# Patient Record
Sex: MALE | Race: ASIAN | NOT HISPANIC OR LATINO | Employment: OTHER | URBAN - METROPOLITAN AREA
[De-identification: names, ages, dates, MRNs, and addresses within clinical notes are randomized per-mention and may not be internally consistent; named-entity substitution may affect disease eponyms.]

---

## 2023-10-24 PROBLEM — I71.40 ABDOMINAL AORTIC ANEURYSM (AAA) WITHOUT RUPTURE (HCC): Status: ACTIVE | Noted: 2023-06-14

## 2023-10-24 PROBLEM — R73.9 HYPERGLYCEMIA: Status: ACTIVE | Noted: 2023-10-24

## 2023-10-24 PROBLEM — J96.21 ACUTE ON CHRONIC RESPIRATORY FAILURE WITH HYPOXIA (HCC): Status: ACTIVE | Noted: 2023-10-24

## 2023-10-24 PROBLEM — Z86.73 HISTORY OF STROKE: Status: ACTIVE | Noted: 2021-01-11

## 2023-10-24 PROBLEM — N13.8 BPH WITH URINARY OBSTRUCTION: Status: ACTIVE | Noted: 2023-08-31

## 2023-10-24 PROBLEM — I35.1 NON-RHEUMATIC AORTIC REGURGITATION: Status: ACTIVE | Noted: 2021-01-11

## 2023-10-24 PROBLEM — I25.10 ATHEROSCLEROTIC HEART DISEASE OF NATIVE CORONARY ARTERY WITHOUT ANGINA PECTORIS: Status: ACTIVE | Noted: 2021-01-11

## 2023-10-24 PROBLEM — E78.5 HYPERLIPIDEMIA: Status: ACTIVE | Noted: 2021-01-11

## 2023-10-24 PROBLEM — N40.1 BPH WITH URINARY OBSTRUCTION: Status: ACTIVE | Noted: 2023-08-31

## 2023-10-24 PROBLEM — I10 BENIGN ESSENTIAL HYPERTENSION: Status: ACTIVE | Noted: 2021-01-11

## 2023-10-24 PROBLEM — D64.9 NORMOCYTIC ANEMIA: Status: ACTIVE | Noted: 2023-10-24

## 2023-10-24 PROBLEM — J44.1 COPD EXACERBATION (HCC): Status: ACTIVE | Noted: 2023-10-24

## 2023-10-25 PROBLEM — E61.1 IRON DEFICIENCY: Status: ACTIVE | Noted: 2023-10-25

## 2024-05-29 ENCOUNTER — APPOINTMENT (OUTPATIENT)
Dept: RADIOLOGY | Facility: MEDICAL CENTER | Age: 79
End: 2024-05-29
Attending: STUDENT IN AN ORGANIZED HEALTH CARE EDUCATION/TRAINING PROGRAM
Payer: MEDICARE

## 2024-05-29 ENCOUNTER — HOSPITAL ENCOUNTER (INPATIENT)
Facility: MEDICAL CENTER | Age: 79
End: 2024-05-29
Attending: STUDENT IN AN ORGANIZED HEALTH CARE EDUCATION/TRAINING PROGRAM | Admitting: INTERNAL MEDICINE
Payer: MEDICARE

## 2024-05-29 DIAGNOSIS — I61.4 CEREBELLAR HEMORRHAGE (HCC): ICD-10-CM

## 2024-05-29 DIAGNOSIS — I61.9 HEMORRHAGIC CEREBROVASCULAR ACCIDENT (CVA) (HCC): ICD-10-CM

## 2024-05-29 LAB
ABO GROUP BLD: NORMAL
ALBUMIN SERPL BCP-MCNC: 4.2 G/DL (ref 3.2–4.9)
ALBUMIN/GLOB SERPL: 1.2 G/DL
ALP SERPL-CCNC: 59 U/L (ref 30–99)
ALT SERPL-CCNC: 8 U/L (ref 2–50)
ANION GAP SERPL CALC-SCNC: 14 MMOL/L (ref 7–16)
APTT PPP: 26.1 SEC (ref 24.7–36)
AST SERPL-CCNC: 14 U/L (ref 12–45)
BASOPHILS # BLD AUTO: 0.2 % (ref 0–1.8)
BASOPHILS # BLD: 0.03 K/UL (ref 0–0.12)
BILIRUB SERPL-MCNC: 0.6 MG/DL (ref 0.1–1.5)
BLD GP AB SCN SERPL QL: NORMAL
BUN SERPL-MCNC: 30 MG/DL (ref 8–22)
CALCIUM ALBUM COR SERPL-MCNC: 9.6 MG/DL (ref 8.5–10.5)
CALCIUM SERPL-MCNC: 9.8 MG/DL (ref 8.5–10.5)
CHLORIDE SERPL-SCNC: 103 MMOL/L (ref 96–112)
CO2 SERPL-SCNC: 23 MMOL/L (ref 20–33)
CREAT SERPL-MCNC: 0.89 MG/DL (ref 0.5–1.4)
EOSINOPHIL # BLD AUTO: 0.01 K/UL (ref 0–0.51)
EOSINOPHIL NFR BLD: 0.1 % (ref 0–6.9)
ERYTHROCYTE [DISTWIDTH] IN BLOOD BY AUTOMATED COUNT: 43.8 FL (ref 35.9–50)
GFR SERPLBLD CREATININE-BSD FMLA CKD-EPI: 87 ML/MIN/1.73 M 2
GLOBULIN SER CALC-MCNC: 3.4 G/DL (ref 1.9–3.5)
GLUCOSE SERPL-MCNC: 162 MG/DL (ref 65–99)
HCT VFR BLD AUTO: 41.2 % (ref 42–52)
HGB BLD-MCNC: 13.2 G/DL (ref 14–18)
IMM GRANULOCYTES # BLD AUTO: 0.06 K/UL (ref 0–0.11)
IMM GRANULOCYTES NFR BLD AUTO: 0.5 % (ref 0–0.9)
INR PPP: 1.03 (ref 0.87–1.13)
LYMPHOCYTES # BLD AUTO: 0.27 K/UL (ref 1–4.8)
LYMPHOCYTES NFR BLD: 2.2 % (ref 22–41)
MCH RBC QN AUTO: 28.1 PG (ref 27–33)
MCHC RBC AUTO-ENTMCNC: 32 G/DL (ref 32.3–36.5)
MCV RBC AUTO: 87.7 FL (ref 81.4–97.8)
MONOCYTES # BLD AUTO: 0.47 K/UL (ref 0–0.85)
MONOCYTES NFR BLD AUTO: 3.8 % (ref 0–13.4)
NEUTROPHILS # BLD AUTO: 11.4 K/UL (ref 1.82–7.42)
NEUTROPHILS NFR BLD: 93.2 % (ref 44–72)
NRBC # BLD AUTO: 0 K/UL
NRBC BLD-RTO: 0 /100 WBC (ref 0–0.2)
PLATELET # BLD AUTO: 226 K/UL (ref 164–446)
PMV BLD AUTO: 11.9 FL (ref 9–12.9)
POTASSIUM SERPL-SCNC: 4.2 MMOL/L (ref 3.6–5.5)
PROT SERPL-MCNC: 7.6 G/DL (ref 6–8.2)
PROTHROMBIN TIME: 13.6 SEC (ref 12–14.6)
RBC # BLD AUTO: 4.7 M/UL (ref 4.7–6.1)
RH BLD: NORMAL
SODIUM SERPL-SCNC: 140 MMOL/L (ref 135–145)
TROPONIN T SERPL-MCNC: 25 NG/L (ref 6–19)
WBC # BLD AUTO: 12.2 K/UL (ref 4.8–10.8)

## 2024-05-29 PROCEDURE — 85730 THROMBOPLASTIN TIME PARTIAL: CPT

## 2024-05-29 PROCEDURE — 85610 PROTHROMBIN TIME: CPT

## 2024-05-29 PROCEDURE — 96368 THER/DIAG CONCURRENT INF: CPT

## 2024-05-29 PROCEDURE — 86850 RBC ANTIBODY SCREEN: CPT

## 2024-05-29 PROCEDURE — 70498 CT ANGIOGRAPHY NECK: CPT

## 2024-05-29 PROCEDURE — 700117 HCHG RX CONTRAST REV CODE 255: Performed by: STUDENT IN AN ORGANIZED HEALTH CARE EDUCATION/TRAINING PROGRAM

## 2024-05-29 PROCEDURE — 94760 N-INVAS EAR/PLS OXIMETRY 1: CPT

## 2024-05-29 PROCEDURE — 96366 THER/PROPH/DIAG IV INF ADDON: CPT

## 2024-05-29 PROCEDURE — 99291 CRITICAL CARE FIRST HOUR: CPT

## 2024-05-29 PROCEDURE — 36415 COLL VENOUS BLD VENIPUNCTURE: CPT

## 2024-05-29 PROCEDURE — 770022 HCHG ROOM/CARE - ICU (200)

## 2024-05-29 PROCEDURE — 96365 THER/PROPH/DIAG IV INF INIT: CPT

## 2024-05-29 PROCEDURE — 700111 HCHG RX REV CODE 636 W/ 250 OVERRIDE (IP): Performed by: STUDENT IN AN ORGANIZED HEALTH CARE EDUCATION/TRAINING PROGRAM

## 2024-05-29 PROCEDURE — 86900 BLOOD TYPING SEROLOGIC ABO: CPT

## 2024-05-29 PROCEDURE — 85025 COMPLETE CBC W/AUTO DIFF WBC: CPT

## 2024-05-29 PROCEDURE — 80053 COMPREHEN METABOLIC PANEL: CPT

## 2024-05-29 PROCEDURE — 86901 BLOOD TYPING SEROLOGIC RH(D): CPT

## 2024-05-29 PROCEDURE — 70496 CT ANGIOGRAPHY HEAD: CPT

## 2024-05-29 PROCEDURE — 5A1945Z RESPIRATORY VENTILATION, 24-96 CONSECUTIVE HOURS: ICD-10-PCS | Performed by: INTERNAL MEDICINE

## 2024-05-29 PROCEDURE — 84484 ASSAY OF TROPONIN QUANT: CPT

## 2024-05-29 PROCEDURE — 700105 HCHG RX REV CODE 258: Performed by: STUDENT IN AN ORGANIZED HEALTH CARE EDUCATION/TRAINING PROGRAM

## 2024-05-29 PROCEDURE — 94002 VENT MGMT INPAT INIT DAY: CPT

## 2024-05-29 PROCEDURE — 71045 X-RAY EXAM CHEST 1 VIEW: CPT

## 2024-05-29 RX ORDER — 3% SODIUM CHLORIDE 3 G/100ML
0-60 INJECTION, SOLUTION INTRAVENOUS CONTINUOUS
Status: DISCONTINUED | OUTPATIENT
Start: 2024-05-29 | End: 2024-05-31

## 2024-05-29 RX ADMIN — IOHEXOL 80 ML: 350 INJECTION, SOLUTION INTRAVENOUS at 23:05

## 2024-05-29 RX ADMIN — PROPOFOL 40 MCG/KG/MIN: 10 INJECTION, EMULSION INTRAVENOUS at 23:34

## 2024-05-29 RX ADMIN — SODIUM CHLORIDE 30 ML/HR: 3 INJECTION, SOLUTION INTRAVENOUS at 23:39

## 2024-05-29 ASSESSMENT — FIBROSIS 4 INDEX: FIB4 SCORE: 1.29

## 2024-05-30 ENCOUNTER — APPOINTMENT (OUTPATIENT)
Dept: RADIOLOGY | Facility: MEDICAL CENTER | Age: 79
End: 2024-05-30
Attending: INTERNAL MEDICINE
Payer: MEDICARE

## 2024-05-30 ENCOUNTER — APPOINTMENT (OUTPATIENT)
Dept: RADIOLOGY | Facility: MEDICAL CENTER | Age: 79
End: 2024-05-30
Attending: NURSE PRACTITIONER
Payer: MEDICARE

## 2024-05-30 ENCOUNTER — HOSPITAL ENCOUNTER (OUTPATIENT)
Dept: RADIOLOGY | Facility: MEDICAL CENTER | Age: 79
End: 2024-05-30
Attending: NEUROLOGICAL SURGERY

## 2024-05-30 PROBLEM — J44.9 COPD (CHRONIC OBSTRUCTIVE PULMONARY DISEASE) (HCC): Status: ACTIVE | Noted: 2023-10-24

## 2024-05-30 LAB
ABO + RH BLD: NORMAL
ANION GAP SERPL CALC-SCNC: 11 MMOL/L (ref 7–16)
ANION GAP SERPL CALC-SCNC: 14 MMOL/L (ref 7–16)
APPEARANCE UR: CLEAR
ARTERIAL PATENCY WRIST A: ABNORMAL
ARTERIAL PATENCY WRIST A: ABNORMAL
BACTERIA #/AREA URNS HPF: NEGATIVE /HPF
BASE EXCESS BLDA CALC-SCNC: 0 MMOL/L (ref -4–3)
BASE EXCESS BLDA CALC-SCNC: 1 MMOL/L (ref -4–3)
BILIRUB UR QL STRIP.AUTO: NEGATIVE
BODY TEMPERATURE: ABNORMAL DEGREES
BODY TEMPERATURE: ABNORMAL DEGREES
BREATHS SETTING VENT: 18
BREATHS SETTING VENT: 18
BUN SERPL-MCNC: 22 MG/DL (ref 8–22)
BUN SERPL-MCNC: 28 MG/DL (ref 8–22)
CALCIUM SERPL-MCNC: 7.9 MG/DL (ref 8.5–10.5)
CALCIUM SERPL-MCNC: 9.1 MG/DL (ref 8.5–10.5)
CFT BLD TEG: 4.1 MIN (ref 4.6–9.1)
CFT P HPASE BLD TEG: 4.6 MIN (ref 4.3–8.3)
CHLORIDE SERPL-SCNC: 103 MMOL/L (ref 96–112)
CHLORIDE SERPL-SCNC: 123 MMOL/L (ref 96–112)
CHOLEST SERPL-MCNC: 162 MG/DL (ref 100–199)
CLOT ANGLE BLD TEG: 78 DEGREES (ref 63–78)
CLOT LYSIS 30M P MA LENFR BLD TEG: 0 % (ref 0–2.6)
CO2 BLDA-SCNC: 25 MMOL/L (ref 20–33)
CO2 BLDA-SCNC: 26 MMOL/L (ref 20–33)
CO2 SERPL-SCNC: 19 MMOL/L (ref 20–33)
CO2 SERPL-SCNC: 22 MMOL/L (ref 20–33)
COLOR UR: YELLOW
CREAT SERPL-MCNC: 0.58 MG/DL (ref 0.5–1.4)
CREAT SERPL-MCNC: 0.83 MG/DL (ref 0.5–1.4)
CT.EXTRINSIC BLD ROTEM: 0.8 MIN (ref 0.8–2.1)
DELSYS IDSYS: ABNORMAL
DELSYS IDSYS: ABNORMAL
EKG IMPRESSION: NORMAL
EPI CELLS #/AREA URNS HPF: NEGATIVE /HPF
EST. AVERAGE GLUCOSE BLD GHB EST-MCNC: 114 MG/DL
GFR SERPLBLD CREATININE-BSD FMLA CKD-EPI: 89 ML/MIN/1.73 M 2
GFR SERPLBLD CREATININE-BSD FMLA CKD-EPI: 99 ML/MIN/1.73 M 2
GLUCOSE BLD STRIP.AUTO-MCNC: 120 MG/DL (ref 65–99)
GLUCOSE BLD STRIP.AUTO-MCNC: 80 MG/DL (ref 65–99)
GLUCOSE BLD STRIP.AUTO-MCNC: 85 MG/DL (ref 65–99)
GLUCOSE BLD STRIP.AUTO-MCNC: 89 MG/DL (ref 65–99)
GLUCOSE SERPL-MCNC: 116 MG/DL (ref 65–99)
GLUCOSE SERPL-MCNC: 90 MG/DL (ref 65–99)
GLUCOSE UR STRIP.AUTO-MCNC: NEGATIVE MG/DL
HBA1C MFR BLD: 5.6 % (ref 4–5.6)
HCO3 BLDA-SCNC: 23.7 MMOL/L (ref 17–25)
HCO3 BLDA-SCNC: 25.1 MMOL/L (ref 17–25)
HDLC SERPL-MCNC: 44 MG/DL
HOROWITZ INDEX BLDA+IHG-RTO: 255 MM[HG]
HOROWITZ INDEX BLDA+IHG-RTO: 288 MM[HG]
HYALINE CASTS #/AREA URNS LPF: ABNORMAL /LPF
KETONES UR STRIP.AUTO-MCNC: NEGATIVE MG/DL
LACTATE BLD-SCNC: 0.4 MMOL/L (ref 0.5–2)
LACTATE BLD-SCNC: 0.8 MMOL/L (ref 0.5–2)
LDLC SERPL CALC-MCNC: ABNORMAL MG/DL
LEUKOCYTE ESTERASE UR QL STRIP.AUTO: NEGATIVE
MCF BLD TEG: 67.7 MM (ref 52–69)
MCF.PLATELET INHIB BLD ROTEM: 30.8 MM (ref 15–32)
MICRO URNS: ABNORMAL
MODE IMODE: ABNORMAL
MODE IMODE: ABNORMAL
NITRITE UR QL STRIP.AUTO: NEGATIVE
O2/TOTAL GAS SETTING VFR VENT: 40 %
O2/TOTAL GAS SETTING VFR VENT: 40 %
OSMOLALITY SERPL: 308 MOSM/KG H2O (ref 278–298)
OSMOLALITY UR: 308 MOSM/KG H2O (ref 300–900)
PA AA BLD-ACNC: 96.9 % (ref 0–11)
PA ADP BLD-ACNC: 39.7 % (ref 0–17)
PCO2 BLDA: 35.8 MMHG (ref 26–37)
PCO2 BLDA: 36.5 MMHG (ref 26–37)
PCO2 TEMP ADJ BLDA: 34.7 MMHG (ref 26–37)
PCO2 TEMP ADJ BLDA: 35.4 MMHG (ref 26–37)
PEEP END EXPIRATORY PRESSURE IPEEP: 8 CMH20
PEEP END EXPIRATORY PRESSURE IPEEP: 8 CMH20
PH BLDA: 7.43 [PH] (ref 7.4–7.5)
PH BLDA: 7.45 [PH] (ref 7.4–7.5)
PH TEMP ADJ BLDA: 7.44 [PH] (ref 7.4–7.5)
PH TEMP ADJ BLDA: 7.46 [PH] (ref 7.4–7.5)
PH UR STRIP.AUTO: 6.5 [PH] (ref 5–8)
PO2 BLDA: 102 MMHG (ref 64–87)
PO2 BLDA: 115 MMHG (ref 64–87)
PO2 TEMP ADJ BLDA: 111 MMHG (ref 64–87)
PO2 TEMP ADJ BLDA: 98 MMHG (ref 64–87)
POTASSIUM SERPL-SCNC: 3.7 MMOL/L (ref 3.6–5.5)
POTASSIUM SERPL-SCNC: 4.6 MMOL/L (ref 3.6–5.5)
PROT UR QL STRIP: 30 MG/DL
RBC # URNS HPF: ABNORMAL /HPF
RBC UR QL AUTO: ABNORMAL
SAO2 % BLDA: 98 % (ref 93–99)
SAO2 % BLDA: 99 % (ref 93–99)
SODIUM SERPL-SCNC: 139 MMOL/L (ref 135–145)
SODIUM SERPL-SCNC: 150 MMOL/L (ref 135–145)
SODIUM SERPL-SCNC: 153 MMOL/L (ref 135–145)
SODIUM UR-SCNC: 32 MMOL/L
SP GR UR STRIP.AUTO: <=1.005
SPECIMEN DRAWN FROM PATIENT: ABNORMAL
SPECIMEN DRAWN FROM PATIENT: ABNORMAL
TEG ALGORITHM TGALG: ABNORMAL
TIDAL VOLUME IVT: 340 ML
TIDAL VOLUME IVT: 390 ML
TRIGL SERPL-MCNC: 106 MG/DL (ref 0–149)
TRIGL SERPL-MCNC: 919 MG/DL (ref 0–149)
UROBILINOGEN UR STRIP.AUTO-MCNC: 0.2 MG/DL
WBC #/AREA URNS HPF: ABNORMAL /HPF

## 2024-05-30 PROCEDURE — 700102 HCHG RX REV CODE 250 W/ 637 OVERRIDE(OP): Performed by: INTERNAL MEDICINE

## 2024-05-30 PROCEDURE — 70450 CT HEAD/BRAIN W/O DYE: CPT

## 2024-05-30 PROCEDURE — 84478 ASSAY OF TRIGLYCERIDES: CPT

## 2024-05-30 PROCEDURE — 700111 HCHG RX REV CODE 636 W/ 250 OVERRIDE (IP): Mod: JZ,JG | Performed by: INTERNAL MEDICINE

## 2024-05-30 PROCEDURE — 82962 GLUCOSE BLOOD TEST: CPT | Mod: 91

## 2024-05-30 PROCEDURE — 85347 COAGULATION TIME ACTIVATED: CPT

## 2024-05-30 PROCEDURE — 83605 ASSAY OF LACTIC ACID: CPT

## 2024-05-30 PROCEDURE — 700102 HCHG RX REV CODE 250 W/ 637 OVERRIDE(OP): Performed by: NURSE PRACTITIONER

## 2024-05-30 PROCEDURE — 81001 URINALYSIS AUTO W/SCOPE: CPT

## 2024-05-30 PROCEDURE — 700105 HCHG RX REV CODE 258: Performed by: INTERNAL MEDICINE

## 2024-05-30 PROCEDURE — 99291 CRITICAL CARE FIRST HOUR: CPT | Performed by: NURSE PRACTITIONER

## 2024-05-30 PROCEDURE — 700111 HCHG RX REV CODE 636 W/ 250 OVERRIDE (IP): Mod: JZ | Performed by: NURSE PRACTITIONER

## 2024-05-30 PROCEDURE — A9270 NON-COVERED ITEM OR SERVICE: HCPCS | Performed by: INTERNAL MEDICINE

## 2024-05-30 PROCEDURE — 85576 BLOOD PLATELET AGGREGATION: CPT | Mod: 91

## 2024-05-30 PROCEDURE — 36600 WITHDRAWAL OF ARTERIAL BLOOD: CPT

## 2024-05-30 PROCEDURE — A9270 NON-COVERED ITEM OR SERVICE: HCPCS | Performed by: NURSE PRACTITIONER

## 2024-05-30 PROCEDURE — 80048 BASIC METABOLIC PNL TOTAL CA: CPT

## 2024-05-30 PROCEDURE — 83930 ASSAY OF BLOOD OSMOLALITY: CPT

## 2024-05-30 PROCEDURE — 99223 1ST HOSP IP/OBS HIGH 75: CPT | Performed by: STUDENT IN AN ORGANIZED HEALTH CARE EDUCATION/TRAINING PROGRAM

## 2024-05-30 PROCEDURE — 770022 HCHG ROOM/CARE - ICU (200)

## 2024-05-30 PROCEDURE — 82803 BLOOD GASES ANY COMBINATION: CPT

## 2024-05-30 PROCEDURE — 94003 VENT MGMT INPAT SUBQ DAY: CPT

## 2024-05-30 PROCEDURE — 36415 COLL VENOUS BLD VENIPUNCTURE: CPT

## 2024-05-30 PROCEDURE — 84300 ASSAY OF URINE SODIUM: CPT

## 2024-05-30 PROCEDURE — 84295 ASSAY OF SERUM SODIUM: CPT

## 2024-05-30 PROCEDURE — 93010 ELECTROCARDIOGRAM REPORT: CPT | Performed by: INTERNAL MEDICINE

## 2024-05-30 PROCEDURE — 83935 ASSAY OF URINE OSMOLALITY: CPT

## 2024-05-30 PROCEDURE — 700105 HCHG RX REV CODE 258: Performed by: NURSE PRACTITIONER

## 2024-05-30 PROCEDURE — 85384 FIBRINOGEN ACTIVITY: CPT | Mod: 91

## 2024-05-30 PROCEDURE — 93005 ELECTROCARDIOGRAM TRACING: CPT | Performed by: INTERNAL MEDICINE

## 2024-05-30 PROCEDURE — 80061 LIPID PANEL: CPT

## 2024-05-30 PROCEDURE — 02HV33Z INSERTION OF INFUSION DEVICE INTO SUPERIOR VENA CAVA, PERCUTANEOUS APPROACH: ICD-10-PCS | Performed by: INTERNAL MEDICINE

## 2024-05-30 PROCEDURE — 83036 HEMOGLOBIN GLYCOSYLATED A1C: CPT

## 2024-05-30 PROCEDURE — C1751 CATH, INF, PER/CENT/MIDLINE: HCPCS

## 2024-05-30 PROCEDURE — 700101 HCHG RX REV CODE 250: Performed by: INTERNAL MEDICINE

## 2024-05-30 RX ORDER — FLUTICASONE FUROATE AND VILANTEROL 200; 25 UG/1; UG/1
1 POWDER RESPIRATORY (INHALATION) DAILY
Status: ON HOLD | COMMUNITY
End: 2024-07-19

## 2024-05-30 RX ORDER — FAMOTIDINE 20 MG/1
20 TABLET, FILM COATED ORAL EVERY 12 HOURS
Status: DISCONTINUED | OUTPATIENT
Start: 2024-05-30 | End: 2024-05-30

## 2024-05-30 RX ORDER — ACETAMINOPHEN 325 MG/1
650 TABLET ORAL EVERY 4 HOURS PRN
Status: DISCONTINUED | OUTPATIENT
Start: 2024-05-30 | End: 2024-05-30

## 2024-05-30 RX ORDER — AMOXICILLIN 250 MG
2 CAPSULE ORAL 2 TIMES DAILY
Status: DISCONTINUED | OUTPATIENT
Start: 2024-05-30 | End: 2024-05-30

## 2024-05-30 RX ORDER — POLYETHYLENE GLYCOL 3350 17 G/17G
1 POWDER, FOR SOLUTION ORAL
Status: DISCONTINUED | OUTPATIENT
Start: 2024-05-30 | End: 2024-06-26 | Stop reason: HOSPADM

## 2024-05-30 RX ORDER — ACETAMINOPHEN 650 MG/1
650 SUPPOSITORY RECTAL EVERY 4 HOURS PRN
Status: DISCONTINUED | OUTPATIENT
Start: 2024-05-30 | End: 2024-06-11

## 2024-05-30 RX ORDER — LABETALOL HYDROCHLORIDE 5 MG/ML
10 INJECTION, SOLUTION INTRAVENOUS
Status: DISCONTINUED | OUTPATIENT
Start: 2024-05-30 | End: 2024-06-01

## 2024-05-30 RX ORDER — SODIUM CHLORIDE, SODIUM LACTATE, POTASSIUM CHLORIDE, CALCIUM CHLORIDE 600; 310; 30; 20 MG/100ML; MG/100ML; MG/100ML; MG/100ML
INJECTION, SOLUTION INTRAVENOUS CONTINUOUS
Status: DISCONTINUED | OUTPATIENT
Start: 2024-05-30 | End: 2024-06-02

## 2024-05-30 RX ORDER — TAMSULOSIN HYDROCHLORIDE 0.4 MG/1
0.4 CAPSULE ORAL
Status: DISCONTINUED | OUTPATIENT
Start: 2024-05-30 | End: 2024-05-30

## 2024-05-30 RX ORDER — ACETAMINOPHEN 650 MG/1
650 SUPPOSITORY RECTAL EVERY 4 HOURS PRN
Status: DISCONTINUED | OUTPATIENT
Start: 2024-05-30 | End: 2024-05-30

## 2024-05-30 RX ORDER — NOREPINEPHRINE BITARTRATE 0.03 MG/ML
0-1 INJECTION, SOLUTION INTRAVENOUS CONTINUOUS
Status: DISCONTINUED | OUTPATIENT
Start: 2024-05-30 | End: 2024-05-31

## 2024-05-30 RX ORDER — LANSOPRAZOLE 30 MG/1
30 TABLET, ORALLY DISINTEGRATING, DELAYED RELEASE ORAL DAILY
Status: DISCONTINUED | OUTPATIENT
Start: 2024-05-31 | End: 2024-06-18

## 2024-05-30 RX ORDER — CALCIUM GLUCONATE 20 MG/ML
1 INJECTION, SOLUTION INTRAVENOUS ONCE
Status: COMPLETED | OUTPATIENT
Start: 2024-05-30 | End: 2024-05-31

## 2024-05-30 RX ORDER — ONDANSETRON 2 MG/ML
4 INJECTION INTRAMUSCULAR; INTRAVENOUS EVERY 4 HOURS PRN
Status: DISCONTINUED | OUTPATIENT
Start: 2024-05-30 | End: 2024-06-26 | Stop reason: HOSPADM

## 2024-05-30 RX ORDER — ACETAMINOPHEN 325 MG/1
650 TABLET ORAL EVERY 4 HOURS PRN
Status: DISCONTINUED | OUTPATIENT
Start: 2024-05-30 | End: 2024-06-11

## 2024-05-30 RX ORDER — BISACODYL 10 MG
10 SUPPOSITORY, RECTAL RECTAL
Status: DISCONTINUED | OUTPATIENT
Start: 2024-05-30 | End: 2024-05-30

## 2024-05-30 RX ORDER — PETROLATUM 42 G/100G
OINTMENT TOPICAL 2 TIMES DAILY
Status: DISCONTINUED | OUTPATIENT
Start: 2024-05-30 | End: 2024-06-26 | Stop reason: HOSPADM

## 2024-05-30 RX ORDER — PREDNISONE 20 MG/1
40 TABLET ORAL DAILY
Status: ON HOLD | COMMUNITY
Start: 2024-04-24 | End: 2024-06-25

## 2024-05-30 RX ORDER — DEXTROSE MONOHYDRATE 25 G/50ML
25 INJECTION, SOLUTION INTRAVENOUS
Status: DISCONTINUED | OUTPATIENT
Start: 2024-05-30 | End: 2024-06-05

## 2024-05-30 RX ORDER — POLYETHYLENE GLYCOL 3350 17 G/17G
1 POWDER, FOR SOLUTION ORAL
Status: DISCONTINUED | OUTPATIENT
Start: 2024-05-30 | End: 2024-05-30

## 2024-05-30 RX ORDER — AZITHROMYCIN 250 MG/1
250-500 TABLET, FILM COATED ORAL DAILY
Status: ON HOLD | COMMUNITY
Start: 2024-04-24 | End: 2024-06-25

## 2024-05-30 RX ORDER — HYDRALAZINE HYDROCHLORIDE 20 MG/ML
10 INJECTION INTRAMUSCULAR; INTRAVENOUS
Status: DISCONTINUED | OUTPATIENT
Start: 2024-05-30 | End: 2024-06-01

## 2024-05-30 RX ORDER — ONDANSETRON 4 MG/1
4 TABLET, ORALLY DISINTEGRATING ORAL EVERY 4 HOURS PRN
Status: DISCONTINUED | OUTPATIENT
Start: 2024-05-30 | End: 2024-05-30

## 2024-05-30 RX ORDER — ONDANSETRON 4 MG/1
4 TABLET, ORALLY DISINTEGRATING ORAL EVERY 4 HOURS PRN
Status: DISCONTINUED | OUTPATIENT
Start: 2024-05-30 | End: 2024-06-26 | Stop reason: HOSPADM

## 2024-05-30 RX ORDER — AMOXICILLIN 250 MG
2 CAPSULE ORAL EVERY EVENING
Status: DISCONTINUED | OUTPATIENT
Start: 2024-05-30 | End: 2024-06-13

## 2024-05-30 RX ORDER — ONDANSETRON 2 MG/ML
4 INJECTION INTRAMUSCULAR; INTRAVENOUS EVERY 4 HOURS PRN
Status: DISCONTINUED | OUTPATIENT
Start: 2024-05-30 | End: 2024-05-30

## 2024-05-30 RX ORDER — DIAZEPAM 5 MG/ML
5 INJECTION, SOLUTION INTRAMUSCULAR; INTRAVENOUS EVERY 6 HOURS PRN
Status: DISCONTINUED | OUTPATIENT
Start: 2024-05-30 | End: 2024-06-26 | Stop reason: HOSPADM

## 2024-05-30 RX ADMIN — Medication: at 18:54

## 2024-05-30 RX ADMIN — PROPOFOL 40 MCG/KG/MIN: 10 INJECTION, EMULSION INTRAVENOUS at 07:02

## 2024-05-30 RX ADMIN — SENNOSIDES AND DOCUSATE SODIUM 2 TABLET: 50; 8.6 TABLET ORAL at 17:19

## 2024-05-30 RX ADMIN — FENTANYL CITRATE 50 MCG: 50 INJECTION, SOLUTION INTRAMUSCULAR; INTRAVENOUS at 15:55

## 2024-05-30 RX ADMIN — NOREPINEPHRINE BITARTRATE 0.05 MCG/KG/MIN: 1 INJECTION, SOLUTION, CONCENTRATE INTRAVENOUS at 17:17

## 2024-05-30 RX ADMIN — CALCIUM GLUCONATE 1 G: 20 INJECTION, SOLUTION INTRAVENOUS at 23:15

## 2024-05-30 RX ADMIN — DESMOPRESSIN ACETATE 18.3 MCG: 4 INJECTION INTRAVENOUS; SUBCUTANEOUS at 07:52

## 2024-05-30 RX ADMIN — SODIUM CHLORIDE 20 ML/HR: 3 INJECTION, SOLUTION INTRAVENOUS at 23:18

## 2024-05-30 RX ADMIN — POTASSIUM BICARBONATE 25 MEQ: 978 TABLET, EFFERVESCENT ORAL at 23:18

## 2024-05-30 RX ADMIN — FAMOTIDINE 20 MG: 10 INJECTION, SOLUTION INTRAVENOUS at 07:44

## 2024-05-30 RX ADMIN — PROPOFOL 40 MCG/KG/MIN: 10 INJECTION, EMULSION INTRAVENOUS at 19:36

## 2024-05-30 RX ADMIN — SODIUM CHLORIDE, POTASSIUM CHLORIDE, SODIUM LACTATE AND CALCIUM CHLORIDE: 600; 310; 30; 20 INJECTION, SOLUTION INTRAVENOUS at 13:23

## 2024-05-30 ASSESSMENT — PAIN DESCRIPTION - PAIN TYPE
TYPE: ACUTE PAIN

## 2024-05-30 ASSESSMENT — ENCOUNTER SYMPTOMS
CARDIOVASCULAR NEGATIVE: 1
WEAKNESS: 1
NAUSEA: 1
PSYCHIATRIC NEGATIVE: 1
DIARRHEA: 0
EYES NEGATIVE: 1
HEADACHES: 1
VOMITING: 1
RESPIRATORY NEGATIVE: 1
CONSTITUTIONAL NEGATIVE: 1
MUSCULOSKELETAL NEGATIVE: 1

## 2024-05-30 ASSESSMENT — FIBROSIS 4 INDEX: FIB4 SCORE: 1.71

## 2024-05-30 NOTE — CARE PLAN
Problem: Ventilation  Goal: Ability to achieve and maintain unassisted ventilation or tolerate decreased levels of ventilator support  Description: Target End Date:  4 days     Document on Vent flowsheet    1.  Support and monitor invasive and noninvasive mechanical ventilation  2.  Monitor ventilator weaning response  3.  Perform ventilator associated pneumonia prevention interventions  4.  Manage ventilation therapy by monitoring diagnostic test results  Outcome: Not Met     Ventilator Daily Summary    Vent Day #2  Airway: 7.5/22    Ventilator settings: 18/390/+8/40%  Weaning trials: n/a  Respiratory Procedures: arrived intubated, awaiting ABG    Plan: Continue current ventilator settings and wean mechanical ventilation as tolerated per physician orders.

## 2024-05-30 NOTE — DISCHARGE PLANNING
[facility] Transitional Care Coordination    Referral from: [facility]    Insurance Provider on Facesheet: TEETEE/SHELDONP    Potential Rehab Diagnosis: CVA    Chart review indicates patient may have on going medical management and may have therapy needs to possibly meet inpatient rehab facility criteria with the goal of returning to community.    D/C support will need to be verified: Spouse    Physiatry consultation pended per protocol.  Vented day 1.     Thank you for the referral.

## 2024-05-30 NOTE — PROCEDURES
Vascular Access Team     Date of Insertion: 05/30/24  Arm Circumference: 26.5  Internal length: 40  External Length: 0  Vein Occupancy %: 42   Reason for PICC: Hypertonic   Labs: WBC 12.2, , BUN 28, Cr 0.83, GFR 89, INR 1.03     Consents confirmed, vessel patency confirmed with ultrasound. Risks and benefits of procedure explained to patient and education regarding central line associated bloodstream infections provided. Questions answered.      PICC placed in LUE per licensed provider order with ultrasound guidance.  5 Fr, 3 lumen PICC placed in Basilic vein after 1 attempt(s). 2 mL of 1% lidocaine injected intradermally at the insertion site. A 21 gauge microintroducer needle was visualized entering the vein and modified Seldinger technique was used to obtain access to the vein. 40 cm catheter inserted and brisk blood return was observed from each lumen upon aspiration. Line secured at the 0 cm marker. TCS stylet removed and observed to be fully intact. Each lumen flushed using pulsatile method without resistance with 10 mL 0.9% normal saline. PICC line secured with Biopatch and Tegaderm.     PICC tip placement location is confirmed by nurse to be in the Superior Vena Cava (SVC) utilizing 3CG technology. PICC line is appropriate for use at this time. Patient tolerated procedure well, without complications.  Patient condition relayed to primary RN or ordering physician via this post procedure note in the EMR.      Ultrasound images uploaded to PACS and viewable in the EMR - no  Ultrasound imaged printed and placed in paper chart - no     BARD Power PICC ref # [id number], Lot # [id number], Expiration Date 05/31/2025

## 2024-05-30 NOTE — ED PROVIDER NOTES
ED Provider Note    CHIEF COMPLAINT  Chief Complaint   Patient presents with    Possible Stroke     Pt transferred from [place] via Care flight for hemorrhagic CVA. LKW 2030. Pt was eating dinner, and had sudden headache and started vomiting. Pt originally arrived to [place] via EMS with N/V, GCS 11. Pt was intubated at [place]       EXTERNAL RECORDS REVIEWED  External ED Note acute hemorrhagic stroke seen on CT.  Patient with mental status change and intubated for airway protection prior to transport.  He has prior office visit documentation of CAD, AAA status postrepair, BPH    HPI/ROS  LIMITATION TO HISTORY   Select: intubated and sedated  OUTSIDE HISTORIAN(S):  EMS on propofol drip.  Blood pressures are normal range.  Patient was witnessed moving all extremities.    Jl Mueller is a 78 y.o. male who presents as a transfer from outside hospital.  Patient had been seen for acute onset of headache and vomiting around 6 PM.  Symptoms got worse at 630 and he was brought to an outside hospital where he gradually became less responsive.  He was found to have a hemorrhagic stroke and transferred here was arranged with neurology aware during the transfer conversation.  He otherwise has a history of CAD, hypertension, prior strokes and heart attack, reported to me he has a history of a AAA and on chart review in January he had a CTA showing aortic Endo stent grafting of abdominal aortic aneurysm and unchanged in size aneurysm.    PAST MEDICAL HISTORY   has a past medical history of Chronic obstructive pulmonary disease (HCC), Hypertension, MI, old, Peptic ulcer, Prostate disorder, and Stroke (HCC).    SURGICAL HISTORY   has a past surgical history that includes aaa with stent graft.    FAMILY HISTORY  No family history on file.    SOCIAL HISTORY  Social History     Tobacco Use    Smoking status: Unknown    Smokeless tobacco: Not on file   Vaping Use    Vaping status: Never Used   Substance and  "Sexual Activity    Alcohol use: Not Currently    Drug use: Not Currently    Sexual activity: Not on file       CURRENT MEDICATIONS  Home Medications       Reviewed by Nancy Zamudio, Pharmacy Intern (Pharmacy Intern) on 05/30/24 at 0042  Med List Status: Complete     Medication Last Dose Status   albuterol (PROVENTIL) 2.5mg/0.5ml Nebu Soln PRN Active   albuterol 108 (90 Base) MCG/ACT Aero Soln inhalation aerosol PRN Active   aspirin EC (ECOTRIN) 81 MG Tablet Delayed Response 5/29/2024 Active   azithromycin (ZITHROMAX) 250 MG Tab 4/29/2024 Active   B Complex Vitamins (B COMPLEX 1 PO) 5/29/2024 Active   fluticasone furoate-vilanterol (BREO ELLIPTA) 200-25 MCG/ACT AEROSOL POWDER, BREATH ACTIVATED  Active   guaiFENesin ER (MUCINEX) 600 MG TABLET SR 12 HR Not Taking Active   magnesium oxide (MAG-OX) 400 MG Tab tablet 5/29/2024 Active   Multiple Vitamins-Minerals (PRESERVISION AREDS 2) Cap 5/29/2024 Active   OXYGEN-HELIUM INH SUPPLY Active   pantoprazole (PROTONIX) 40 MG Tablet Delayed Response 5/29/2024 Active   Pitavastatin Calcium (LIVALO) 2 MG Tab 5/28/2024 Active   predniSONE (DELTASONE) 20 MG Tab 4/29/2024 Active   tamsulosin (FLOMAX) 0.4 MG capsule 5/29/2024 Active                  Audit from Redirected Encounters    **Home medications have not yet been reviewed for this encounter**         ALLERGIES  Allergies   Allergen Reactions    Lisinopril      cough    Namenda [Memantine]      \"weakness\"    Rosuvastatin        PHYSICAL EXAM  VITAL SIGNS: /64   Pulse 67   Temp 36.3 °C (97.4 °F) (Temporal)   Resp 18   Ht 1.676 m (5' 5.98\")   Wt 61 kg (134 lb 7.7 oz)   SpO2 100%   BMI 21.72 kg/m²    Constitutional: Awake and alert. No acute distress.  Head: NCAT.  HEENT: Normal Conjunctiva. PERRLA.  Intubated and sedated.  Neck: Grossly normal range of motion. Airway midline.  Cardiovascular: Normal heart rate, Normal rhythm.  Thorax & Lungs: No respiratory distress. Clear to Auscultation bilaterally.  Abdomen: " Normal inspection. Nontender. Nondistended  Skin: No obvious rash.  Back: No tenderness, No CVA tenderness.   Musculoskeletal: No obvious deformity. Moves all extremities Well.  Neurologic: GCS 3 T. Moving all extremities noted.  Psychiatric: Mood and affect are appropriate for situation.    EKG/LABS  Results for orders placed or performed during the hospital encounter of 05/29/24   CBC WITH DIFFERENTIAL   Result Value Ref Range    WBC 12.2 (H) 4.8 - 10.8 K/uL    RBC 4.70 4.70 - 6.10 M/uL    Hemoglobin 13.2 (L) 14.0 - 18.0 g/dL    Hematocrit 41.2 (L) 42.0 - 52.0 %    MCV 87.7 81.4 - 97.8 fL    MCH 28.1 27.0 - 33.0 pg    MCHC 32.0 (L) 32.3 - 36.5 g/dL    RDW 43.8 35.9 - 50.0 fL    Platelet Count 226 164 - 446 K/uL    MPV 11.9 9.0 - 12.9 fL    Neutrophils-Polys 93.20 (H) 44.00 - 72.00 %    Lymphocytes 2.20 (L) 22.00 - 41.00 %    Monocytes 3.80 0.00 - 13.40 %    Eosinophils 0.10 0.00 - 6.90 %    Basophils 0.20 0.00 - 1.80 %    Immature Granulocytes 0.50 0.00 - 0.90 %    Nucleated RBC 0.00 0.00 - 0.20 /100 WBC    Neutrophils (Absolute) 11.40 (H) 1.82 - 7.42 K/uL    Lymphs (Absolute) 0.27 (L) 1.00 - 4.80 K/uL    Monos (Absolute) 0.47 0.00 - 0.85 K/uL    Eos (Absolute) 0.01 0.00 - 0.51 K/uL    Baso (Absolute) 0.03 0.00 - 0.12 K/uL    Immature Granulocytes (abs) 0.06 0.00 - 0.11 K/uL    NRBC (Absolute) 0.00 K/uL   COMP METABOLIC PANEL   Result Value Ref Range    Sodium 140 135 - 145 mmol/L    Potassium 4.2 3.6 - 5.5 mmol/L    Chloride 103 96 - 112 mmol/L    Co2 23 20 - 33 mmol/L    Anion Gap 14.0 7.0 - 16.0    Glucose 162 (H) 65 - 99 mg/dL    Bun 30 (H) 8 - 22 mg/dL    Creatinine 0.89 0.50 - 1.40 mg/dL    Calcium 9.8 8.5 - 10.5 mg/dL    Correct Calcium 9.6 8.5 - 10.5 mg/dL    AST(SGOT) 14 12 - 45 U/L    ALT(SGPT) 8 2 - 50 U/L    Alkaline Phosphatase 59 30 - 99 U/L    Total Bilirubin 0.6 0.1 - 1.5 mg/dL    Albumin 4.2 3.2 - 4.9 g/dL    Total Protein 7.6 6.0 - 8.2 g/dL    Globulin 3.4 1.9 - 3.5 g/dL    A-G Ratio 1.2 g/dL    PROTHROMBIN TIME   Result Value Ref Range    PT 13.6 12.0 - 14.6 sec    INR 1.03 0.87 - 1.13   APTT   Result Value Ref Range    APTT 26.1 24.7 - 36.0 sec   COD (ADULT)   Result Value Ref Range    ABO Grouping Only A     Rh Grouping Only POS     Antibody Screen-Cod NEG    TROPONIN   Result Value Ref Range    Troponin T 25 (H) 6 - 19 ng/L   ABO Rh Confirm   Result Value Ref Range    ABO Rh Confirm A POS    ESTIMATED GFR   Result Value Ref Range    GFR (CKD-EPI) 87 >60 mL/min/1.73 m 2       I have independently interpreted this EKG    RADIOLOGY/PROCEDURES   I have independently interpreted the diagnostic imaging associated with this visit and am waiting the final reading from the radiologist.   My preliminary interpretation is as follows: L cerebellar hemorrhagic CVA. No Intraventricular bleeding    Radiologist interpretation:  DX-CHEST-PORTABLE (1 VIEW)   Final Result         1.  Left basilar atelectasis, no focal infiltrate   2.  Trace left pleural effusion   3.  Atherosclerosis      CT-CTA NECK WITH & W/O-POST PROCESSING   Final Result         1.  Scattered atherosclerosis without significant stenosis or occlusion.   2.  4.2 cm proximal descending thoracic aortic aneurysm, radiographic follow-up and surveillance recommended as clinically appropriate.         CT-CTA HEAD WITH & W/O-POST PROCESS   Final Result         1.  No large vessel occlusion or aneurysm identified   2.  Large cerebellar hemorrhage predominantly in the left cerebellum      These findings were discussed with the patient's clinician, Nikki Alexander, on 5/29/2024 11:35 PM.      EC-ECHOCARDIOGRAM COMPLETE W/O CONT    (Results Pending)   CT-HEAD W/O    (Results Pending)   MR-MRA HEAD-W/O    (Results Pending)       COURSE & MEDICAL DECISION MAKING    ASSESSMENT, COURSE AND PLAN  Care Narrative:   78-year-old male arrives as a hemorrhagic stroke transfer from outside hospital where he had acute onset of headache and vomiting at 6 PM and was found  to have a left cerebellar intraparenchymal hemorrhage without significant shift and no intraventricular bleeding  Afebrile and normotensive on arrival  He is GCS 3 T, intubated and sedated.  He is observed moving all 4 extremities when being transported to CT scanner.  Neurology arrived to CT.  CTAs are obtained in addition to CT Noncon.  Evidently outside hospital unable to push images this evening.  Imaging is as described and seemingly not worse based on description from outside hospital.  Neurology recommends hypertonic saline.  Neurosurgery consult advised by neurology, and no acute interventions recommended by neurosurgery.  Patient will be admitted to the ICU for close blood pressure control, administration of hypertonic saline, stroke care and management  Stroke: Time seen 22:50 (Time)   No, this patient is not a candidate for thrombolytic therapy because hemorrhagic CVA    CRITICAL CARE  The very real possibilty of a deterioration of this patient's condition required the highest level of my preparedness for sudden, emergent intervention.  I provided critical care services, which included medication orders, frequent reevaluations of the patient's condition and response to treatment, ordering and reviewing test results, and discussing the case with various consultants.  The critical care time associated with the care of the patient was 33 minutes. Review chart for interventions. This time is exclusive of any other billable procedures.     ADDITIONAL PROBLEMS MANAGED  none    DISPOSITION AND DISCUSSIONS  I have discussed management of the patient with the following physicians and LIZZ's:    Neurology  Neurosurgery  Radiology  ICU    Discussion of management with other QHP or appropriate source(s): Pharmacy hypertonic saline and RT for intubation care      Barriers to care at this time, including but not limited to:  none .     Decision tools and prescription drugs considered including, but not limited to:  none  .    FINAL DIAGNOSIS  1. Hemorrhagic cerebrovascular accident (CVA) (HCC)    2. Cerebellar hemorrhage (HCC)           Electronically signed by: Nikki Alexander D.O., 5/29/2024 10:56 PM

## 2024-05-30 NOTE — THERAPY
Speech Language Therapy Contact Note    Patient Name: Jl Mueller  Age:  78 y.o., Sex:  male  Medical Record #: [id number]  Today's Date: 5/30/2024 05/30/24 0810   Treatment Variance   Reason For Missed Therapy Medical - Patient on Hold from Therapy;Medical - Patient has Bowel Issues;Medical - Other (Please Comment)   Initial Contact Note    Initial Contact Note  Order Received and Verified, Speech Therapy Evaluation in Progress with Full Report to Follow.   Interdisciplinary Plan of Care Collaboration   IDT Collaboration with  Other (See Comments)  (EMR)   Collaboration Comments Order for CSE received/chart reviewed. Per EMR, pt remains intubated at this junction. Will hold and monitor for extubation to complete CSE if/when appropriate.

## 2024-05-30 NOTE — ED NOTES
Bedside report received from off going RN Isai, assumed care of patient.  POC discussed with patient. Call light within reach, all needs addressed at this time.       Fall risk interventions in place: Move the patient closer to the nurse's station, Patient's personal possessions are with in their safe reach, Place fall risk sign on patient's door, and Other (comment required) (all applicable per [place] Fall risk assessment)   Continuous monitoring: Cardiac Leads, Pulse Ox, or Blood Pressure  IVF/IV medications: Infusion per MAR (List Med(s)) Isotonic 3 % and propofol drip  Oxygen: vented 40 %   Bedside sitter: Not Applicable   Isolation: Not Applicable

## 2024-05-30 NOTE — ED NOTES
PIV placed at charge desk, labs drawn pt taken to CT via 2 trauma Rns, and RT on ventilator and zoll montior.

## 2024-05-30 NOTE — ED TRIAGE NOTES
"Chief Complaint   Patient presents with    Possible Stroke     Pt transferred from [place] via Care flight for hemorrhagic CVA. LKW 2030. Pt was eating dinner, and had sudden headache and started vomiting. Pt originally arrived to [place] via EMS with N/V, GCS 11. Pt was intubated at [place]           /75   Pulse 66   Temp 36.3 °C (97.4 °F) (Temporal)   Resp 18   Ht 1.676 m (5' 5.98\")   Wt 61 kg (134 lb 7.7 oz)   SpO2 97%   BMI 21.72 kg/m²     "

## 2024-05-30 NOTE — ASSESSMENT & PLAN NOTE
S/p EVAR w/ bypass graft stent  - Maintain normotension  - Holding ASA at this time; per wife he takes ASA 3x/week

## 2024-05-30 NOTE — ASSESSMENT & PLAN NOTE
Per wife, patient's medications were discontinued as an outpatient because his BP has been controlled  - SBP in the 170s per wife's at home BP cuff measurement; <160 since arrival to this facility  - Cardene & PRN antihypertensives to maintain -160)  - Monitor I&O  - Cardiac diet when appropriate  - Control pain

## 2024-05-30 NOTE — CONSULTS
Chief complaint cerebellar hemorrhage  Brief HPI this is a 78-year-old gentleman who had onset of headache subsequently was brought to outside ER where he had a CT scan demonstrating left superior hemispheric cerebellar hemorrhage there is relatively diffuse dispersed without any significant mass effect or impingement on the fourth ventricle he did not have any hydrocephalus he was intubated at [place] is unclear of the reasons other than the fact that he had a hemorrhage and then was sent to [facility] where he was moving everything and following commands this morning he had a CT head repeat which showed slight interval evolution of the hemorrhage but nothing that looked enlarged and nothing that was increased in terms of mass effect or impingement of the fourth ventricle he is on sedation this morning and is moving everything with it what seems to be slight favoring of the right side over the left side but does not have any focal deficits that we can discern at this time he is not following commands but is likely due to sedation for the intubation status.    CT head this morning again looks relatively stable does not appear to be any significant expansion of the hemorrhage and there also does not appear to be any significant changes in the overall ventricular size.    12 point review of systems is negative for any loss of consciousness bowel or bladder dysfunction or focal deficits.    Past medical history significant for 3 intraparenchymal hematomas in the past  Past surgical history noncontributory  Medications he is on aspirin every other day for unclear reasons.    Physical exam  The patient is intubated on propofol for sedation  He will move all extremities x 4 with right side greater than left but does not seem to have any sensory deficit or motor deficit  Cranial nerves grossly intact.    CT head this morning again demonstrates stability of the exam of the scan with slight increased hyper density within  the clot cavity but no signs of expansion of the clot or bigger more mass effect.    Assessment and plan  At this time we will defer anticoagulation reversal to the ICU  Okay for every hour neurochecks until the patient is extubated if he has stable exam he can be downgraded to every 2 neurochecks  Medical management per ICU  If the patient obstructs or becomes concerning for decline in exam we would consider placement of an EVD based on the size of the clot and location of clot we think this is unlikely additionally due to the lack of mass effect we do not think the patient will require surgical intervention unless the clot expand significantly given the amount of atrophy in the patient's posterior fossa.  No role for Keppra  If patient has decline in exam please repeat the CT head as of now we feel the CT is significantly stable enough for we do not require a follow-up scan unless ICU feels it is necessary    We will continue to follow until the patient is extubated and stable  Total 50 minutes in direct patient care coordination consultation evaluation  Alfredito Hicks MD

## 2024-05-30 NOTE — ASSESSMENT & PLAN NOTE
Not currently in exacerbation  - CXR now and Qam, no acute process  - Continue home nebulizers as ordered  - Continue PRN nebulizers Q4h  - Wean FIo2 as tolerated

## 2024-05-30 NOTE — ED NOTES
Med rec completed per pt's spouse at bedside with home medication bottles.  Home antibiotics in past 30 days:   - Azithromycin x5 days, completed 4/29   Anticoagulants/antiplatelets in past 14 days:   - aspirin 81mg daily, last dose today in AM    Nancy Zamudio, Pharmacy Intern

## 2024-05-30 NOTE — PROCEDURES
"Per Radiology \"PICC line is in place with the tip projecting over the SVC in satisfactory position\" Picc line is ok for use.  "

## 2024-05-30 NOTE — ASSESSMENT & PLAN NOTE
Intubated for airway protection  Lung protective ventilation strategies  Titrate ventilator prescription to optimize oxygenation, ventilation, and acid base balance.  Daily ABC trials

## 2024-05-30 NOTE — H&P
Critical Care History & Physical Note    Date of Service  5/30/2024    Primary Care Physician  Ximena Callahan N.P.    Consultants  neurology and neurosurgery    Specialist Names: Dr. Oliver, Dr. Hicks    Code Status  Full Code    Chief Complaint  Chief Complaint   Patient presents with    Possible Stroke     Pt transferred from [place] via Care flight for hemorrhagic CVA. LKW 2030. Pt was eating dinner, and had sudden headache and started vomiting. Pt originally arrived to [place] via EMS with N/V, GCS 11. Pt was intubated at [place]       History of Presenting Illness  Jl Mueller is a 78 y.o. male smoker w/ PMHx s/f HTN, CAD, HLD, prior MI, AAA s/p bypass graft stenting, COPD on 2L home O2, two prior hemorrhagic strokes w/o reported deficits, BPH s/p prostate artery embolization who initially presented to an outside facility via EMS on 5/29/2024 for evaluation. Per the patient's wife, the patient was eating dinner when he stated he had a headache and felt weak. He then had two episodes of emesis and became altered to GCS11; he was intubated at some point prior to transfer to this facility due to his neuro status. On imaging at the outside facility, he was found to have a left cerebellar hemorrhage, mild perihematomal edema with subtle parenchymal displacement. 4th ventricle with moderate compression but open. for which he was transferred to this facility for higher level of care. On arrival, the patient was moving all extremities and withdrew to pain in all extremities. His pupils are pinpoint, and he over breaths vent. Repeat imaging redemonstrates area of Lt-cerebellar hemorrhage.  A CTA was completed which, does not clearly show an aneurysmal/vascular malformation. He is being admitted to the ICU for hypertonic saline therapy, Q1h neurological checks, and strict blood pressure control as well as ventilator management.     .  I discussed the plan of care with family, bedside RN,  "neurology and neurosurgery, and Dr. Stephenson .    Review of Systems  Review of Systems   Unable to perform ROS: Mental acuity (history obtained by wife)   Constitutional: Negative.    HENT: Negative.     Eyes: Negative.    Respiratory: Negative.     Cardiovascular: Negative.    Gastrointestinal:  Positive for nausea and vomiting. Negative for diarrhea.   Genitourinary:         Incontinence of urine at baseline   Musculoskeletal: Negative.         Uses walker/wheelchair at baseline   Skin:  Positive for itching (bandaids to shoulder/back from him itching).   Neurological:  Positive for weakness (patient did not specify to wife any focal weakness) and headaches.        AMS (GCS11) required intubation at [facility]    Endo/Heme/Allergies: Negative.    Psychiatric/Behavioral: Negative.         Past Medical History   has a past medical history of Chronic obstructive pulmonary disease (HCC), Hypertension, MI, old, Peptic ulcer, Prostate disorder, and Stroke (HCC).    Surgical History  AAA bypass graft stent, prostate artery embolization, Bilateral cataract surgery    Family History  Family history reviewed with patient. There is family history that is pertinent to the chief complaint.   Father - Stroke  Sister - Hemorrhagic stroke     Social History  He has been a smoker since his late teens; his wife states he smokes 1-3 packs of cigarettes per day, no other sources of tobacco or nicotine reported. He drinks alcohol on rare occasion socially. He reports that he does not currently use drugs.  He lives with his wife who is his surrogate decision maker  He ambulates with a walker at baseline; wheelchair for longer distances    Allergies  Allergies   Allergen Reactions    Lisinopril      cough    Namenda [Memantine]      \"weakness\"    Rosuvastatin        Medications  Prior to Admission Medications   Prescriptions Last Dose Informant Patient Reported? Taking?   B Complex Vitamins (B COMPLEX 1 PO) 5/29/2024 at AM  Yes Yes   Sig: Take 1 " Tablet by mouth every day.   Multiple Vitamins-Minerals (PRESERVISION AREDS 2) Cap 5/29/2024 at AM  Yes Yes   Sig: Take  by mouth.   OXYGEN-HELIUM INH SUPPLY at SUPPLY  Yes No   Sig: Inhale. 2 lt NC   Pitavastatin Calcium (LIVALO) 2 MG Tab 5/28/2024 at PM  Yes Yes   Sig: Take  by mouth every day.   albuterol (PROVENTIL) 2.5mg/0.5ml Nebu Soln PRN at PRN  Yes Yes   Sig: Take 2.5 mg by nebulization every four hours as needed for Shortness of Breath.   albuterol 108 (90 Base) MCG/ACT Aero Soln inhalation aerosol PRN at PRN  No No   Sig: Inhale 2 Puffs every 6 hours as needed for Shortness of Breath.   aspirin EC (ECOTRIN) 81 MG Tablet Delayed Response 5/29/2024 at AM  Yes Yes   Sig: Take 81 mg by mouth every day.   azithromycin (ZITHROMAX) 250 MG Tab 4/29/2024 at COMPLETED  Yes Yes   Sig: Take 250-500 mg by mouth every day. 500MG DAY ONE THEN 250MG DAYS 2-5   fluticasone furoate-vilanterol (BREO ELLIPTA) 200-25 MCG/ACT AEROSOL POWDER, BREATH ACTIVATED   Yes No   Sig: Inhale 1 Puff every day.   guaiFENesin ER (MUCINEX) 600 MG TABLET SR 12 HR Not Taking  No No   Sig: Take 1 Tablet by mouth every day.   Patient not taking: Reported on 5/30/2024   magnesium oxide (MAG-OX) 400 MG Tab tablet 5/29/2024 at AM  Yes Yes   Sig: Take 400 mg by mouth every day.   pantoprazole (PROTONIX) 40 MG Tablet Delayed Response 5/29/2024 at AM  Yes Yes   Sig: Take 40 mg by mouth every day.   predniSONE (DELTASONE) 20 MG Tab 4/29/2024 at COMPLETED  Yes Yes   Sig: Take 40 mg by mouth every day. 5 DAYS   tamsulosin (FLOMAX) 0.4 MG capsule 5/29/2024 at AM  Yes Yes   Sig: Take 0.4 mg by mouth 1/2 hour after breakfast.      Facility-Administered Medications: None       Physical Exam  Temp:  [36.3 °C (97.4 °F)] 36.3 °C (97.4 °F)  Pulse:  [59-68] 67  Resp:  [18-22] 18  BP: (112-159)/(63-75) 121/64  SpO2:  [97 %-100 %] 100 %  Blood Pressure : 121/64   Temperature: 36.3 °C (97.4 °F)   Pulse: 66   Respiration: 20   Pulse Oximetry: 98 %       Physical  "Exam  Constitutional:       Comments: Intubated/sedated on ventilator   HENT:      Head: Normocephalic and atraumatic.      Nose: Nose normal.      Mouth/Throat:      Mouth: Mucous membranes are dry.   Eyes:      General: No scleral icterus.     Pupils: Pupils are equal, round, and reactive to light.      Comments: Pinpoint pupils   Cardiovascular:      Rate and Rhythm: Normal rate and regular rhythm.      Pulses: Normal pulses.      Heart sounds: Normal heart sounds. No murmur heard.  Pulmonary:      Effort: Pulmonary effort is normal.      Breath sounds: Normal breath sounds. No wheezing.   Abdominal:      General: Abdomen is flat. Bowel sounds are normal. There is no distension.      Palpations: Abdomen is soft. There is no mass.      Tenderness: There is no abdominal tenderness.   Genitourinary:     Penis: Normal.    Musculoskeletal:      Cervical back: Normal range of motion and neck supple. No rigidity.      Right lower leg: No edema.      Left lower leg: No edema.   Skin:     General: Skin is warm and dry.      Capillary Refill: Capillary refill takes 2 to 3 seconds.      Findings: Lesion (back/shoulder/arm lesions from pt scratching) present.   Neurological:      Comments: L side stronger than Right side  Withdrawals to all 4 extremities  Pupils pinpoint bilaterally  Bilateral feet w/ upgoing toes/leg flexion, appropriately w/d to pain         Laboratory:  Recent Labs     05/29/24 2249   WBC 12.2*   RBC 4.70   HEMOGLOBIN 13.2*   HEMATOCRIT 41.2*   MCV 87.7   MCH 28.1   MCHC 32.0*   RDW 43.8   PLATELETCT 226   MPV 11.9     Recent Labs     05/29/24 2249   SODIUM 140   POTASSIUM 4.2   CHLORIDE 103   CO2 23   GLUCOSE 162*   BUN 30*   CREATININE 0.89   CALCIUM 9.8     Recent Labs     05/29/24 2249   ALTSGPT 8   ASTSGOT 14   ALKPHOSPHAT 59   TBILIRUBIN 0.6   GLUCOSE 162*     Recent Labs     05/29/24 2249   APTT 26.1   INR 1.03     No results for input(s): \"NTPROBNP\" in the last 72 hours.      Recent Labs     " 05/29/24  2249   TROPONINT 25*       Imaging:  DX-CHEST-PORTABLE (1 VIEW)   Final Result         1.  Left basilar atelectasis, no focal infiltrate   2.  Trace left pleural effusion   3.  Atherosclerosis      CT-CTA NECK WITH & W/O-POST PROCESSING   Final Result         1.  Scattered atherosclerosis without significant stenosis or occlusion.   2.  4.2 cm proximal descending thoracic aortic aneurysm, radiographic follow-up and surveillance recommended as clinically appropriate.         CT-CTA HEAD WITH & W/O-POST PROCESS   Final Result         1.  No large vessel occlusion or aneurysm identified   2.  Large cerebellar hemorrhage predominantly in the left cerebellum      These findings were discussed with the patient's clinician, Nikki Alexander, on 5/29/2024 11:35 PM.      EC-ECHOCARDIOGRAM COMPLETE W/O CONT    (Results Pending)   CT-HEAD W/O    (Results Pending)   MR-MRA HEAD-W/O    (Results Pending)       X-Ray:  I have personally reviewed the images and compared with prior images.  EKG:  I have personally reviewed the images and compared with prior images.    Assessment/Plan:  * Cerebellar hemorrhage (HCC)- (present on admission)  Assessment & Plan  Patient reportedly stated he had a headache and felt weak while eating dinner followed by AMS (GCS 11) with emesis x2 episodes at home; LKW 2030  -Admit to ICU  -q1h neurochecks  -Repeat CTH in AM to assess for hematoma expansion; ICH score=1  -MRI brain ICH protocol- this is MRI brain w/wo contrast, MRA head w/o contrast  -Keep -160, target 140 or MAP  strict,  cardene PRN  -3% Saline: check serum Na and Osm q6 hour, goal Na 150-160  -Neurosurgery evaluation; non operative at this time  -Neurovascular evaluation   - Per Dr. Oliver, no obvious Aneurysm/AVM on imaging - will d/w Neuro IR in am  -No coagulation noted; TEG pending (pt takes 81mg ASA 3x/wk)  -Hold all antithrombotic therapy  -SCDs only for DVT ppx  -keep euvolemic, euthermic, and  normoglycemic (140-180)  -Maintain bowel regimen to avoid Valsalva and increased intracranial pressure.  -HOB at 30 degrees  -maintain pCO2 of 35-40; closer to 35  -Start atorvastatin 40 mg qHS for possible neuroprotective effect  -Send for A1c, Lipid panel as well as baseline urine studies  -not a candidate for PT/OT/SLP    Benign essential hypertension- (present on admission)  Assessment & Plan  Per wife, patient's medications were discontinued as an outpatient because his BP has been controlled  - SBP in the 170s per wife's at home BP cuff measurement; <160 since arrival to this facility  - Cardene & PRN antihypertensives to maintain SBP <160mmHg  - Monitor I&O  - Cardiac diet when appropriate  - Control pain    Hyperglycemia- (present on admission)  Assessment & Plan  - Obtain HgA1c  - SSI as ordered  - Carb controlled diet when tolerating PO  - Dietary/lifestyle modification counseling    Acute on chronic respiratory failure with hypoxia (HCC)- (present on admission)  Assessment & Plan  2/2 neurological status  - Obtain CXR and ABG now and as needed  - Daily SAT/SBT as tolerated  - Wean ventilator settings for extubation when appropriate  - VAP bundle  - Maintain HOB >30*  - Sedation for RASS 0 to -1    COPD (chronic obstructive pulmonary disease) (HCC)- (present on admission)  Assessment & Plan  Not currently in exacerbation  - CXR now and Qam, no acute process  - Continue home nebulizers as ordered  - Continue PRN nebulizers Q4h  - Wean FIo2 as tolerated    Hyperlipidemia- (present on admission)  Assessment & Plan  Per wife, patient's home medication was discontinued due to muscle cramps  - Start atorvastatin 40mg QHS for neuroprotection; monitor for tolerance  - Obtain Lipid panel  - Statin therapy; hold for elevated CK/LFTs  - Counseling on lifestyle/dietary modifications    History of stroke- (present on admission)  Assessment & Plan  Patient has had 2 prior hemorrhagic strokes; last in 2015  - No noted  deficits per wife    BPH with urinary obstruction- (present on admission)  Assessment & Plan  S/p prostate artery embolization  - Per wife, patient has suffered from urinary retention and incontinence since his surgery  - Continue flomax  - [place] for strict I&O    Abdominal aortic aneurysm (AAA) without rupture (HCC)- (present on admission)  Assessment & Plan  S/p EVAR w/ bypass graft stent  - Maintain normotension  - Holding ASA at this time; per wife he takes ASA 3x/week        VTE prophylaxis: SCDs/TEDs and pharmacologic prophylaxis contraindicated due to L ICH.     Patient was critically ill during the time I treated the patient.    75 minutes of critical care time were spent, including examination and interview of the patient, explanation of prognosis/diagnosis/plan of care, direction of nursing staff and discussion of the patient with other physicians involved.  This time was independent of procedures performed.    Greater than 50% of which was spent at the bedside.

## 2024-05-30 NOTE — THERAPY
Physical Therapy Contact Note    Patient Name: Jl Mueller  Age:  78 y.o., Sex:  male  Medical Record #: [id number]  Today's Date: 5/30/2024    PT consult received, pt new admit with active bedrest order will monitor for appropriate timing of PT eval;     Monica HEARN, PT,  [phone]   Yes

## 2024-05-30 NOTE — ASSESSMENT & PLAN NOTE
Per wife, patient's home medication was discontinued due to muscle cramps  - Start atorvastatin 40mg QHS for neuroprotection; monitor for tolerance  - Obtain Lipid panel  - Statin therapy; hold for elevated CK/LFTs  - Counseling on lifestyle/dietary modifications

## 2024-05-30 NOTE — CONSULTS
Neurology Initial Consult H&P  Neurohospitalist Service, [facility]    Referring Physician: Chilo Stephenson D.O.    Chief Complaint   Patient presents with    Possible Stroke     Pt transferred from [place] via Care flight for hemorrhagic CVA. LKW 2030. Pt was eating dinner, and had sudden headache and started vomiting. Pt originally arrived to [place] via EMS with N/V, GCS 11. Pt was intubated at [place]       HPI: Jl Mueller is a 78 y.o. M w/ prior hx of HTN, HLD, AAA repair (6/2023), UGIB, prior stroke 2008 and prior ICH 2015, CAD s/p stent 2008, COPD who presents as trasnsfer from [facility] after acute onset of vomiting and found on head imaging to have Lt-cerebellar spontaneous IPH. Patient did not have signs of midline shift, hydrocephalus, brainstem compression or intraventricular extension. Patient was intubated prior to transfer for diminished mental status and failure to protect airway. Patient was moving all extremities upon transfer and repeat head imaging without worsening of hemorrhage. Patient's wife reports history at bedside this morning. Patient was provided dinner by wife but when he wasn't eating she asked him what was wrong. Patient tried to get up from his chair but could not do so and wife did think that they should call 911 because she was unable to help him ambulate to the couch to lay down. Patient abruptly began vomiting and vomited roughly 3 times. Patient had no prodromal complaints and was in his [facility] prior to event. Denied acute headache prior to event. Wife reports good medication compliance because she provides him with his medications.     Review of systems: In addition to what is detailed in the HPI above, all other systems reviewed and are negative.    Past Medical History:    has a past medical history of Chronic obstructive pulmonary disease (HCC), Hypertension, MI, old, Peptic ulcer, Prostate disorder, and Stroke  "([facility]).    FHx:  family history is not on file.    SHx:   reports that he does not currently use alcohol. He reports that he does not currently use drugs.    Allergies:  Allergies   Allergen Reactions    Lisinopril      cough    Namenda [Memantine]      \"weakness\"    Rosuvastatin        Medications:    Current Facility-Administered Medications:     diazePAM (Valium) injection 5 mg, 5 mg, Intravenous, Q6HRS PRN, Sarina Euceda    insulin regular (HumuLIN R,NovoLIN R) injection, 1-6 Units, Subcutaneous, Q6HRS **AND** POC blood glucose manual result, , , Q6H **AND** NOTIFY MD and PharmD, , , Once **AND** Administer 20 grams of glucose (approximately 8 ounces of fruit juice) every 15 minutes PRN FSBG less than 70 mg/dL, , , PRN **AND** dextrose 50% (D50W) injection 25 g, 25 g, Intravenous, Q15 MIN PRN, Sarina Euceda    niCARdipine (Cardene) 25 mg in  mL Standard Infusion, 0-15 mg/hr, Intravenous, Continuous, Chilo Stephenson D.O.    labetalol (Normodyne/Trandate) injection 10 mg, 10 mg, Intravenous, Q3HRS PRN, Sarina Euceda    hydrALAZINE (Apresoline) injection 10 mg, 10 mg, Intravenous, Q3HRS PRN, Sarina Euceda    norepinephrine (Levophed) 8 mg in 250 mL NS infusion (premix), 0-1 mcg/kg/min, Intravenous, Continuous, LOBO LunaOAye    senna-docusate (Pericolace Or Senokot S) 8.6-50 MG per tablet 2 Tablet, 2 Tablet, Enteral Tube, Q EVENING **AND** polyethylene glycol/lytes (Miralax) Packet 1 Packet, 1 Packet, Enteral Tube, QDAY PRN, Sarina Euceda    Respiratory Therapy Consult, , Nebulization, Continuous RT, Sarina Euceda MD Alert...ICU Electrolyte Replacement per Pharmacy, , Other, PHARMACY TO DOSE, Sarina Euceda    lidocaine (Xylocaine) 1 % injection 2 mL, 2 mL, Tracheal Tube, Q30 MIN PRN, Sarina Euceda    acetaminophen (Tylenol) tablet 650 mg, 650 mg, Enteral Tube, Q4HRS PRN **OR** acetaminophen (Tylenol) suppository 650 mg, 650 mg, Rectal, Q4HRS PRN, Sarina Euceda    ondansetron (Zofran ODT) " dispertab 4 mg, 4 mg, Enteral Tube, Q4HRS PRN **OR** ondansetron (Zofran) syringe/vial injection 4 mg, 4 mg, Intravenous, Q4HRS PRN, Sarina Euceda    albuterol (Proventil) 2.5mg/0.5ml nebulizer solution 2.5 mg, 2.5 mg, Nebulization, Q4HRS PRN, Sarina LAye Lattanja    propofol (DIPRIVAN) injection, 0-80 mcg/kg/min, Intravenous, Continuous, Last Rate: 18.3 mL/hr at 05/30/24 1000, 50 mcg/kg/min at 05/30/24 1000 **AND** Triglycerides Starting now and then Every 3 Days, , , Every 3 Days (0300), Sarina Euceda    fentaNYL (Sublimaze) injection 25-50 mcg, 25-50 mcg, Intravenous, Q HOUR PRN, Sarina LAye Lattanja    Pharmacy Consult: Enteral tube insertion - review meds/change route/product selection, , Other, PHARMACY TO DOSE, Reyes Gray M.D.    [START ON 5/31/2024] lansoprazole (Prevacid) solutab 30 mg, 30 mg, Enteral Tube, DAILY, Reyes Gray M.D.    3% sodium chloride (Hypertonic Saline) 500mL infusion, 0-60 mL/hr, Intravenous, Continuous, Sarina RODRIGUEZ Lattanja, Last Rate: 20 mL/hr at 05/30/24 0738, 20 mL/hr at 05/30/24 0738    sodium chloride 200 mEq in empty bag 50 mL infusion, 200 mEq, Intravenous, Q6HRS PRN, Sarina L. Lattanja      NEUROLOGICAL EXAM:     Sedated (Propofol). Intubated and Ventilated.    Level of consciousness: Responsive to pinch in all extremities with limb and truncal withdrawal. Patient did not follow commands    Pupils: Pinpoint pupils, difficult to establish reactivity.   Oculomotor: Conjugate. No gaze deviation. No overt nystagmus. Oculomotor reflex impaired  Corneal reflexes: Not tested  Blink to threat: Not elicited  Cough reflex: Intact  Gag reflex: Intact  Overbreathes ventilator    Face appears symmetric    Motor: Moves all extremities to pinch, withdrawal seen.   Deep tendon reflexes: No clonus.   Plantar responses: Mute      NIHSS: National Institutes of Health Stroke Scale    [2] 1a:Level of Consciousness    0-alert 1-drowsy   2-stupor   3-coma  [2] 1b:LOC Questions                  0-both   1-one      2-neither  [2] 1c:LOC Commands                   0-both  1-one      2-neither  [0] 2: Best Gaze                     0-nl    1-partial  2-forced  [0] 3: Visual Fields                   0-nl    1-partial  2-complete 3-bilat  [0] 4: Facial Paresis                0-nl    1-minor    2-partial  3-full  MOTOR                       0-nl  [3] 5: Right Arm           1-drift  [3] 6: Left Arm             2-some effort vs gravity  [3] 7: Right Leg           3-no effort vs gravity  [3] 8: Left Leg             4-no movement                             x-untestable  [0] 9: Limb Ataxia                    0-abs   1-1_limb   2-2+_limbs       x-untestable  [0] 10:Sensory                        0-nl    1-partial  2-dense  [3] 11:Best Language/Aphasia         0-nl    1-mild/mod 2-severe   3-mute  [2] 12:Dysarthria                     0-nl    1-mild/mod 2-severe       x-untestable  [0] 13:Neglect/Inattention            0-none  1-partial  2-complete  [23] TOTAL      Objective Data:    Labs:  Lab Results   Component Value Date/Time    PROTHROMBTM 13.6 05/29/2024 10:49 PM    INR 1.03 05/29/2024 10:49 PM      Lab Results   Component Value Date/Time    WBC 12.2 (H) 05/29/2024 10:49 PM    RBC 4.70 05/29/2024 10:49 PM    HEMOGLOBIN 13.2 (L) 05/29/2024 10:49 PM    HEMATOCRIT 41.2 (L) 05/29/2024 10:49 PM    MCV 87.7 05/29/2024 10:49 PM    MCH 28.1 05/29/2024 10:49 PM    MCHC 32.0 (L) 05/29/2024 10:49 PM    MPV 11.9 05/29/2024 10:49 PM    NEUTSPOLYS 93.20 (H) 05/29/2024 10:49 PM    LYMPHOCYTES 2.20 (L) 05/29/2024 10:49 PM    MONOCYTES 3.80 05/29/2024 10:49 PM    EOSINOPHILS 0.10 05/29/2024 10:49 PM    BASOPHILS 0.20 05/29/2024 10:49 PM      Lab Results   Component Value Date/Time    SODIUM 150 (H) 05/30/2024 05:00 AM    POTASSIUM 4.6 05/30/2024 02:00 AM    CHLORIDE 103 05/30/2024 02:00 AM    CO2 22 05/30/2024 02:00 AM    GLUCOSE 116 (H) 05/30/2024 02:00 AM    BUN 28 (H) 05/30/2024 02:00 AM    CREATININE 0.83 05/30/2024 02:00 AM     GLOMRATE 78 10/19/2023 09:26 AM      Lab Results   Component Value Date/Time    CHOLSTRLTOT 162 05/30/2024 02:00 AM    LDL see below 05/30/2024 02:00 AM    HDL 44 05/30/2024 02:00 AM    TRIGLYCERIDE 106 05/30/2024 05:00 AM       Lab Results   Component Value Date/Time    ALKPHOSPHAT 59 05/29/2024 10:49 PM    ASTSGOT 14 05/29/2024 10:49 PM    ALTSGPT 8 05/29/2024 10:49 PM    TBILIRUBIN 0.6 05/29/2024 10:49 PM        Imaging/Testing:    I interpreted and/or reviewed the patient's neuroimaging    CT-HEAD W/O   Final Result         1.  Left cerebellar hemorrhage, similar compared to prior study.   2.  Minimal bilateral intraventricular hemorrhages, new since prior study.   3.  Nonspecific white matter changes, commonly associated with small vessel ischemic disease.  Associated mild cerebral atrophy is noted.   4.  Atherosclerosis.         DX-CHEST-PORTABLE (1 VIEW)   Final Result         1.  Left basilar atelectasis, no focal infiltrate   2.  Trace left pleural effusion   3.  Atherosclerosis      CT-CTA NECK WITH & W/O-POST PROCESSING   Final Result         1.  Scattered atherosclerosis without significant stenosis or occlusion.   2.  4.2 cm proximal descending thoracic aortic aneurysm, radiographic follow-up and surveillance recommended as clinically appropriate.         CT-CTA HEAD WITH & W/O-POST PROCESS   Final Result         1.  No large vessel occlusion or aneurysm identified   2.  Large cerebellar hemorrhage predominantly in the left cerebellum      These findings were discussed with the patient's clinician, Nikki Alexander, on 5/29/2024 11:35 PM.      DX-CHEST-PORTABLE (1 VIEW)   Final Result      Tubes as above      Left pleural effusion      CHF/pulmonary edema pattern      See Brown MD   5/30/2024 8:41 AM         CT-HEAD W/O   Final Result      Acute intraparenchymal hemorrhage is noted involving the left cerebellar hemisphere, measuring 3.1 x 4.2 x 3.4 cm, with associated mass effect.      This finding  was telephoned to the mentioned attending by on-call radiologist at the time of imaging         AAST Intracranial Hemorrhage Brain Injury Guidelines         Hemorrhage type  mBIG 1           mBIG 2                mBIG 3      Subdural             <4mm               5-7.9 mm             >8mm      Epidural                No                    No                  Yes      Intraparenc'mal   <4mm               5-7.9 mm         >8mm or multi   1 location       or 2 locations      >3 locations      Subarachnoid     <3 sulci       single hemisphere   bi-hemisphere   and <1 mm        or 1-3 mm            or > 3 mm      Intraventricular       No                  No                    Yes      Skull Fracture       None            Non-displaced       Displaced               DLP Reporting Thresholds for Incorrect/Repeated Exams - DLP in mGy*cm   Head/Neck:  0-year-old 3840, 1-year-old 5880, 5-year-old 8770, 10-year-old 44827 and adult 85260   Head:  0-year-old 4540, 1-year-old 7460, 5-year-old 93220, 10-year-old 93465 and adult 67009   Neck:  0-year-old 2940, 1-year-old 4160, 5-year-old 4550, 10-year-old 6320 and adult 8470   Chest:  0-year-old 550, 1-year-old 830, 5-year-old 1200, 10-year-old 3840 and adult 3570   Abd/pelvis:  0-year-old 440, 1-year-old 720, 5-year-old 1080, 10-year-old 3330 and adult 3330   Trunk(C/A/P):  0-year-old 490, 1-year-old 770, 5-year-old 1140, 10-year-old 3570 and adult 3330      EC-ECHOCARDIOGRAM COMPLETE W/O CONT    (Results Pending)   MR-MRA HEAD-W/O    (Results Pending)   OUTSIDE IMAGES-CT CERVICAL SPINE    (Results Pending)       Assessment and Plan:  78 y.o. M w/ prior hx of HTN, HLD, AAA repair (6/2023), UGIB, prior stroke 2008 and prior ICH 2015, CAD s/p stent 2008, COPD who presents as trasnsfer from [facility] after acute onset of vomiting and found on head imaging to have Lt-cerebellar spontaneous IPH. Repeat head imaging has shown stable IPH. No signs of developing brainstem compression  or hydrocephalus. Started on HTS in the setting of PHE surrounding bleed with subtle 4th ventricular compression however no signs of overt obstruction. NSGY consulted NTD at this time. In the setting of long standing cardiovascular disease and evidence of significant underlying atherosclerotic disease burden most strongly suspect primary HTN related hemorrhage although follow up MRI should provide clarity for evidence of potential CAA. Patient will remain in ICU for close neuro-monitoring. CTA head and neck without underlying aneurysms or vascular abnormalities, would not recommend further vascular imaging at this time.     Problem list:  -- Lt-cerebellar spontaneous IPH    Recommendations:  -- ICU to wean ventilator/sedation as tolerated  -- Hemorrhage protocol  -- q1h neurochecks  -- Continue 3% Saline: check serum Na and Osm q6 hour, goal Na 150-160; current Na 150  -- Neurosurgery evaluation: NTD at this time, are prepared to intervene if patient develops brainstem compression, this is not anticipated  -- no underlying coagulopathy to correct noted  -- MRI brain w/o contrast  -- keep -160, target 140 or MAP  strict,  cardene PRN  -- Hold all antithrombotic therapy  -- SCDs only for DVT ppx  -- keep euvolemic, euthermic, and normoglycemic (140-180)  -- Maintain bowel regimen to avoid Valsalva and increased intracranial pressure.  -- HOB at 30 degrees  -- maintain pCO2 of 35-40  -- Start atorvastatin 40 mg qHS for possible neuroprotective effect  -- A1c: 5.6, LDL: Will need to be repeated TAGs great than 400  -- not a candidate for PT/OT/SLP    Les Nunn III, MD  ABPN Board Certified in Neurology  Vascular Neurology, [facility]

## 2024-05-30 NOTE — ASSESSMENT & PLAN NOTE
S/p prostate artery embolization  - Per wife, patient has suffered from urinary retention and incontinence since his surgery  - Continue flomax  - Park for strict I&O

## 2024-05-30 NOTE — PROGRESS NOTES
Vascular Neurology Brief Consult note    Patient transferred from [facility] after the acute onest of nausea, vomiting and AMS. On CTH imaging, found to have Lt-cerebellar hemorrhage. OSH system down and could not transfer imaging for our review. Patient reportedly required intubation for airway protection and was transferred for higher level of care. On arrival, patient seen moving all extremities and withdrew to pain in all extremities. Pupils pinpoint, over breaths vent. Repeat imaging redemonstrates area of Lt-cerebellar hemorrhage, mild perihematomal edema with subtle parenchymal displacement. 4th ventricle with moderate compression but open. Patient has baseline cerebral volume loss and likely hydrocephalus ex-vacuo, imaging was not suggestive of acute obstructive hydrocephalus. In the setting of well preserved motor function and developing PHE, elected to have patient treated with hypertonic saline. CTA completed, does not clearly show an aneurysmal/vascular malformation but will discuss with IR in the morning for consideration of DSA. Patient will require admission to NICU for higher level of care, close vital sign and neurologic monitoring. Full consultation note to follow in the morning.     Recommendations.  -- Admit to RICU  -- Hemorrhage protocol  -- q1h neurochecks  -- Repeat CTH tomorrow AM to assess for hematoma expansion  -- 3% Saline: check serum Na and Osm q6 hour, goal Na 150-160  -- Neurosurgery evaluation  -- no underlying coagulopathy to correct noted  -- MRI brain ICH protocol- this is MRI brain w/wo contrast, MRA head w/o contrast  -- keep -160, target 140 or MAP  strict,  cardene PRN  -- Hold all antithrombotic therapy  -- SCDs only for DVT ppx  -- keep euvolemic, euthermic, and normoglycemic (140-180)  -- Maintain bowel regimen to avoid Valsalva and increased intracranial pressure.  -- HOB at 30 degrees  -- maintain pCO2 of 35-40  -- Start atorvastatin 40 mg qHS for possible  neuroprotective effect  -- Send for A1c & Lipid panel  -- not a candidate for PT/OT/SLP

## 2024-05-30 NOTE — PROGRESS NOTES
4 Eyes Skin Assessment Completed by Terence RN and SALBADOR Guerra.    Head Redness, discoloration  Ears Redness, Blanching, and Discoloration  Nose WDL  Mouth Ulcer(s) white patch on tongue  Neck WDL  Breast/Chest Scab, possible rash  Shoulder Blades abrasions, discoloration, possible  Spine WDL  (R) Arm/Elbow/Hand Abrasion, Scab, and Discoloration (grey color)  (L) Arm/Elbow/Hand Discoloration  Abdomen WDL  Groin WDL  Scrotum/Coccyx/Buttocks Discoloration  (R) Leg Abrasion  (L) Leg WDL  (R) Heel/Foot/Toe Redness and Non-Blanching, boggy  (L) Heel/Foot/Toe Redness, Non-Blanching, and Boggy          Devices In Places ECG, Blood Pressure Cuff, Pulse Ox, Park, SCD's, ET Tube, and OG/NG      Interventions In Place NC Cheek Stickers, Heel Mepilex, Sacral Mepilex, TAP System, Pillows, Elbow Mepilex, Q2 Turns, Low Air Loss Mattress, and Heels Loaded W/Pillows    Possible Skin Injury Yes    Pictures Uploaded Into Epic Yes  Wound Consult Placed Yes  RN Wound Prevention Protocol Ordered Yes

## 2024-05-30 NOTE — ASSESSMENT & PLAN NOTE
ICH score=1  -q1h neurochecks  -MRI brain ICH protocol pending  -Keep -160,   -3% sodium acetate: check serum Na and Osm q6 hour, goal Na 150-160  -Neurosurgery Zosyn; non operative at this time  -No coagulation   -Hold all antithrombotic therapy  -SCDs only for DVT ppx  -keep euvolemic, euthermic, and normoglycemic (140-180)  -Maintain bowel regimen to avoid Valsalva and increased intracranial pressure.  -HOB at 30 degrees  -maintain pCO2 of 35-40; closer to 35  -Atorvastatin 40 mg qHS for possible neuroprotective effect

## 2024-05-31 ENCOUNTER — APPOINTMENT (OUTPATIENT)
Dept: RADIOLOGY | Facility: MEDICAL CENTER | Age: 79
End: 2024-05-31
Attending: NURSE PRACTITIONER
Payer: MEDICARE

## 2024-05-31 VITALS
WEIGHT: 144.84 LBS | HEIGHT: 63 IN | RESPIRATION RATE: 20 BRPM | DIASTOLIC BLOOD PRESSURE: 56 MMHG | TEMPERATURE: 96.8 F | OXYGEN SATURATION: 97 % | HEART RATE: 45 BPM | SYSTOLIC BLOOD PRESSURE: 113 MMHG | BODY MASS INDEX: 25.66 KG/M2

## 2024-05-31 PROBLEM — E87.29 HYPERCHLOREMIC METABOLIC ACIDOSIS: Status: ACTIVE | Noted: 2024-05-31

## 2024-05-31 LAB
ANION GAP SERPL CALC-SCNC: 11 MMOL/L (ref 7–16)
ANION GAP SERPL CALC-SCNC: 12 MMOL/L (ref 7–16)
ANION GAP SERPL CALC-SCNC: 13 MMOL/L (ref 7–16)
ANION GAP SERPL CALC-SCNC: 13 MMOL/L (ref 7–16)
ARTERIAL PATENCY WRIST A: ABNORMAL
BASE EXCESS BLDA CALC-SCNC: -1 MMOL/L (ref -4–3)
BASOPHILS # BLD AUTO: 0.2 % (ref 0–1.8)
BASOPHILS # BLD: 0.02 K/UL (ref 0–0.12)
BODY TEMPERATURE: ABNORMAL DEGREES
BREATHS SETTING VENT: 18
BUN SERPL-MCNC: 17 MG/DL (ref 8–22)
BUN SERPL-MCNC: 18 MG/DL (ref 8–22)
BUN SERPL-MCNC: 20 MG/DL (ref 8–22)
BUN SERPL-MCNC: 23 MG/DL (ref 8–22)
CALCIUM SERPL-MCNC: 7.9 MG/DL (ref 8.5–10.5)
CALCIUM SERPL-MCNC: 8.3 MG/DL (ref 8.5–10.5)
CALCIUM SERPL-MCNC: 8.9 MG/DL (ref 8.5–10.5)
CALCIUM SERPL-MCNC: 9 MG/DL (ref 8.5–10.5)
CHLORIDE SERPL-SCNC: 114 MMOL/L (ref 96–112)
CHLORIDE SERPL-SCNC: 116 MMOL/L (ref 96–112)
CHLORIDE SERPL-SCNC: 116 MMOL/L (ref 96–112)
CHLORIDE SERPL-SCNC: 133 MMOL/L (ref 96–112)
CO2 BLDA-SCNC: 26 MMOL/L (ref 20–33)
CO2 SERPL-SCNC: 18 MMOL/L (ref 20–33)
CO2 SERPL-SCNC: 20 MMOL/L (ref 20–33)
CO2 SERPL-SCNC: 21 MMOL/L (ref 20–33)
CO2 SERPL-SCNC: 23 MMOL/L (ref 20–33)
CREAT SERPL-MCNC: 0.57 MG/DL (ref 0.5–1.4)
CREAT SERPL-MCNC: 0.68 MG/DL (ref 0.5–1.4)
CREAT SERPL-MCNC: 0.68 MG/DL (ref 0.5–1.4)
CREAT SERPL-MCNC: 0.76 MG/DL (ref 0.5–1.4)
DELSYS IDSYS: ABNORMAL
EOSINOPHIL # BLD AUTO: 0.13 K/UL (ref 0–0.51)
EOSINOPHIL NFR BLD: 1.5 % (ref 0–6.9)
ERYTHROCYTE [DISTWIDTH] IN BLOOD BY AUTOMATED COUNT: 46.9 FL (ref 35.9–50)
GFR SERPLBLD CREATININE-BSD FMLA CKD-EPI: 100 ML/MIN/1.73 M 2
GFR SERPLBLD CREATININE-BSD FMLA CKD-EPI: 92 ML/MIN/1.73 M 2
GFR SERPLBLD CREATININE-BSD FMLA CKD-EPI: 95 ML/MIN/1.73 M 2
GFR SERPLBLD CREATININE-BSD FMLA CKD-EPI: 95 ML/MIN/1.73 M 2
GLUCOSE BLD STRIP.AUTO-MCNC: 150 MG/DL (ref 65–99)
GLUCOSE BLD STRIP.AUTO-MCNC: 181 MG/DL (ref 65–99)
GLUCOSE BLD STRIP.AUTO-MCNC: 64 MG/DL (ref 65–99)
GLUCOSE BLD STRIP.AUTO-MCNC: 72 MG/DL (ref 65–99)
GLUCOSE BLD STRIP.AUTO-MCNC: 78 MG/DL (ref 65–99)
GLUCOSE BLD STRIP.AUTO-MCNC: 96 MG/DL (ref 65–99)
GLUCOSE SERPL-MCNC: 103 MG/DL (ref 65–99)
GLUCOSE SERPL-MCNC: 67 MG/DL (ref 65–99)
GLUCOSE SERPL-MCNC: 74 MG/DL (ref 65–99)
GLUCOSE SERPL-MCNC: 90 MG/DL (ref 65–99)
HCO3 BLDA-SCNC: 24.4 MMOL/L (ref 17–25)
HCT VFR BLD AUTO: 33.5 % (ref 42–52)
HGB BLD-MCNC: 10.9 G/DL (ref 14–18)
HOROWITZ INDEX BLDA+IHG-RTO: 193 MM[HG]
IMM GRANULOCYTES # BLD AUTO: 0.02 K/UL (ref 0–0.11)
IMM GRANULOCYTES NFR BLD AUTO: 0.2 % (ref 0–0.9)
LACTATE BLD-SCNC: 0.9 MMOL/L (ref 0.5–2)
LYMPHOCYTES # BLD AUTO: 0.71 K/UL (ref 1–4.8)
LYMPHOCYTES NFR BLD: 8.4 % (ref 22–41)
MAGNESIUM SERPL-MCNC: 2 MG/DL (ref 1.5–2.5)
MCH RBC QN AUTO: 29 PG (ref 27–33)
MCHC RBC AUTO-ENTMCNC: 32.5 G/DL (ref 32.3–36.5)
MCV RBC AUTO: 89.1 FL (ref 81.4–97.8)
MODE IMODE: ABNORMAL
MONOCYTES # BLD AUTO: 0.67 K/UL (ref 0–0.85)
MONOCYTES NFR BLD AUTO: 7.9 % (ref 0–13.4)
NEUTROPHILS # BLD AUTO: 6.91 K/UL (ref 1.82–7.42)
NEUTROPHILS NFR BLD: 81.8 % (ref 44–72)
NRBC # BLD AUTO: 0 K/UL
NRBC BLD-RTO: 0 /100 WBC (ref 0–0.2)
O2/TOTAL GAS SETTING VFR VENT: 30 %
PCO2 BLDA: 40.5 MMHG (ref 26–37)
PCO2 TEMP ADJ BLDA: 39.4 MMHG (ref 26–37)
PEEP END EXPIRATORY PRESSURE IPEEP: 8 CMH20
PH BLDA: 7.39 [PH] (ref 7.4–7.5)
PH TEMP ADJ BLDA: 7.4 [PH] (ref 7.4–7.5)
PLATELET # BLD AUTO: 175 K/UL (ref 164–446)
PMV BLD AUTO: 13 FL (ref 9–12.9)
PO2 BLDA: 58 MMHG (ref 64–87)
PO2 TEMP ADJ BLDA: 56 MMHG (ref 64–87)
POTASSIUM SERPL-SCNC: 3.6 MMOL/L (ref 3.6–5.5)
POTASSIUM SERPL-SCNC: 3.6 MMOL/L (ref 3.6–5.5)
POTASSIUM SERPL-SCNC: 4 MMOL/L (ref 3.6–5.5)
POTASSIUM SERPL-SCNC: 4.3 MMOL/L (ref 3.6–5.5)
RBC # BLD AUTO: 3.76 M/UL (ref 4.7–6.1)
SAO2 % BLDA: 89 % (ref 93–99)
SODIUM SERPL-SCNC: 148 MMOL/L (ref 135–145)
SODIUM SERPL-SCNC: 148 MMOL/L (ref 135–145)
SODIUM SERPL-SCNC: 150 MMOL/L (ref 135–145)
SODIUM SERPL-SCNC: 164 MMOL/L (ref 135–145)
SPECIMEN DRAWN FROM PATIENT: ABNORMAL
TIDAL VOLUME IVT: 340 ML
TROPONIN T SERPL-MCNC: 34 NG/L (ref 6–19)
WBC # BLD AUTO: 8.5 K/UL (ref 4.8–10.8)

## 2024-05-31 PROCEDURE — 99232 SBSQ HOSP IP/OBS MODERATE 35: CPT | Performed by: STUDENT IN AN ORGANIZED HEALTH CARE EDUCATION/TRAINING PROGRAM

## 2024-05-31 PROCEDURE — 700101 HCHG RX REV CODE 250: Performed by: NURSE PRACTITIONER

## 2024-05-31 PROCEDURE — 85025 COMPLETE CBC W/AUTO DIFF WBC: CPT

## 2024-05-31 PROCEDURE — 82962 GLUCOSE BLOOD TEST: CPT | Mod: 91

## 2024-05-31 PROCEDURE — 700111 HCHG RX REV CODE 636 W/ 250 OVERRIDE (IP): Performed by: INTERNAL MEDICINE

## 2024-05-31 PROCEDURE — 36600 WITHDRAWAL OF ARTERIAL BLOOD: CPT

## 2024-05-31 PROCEDURE — 70544 MR ANGIOGRAPHY HEAD W/O DYE: CPT

## 2024-05-31 PROCEDURE — 99291 CRITICAL CARE FIRST HOUR: CPT | Performed by: INTERNAL MEDICINE

## 2024-05-31 PROCEDURE — 70553 MRI BRAIN STEM W/O & W/DYE: CPT

## 2024-05-31 PROCEDURE — 700111 HCHG RX REV CODE 636 W/ 250 OVERRIDE (IP): Mod: JZ | Performed by: NURSE PRACTITIONER

## 2024-05-31 PROCEDURE — A9579 GAD-BASE MR CONTRAST NOS,1ML: HCPCS | Performed by: NURSE PRACTITIONER

## 2024-05-31 PROCEDURE — 94003 VENT MGMT INPAT SUBQ DAY: CPT

## 2024-05-31 PROCEDURE — 700102 HCHG RX REV CODE 250 W/ 637 OVERRIDE(OP): Performed by: INTERNAL MEDICINE

## 2024-05-31 PROCEDURE — A9270 NON-COVERED ITEM OR SERVICE: HCPCS | Performed by: INTERNAL MEDICINE

## 2024-05-31 PROCEDURE — 700102 HCHG RX REV CODE 250 W/ 637 OVERRIDE(OP): Performed by: NURSE PRACTITIONER

## 2024-05-31 PROCEDURE — 700101 HCHG RX REV CODE 250: Performed by: INTERNAL MEDICINE

## 2024-05-31 PROCEDURE — 71045 X-RAY EXAM CHEST 1 VIEW: CPT

## 2024-05-31 PROCEDURE — 700117 HCHG RX CONTRAST REV CODE 255: Performed by: NURSE PRACTITIONER

## 2024-05-31 PROCEDURE — 82803 BLOOD GASES ANY COMBINATION: CPT

## 2024-05-31 PROCEDURE — 84484 ASSAY OF TROPONIN QUANT: CPT

## 2024-05-31 PROCEDURE — A9270 NON-COVERED ITEM OR SERVICE: HCPCS | Performed by: NURSE PRACTITIONER

## 2024-05-31 PROCEDURE — 770022 HCHG ROOM/CARE - ICU (200)

## 2024-05-31 PROCEDURE — 83605 ASSAY OF LACTIC ACID: CPT

## 2024-05-31 PROCEDURE — 80048 BASIC METABOLIC PNL TOTAL CA: CPT

## 2024-05-31 PROCEDURE — 700105 HCHG RX REV CODE 258: Performed by: INTERNAL MEDICINE

## 2024-05-31 PROCEDURE — 83735 ASSAY OF MAGNESIUM: CPT

## 2024-05-31 RX ORDER — NOREPINEPHRINE BITARTRATE 0.03 MG/ML
0-1 INJECTION, SOLUTION INTRAVENOUS CONTINUOUS
Status: DISCONTINUED | OUTPATIENT
Start: 2024-05-31 | End: 2024-06-02

## 2024-05-31 RX ADMIN — SODIUM CHLORIDE, POTASSIUM CHLORIDE, SODIUM LACTATE AND CALCIUM CHLORIDE: 600; 310; 30; 20 INJECTION, SOLUTION INTRAVENOUS at 18:26

## 2024-05-31 RX ADMIN — LANSOPRAZOLE 30 MG: 30 TABLET, ORALLY DISINTEGRATING ORAL at 06:22

## 2024-05-31 RX ADMIN — FENTANYL CITRATE 50 MCG: 50 INJECTION, SOLUTION INTRAMUSCULAR; INTRAVENOUS at 17:50

## 2024-05-31 RX ADMIN — PROPOFOL 40 MCG/KG/MIN: 10 INJECTION, EMULSION INTRAVENOUS at 18:53

## 2024-05-31 RX ADMIN — FENTANYL CITRATE 50 MCG: 50 INJECTION, SOLUTION INTRAMUSCULAR; INTRAVENOUS at 09:33

## 2024-05-31 RX ADMIN — SENNOSIDES AND DOCUSATE SODIUM 2 TABLET: 50; 8.6 TABLET ORAL at 18:25

## 2024-05-31 RX ADMIN — FENTANYL CITRATE 50 MCG: 50 INJECTION, SOLUTION INTRAMUSCULAR; INTRAVENOUS at 06:26

## 2024-05-31 RX ADMIN — Medication: at 18:28

## 2024-05-31 RX ADMIN — DEXTROSE MONOHYDRATE 25 G: 25 INJECTION, SOLUTION INTRAVENOUS at 19:02

## 2024-05-31 RX ADMIN — FENTANYL CITRATE 50 MCG: 50 INJECTION, SOLUTION INTRAMUSCULAR; INTRAVENOUS at 12:00

## 2024-05-31 RX ADMIN — PROPOFOL 35 MCG/KG/MIN: 10 INJECTION, EMULSION INTRAVENOUS at 02:51

## 2024-05-31 RX ADMIN — WATER 1026 MEQ: 1 INJECTION INTRAMUSCULAR; INTRAVENOUS; SUBCUTANEOUS at 20:59

## 2024-05-31 RX ADMIN — PROPOFOL 35 MCG/KG/MIN: 10 INJECTION, EMULSION INTRAVENOUS at 10:54

## 2024-05-31 RX ADMIN — NOREPINEPHRINE BITARTRATE 0.02 MCG/KG/MIN: 1 INJECTION, SOLUTION, CONCENTRATE INTRAVENOUS at 10:57

## 2024-05-31 RX ADMIN — FENTANYL CITRATE 50 MCG: 50 INJECTION, SOLUTION INTRAMUSCULAR; INTRAVENOUS at 03:25

## 2024-05-31 RX ADMIN — GADOTERIDOL 10 ML: 279.3 INJECTION, SOLUTION INTRAVENOUS at 18:08

## 2024-05-31 RX ADMIN — Medication: at 06:22

## 2024-05-31 RX ADMIN — DEXTROSE MONOHYDRATE 25 G: 25 INJECTION, SOLUTION INTRAVENOUS at 12:29

## 2024-05-31 RX ADMIN — PROPOFOL 20 MCG/KG/MIN: 10 INJECTION, EMULSION INTRAVENOUS at 09:20

## 2024-05-31 RX ADMIN — FENTANYL CITRATE 50 MCG: 50 INJECTION, SOLUTION INTRAMUSCULAR; INTRAVENOUS at 16:36

## 2024-05-31 ASSESSMENT — PAIN DESCRIPTION - PAIN TYPE
TYPE: ACUTE PAIN

## 2024-05-31 NOTE — PROGRESS NOTES
Critical Care Progress Note    Date of admission  5/29/2024    Chief Complaint  Jl Mueller is a 78 y.o. male smoker w/ PMHx s/f HTN, CAD, HLD, prior MI, AAA s/p bypass graft stenting, COPD on 2L home O2, two prior hemorrhagic strokes w/o reported deficits, BPH s/p prostate artery embolization who initially presented to an outside facility via EMS on 5/29/2024 for evaluation. Per the patient's wife, the patient was eating dinner when he stated he had a headache and felt weak. He then had two episodes of emesis and became altered to GCS11; he was intubated at some point prior to transfer to this facility due to his neuro status. On imaging at the outside facility, he was found to have a left cerebellar hemorrhage, mild perihematomal edema with subtle parenchymal displacement. 4th ventricle with moderate compression but open. for which he was transferred to this facility for higher level of care. On arrival, the patient was moving all extremities and withdrew to pain in all extremities. His pupils are pinpoint, and he over breaths vent. Repeat imaging redemonstrates area of Lt-cerebellar hemorrhage.  A CTA was completed which, does not clearly show an aneurysmal/vascular malformation. He is being admitted to the ICU for hypertonic saline therapy, Q1h neurological checks, and strict blood pressure control as well as ventilator management.     Hospital Course  5/30- DDAVP for platelet inhibition.     Interval Problem Update  Reviewed last 24 hour events:              - No acute events overnight              - Tm: Afebrile              - HR: 60-70s (RBBB)              - SBP: 120-140s (goal 110-140)              - Neuro: RASS -2 to +2              - GI: NPO, start EN              - UOP: 450 mL              - Park: Yes              - Lines: PICC line              - PPx: PPI, No dvt.    - VD#3, Failed SAT agitation              - Mobility level1    Infusions:  Norepinephrine  Propofol    Review of Systems  Review of  Systems   Unable to perform ROS: Intubated        Vital Signs for last 24 hours   Temp:  [35.8 °C (96.4 °F)-36.2 °C (97.2 °F)] 36 °C (96.8 °F)  Pulse:  [] 54  Resp:  [16-56] 20  BP: ()/(51-83) 115/56  SpO2:  [88 %-100 %] 96 %    Hemodynamic parameters for last 24 hours       Respiratory Information for the last 24 hours  Vent Mode: APVCMV  Rate (breaths/min): 20  Vt Target (mL): 340  PEEP/CPAP: 8  MAP: 11  Control VTE (exp VT): 341    Physical Exam   Physical Exam  Constitutional:       General: He is not in acute distress.  HENT:      Head: Normocephalic.      Mouth/Throat:      Mouth: Mucous membranes are moist.   Eyes:      Pupils: Pupils are equal, round, and reactive to light.      Comments: Constricted bilaterally consistent with propofol infusion   Cardiovascular:      Rate and Rhythm: Regular rhythm. Bradycardia present.      Heart sounds: No murmur heard.     No friction rub. No gallop.   Pulmonary:      Effort: No respiratory distress.      Breath sounds: No wheezing or rales.   Abdominal:      General: There is no distension.      Palpations: Abdomen is soft. There is no mass.      Tenderness: There is no abdominal tenderness.   Musculoskeletal:      Cervical back: Neck supple.      Right lower leg: No edema.      Left lower leg: No edema.   Skin:     General: Skin is warm and dry.      Capillary Refill: Capillary refill takes less than 2 seconds.   Neurological:      Comments: Sedated, RASS -2.   Psychiatric:      Comments: GUZMAN         Medications  Current Facility-Administered Medications   Medication Dose Route Frequency Provider Last Rate Last Admin    norepinephrine (Levophed) 8 mg in 250 mL NS infusion (premix)  0-1 mcg/kg/min (Ideal) Intravenous Continuous Reyes Gray M.D.   Stopped at 05/31/24 1130    propofol (DIPRIVAN) injection  0-80 mcg/kg/min (Ideal) Intravenous Continuous Reyes Gray M.D. 13.7 mL/hr at 05/31/24 1400 40 mcg/kg/min at 05/31/24 1400    Pharmacy Consult:  Enteral tube insertion - review meds/change route/product selection  1 Each Other PHARMACY TO DOSE Reyes Gray M.D.        Hypertonic Sodium Acetate (513 mEq Na Acetate per liter) infusion  513 mEq/L Intravenous Continuous Reyes Gray M.D.        diazePAM (Valium) injection 5 mg  5 mg Intravenous Q6HRS PRN Sarina Euceda        insulin regular (HumuLIN R,NovoLIN R) injection  1-6 Units Subcutaneous Q6HRS Sarina Euceda        And    dextrose 50% (D50W) injection 25 g  25 g Intravenous Q15 MIN PRN Sarina RODRIGUEZ Latona   25 g at 05/31/24 1229    niCARdipine (Cardene) 25 mg in  mL Standard Infusion  0-15 mg/hr Intravenous Continuous Chilo Stephenson D.O.        labetalol (Normodyne/Trandate) injection 10 mg  10 mg Intravenous Q3HRS PRN Sarina Euceda        hydrALAZINE (Apresoline) injection 10 mg  10 mg Intravenous Q3HRS PRN Sarina Euceda        senna-docusate (Pericolace Or Senokot S) 8.6-50 MG per tablet 2 Tablet  2 Tablet Enteral Tube Q EVENING Sarina Euceda   2 Tablet at 05/30/24 1719    And    polyethylene glycol/lytes (Miralax) Packet 1 Packet  1 Packet Enteral Tube QDAY PRN Sarina Euceda        Respiratory Therapy Consult   Nebulization Continuous RT Sarina Euceda MD Alert...ICU Electrolyte Replacement per Pharmacy   Other PHARMACY TO DOSE Sarina Euceda        lidocaine (Xylocaine) 1 % injection 2 mL  2 mL Tracheal Tube Q30 MIN PRN Sarina Euceda        acetaminophen (Tylenol) tablet 650 mg  650 mg Enteral Tube Q4HRS PRN Sarina Euceda        Or    acetaminophen (Tylenol) suppository 650 mg  650 mg Rectal Q4HRS PRN Sarina Euceda        ondansetron (Zofran ODT) dispertab 4 mg  4 mg Enteral Tube Q4HRS PRN Sarina Euceda        Or    ondansetron (Zofran) syringe/vial injection 4 mg  4 mg Intravenous Q4HRS PRN Sarina Euceda        albuterol (Proventil) 2.5mg/0.5ml nebulizer solution 2.5 mg  2.5 mg Nebulization Q4HRS PRN Sarina Euceda        fentaNYL (Sublimaze) injection 25-50 mcg   25-50 mcg Intravenous Q HOUR PRN Sarina RODRIGUEZ Latona   50 mcg at 05/31/24 1200    lansoprazole (Prevacid) solutab 30 mg  30 mg Enteral Tube DAILY Reyes Gray M.D.   30 mg at 05/31/24 0622    lactated ringers infusion   Intravenous Continuous Reyes Gray M.D. 40 mL/hr at 05/31/24 1400 Rate Verify at 05/31/24 1400    mineral oil-pet hydrophilic (Aquaphor) ointment   Topical BID Reyes Gray M.D.   Given at 05/31/24 0622       Fluids    Intake/Output Summary (Last 24 hours) at 5/31/2024 1438  Last data filed at 5/31/2024 1400  Gross per 24 hour   Intake 2062.62 ml   Output 580 ml   Net 1482.62 ml       Laboratory  Recent Labs     05/30/24  0200 05/30/24  2306 05/31/24  0341   ISTATAPH 7.445 7.430 7.387*   ISTATAPCO2 36.5 35.8 40.5*   ISTATAPO2 102* 115* 58*   ISTATATCO2 26 25 26   JMCVMVQ1WEM 98 99 89*   ISTATARTHCO3 25.1* 23.7 24.4   ISTATARTBE 1 0 -1   ISTATTEMP 97.3 F 97.4 F 97.5 F   ISTATFIO2 40 40 30   ISTATSPEC Arterial Arterial Arterial   ISTATAPHTC 7.456 7.440 7.396*   RYINWCJS0WE 98* 111* 56*         Recent Labs     05/31/24  0024 05/31/24  0601 05/31/24  1210   SODIUM 148* 150* 164*   POTASSIUM 3.6 4.3 4.0   CHLORIDE 114* 116* 133*   CO2 23 21 18*   BUN 23* 20 17   CREATININE 0.76 0.68 0.57   MAGNESIUM  --  2.0  --    CALCIUM 9.0 8.9 7.9*     Recent Labs     05/29/24  2249 05/30/24  0200 05/31/24  0024 05/31/24  0601 05/31/24  1210   ALTSGPT 8  --   --   --   --    ASTSGOT 14  --   --   --   --    ALKPHOSPHAT 59  --   --   --   --    TBILIRUBIN 0.6  --   --   --   --    GLUCOSE 162*   < > 103* 90 67    < > = values in this interval not displayed.     Recent Labs     05/29/24 2249 05/31/24 0601   WBC 12.2* 8.5   NEUTSPOLYS 93.20* 81.80*   LYMPHOCYTES 2.20* 8.40*   MONOCYTES 3.80 7.90   EOSINOPHILS 0.10 1.50   BASOPHILS 0.20 0.20   ASTSGOT 14  --    ALTSGPT 8  --    ALKPHOSPHAT 59  --    TBILIRUBIN 0.6  --      Recent Labs     05/29/24 2249 05/31/24 0601   RBC 4.70 3.76*   HEMOGLOBIN 13.2* 10.9*    HEMATOCRIT 41.2* 33.5*   PLATELETCT 226 175   PROTHROMBTM 13.6  --    APTT 26.1  --    INR 1.03  --        Imaging  MRI:   Pending    Assessment/Plan  * Cerebellar hemorrhage (HCC)- (present on admission)  Assessment & Plan  ICH score=1  -q1h neurochecks  -MRI brain ICH protocol pending  -Keep -160,   -3% sodium acetate: check serum Na and Osm q6 hour, goal Na 150-160  -Neurosurgery Zosyn; non operative at this time  -No coagulation   -Hold all antithrombotic therapy  -SCDs only for DVT ppx  -keep euvolemic, euthermic, and normoglycemic (140-180)  -Maintain bowel regimen to avoid Valsalva and increased intracranial pressure.  -HOB at 30 degrees  -maintain pCO2 of 35-40; closer to 35  -Atorvastatin 40 mg qHS for possible neuroprotective effect      Acute on chronic respiratory failure with hypoxia (HCC)- (present on admission)  Assessment & Plan  Intubated for airway protection  Lung protective ventilation strategies  Titrate ventilator prescription to optimize oxygenation, ventilation, and acid base balance.  Daily ABC trials    COPD (chronic obstructive pulmonary disease) (HCC)- (present on admission)  Assessment & Plan  Not currently in exacerbation  - CXR now and Qam, no acute process  - Continue home nebulizers as ordered  - Continue PRN nebulizers Q4h  - Wean FIo2 as tolerated    Abdominal aortic aneurysm (AAA) without rupture (HCC)- (present on admission)  Assessment & Plan  S/p EVAR w/ bypass graft stent  - Maintain normotension  - Holding ASA at this time; per wife he takes ASA 3x/week    Hyperchloremic metabolic acidosis  Assessment & Plan  Due to 3% saline for osmotic therapy  Changed 3% saline to 3% sodium acetate    Hyperglycemia- (present on admission)  Assessment & Plan  Moderate glycemic control with insulin therapy    Hyperlipidemia- (present on admission)  Assessment & Plan  Per wife, patient's home medication was discontinued due to muscle cramps  - Start atorvastatin 40mg QHS for  neuroprotection; monitor for tolerance  - Obtain Lipid panel  - Statin therapy; hold for elevated CK/LFTs  - Counseling on lifestyle/dietary modifications    History of stroke- (present on admission)  Assessment & Plan  Patient has had 2 prior hemorrhagic strokes; last in 2015  - No noted deficits per wife    BPH with urinary obstruction- (present on admission)  Assessment & Plan  S/p prostate artery embolization  - Per wife, patient has suffered from urinary retention and incontinence since his surgery  - Continue flomax  - Park for strict I&O    Benign essential hypertension- (present on admission)  Assessment & Plan  Per wife, patient's medications were discontinued as an outpatient because his BP has been controlled  - SBP in the 170s per wife's at home BP cuff measurement; <160 since arrival to this facility  - Cardene & PRN antihypertensives to maintain -160)  - Monitor I&O  - Cardiac diet when appropriate  - Control pain         VTE:  Contraindicated  Ulcer: PPI  Lines: None    I have performed a physical exam and reviewed and updated ROS and Plan today (5/31/2024). In review of yesterday's note (5/30/2024), there are no changes except as documented above.     Discussed patient condition and risk of morbidity and/or mortality with Family, RN, RT, Pharmacy, and neurology and neurosurgery      The patient remains critically ill.  I have assessed and reassessed the respiratory status and made ventilator adjustments based upon arterial blood gas analysis, ventilator waveforms and airway mechanics.  I have assessed and reassessed the blood pressure, hemodynamics, cardiovascular and neurologic status. This patient remains at high risk for worsening cardiopulmonary dysfunction and death without the above critical care interventions.    Critical care time = 45 minutes in directly providing and coordinating critical care and extensive data review.  No time overlap and excludes procedures.

## 2024-05-31 NOTE — THERAPY
Physical Therapy Contact Note    Patient Name: Jl Mueller  Age:  78 y.o., Sex:  male  Medical Record #: [id number]  Today's Date: 5/31/2024    PT consult received, remains with active bedrest order; neurology note citing 'not a candidate for therapy'; if remains inappropriate to participate x 3rd attempt will sign off per protocol and await reconsult;     Monica HEARN, PT,  [phone]

## 2024-05-31 NOTE — PROGRESS NOTES
Critical Na+ value of 164 resulted from 1210. 3% Normal Saline stopped per MAR. Will redraw in 6 hours. Dr Gray notified at 1300.

## 2024-05-31 NOTE — DISCHARGE PLANNING
CM reviewed chart and patient was discussed in IDT rounds.    CM met with wife  Avril at bedside.She tells me that they live in a multi level home.  There are 7 steps up to a landing and then 7 more.  Yuniel has been able to manage those by using the wall for stability.  Yuniel uses a 4 wheeled walker with a seat and hand brakes.  When he goes to an appt he does use the w/c.    Yuniel is on O2 continuous at 2L/nc baseline.  Provider is [facility].    Previously Yuniel has been [facility] HH [facility] and was with [facility] in [place].    Since previous strokes Yuniel has required increasing assistance with ADLs.  On discharge if he needs a SNF she would like the facility in [place] as first choice and then would prefer something in [place].      Care Transition Team Assessment    Information Source  Orientation Level: Unable to assess  Information Given By: Spouse  Informant's Name: Avril  Who is responsible for making decisions for patient? : Spouse  Name(s) of Primary Decision Maker: Avril    Readmission Evaluation  Is this a readmission?: No    Elopement Risk  Legal Hold: No  Ambulatory or Self Mobile in Wheelchair: No-Not an Elopement Risk  Elopement Risk: Not at Risk for Elopement         Discharge Preparedness  What is your plan after discharge?: Home with help (per Avril)  What are your discharge supports?: Spouse  Prior Functional Level: Ambulatory, Needs Assist with Activities of Daily Living, Uses Walker, Uses Wheelchair, Needs Assist with Medication Management  Difficulity with ADLs: Walking, Dressing, Bathing  Difficulty with ADLs Comment: Since the previous stroke Avril has had to assist him  Difficulity with IADLs: Cooking, Driving, Keeping track of finances, Laundry, Managing medication, Shopping  Difficulity with IADL Comments: Avril provides this for him    Functional Assesment  Prior Functional Level: Ambulatory, Needs Assist with Activities of Daily Living, Uses Walker, Uses Wheelchair,  Needs Assist with Medication Management    Finances  Prescription Coverage: Yes                             Discharge Risks or Barriers  Discharge risks or barriers?: Post-acute placement / services, Complex medical needs  Patient risk factors: Cognitive / sensory / physical deficit    Anticipated Discharge Information  Discharge Disposition: D/T to another type of health care inst. (96)

## 2024-05-31 NOTE — DISCHARGE PLANNING
Physiatry consult remains pending.  Would appreciate therapy evaluations once medically appropriate.    Vented day #2    TCC remains following

## 2024-05-31 NOTE — PROGRESS NOTES
Hypoglycemia Intervention    Hypoglycemia protocol intervention:  Blood glucose 59 at 1220.  Intervention: 25 g IV dextrose per MAR   Repeat blood glucose 181 at 1253.  Intervention: Patient NPO, recheck blood glucose in 1 hour   Additional interventions needed: N/A - blood glucose 59 on PICC blood draw; 72 fingerstick --> 25 g IV dextrose given  Dr. Gray notified of the above at 1300.

## 2024-05-31 NOTE — DIETARY
"Nutrition Support Assessment   Day 2 of admit.  Jl Mueller is a 78 y.o. male with admitting DX of Cerebellar hemorrhage      Current problem list:  COPD/ acute on chronic   Hx of stroke 2021  Cerebellar hemorrhage   Abdominal aortic aneurysm without rupture      Assessment:  Estimated Nutritional Needs: based on:   Height: 160 cm (5' 2.99\")  Weight: 65.7 kg (144 lb 13.5 oz)  Ideal Body Weight: 56.3 kg (124 lb 1.9 oz) +/- 10%  % Ideal Body weight: 116%  Body mass index is 25.66 kg/m²., BMI classification: Overweight   Weight to be used in calculations: 65.7 kg    Calculation/Equation: MSJ x 1.2 = 1527 kcal; PSU: 1323 kcal (Tmax: 36.2, Vent: 6.7)  Total calories/ day: 1642 - 1770  kcal (25 - 27kcal/kg)  Total grams of protein/day: 85 - 105 (1.3-1.6g/kg) elevated related to hemorrhage  Maintenance fluids: 1mL/kcal or at least 25mL/kg, priority is maintaining Na 150-160 and adjusting fluids accordingly      Evaluation:   Indication for nutrition support: Intubated   NG left nare in place, pending verification prior to feeding.   Wt hx per chart review: noted unknown weight measurement sources:  Wt Readings from Last 4 Encounters:   05/30/24 65.7 kg (144 lb 13.5 oz)   05/30/24 65.7 kg (144 lb 13.5 oz)   10/24/23 61 kg (134 lb 7.7 oz)   01/26/21 69.2 kg (152 lb 8.9 oz)   Potential downtrend over 2 years ago, more recently appears trended up and is stable.   Per nutrition focused physical exam limited, though no muscle wasting or subcutaneous fat loss observed at this time.   Skin: partial thickness to back and shoulder; Wt assessed on 5/30, no advanced wound needs required or follow-up at this time.   Pertinent Labs: Na 150, triglycerides now WNL  Pertinent Meds: SSI, prevacid, senokot; continuous 3% sodium chloride, 40mL/hr lactated ringers Propofol @ 35mcg (11.9mL/hr = 314 kcal); Levo @ 0.02mcg  Last BM: PTA  RD recommends fiber-free formula with immuno enhancing components at this time.      Malnutrition Risk: No " criteria noted at this time.     Problem: Nutritional:  Goal: Nutrition support tolerated and meeting greater than 85% of estimated needs      Recommendations/Plan:  Recommend Impact peptide @ 25mL/hr to advance per protocol to goal rate of 40mL/hr to provide 1440 kcal (+ kcal form propofol), 90 grams of protein (~1.4g/kg), and 739 ml free water.  Nutrition support to reach goal rate within 72 hours desirable   Fluids per MD, NA goal remains within 150-160  Monitor TF tolerance and propofol  Monitor weight     RD following

## 2024-05-31 NOTE — WOUND TEAM
[facility]  Inpatient Services  Initial Wound and Skin Care Evaluation    Admission Date: 5/29/2024     Last order of IP CONSULT TO WOUND CARE was found on 5/30/2024 from Hospital Encounter on 5/29/2024     HPI, PMH, SH: Reviewed    Past Surgical History:   Procedure Laterality Date    AAA WITH STENT GRAFT       Social History     Tobacco Use    Smoking status: Unknown    Smokeless tobacco: Not on file   Substance Use Topics    Alcohol use: Not Currently     Chief Complaint   Patient presents with    Possible Stroke     Pt transferred from [place] via Care flight for hemorrhagic CVA. LKW 2030. Pt was eating dinner, and had sudden headache and started vomiting. Pt originally arrived to [facility] via EMS with N/V, GCS 11. Pt was intubated at [facility]     Diagnosis: Cerebellar hemorrhage (HCC) [I61.4]    Unit where seen by Wound Team: R104/00     WOUND CONSULT RELATED TO:  Shoulders     WOUND TEAM PLAN OF CARE - Frequency of Follow-up:   Nursing to follow dressing orders written for wound care. Contact wound team if area fails to progress, deteriorates or with any questions/concerns if something comes up before next scheduled follow up (See below as to whether wound is following and frequency of wound follow up)   Not following, consult as needed  - Shoulders    WOUND HISTORY:   Patient has history of itching and scratching shoulders. Wife using TAC and bacitracin at home.        WOUND ASSESSMENT/LDA  Wound 10/24/23 Partial Thickness Wound Back;Shoulder Midline;Upper Bilateral Scar tissue, dryness (Active)   Date First Assessed/Time First Assessed: 10/24/23 1938   Present on Original Admission: Yes  Primary Wound Type: Partial Thickness Wound  Location: Back;Shoulder  Wound Orientation: Midline;Upper  Laterality: Bilateral  Wound Description (Comments): S...      Assessments 5/30/2024  5:00 PM   Wound Image     Periwound Assessment Pink;Dry;Flaky   Margins Attached edges   Closure Open to  air   Drainage Amount None   Treatments Other (Comment)   Periwound Protectant Skin Moisturizer   Dressing Status Open to Air        Vascular:    MIRTHA:   No results found.    Lab Values:    Lab Results   Component Value Date/Time    WBC 12.2 (H) 05/29/2024 10:49 PM    RBC 4.70 05/29/2024 10:49 PM    HEMOGLOBIN 13.2 (L) 05/29/2024 10:49 PM    HEMATOCRIT 41.2 (L) 05/29/2024 10:49 PM    HBA1C 5.6 05/30/2024 02:00 AM         Culture Results show:  No results found for this or any previous visit (from the past 720 hour(s)).    Pain Level/Medicated:  None, Tolerated without pain medication       INTERVENTIONS BY WOUND TEAM:  Chart and images reviewed. Discussed with bedside RN. All areas of concern (based on picture review, LDA review and discussion with bedside RN) have been thoroughly assessed. Documentation of areas based on significant findings. This RN in to assess patient. Performed standard wound care which includes appropriate positioning, dressing removal and non-selective debridement. Pictures and measurements obtained weekly if/when required.    Wound:  B shoulders  Primary Dressing:  Ordered aquaphor ointment, left open to air    [facility] Discharge Planning  Number of Clinicians necessary to complete wound care: 1  Is patient requiring IV pain medications for dressing changes:  No   Length of time for dressing change 10 min. (This does not include chart review, pre-medication time, set up, clean up or time spent charting.)    Interdisciplinary consultation: Patient, Bedside RN ([place]), N/A.  Pressure injury and staging reviewed with N/A.    EVALUATION / RATIONALE FOR TREATMENT:     Date:  05/30/24  Wound Status:  Initial evaluation    The patient has significant scar tissue extending from shoulders on to upper back area. Per wife, the patient has been having itching, and often scratches here. At home he has been using kenalog for the dryness, and bacitracin for open sores that they gets  over-the-counter. The wounds do not appear infected, and are quite dry with a few partial thickness areas open. A hydrating ointment ordered to decrease dryness. The patient is currently restrained, unable to identify if skin is currently itchy. May consider a steroid cream if itching continues.     The patient's B heels are intact and blanching. The sacrum also intact and blanching with small skin-colored growth noted to L upper posterior thigh.       NURSING PLAN OF CARE ORDERS:  Skin care: See Skin Care orders    NUTRITION RECOMMENDATIONS   Wound Team Recommendations:  N/A    DIET ORDERS (From admission to next 24h)       Start     Ordered    05/30/24 0028  Diet NPO Restrict to: Strict (okay to receive meds through the NG/OG tube)  ALL MEALS        Question Answer Comment   Type: Now    Diet NPO Restrict to: Strict okay to receive meds through the NG/OG tube       05/30/24 0031                    PREVENTATIVE INTERVENTIONS:    Q shift Barrera - performed per nursing policy  Q shift pressure point assessments - performed per nursing policy    Surface/Positioning  Low Airloss - Currently in Place  Reposition q 2 hours - Currently in Place  TAPs Turning system - Currently in Place    Offloading/Redistribution  Sacral offloading dressing (Silicone dressing) - Currently in Place  Float Heels off Bed with Pillows - Currently in Place           Containment/Moisture Prevention    Dri-zuri pad - Currently in Place  Park Catheter - Currently in Place    Mobilization      Not Mobilizing      Anticipated discharge plans:  TBD and N/A        Vac Discharge Needs:  Vac Discharge plan is purely a recommendation from wound team and not a requirement for discharge unless otherwise stated by physician.  Not Applicable Pt not on a wound vac

## 2024-05-31 NOTE — PROGRESS NOTES
Neurosurgery Progress Note    Subjective:  No acute events over night    Exam:  Vented and sedated  PERRLA  Per RN, difficulty with weaning sedation due to agitation  MACIAS, R>L    BP  Min: 89/51  Max: 167/73  Pulse  Av.4  Min: 45  Max: 101  Resp  Av.2  Min: 16  Max: 56  Temp  Av.1 °C (96.9 °F)  Min: 35.7 °C (96.2 °F)  Max: 36.2 °C (97.2 °F)  Monitored Temp 2  Av.2 °C (99 °F)  Min: 36.1 °C (97 °F)  Max: 37.6 °C (99.7 °F)  SpO2  Av.4 %  Min: 88 %  Max: 100 %    No data recorded    Recent Labs     24  06   WBC 12.2* 8.5   RBC 4.70 3.76*   HEMOGLOBIN 13.2* 10.9*   HEMATOCRIT 41.2* 33.5*   MCV 87.7 89.1   MCH 28.1 29.0   MCHC 32.0* 32.5   RDW 43.8 46.9   PLATELETCT 226 175   MPV 11.9 13.0*     Recent Labs     24  1800 24  0024 24  0601   SODIUM 153* 148* 150*   POTASSIUM 3.7 3.6 4.3   CHLORIDE 123* 114* 116*   CO2 19* 23 21   GLUCOSE 90 103* 90   BUN 22 23* 20   CREATININE 0.58 0.76 0.68   CALCIUM 7.9* 9.0 8.9     Recent Labs     24   APTT 26.1   INR 1.03     Recent Labs     24  0035   REACTMIN 4.1*   CLOTKINET 0.8   CLOTANGL 78.0   MAXCLOTS 67.7   [id number] 0.0   PRCINADP 39.7*   PRCINAA 96.9*       Intake/Output                         24 - 24 0659 24 - 2459      Total [id number] Total                 Intake    I.V.  602.3  913.7 1516  --  -- --    Propofol Volume 169.3 161.4 330.7 -- -- --    Norepinephrine Volume 3.9 38.7 42.6 -- -- --    Volume (mL) (3% sodium chloride (Hypertonic Saline) 500mL infusion) 252.5 283.2 535.7 -- -- --    Volume (mL) (lactated ringers infusion) 176.5 430.6 607.1 -- -- --    Other  --  180 180  --  -- --    Medications (PO/Enteral Liquids) -- 180 180 -- -- --    NG/GT  --  0 0  --  -- --    Intake (mL) (Enteral Tube 24 Left nare) -- 0 0 -- -- --    IV Piggyback  48.9  43 91.8  --  -- --    Volume (mL) (desmopressin PF (Ddavp) 18.3 mcg in  NS 50 mL ivpb) 48.9 -- 48.9 -- -- --    Volume (mL) (calcium GLUConate 1 g in NaCl IVPB premix) -- 43 43 -- -- --    Total Intake 651.2 1136.7 1787.9 -- -- --       Output    Urine  225  220 445  --  -- --    Output (mL) ([REMOVED] Urethral Catheter 16 Fr. 05/31/24 0540) 225 160 385 -- -- --    Output (mL) (Urethral Catheter Temperature probe 16 Fr.) -- 60 60 -- -- --    Stool  --  -- --  --  -- --    Number of Times Stooled 0 x 0 x 0 x -- -- --    Emesis/NG output  --  0 0  --  -- --    Output (mL) (Enteral Tube 05/29/24 Left nare) -- 0 0 -- -- --    Total Output 225 220 445 -- -- --       Net I/O     426.2 916.7 1342.9 -- -- --              Intake/Output Summary (Last 24 hours) at 5/31/2024 0735  Last data filed at 5/31/2024 0600  Gross per 24 hour   Intake 1787.85 ml   Output 445 ml   Net 1342.85 ml             diazePAM  5 mg Q6HRS PRN    insulin regular  1-6 Units Q6HRS    And    dextrose bolus  25 g Q15 MIN PRN    niCARdipine (Cardene) 25 mg in  mL Standard Infusion  0-15 mg/hr Continuous    labetalol  10 mg Q3HRS PRN    hydrALAZINE  10 mg Q3HRS PRN    NORepinephrine  0-1 mcg/kg/min Continuous    senna-docusate  2 Tablet Q EVENING    And    polyethylene glycol/lytes  1 Packet QDAY PRN    Respiratory Therapy Consult   Continuous RT    MD Alert...Adult ICU Electrolyte Replacement per Pharmacy   PHARMACY TO DOSE    lidocaine  2 mL Q30 MIN PRN    acetaminophen  650 mg Q4HRS PRN    Or    acetaminophen  650 mg Q4HRS PRN    ondansetron  4 mg Q4HRS PRN    Or    ondansetron  4 mg Q4HRS PRN    albuterol  2.5 mg Q4HRS PRN    propofol  0-80 mcg/kg/min Continuous    fentaNYL  25-50 mcg Q HOUR PRN    Pharmacy   PHARMACY TO DOSE    lansoprazole  30 mg DAILY    LR   Continuous    mineral oil-pet hydrophilic   BID    3% sodium chloride  0-60 mL/hr Continuous    sodium chloride 200 mEq in empty bag 50 mL infusion  200 mEq Q6HRS PRN       Assessment and Plan:  Hospital day #2 cerebellar hemorrhage  POD #NA  Prophylactic  anticoagulation: no         Start date/time: TBD     Follow up CT stable  Continue with Q1hr neuro checks while intubated  On EVD watch  Will intermittently follow, please let us know with any questions/concerns

## 2024-05-31 NOTE — CARE PLAN
The patient is Watcher - Medium risk of patient condition declining or worsening    Shift Goals  Clinical Goals: wean pressor support; wean vent support; q1h neuro checks  Patient Goals: GUZMAN  Family Goals: updates,    Progress made toward(s) clinical / shift goals:    Problem: Safety - Medical Restraint  Goal: Remains free of injury from restraints (Restraint for Interference with Medical Device)  Outcome: Progressing     Problem: Pain - Standard  Goal: Alleviation of pain or a reduction in pain to the patient’s comfort goal  Outcome: Progressing     Problem: Skin Integrity  Goal: Skin integrity is maintained or improved  Outcome: Progressing       Patient is not progressing towards the following goals:

## 2024-05-31 NOTE — PROGRESS NOTES
Neurology Progress Note  Neurohospitalist Service, [facility]    Referring Physician: Reyes Gray M.D.      Interval History: Patient seen and examined this AM. Patient clinically unchanged. No reported acute overnight events.     Review of systems: In addition to what is detailed in the HPI and/or updated in the interval history, all other systems reviewed and are negative.    Past Medical History, Past Surgical History and Social History reviewed and unchanged from prior    Medications:    Current Facility-Administered Medications:     norepinephrine (Levophed) 8 mg in 250 mL NS infusion (premix), 0-1 mcg/kg/min (Ideal), Intravenous, Continuous, Reyes Gray M.D., Last Rate: 2.1 mL/hr at 05/31/24 1057, 0.02 mcg/kg/min at 05/31/24 1057    propofol (DIPRIVAN) injection, 0-80 mcg/kg/min (Ideal), Intravenous, Continuous, Last Rate: 11.9 mL/hr at 05/31/24 1054, 35 mcg/kg/min at 05/31/24 1054 **AND** [START ON 6/1/2024] Triglycerides Starting now and then Every 3 Days, , , Every 3 Days (0300), Reyes Gray M.D.    diazePAM (Valium) injection 5 mg, 5 mg, Intravenous, Q6HRS PRN, Sarina Euceda    insulin regular (HumuLIN R,NovoLIN R) injection, 1-6 Units, Subcutaneous, Q6HRS **AND** POC blood glucose manual result, , , Q6H **AND** NOTIFY MD and PharmD, , , Once **AND** Administer 20 grams of glucose (approximately 8 ounces of fruit juice) every 15 minutes PRN FSBG less than 70 mg/dL, , , PRN **AND** dextrose 50% (D50W) injection 25 g, 25 g, Intravenous, Q15 MIN PRN, Sarina Euceda    niCARdipine (Cardene) 25 mg in  mL Standard Infusion, 0-15 mg/hr, Intravenous, Continuous, Chilo Stephenson D.O.    labetalol (Normodyne/Trandate) injection 10 mg, 10 mg, Intravenous, Q3HRS PRN, Sarina L. Latona    hydrALAZINE (Apresoline) injection 10 mg, 10 mg, Intravenous, Q3HRS PRN, Sarina RODRIGUEZ Latona    senna-docusate (Pericolace Or Senokot S) 8.6-50 MG per tablet 2 Tablet, 2 Tablet, Enteral Tube, Q  "EVENING, 2 Tablet at 05/30/24 1719 **AND** polyethylene glycol/lytes (Miralax) Packet 1 Packet, 1 Packet, Enteral Tube, QDAY PRN, Sarina Euceda    Respiratory Therapy Consult, , Nebulization, Continuous RT, Sarina Euceda MD Alert...ICU Electrolyte Replacement per Pharmacy, , Other, PHARMACY TO DOSE, Sarina Euceda    lidocaine (Xylocaine) 1 % injection 2 mL, 2 mL, Tracheal Tube, Q30 MIN PRN, Sarina Euceda    acetaminophen (Tylenol) tablet 650 mg, 650 mg, Enteral Tube, Q4HRS PRN **OR** acetaminophen (Tylenol) suppository 650 mg, 650 mg, Rectal, Q4HRS PRN, Sarina Euceda    ondansetron (Zofran ODT) dispertab 4 mg, 4 mg, Enteral Tube, Q4HRS PRN **OR** ondansetron (Zofran) syringe/vial injection 4 mg, 4 mg, Intravenous, Q4HRS PRN, Sarina Euceda    albuterol (Proventil) 2.5mg/0.5ml nebulizer solution 2.5 mg, 2.5 mg, Nebulization, Q4HRS PRN, Sarina Euceda    fentaNYL (Sublimaze) injection 25-50 mcg, 25-50 mcg, Intravenous, Q HOUR PRN, Sarina Euceda, 50 mcg at 05/31/24 0933    Pharmacy Consult: Enteral tube insertion - review meds/change route/product selection, , Other, PHARMACY TO DOSE, Reyes Gray M.D.    lansoprazole (Prevacid) solutab 30 mg, 30 mg, Enteral Tube, DAILY, Reyes Gray M.D., 30 mg at 05/31/24 0622    lactated ringers infusion, , Intravenous, Continuous, Reyes Gray M.D., Last Rate: 40 mL/hr at 05/31/24 1000, Rate Verify at 05/31/24 1000    mineral oil-pet hydrophilic (Aquaphor) ointment, , Topical, BID, Reyes Gray M.D., Given at 05/31/24 0622    3% sodium chloride (Hypertonic Saline) 500mL infusion, 0-60 mL/hr, Intravenous, Continuous, Sarina Euceda, Last Rate: 30 mL/hr at 05/31/24 0713, 30 mL/hr at 05/31/24 0713    Physical Examination:   BP 98/55   Pulse (!) 51   Temp 36 °C (96.8 °F) (Temporal)   Resp 20   Ht 1.6 m (5' 2.99\")   Wt 65.7 kg (144 lb 13.5 oz)   SpO2 95%   BMI 25.66 kg/m²     NEUROLOGICAL EXAM:     Neurologic exam stable without significant changes " since yesterday's assessment (5/31/2024).    Sedated (Propofol). Intubated and Ventilated.     Level of consciousness: Responsive to pinch in all extremities with limb and truncal withdrawal. Patient did not follow commands     Pupils: Pinpoint pupils, difficult to establish reactivity.   Oculomotor: Conjugate. No gaze deviation. No overt nystagmus. Oculomotor reflex impaired  Corneal reflexes: Not tested  Blink to threat: Not elicited  Cough reflex: Intact  Gag reflex: Intact  Overbreathes ventilator     Face appears symmetric     Motor: Moves all extremities to pinch, withdrawal seen.   Deep tendon reflexes: No clonus.   Plantar responses: Mute    Objective Data:    Labs:  Lab Results   Component Value Date/Time    PROTHROMBTM 13.6 05/29/2024 10:49 PM    INR 1.03 05/29/2024 10:49 PM      Lab Results   Component Value Date/Time    WBC 8.5 05/31/2024 06:01 AM    RBC 3.76 (L) 05/31/2024 06:01 AM    HEMOGLOBIN 10.9 (L) 05/31/2024 06:01 AM    HEMATOCRIT 33.5 (L) 05/31/2024 06:01 AM    MCV 89.1 05/31/2024 06:01 AM    MCH 29.0 05/31/2024 06:01 AM    MCHC 32.5 05/31/2024 06:01 AM    MPV 13.0 (H) 05/31/2024 06:01 AM    NEUTSPOLYS 81.80 (H) 05/31/2024 06:01 AM    LYMPHOCYTES 8.40 (L) 05/31/2024 06:01 AM    MONOCYTES 7.90 05/31/2024 06:01 AM    EOSINOPHILS 1.50 05/31/2024 06:01 AM    BASOPHILS 0.20 05/31/2024 06:01 AM      Lab Results   Component Value Date/Time    SODIUM 150 (H) 05/31/2024 06:01 AM    POTASSIUM 4.3 05/31/2024 06:01 AM    CHLORIDE 116 (H) 05/31/2024 06:01 AM    CO2 21 05/31/2024 06:01 AM    GLUCOSE 90 05/31/2024 06:01 AM    BUN 20 05/31/2024 06:01 AM    CREATININE 0.68 05/31/2024 06:01 AM    GLOMRATE 78 10/19/2023 09:26 AM      Lab Results   Component Value Date/Time    CHOLSTRLTOT 162 05/30/2024 02:00 AM    LDL see below 05/30/2024 02:00 AM    HDL 44 05/30/2024 02:00 AM    TRIGLYCERIDE 106 05/30/2024 05:00 AM       Lab Results   Component Value Date/Time    ALKPHOSPHAT 59 05/29/2024 10:49 PM    ASTSGOT 14  05/29/2024 10:49 PM    ALTSGPT 8 05/29/2024 10:49 PM    TBILIRUBIN 0.6 05/29/2024 10:49 PM        Imaging/Testing:    I interpreted and/or reviewed the patient's neuroimaging    DX-CHEST-PORTABLE (1 VIEW)   Final Result         1.  Hazy left lower lobe infiltrate   2.  Small left pleural effusion   3.  Enlargement of the aortic knob, appearance suggesting aortic aneurysm.      DX-CHEST-FOR LINE PLACEMENT Perform procedure in: Patient's Room   Final Result      1.  PICC line is in place with the tip projecting over the SVC in satisfactory position.   2.  Mildly enlarged cardiac silhouette with vascular congestion.   3.  Retrocardiac opacity is likely due to atelectasis.         IR-PICC LINE PLACEMENT W/ GUIDANCE > AGE 5   Final Result                  Ultrasound-guided PICC placement performed by qualified nursing staff as    above.          CT-HEAD W/O   Final Result         1.  Left cerebellar hemorrhage, similar compared to prior study.   2.  Minimal bilateral intraventricular hemorrhages, new since prior study.   3.  Nonspecific white matter changes, commonly associated with small vessel ischemic disease.  Associated mild cerebral atrophy is noted.   4.  Atherosclerosis.         DX-CHEST-PORTABLE (1 VIEW)   Final Result         1.  Left basilar atelectasis, no focal infiltrate   2.  Trace left pleural effusion   3.  Atherosclerosis      CT-CTA NECK WITH & W/O-POST PROCESSING   Final Result         1.  Scattered atherosclerosis without significant stenosis or occlusion.   2.  4.2 cm proximal descending thoracic aortic aneurysm, radiographic follow-up and surveillance recommended as clinically appropriate.         CT-CTA HEAD WITH & W/O-POST PROCESS   Final Result         1.  No large vessel occlusion or aneurysm identified   2.  Large cerebellar hemorrhage predominantly in the left cerebellum      These findings were discussed with the patient's clinician, Nikki Alexander, on 5/29/2024 11:35 PM.       DX-CHEST-PORTABLE (1 VIEW)   Final Result      Tubes as above      Left pleural effusion      CHF/pulmonary edema pattern      See Brown MD   5/30/2024 8:41 AM         CT-HEAD W/O   Final Result      Acute intraparenchymal hemorrhage is noted involving the left cerebellar hemisphere, measuring 3.1 x 4.2 x 3.4 cm, with associated mass effect.      This finding was telephoned to the mentioned attending by on-call radiologist at the time of imaging         AAST Intracranial Hemorrhage Brain Injury Guidelines         Hemorrhage type  mBIG 1           mBIG 2                mBIG 3      Subdural             <4mm               5-7.9 mm             >8mm      Epidural                No                    No                  Yes      Intraparenc'mal   <4mm               5-7.9 mm         >8mm or multi   1 location       or 2 locations      >3 locations      Subarachnoid     <3 sulci       single hemisphere   bi-hemisphere   and <1 mm        or 1-3 mm            or > 3 mm      Intraventricular       No                  No                    Yes      Skull Fracture       None            Non-displaced       Displaced               DLP Reporting Thresholds for Incorrect/Repeated Exams - DLP in mGy*cm   Head/Neck:  0-year-old 3840, 1-year-old 5880, 5-year-old 8770, 10-year-old 71370 and adult 14441   Head:  0-year-old 4540, 1-year-old 7460, 5-year-old 48249, 10-year-old 52760 and adult 52313   Neck:  0-year-old 2940, 1-year-old 4160, 5-year-old 4550, 10-year-old 6320 and adult 8470   Chest:  0-year-old 550, 1-year-old 830, 5-year-old 1200, 10-year-old 3840 and adult 3570   Abd/pelvis:  0-year-old 440, 1-year-old 720, 5-year-old 1080, 10-year-old 3330 and adult 3330   Trunk(C/A/P):  0-year-old 490, 1-year-old 770, 5-year-old 1140, 10-year-old 3570 and adult 3330      EC-ECHOCARDIOGRAM COMPLETE W/O CONT    (Results Pending)   MR-MRA HEAD-W/O    (Results Pending)   OUTSIDE IMAGES-CT CERVICAL SPINE    (Results Pending)    MR-BRAIN-WITH & W/O    (Results Pending)       Assessment and Plan:  78 y.o. M w/ prior hx of HTN, HLD, AAA repair (6/2023), UGIB, prior stroke 2008 and prior ICH 2015, CAD s/p stent 2008, COPD who presents as trasnsfer from [facility] after acute onset of vomiting and found on head imaging to have Lt-cerebellar spontaneous IPH. Repeat head imaging has shown stable IPH. No signs of developing brainstem compression or hydrocephalus. Started on HTS in the setting of PHE surrounding bleed with subtle 4th ventricular compression however no signs of overt obstruction. NSGY consulted NTD at this time. In the setting of long standing cardiovascular disease and evidence of significant underlying atherosclerotic disease burden most strongly suspect primary HTN related hemorrhage although follow up MRI should provide clarity for evidence of potential CAA. Patient will remain in ICU for close neuro-monitoring. CTA head and neck without underlying aneurysms or vascular abnormalities, would not recommend further vascular imaging at this time.      Problem list:  -- Lt-cerebellar spontaneous IPH     Recommendations:  -- Can switch to q2hr neurochecks  -- Continue 3% Saline: check serum Na and Osm q6 hour, goal Na 150-160; current Na 150  -- Anticipate total HTS duration of about 3-5 days; then slow wean  -- Neurosurgery evaluation: NTD at this time, none anticipated  -- no underlying coagulopathy to correct noted  -- MRI brain w/o contrast  -- keep -160, target 140 or MAP  strict,  cardene PRN  -- Hold all antithrombotic therapy  -- SCDs only for DVT ppx  -- keep euvolemic, euthermic, and normoglycemic (140-180)  -- Maintain bowel regimen to avoid Valsalva and increased intracranial pressure.  -- HOB at 30 degrees  -- maintain pCO2 of 35-40  -- Start atorvastatin 40 mg qHS for possible neuroprotective effect  -- A1c: 5.6, LDL: Will need to be repeated TAGs great than 400  -- not a candidate for PT/OT/SLP    I  have performed a physical exam and reviewed and updated ROS and Plan today (5/31/2024).     Les Nunn III, MD  ABPN Board Certified in Neurology  Vascular Neurology, [facility]

## 2024-06-01 ENCOUNTER — APPOINTMENT (OUTPATIENT)
Dept: RADIOLOGY | Facility: MEDICAL CENTER | Age: 79
End: 2024-06-01
Attending: NURSE PRACTITIONER
Payer: MEDICARE

## 2024-06-01 ENCOUNTER — APPOINTMENT (OUTPATIENT)
Dept: CARDIOLOGY | Facility: MEDICAL CENTER | Age: 79
End: 2024-06-01
Attending: NURSE PRACTITIONER
Payer: MEDICARE

## 2024-06-01 LAB
ANION GAP SERPL CALC-SCNC: 10 MMOL/L (ref 7–16)
ANION GAP SERPL CALC-SCNC: 14 MMOL/L (ref 7–16)
ANION GAP SERPL CALC-SCNC: 8 MMOL/L (ref 7–16)
BASE EXCESS BLDA CALC-SCNC: 1 MMOL/L (ref -4–3)
BASOPHILS # BLD AUTO: 0.3 % (ref 0–1.8)
BASOPHILS # BLD: 0.03 K/UL (ref 0–0.12)
BODY TEMPERATURE: ABNORMAL DEGREES
BREATHS SETTING VENT: 20
BUN SERPL-MCNC: 12 MG/DL (ref 8–22)
BUN SERPL-MCNC: 18 MG/DL (ref 8–22)
BUN SERPL-MCNC: 18 MG/DL (ref 8–22)
CALCIUM SERPL-MCNC: 8.4 MG/DL (ref 8.5–10.5)
CALCIUM SERPL-MCNC: 8.4 MG/DL (ref 8.5–10.5)
CALCIUM SERPL-MCNC: 8.7 MG/DL (ref 8.5–10.5)
CHLORIDE SERPL-SCNC: 115 MMOL/L (ref 96–112)
CHLORIDE SERPL-SCNC: 115 MMOL/L (ref 96–112)
CHLORIDE SERPL-SCNC: 116 MMOL/L (ref 96–112)
CHOLEST SERPL-MCNC: 132 MG/DL (ref 100–199)
CO2 BLDA-SCNC: 27 MMOL/L (ref 20–33)
CO2 SERPL-SCNC: 25 MMOL/L (ref 20–33)
CO2 SERPL-SCNC: 26 MMOL/L (ref 20–33)
CO2 SERPL-SCNC: 30 MMOL/L (ref 20–33)
CREAT SERPL-MCNC: 0.61 MG/DL (ref 0.5–1.4)
CREAT SERPL-MCNC: 0.74 MG/DL (ref 0.5–1.4)
CREAT SERPL-MCNC: 0.76 MG/DL (ref 0.5–1.4)
CRP SERPL HS-MCNC: 5.73 MG/DL (ref 0–0.75)
DELSYS IDSYS: ABNORMAL
EOSINOPHIL # BLD AUTO: 0.3 K/UL (ref 0–0.51)
EOSINOPHIL NFR BLD: 3.4 % (ref 0–6.9)
ERYTHROCYTE [DISTWIDTH] IN BLOOD BY AUTOMATED COUNT: 48.3 FL (ref 35.9–50)
GFR SERPLBLD CREATININE-BSD FMLA CKD-EPI: 92 ML/MIN/1.73 M 2
GFR SERPLBLD CREATININE-BSD FMLA CKD-EPI: 92 ML/MIN/1.73 M 2
GFR SERPLBLD CREATININE-BSD FMLA CKD-EPI: 98 ML/MIN/1.73 M 2
GLUCOSE BLD STRIP.AUTO-MCNC: 119 MG/DL (ref 65–99)
GLUCOSE BLD STRIP.AUTO-MCNC: 126 MG/DL (ref 65–99)
GLUCOSE BLD STRIP.AUTO-MCNC: 132 MG/DL (ref 65–99)
GLUCOSE BLD STRIP.AUTO-MCNC: 71 MG/DL (ref 65–99)
GLUCOSE SERPL-MCNC: 109 MG/DL (ref 65–99)
GLUCOSE SERPL-MCNC: 125 MG/DL (ref 65–99)
GLUCOSE SERPL-MCNC: 128 MG/DL (ref 65–99)
HCO3 BLDA-SCNC: 25.8 MMOL/L (ref 17–25)
HCT VFR BLD AUTO: 34.9 % (ref 42–52)
HDLC SERPL-MCNC: 41 MG/DL
HGB BLD-MCNC: 10.8 G/DL (ref 14–18)
HOROWITZ INDEX BLDA+IHG-RTO: 263 MM[HG]
IMM GRANULOCYTES # BLD AUTO: 0.03 K/UL (ref 0–0.11)
IMM GRANULOCYTES NFR BLD AUTO: 0.3 % (ref 0–0.9)
LACTATE BLD-SCNC: 1.9 MMOL/L (ref 0.5–2)
LDLC SERPL CALC-MCNC: ABNORMAL MG/DL
LV EJECT FRACT  99904: 45
LV EJECT FRACT MOD 2C 99903: 50.44
LV EJECT FRACT MOD 4C 99902: 38.45
LV EJECT FRACT MOD BP 99901: 45.96
LYMPHOCYTES # BLD AUTO: 0.99 K/UL (ref 1–4.8)
LYMPHOCYTES NFR BLD: 11.3 % (ref 22–41)
MAGNESIUM SERPL-MCNC: 2 MG/DL (ref 1.5–2.5)
MCH RBC QN AUTO: 28.3 PG (ref 27–33)
MCHC RBC AUTO-ENTMCNC: 30.9 G/DL (ref 32.3–36.5)
MCV RBC AUTO: 91.4 FL (ref 81.4–97.8)
MODE IMODE: ABNORMAL
MONOCYTES # BLD AUTO: 0.74 K/UL (ref 0–0.85)
MONOCYTES NFR BLD AUTO: 8.4 % (ref 0–13.4)
NEUTROPHILS # BLD AUTO: 6.7 K/UL (ref 1.82–7.42)
NEUTROPHILS NFR BLD: 76.3 % (ref 44–72)
NRBC # BLD AUTO: 0 K/UL
NRBC BLD-RTO: 0 /100 WBC (ref 0–0.2)
O2/TOTAL GAS SETTING VFR VENT: 30 %
PCO2 BLDA: 40.7 MMHG (ref 26–37)
PCO2 TEMP ADJ BLDA: 41.1 MMHG (ref 26–37)
PEEP END EXPIRATORY PRESSURE IPEEP: 8 CMH20
PH BLDA: 7.41 [PH] (ref 7.4–7.5)
PH TEMP ADJ BLDA: 7.41 [PH] (ref 7.4–7.5)
PLATELET # BLD AUTO: 175 K/UL (ref 164–446)
PMV BLD AUTO: 12 FL (ref 9–12.9)
PO2 BLDA: 79 MMHG (ref 64–87)
PO2 TEMP ADJ BLDA: 80 MMHG (ref 64–87)
POTASSIUM SERPL-SCNC: 3.3 MMOL/L (ref 3.6–5.5)
POTASSIUM SERPL-SCNC: 3.5 MMOL/L (ref 3.6–5.5)
POTASSIUM SERPL-SCNC: 3.7 MMOL/L (ref 3.6–5.5)
RBC # BLD AUTO: 3.82 M/UL (ref 4.7–6.1)
SAO2 % BLDA: 96 % (ref 93–99)
SODIUM SERPL-SCNC: 152 MMOL/L (ref 135–145)
SODIUM SERPL-SCNC: 153 MMOL/L (ref 135–145)
SODIUM SERPL-SCNC: 154 MMOL/L (ref 135–145)
SPECIMEN DRAWN FROM PATIENT: ABNORMAL
TIDAL VOLUME IVT: 340 ML
TRIGL SERPL-MCNC: 432 MG/DL (ref 0–149)
WBC # BLD AUTO: 8.8 K/UL (ref 4.8–10.8)

## 2024-06-01 PROCEDURE — 85025 COMPLETE CBC W/AUTO DIFF WBC: CPT

## 2024-06-01 PROCEDURE — 700102 HCHG RX REV CODE 250 W/ 637 OVERRIDE(OP): Performed by: INTERNAL MEDICINE

## 2024-06-01 PROCEDURE — 80061 LIPID PANEL: CPT

## 2024-06-01 PROCEDURE — 94640 AIRWAY INHALATION TREATMENT: CPT

## 2024-06-01 PROCEDURE — 93306 TTE W/DOPPLER COMPLETE: CPT

## 2024-06-01 PROCEDURE — 700105 HCHG RX REV CODE 258: Performed by: INTERNAL MEDICINE

## 2024-06-01 PROCEDURE — 770022 HCHG ROOM/CARE - ICU (200)

## 2024-06-01 PROCEDURE — 71045 X-RAY EXAM CHEST 1 VIEW: CPT

## 2024-06-01 PROCEDURE — 80048 BASIC METABOLIC PNL TOTAL CA: CPT | Mod: 91

## 2024-06-01 PROCEDURE — 83605 ASSAY OF LACTIC ACID: CPT

## 2024-06-01 PROCEDURE — 83735 ASSAY OF MAGNESIUM: CPT

## 2024-06-01 PROCEDURE — 99232 SBSQ HOSP IP/OBS MODERATE 35: CPT | Performed by: PSYCHIATRY & NEUROLOGY

## 2024-06-01 PROCEDURE — 86140 C-REACTIVE PROTEIN: CPT

## 2024-06-01 PROCEDURE — 700101 HCHG RX REV CODE 250: Performed by: INTERNAL MEDICINE

## 2024-06-01 PROCEDURE — 99291 CRITICAL CARE FIRST HOUR: CPT | Performed by: INTERNAL MEDICINE

## 2024-06-01 PROCEDURE — A9270 NON-COVERED ITEM OR SERVICE: HCPCS | Performed by: INTERNAL MEDICINE

## 2024-06-01 PROCEDURE — 700111 HCHG RX REV CODE 636 W/ 250 OVERRIDE (IP): Performed by: NURSE PRACTITIONER

## 2024-06-01 PROCEDURE — 93306 TTE W/DOPPLER COMPLETE: CPT | Mod: 26 | Performed by: INTERNAL MEDICINE

## 2024-06-01 PROCEDURE — 700101 HCHG RX REV CODE 250: Performed by: NURSE PRACTITIONER

## 2024-06-01 PROCEDURE — 94003 VENT MGMT INPAT SUBQ DAY: CPT

## 2024-06-01 PROCEDURE — 36600 WITHDRAWAL OF ARTERIAL BLOOD: CPT

## 2024-06-01 PROCEDURE — 700117 HCHG RX CONTRAST REV CODE 255: Performed by: NURSE PRACTITIONER

## 2024-06-01 PROCEDURE — 82962 GLUCOSE BLOOD TEST: CPT | Mod: 91

## 2024-06-01 PROCEDURE — 82803 BLOOD GASES ANY COMBINATION: CPT

## 2024-06-01 PROCEDURE — 700111 HCHG RX REV CODE 636 W/ 250 OVERRIDE (IP): Performed by: INTERNAL MEDICINE

## 2024-06-01 RX ORDER — IPRATROPIUM BROMIDE AND ALBUTEROL SULFATE 2.5; .5 MG/3ML; MG/3ML
3 SOLUTION RESPIRATORY (INHALATION)
Status: DISCONTINUED | OUTPATIENT
Start: 2024-06-01 | End: 2024-06-03

## 2024-06-01 RX ORDER — DEXMEDETOMIDINE HYDROCHLORIDE 4 UG/ML
.1-.5 INJECTION, SOLUTION INTRAVENOUS CONTINUOUS
Status: DISCONTINUED | OUTPATIENT
Start: 2024-06-01 | End: 2024-06-04

## 2024-06-01 RX ORDER — LABETALOL HYDROCHLORIDE 5 MG/ML
10 INJECTION, SOLUTION INTRAVENOUS
Status: DISCONTINUED | OUTPATIENT
Start: 2024-06-01 | End: 2024-06-26 | Stop reason: HOSPADM

## 2024-06-01 RX ORDER — HYDRALAZINE HYDROCHLORIDE 20 MG/ML
10 INJECTION INTRAMUSCULAR; INTRAVENOUS
Status: DISCONTINUED | OUTPATIENT
Start: 2024-06-01 | End: 2024-06-26 | Stop reason: HOSPADM

## 2024-06-01 RX ADMIN — HYDRALAZINE HYDROCHLORIDE 10 MG: 20 INJECTION, SOLUTION INTRAMUSCULAR; INTRAVENOUS at 13:04

## 2024-06-01 RX ADMIN — FENTANYL CITRATE 50 MCG: 50 INJECTION, SOLUTION INTRAMUSCULAR; INTRAVENOUS at 01:06

## 2024-06-01 RX ADMIN — Medication: at 05:42

## 2024-06-01 RX ADMIN — ALBUTEROL SULFATE 2.5 MG: 2.5 SOLUTION RESPIRATORY (INHALATION) at 13:44

## 2024-06-01 RX ADMIN — Medication: at 17:11

## 2024-06-01 RX ADMIN — POTASSIUM BICARBONATE 50 MEQ: 978 TABLET, EFFERVESCENT ORAL at 07:55

## 2024-06-01 RX ADMIN — FENTANYL CITRATE 50 MCG: 50 INJECTION, SOLUTION INTRAMUSCULAR; INTRAVENOUS at 13:02

## 2024-06-01 RX ADMIN — PROPOFOL 40 MCG/KG/MIN: 10 INJECTION, EMULSION INTRAVENOUS at 02:16

## 2024-06-01 RX ADMIN — HYDRALAZINE HYDROCHLORIDE 10 MG: 20 INJECTION, SOLUTION INTRAMUSCULAR; INTRAVENOUS at 12:11

## 2024-06-01 RX ADMIN — ALBUTEROL SULFATE 2.5 MG: 2.5 SOLUTION RESPIRATORY (INHALATION) at 09:49

## 2024-06-01 RX ADMIN — LABETALOL HYDROCHLORIDE 10 MG: 5 INJECTION, SOLUTION INTRAVENOUS at 10:09

## 2024-06-01 RX ADMIN — DEXMEDETOMIDINE 0.2 MCG/KG/HR: 100 INJECTION, SOLUTION INTRAVENOUS at 12:12

## 2024-06-01 RX ADMIN — HUMAN ALBUMIN MICROSPHERES AND PERFLUTREN 3 ML: 10; .22 INJECTION, SOLUTION INTRAVENOUS at 11:31

## 2024-06-01 RX ADMIN — DEXMEDETOMIDINE 1.2 MCG/KG/HR: 100 INJECTION, SOLUTION INTRAVENOUS at 23:19

## 2024-06-01 RX ADMIN — LABETALOL HYDROCHLORIDE 10 MG: 5 INJECTION, SOLUTION INTRAVENOUS at 21:01

## 2024-06-01 RX ADMIN — SODIUM CHLORIDE, POTASSIUM CHLORIDE, SODIUM LACTATE AND CALCIUM CHLORIDE: 600; 310; 30; 20 INJECTION, SOLUTION INTRAVENOUS at 17:10

## 2024-06-01 RX ADMIN — FENTANYL CITRATE 50 MCG: 50 INJECTION, SOLUTION INTRAMUSCULAR; INTRAVENOUS at 04:24

## 2024-06-01 RX ADMIN — DEXMEDETOMIDINE 1.2 MCG/KG/HR: 100 INJECTION, SOLUTION INTRAVENOUS at 17:09

## 2024-06-01 RX ADMIN — LANSOPRAZOLE 30 MG: 30 TABLET, ORALLY DISINTEGRATING ORAL at 05:42

## 2024-06-01 RX ADMIN — ALBUTEROL SULFATE 2.5 MG: 2.5 SOLUTION RESPIRATORY (INHALATION) at 08:00

## 2024-06-01 RX ADMIN — IPRATROPIUM BROMIDE AND ALBUTEROL SULFATE 3 ML: 2.5; .5 SOLUTION RESPIRATORY (INHALATION) at 19:06

## 2024-06-01 ASSESSMENT — PAIN DESCRIPTION - PAIN TYPE
TYPE: ACUTE PAIN

## 2024-06-01 ASSESSMENT — FIBROSIS 4 INDEX: FIB4 SCORE: 2.21

## 2024-06-01 NOTE — CARE PLAN
The patient is Watcher - Medium risk of patient condition declining or worsening    Shift Goals  Clinical Goals: wean levo, q1H neuros, MRI, pulmonary hygiene  Patient Goals: GUZMAN  Family Goals: MRI, updates    Progress made toward(s) clinical / shift goals:    Problem: Urinary - Renal Perfusion  Goal: Ability to achieve and maintain adequate renal perfusion and functioning will improve  Outcome: Progressing     Problem: Hemodynamics  Goal: Patient's hemodynamics, fluid balance and neurologic status will be stable or improve  Outcome: Progressing     Problem: Respiratory  Goal: Patient will achieve/maintain optimum respiratory ventilation and gas exchange  Outcome: Progressing       Patient is not progressing towards the following goals:      Problem: Safety - Medical Restraint  Goal: Free from restraint(s) (Restraint for Interference with Medical Device)  5/31/2024 1717 by Savana Samuel, R.N.  Outcome: Not Progressing  5/31/2024 1716 by Savana Samuel, R.N.  Outcome: Not Progressing     Problem: Pain - Standard  Goal: Alleviation of pain or a reduction in pain to the patient’s comfort goal  Outcome: Not Progressing     Problem: Psychosocial  Goal: Patient's level of anxiety will decrease  Outcome: Not Progressing     Problem: Neuro Status  Goal: Neuro status will remain stable or improve  Outcome: Not Progressing     Problem: Risk for Aspiration  Goal: Patient's risk for aspiration will be absent or decrease  Outcome: Not Progressing    Pt systolic BP goals 100-140 - managed per MAR. Critical Na+ level of 164 communicated with MD - managed per MAR. MRI done. Neuro status unchanged - failed SAT. Family at bedside.

## 2024-06-01 NOTE — CARE PLAN
The patient is Watcher - Medium risk of patient condition declining or worsening    Shift Goals  Clinical Goals: Q1h neuro; wean sedation  Patient Goals: GUZMAN  Family Goals: Updates    Progress made toward(s) clinical / shift goals:    Problem: Safety - Medical Restraint  Goal: Remains free of injury from restraints (Restraint for Interference with Medical Device)  Outcome: Progressing     Problem: Pain - Standard  Goal: Alleviation of pain or a reduction in pain to the patient’s comfort goal  Outcome: Progressing     Problem: Hemodynamics  Goal: Patient's hemodynamics, fluid balance and neurologic status will be stable or improve  Outcome: Progressing       Patient is not progressing towards the following goals:      Problem: Safety - Medical Restraint  Goal: Free from restraint(s) (Restraint for Interference with Medical Device)  Outcome: Not Progressing

## 2024-06-01 NOTE — PROGRESS NOTES
Critical Care Progress Note    Date of admission  5/29/2024    Chief Complaint  Jl Mueller is a 78 y.o. male smoker w/ PMHx s/f HTN, CAD, HLD, prior MI, AAA s/p bypass graft stenting, COPD on 2L home O2, two prior hemorrhagic strokes w/o reported deficits, BPH s/p prostate artery embolization who initially presented to an outside facility via EMS on 5/29/2024 for evaluation. Per the patient's wife, the patient was eating dinner when he stated he had a headache and felt weak. He then had two episodes of emesis and became altered to GCS11; he was intubated at some point prior to transfer to this facility due to his neuro status. On imaging at the outside facility, he was found to have a left cerebellar hemorrhage, mild perihematomal edema with subtle parenchymal displacement. 4th ventricle with moderate compression but open. for which he was transferred to this facility for higher level of care. On arrival, the patient was moving all extremities and withdrew to pain in all extremities. His pupils are pinpoint, and he over breaths vent. Repeat imaging redemonstrates area of Lt-cerebellar hemorrhage.  A CTA was completed which, does not clearly show an aneurysmal/vascular malformation. He is being admitted to the ICU for hypertonic saline therapy, Q1h neurological checks, and strict blood pressure control as well as ventilator management.     Hospital Course  5/30- DDAVP for platelet inhibition.   5/31- No clinical change    Interval Problem Update  Reviewed last 24 hour events:              - Trial of extubation just before IDT rounds.              - Tm: Afebrile              - HR: 40-50 (RBBB)              - SBP: 120-140s (goal 110-140)              - Neuro: RASS -1 to 0              - GI: NPO, start EN              - UOP: 560 mL              - Park: Yes              - Lines: PICC line, EVD              - PPx:  Home PPI, No dvt.    - Mobility level1    Infusions:  Norepinephrine  Propofol  3% Sodium  Acetate    Review of Systems  Review of Systems   Unable to perform ROS: Intubated        Vital Signs for last 24 hours   Pulse:  [] 111  Resp:  [15-50] 28  BP: ()/(51-78) 110/53  SpO2:  [92 %-99 %] 92 %    Hemodynamic parameters for last 24 hours       Respiratory Information for the last 24 hours  Vent Mode: Spont  Rate (breaths/min): 20  Vt Target (mL): 340  PEEP/CPAP: 8  P Support: 7  MAP: 12  Control VTE (exp VT): 341    Physical Exam  Constitutional:       General: He is not in acute distress.  HENT:      Head: Normocephalic.      Mouth/Throat:      Mouth: Mucous membranes are moist.   Eyes:      Pupils: Pupils are equal, round, and reactive to light.      Comments: Constricted bilaterally consistent with propofol infusion   Cardiovascular:      Rate and Rhythm: Regular rhythm. Bradycardia present.      Heart sounds: No murmur heard.     No friction rub. No gallop.   Pulmonary:      Effort: No respiratory distress.      Breath sounds: No wheezing or rales.   Abdominal:      General: There is no distension.      Palpations: Abdomen is soft. There is no mass.      Tenderness: There is no abdominal tenderness.   Musculoskeletal:      Cervical back: Neck supple.      Right lower leg: No edema.      Left lower leg: No edema.   Skin:     General: Skin is warm and dry.      Capillary Refill: Capillary refill takes less than 2 seconds.   Neurological:      Comments: Sedated, RASS +1, Lifting his head off the pillow.   Psychiatric:      Comments: GUZMAN         Medications  Current Facility-Administered Medications   Medication Dose Route Frequency Provider Last Rate Last Admin    dexmedetomidine (PRECEDEX) 400 mcg/100mL NS premix infusion  0.1-1.5 mcg/kg/hr (Ideal) Intravenous Continuous Reyes Gray M.D. 2.8 mL/hr at 06/01/24 1212 0.2 mcg/kg/hr at 06/01/24 1212    norepinephrine (Levophed) 8 mg in 250 mL NS infusion (premix)  0-1 mcg/kg/min (Ideal) Intravenous Continuous Reyes Gray M.D.   Stopped at  05/31/24 1130    Pharmacy Consult: Enteral tube insertion - review meds/change route/product selection  1 Each Other PHARMACY TO DOSE Reyes Gray M.D.        Hypertonic Sodium Acetate (513 mEq Na Acetate per liter) infusion  513 mEq/L Intravenous Continuous Reyes Gray M.D. 20 mL/hr at 06/01/24 0726 Rate Verify at 06/01/24 0726    diazePAM (Valium) injection 5 mg  5 mg Intravenous Q6HRS PRN Sarina Euceda        insulin regular (HumuLIN R,NovoLIN R) injection  1-6 Units Subcutaneous Q6HRS Sarina Euceda        And    dextrose 50% (D50W) injection 25 g  25 g Intravenous Q15 MIN PRN Sarina LAye Latona   25 g at 05/31/24 1902    niCARdipine (Cardene) 25 mg in  mL Standard Infusion  0-15 mg/hr Intravenous Continuous Chilo Stephenson D.O.        labetalol (Normodyne/Trandate) injection 10 mg  10 mg Intravenous Q3HRS PRN Sarina RODRIGUEZ Latona   10 mg at 06/01/24 1009    hydrALAZINE (Apresoline) injection 10 mg  10 mg Intravenous Q3HRS PRN Sarina LAye Latona   10 mg at 06/01/24 1211    senna-docusate (Pericolace Or Senokot S) 8.6-50 MG per tablet 2 Tablet  2 Tablet Enteral Tube Q EVENING Sarina RODRIGUEZ Lattanja   2 Tablet at 05/31/24 1825    And    polyethylene glycol/lytes (Miralax) Packet 1 Packet  1 Packet Enteral Tube QDAY PRN Sarina Euceda        Respiratory Therapy Consult   Nebulization Continuous RT Sarina Euceda MD Alert...ICU Electrolyte Replacement per Pharmacy   Other PHARMACY TO DOSE Sarina Euceda        acetaminophen (Tylenol) tablet 650 mg  650 mg Enteral Tube Q4HRS PRN Sarina Euceda        Or    acetaminophen (Tylenol) suppository 650 mg  650 mg Rectal Q4HRS PRN Sarina Euceda        ondansetron (Zofran ODT) dispertab 4 mg  4 mg Enteral Tube Q4HRS PRN Sarina Euceda        Or    ondansetron (Zofran) syringe/vial injection 4 mg  4 mg Intravenous Q4HRS PRN Sarina RODRIGUEZ Lattanja        albuterol (Proventil) 2.5mg/0.5ml nebulizer solution 2.5 mg  2.5 mg Nebulization Q4HRS PRN Sarina RODRIGUEZ Latona   2.5 mg at  06/01/24 0949    fentaNYL (Sublimaze) injection 25-50 mcg  25-50 mcg Intravenous Q HOUR PRN Sarina RODRIGUEZ Latona   50 mcg at 06/01/24 0424    lansoprazole (Prevacid) solutab 30 mg  30 mg Enteral Tube DAILY Reyes Grya M.D.   30 mg at 06/01/24 0542    lactated ringers infusion   Intravenous Continuous Reyes Gray M.D. 40 mL/hr at 05/31/24 1826 New Bag at 05/31/24 1826    mineral oil-pet hydrophilic (Aquaphor) ointment   Topical BID Reyes Gray M.D.   Given at 06/01/24 0542       Fluids    Intake/Output Summary (Last 24 hours) at 6/1/2024 1247  Last data filed at 6/1/2024 0600  Gross per 24 hour   Intake 1529.43 ml   Output 390 ml   Net 1139.43 ml       Laboratory  Recent Labs     05/30/24  2306 05/31/24  0341 06/01/24  0438   ISTATAPH 7.430 7.387* 7.410   ISTATAPCO2 35.8 40.5* 40.7*   ISTATAPO2 115* 58* 79   ISTATATCO2 25 26 27   CBOIEWU9TTG 99 89* 96   ISTATARTHCO3 23.7 24.4 25.8*   ISTATARTBE 0 -1 1   ISTATTEMP 97.4 F 97.5 F 37.2 C   ISTATFIO2 40 30 30   ISTATSPEC Arterial Arterial Arterial   ISTATAPHTC 7.440 7.396* 7.407   QGPLCXHE2CU 111* 56* 80         Recent Labs     05/31/24  0601 05/31/24  1210 05/31/24  1845 06/01/24  0435 06/01/24  1109   SODIUM 150*   < > 148* 152* 153*   POTASSIUM 4.3   < > 3.6 3.5* 3.7   CHLORIDE 116*   < > 116* 116* 115*   CO2 21   < > 20 26 30   BUN 20   < > 18 18 18   CREATININE 0.68   < > 0.68 0.74 0.76   MAGNESIUM 2.0  --   --  2.0  --    CALCIUM 8.9   < > 8.3* 8.4* 8.7    < > = values in this interval not displayed.     Recent Labs     05/29/24 2249 05/30/24  0200 05/31/24  1845 06/01/24  0435 06/01/24  1109   ALTSGPT 8  --   --   --   --    ASTSGOT 14  --   --   --   --    ALKPHOSPHAT 59  --   --   --   --    TBILIRUBIN 0.6  --   --   --   --    GLUCOSE 162*   < > 74 109* 125*    < > = values in this interval not displayed.     Recent Labs     05/29/24 2249 05/31/24  0601 06/01/24  0435   WBC 12.2* 8.5 8.8   NEUTSPOLYS 93.20* 81.80* 76.30*   LYMPHOCYTES 2.20* 8.40* 11.30*    MONOCYTES 3.80 7.90 8.40   EOSINOPHILS 0.10 1.50 3.40   BASOPHILS 0.20 0.20 0.30   ASTSGOT 14  --   --    ALTSGPT 8  --   --    ALKPHOSPHAT 59  --   --    TBILIRUBIN 0.6  --   --      Recent Labs     05/29/24  2249 05/31/24  0601 06/01/24  0435   RBC 4.70 3.76* 3.82*   HEMOGLOBIN 13.2* 10.9* 10.8*   HEMATOCRIT 41.2* 33.5* 34.9*   PLATELETCT 226 175 175   PROTHROMBTM 13.6  --   --    APTT 26.1  --   --    INR 1.03  --   --        Imaging  MRI:   Reviewed    Assessment/Plan  * Cerebellar hemorrhage (HCC)- (present on admission)  Assessment & Plan  ICH score=1  -q1h neurochecks  -MRI brain ICH protocol pending  -Keep -160,   -3% sodium acetate: check serum Na and Osm q6 hour, goal Na 150-160  -Neurosurgery non operative at this time  -No coagulation   -Hold all antithrombotic therapy  -SCDs only for DVT ppx  -keep euvolemic, euthermic, and normoglycemic (140-180)  -Maintain bowel regimen to avoid Valsalva and increased intracranial pressure.  -HOB at 30 degrees  -maintain pCO2 of 35-40; closer to 35  -Atorvastatin 40 mg qHS for possible neuroprotective effect      Acute on chronic respiratory failure with hypoxia ([facility])- (present on admission)  Assessment & Plan  Intubated for airway protection  Lung protective ventilation strategies  Titrate ventilator prescription to optimize oxygenation, ventilation, and acid base balance.  Trial of extubation    COPD (chronic obstructive pulmonary disease) ([facility])- (present on admission)  Assessment & Plan  Not currently in exacerbation  - CXR now and Qam, no acute process  - Continue home nebulizers as ordered  - Continue PRN nebulizers Q4h  - Wean FIo2 as tolerated    Abdominal aortic aneurysm (AAA) without rupture ([facility])- (present on admission)  Assessment & Plan  S/p EVAR w/ bypass graft stent  - Maintain normotension  - Holding ASA at this time; per wife he takes ASA 3x/week    Hyperchloremic metabolic acidosis  Assessment & Plan  Due to 3% saline for osmotic  therapy  Changed 3% saline to 3% sodium acetate    Hyperglycemia- (present on admission)  Assessment & Plan  Moderate glycemic control with insulin therapy    Hyperlipidemia- (present on admission)  Assessment & Plan  Per wife, patient's home medication was discontinued due to muscle cramps  - Start atorvastatin 40mg QHS for neuroprotection; monitor for tolerance  - Obtain Lipid panel  - Statin therapy; hold for elevated CK/LFTs  - Counseling on lifestyle/dietary modifications    History of stroke- (present on admission)  Assessment & Plan  Patient has had 2 prior hemorrhagic strokes; last in 2015  - No noted deficits per wife    BPH with urinary obstruction- (present on admission)  Assessment & Plan  S/p prostate artery embolization  - Per wife, patient has suffered from urinary retention and incontinence since his surgery  - Continue flomax  - Park for strict I&O    Benign essential hypertension- (present on admission)  Assessment & Plan  Per wife, patient's medications were discontinued as an outpatient because his BP has been controlled  - SBP in the 170s per wife's at home BP cuff measurement; <160 since arrival to this facility  - Cardene & PRN antihypertensives to maintain -160)  - Monitor I&O  - Cardiac diet when appropriate  - Control pain         VTE:  Contraindicated  Ulcer: PPI  Lines: None    I have performed a physical exam and reviewed and updated ROS and Plan today (6/1/2024). In review of yesterday's note (5/31/2024), there are no changes except as documented above.     Discussed patient condition and risk of morbidity and/or mortality with Family, RN, RT, Pharmacy, and neurology and neurosurgery      The patient remains critically ill.  I have assessed and reassessed the respiratory status and made ventilator adjustments based upon arterial blood gas analysis, ventilator waveforms and airway mechanics.  I have assessed and reassessed the blood pressure, hemodynamics, cardiovascular and  neurologic status. This patient remains at high risk for worsening cardiopulmonary dysfunction and death without the above critical care interventions.    Critical care time = 40 minutes in directly providing and coordinating critical care and extensive data review.  No time overlap and excludes procedures.

## 2024-06-01 NOTE — RESPIRATORY CARE
Extubation    Cuff leak noted yes  Stridor present no     FiO2%: 30 % (06/01/24 9085)        Patient toleration well  RCP Complete? yes  Events/Summary/Plan: MD bedside ok extubation, pt  can lift head off pillow and has a strong cuff leak (06/01/24 9045)

## 2024-06-01 NOTE — CARE PLAN
Problem: Bronchoconstriction  Goal: Improve in air movement and diminished wheezing  Description: Target End Date:  2 to 3 days    1.  Implement inhaled treatments  2.  Evaluate and manage medication effects  Outcome: Progressing   Duoneb QID

## 2024-06-01 NOTE — CARE PLAN
Problem: Ventilation  Goal: Ability to achieve and maintain unassisted ventilation or tolerate decreased levels of ventilator support  Description: Target End Date:  4 days     Document on Vent flowsheet    1.  Support and monitor invasive and noninvasive mechanical ventilation  2.  Monitor ventilator weaning response  3.  Perform ventilator associated pneumonia prevention interventions  4.  Manage ventilation therapy by monitoring diagnostic test results  Outcome: Progressing   VD 4  20/340/8/30

## 2024-06-01 NOTE — CARE PLAN
Problem: Ventilation  Goal: Ability to achieve and maintain unassisted ventilation or tolerate decreased levels of ventilator support  Description: Target End Date:  4 days     Document on Vent flowsheet    1.  Support and monitor invasive and noninvasive mechanical ventilation  2.  Monitor ventilator weaning response  3.  Perform ventilator associated pneumonia prevention interventions  4.  Manage ventilation therapy by monitoring diagnostic test results  Outcome: Not Met   VD: 3 22, 7.5    APVCMV  RR: 20, VT: 340, PEEP: 8, FiO2: 30%

## 2024-06-02 ENCOUNTER — APPOINTMENT (OUTPATIENT)
Dept: RADIOLOGY | Facility: MEDICAL CENTER | Age: 79
End: 2024-06-02
Attending: NURSE PRACTITIONER
Payer: MEDICARE

## 2024-06-02 LAB
ANION GAP SERPL CALC-SCNC: 10 MMOL/L (ref 7–16)
ANION GAP SERPL CALC-SCNC: 10 MMOL/L (ref 7–16)
ANION GAP SERPL CALC-SCNC: 8 MMOL/L (ref 7–16)
ANION GAP SERPL CALC-SCNC: 9 MMOL/L (ref 7–16)
BASOPHILS # BLD AUTO: 0.2 % (ref 0–1.8)
BASOPHILS # BLD: 0.02 K/UL (ref 0–0.12)
BUN SERPL-MCNC: 12 MG/DL (ref 8–22)
BUN SERPL-MCNC: 14 MG/DL (ref 8–22)
BUN SERPL-MCNC: 16 MG/DL (ref 8–22)
BUN SERPL-MCNC: 18 MG/DL (ref 8–22)
CALCIUM SERPL-MCNC: 8.5 MG/DL (ref 8.5–10.5)
CALCIUM SERPL-MCNC: 8.8 MG/DL (ref 8.5–10.5)
CALCIUM SERPL-MCNC: 8.8 MG/DL (ref 8.5–10.5)
CALCIUM SERPL-MCNC: 8.9 MG/DL (ref 8.5–10.5)
CHLORIDE SERPL-SCNC: 112 MMOL/L (ref 96–112)
CHLORIDE SERPL-SCNC: 113 MMOL/L (ref 96–112)
CHLORIDE SERPL-SCNC: 113 MMOL/L (ref 96–112)
CHLORIDE SERPL-SCNC: 115 MMOL/L (ref 96–112)
CO2 SERPL-SCNC: 27 MMOL/L (ref 20–33)
CO2 SERPL-SCNC: 27 MMOL/L (ref 20–33)
CO2 SERPL-SCNC: 30 MMOL/L (ref 20–33)
CO2 SERPL-SCNC: 31 MMOL/L (ref 20–33)
CREAT SERPL-MCNC: 0.58 MG/DL (ref 0.5–1.4)
CREAT SERPL-MCNC: 0.63 MG/DL (ref 0.5–1.4)
CREAT SERPL-MCNC: 0.63 MG/DL (ref 0.5–1.4)
CREAT SERPL-MCNC: 0.64 MG/DL (ref 0.5–1.4)
EOSINOPHIL # BLD AUTO: 0.32 K/UL (ref 0–0.51)
EOSINOPHIL NFR BLD: 2.9 % (ref 0–6.9)
ERYTHROCYTE [DISTWIDTH] IN BLOOD BY AUTOMATED COUNT: 46.5 FL (ref 35.9–50)
GFR SERPLBLD CREATININE-BSD FMLA CKD-EPI: 97 ML/MIN/1.73 M 2
GFR SERPLBLD CREATININE-BSD FMLA CKD-EPI: 98 ML/MIN/1.73 M 2
GFR SERPLBLD CREATININE-BSD FMLA CKD-EPI: 99 ML/MIN/1.73 M 2
GLUCOSE BLD STRIP.AUTO-MCNC: 145 MG/DL (ref 65–99)
GLUCOSE BLD STRIP.AUTO-MCNC: 146 MG/DL (ref 65–99)
GLUCOSE BLD STRIP.AUTO-MCNC: 153 MG/DL (ref 65–99)
GLUCOSE BLD STRIP.AUTO-MCNC: 163 MG/DL (ref 65–99)
GLUCOSE SERPL-MCNC: 152 MG/DL (ref 65–99)
GLUCOSE SERPL-MCNC: 162 MG/DL (ref 65–99)
GLUCOSE SERPL-MCNC: 164 MG/DL (ref 65–99)
GLUCOSE SERPL-MCNC: 178 MG/DL (ref 65–99)
HCT VFR BLD AUTO: 36 % (ref 42–52)
HGB BLD-MCNC: 11.6 G/DL (ref 14–18)
IMM GRANULOCYTES # BLD AUTO: 0.03 K/UL (ref 0–0.11)
IMM GRANULOCYTES NFR BLD AUTO: 0.3 % (ref 0–0.9)
LYMPHOCYTES # BLD AUTO: 0.57 K/UL (ref 1–4.8)
LYMPHOCYTES NFR BLD: 5.2 % (ref 22–41)
MAGNESIUM SERPL-MCNC: 2 MG/DL (ref 1.5–2.5)
MCH RBC QN AUTO: 28.7 PG (ref 27–33)
MCHC RBC AUTO-ENTMCNC: 32.2 G/DL (ref 32.3–36.5)
MCV RBC AUTO: 89.1 FL (ref 81.4–97.8)
MONOCYTES # BLD AUTO: 0.65 K/UL (ref 0–0.85)
MONOCYTES NFR BLD AUTO: 6 % (ref 0–13.4)
NEUTROPHILS # BLD AUTO: 9.3 K/UL (ref 1.82–7.42)
NEUTROPHILS NFR BLD: 85.4 % (ref 44–72)
NRBC # BLD AUTO: 0 K/UL
NRBC BLD-RTO: 0 /100 WBC (ref 0–0.2)
PLATELET # BLD AUTO: 182 K/UL (ref 164–446)
PMV BLD AUTO: 12.2 FL (ref 9–12.9)
POTASSIUM SERPL-SCNC: 3.4 MMOL/L (ref 3.6–5.5)
POTASSIUM SERPL-SCNC: 3.7 MMOL/L (ref 3.6–5.5)
POTASSIUM SERPL-SCNC: 3.7 MMOL/L (ref 3.6–5.5)
POTASSIUM SERPL-SCNC: 4.1 MMOL/L (ref 3.6–5.5)
RBC # BLD AUTO: 4.04 M/UL (ref 4.7–6.1)
SODIUM SERPL-SCNC: 150 MMOL/L (ref 135–145)
SODIUM SERPL-SCNC: 150 MMOL/L (ref 135–145)
SODIUM SERPL-SCNC: 151 MMOL/L (ref 135–145)
SODIUM SERPL-SCNC: 154 MMOL/L (ref 135–145)
WBC # BLD AUTO: 10.9 K/UL (ref 4.8–10.8)

## 2024-06-02 PROCEDURE — 80048 BASIC METABOLIC PNL TOTAL CA: CPT | Mod: 91

## 2024-06-02 PROCEDURE — 99291 CRITICAL CARE FIRST HOUR: CPT | Performed by: INTERNAL MEDICINE

## 2024-06-02 PROCEDURE — 99232 SBSQ HOSP IP/OBS MODERATE 35: CPT | Performed by: PSYCHIATRY & NEUROLOGY

## 2024-06-02 PROCEDURE — 83735 ASSAY OF MAGNESIUM: CPT

## 2024-06-02 PROCEDURE — A9270 NON-COVERED ITEM OR SERVICE: HCPCS | Performed by: NURSE PRACTITIONER

## 2024-06-02 PROCEDURE — 94640 AIRWAY INHALATION TREATMENT: CPT

## 2024-06-02 PROCEDURE — 700111 HCHG RX REV CODE 636 W/ 250 OVERRIDE (IP): Performed by: INTERNAL MEDICINE

## 2024-06-02 PROCEDURE — 700102 HCHG RX REV CODE 250 W/ 637 OVERRIDE(OP): Performed by: INTERNAL MEDICINE

## 2024-06-02 PROCEDURE — 700101 HCHG RX REV CODE 250: Performed by: INTERNAL MEDICINE

## 2024-06-02 PROCEDURE — 700102 HCHG RX REV CODE 250 W/ 637 OVERRIDE(OP): Performed by: NURSE PRACTITIONER

## 2024-06-02 PROCEDURE — 700111 HCHG RX REV CODE 636 W/ 250 OVERRIDE (IP): Performed by: NURSE PRACTITIONER

## 2024-06-02 PROCEDURE — 770022 HCHG ROOM/CARE - ICU (200)

## 2024-06-02 PROCEDURE — 85025 COMPLETE CBC W/AUTO DIFF WBC: CPT

## 2024-06-02 PROCEDURE — 700105 HCHG RX REV CODE 258: Performed by: INTERNAL MEDICINE

## 2024-06-02 PROCEDURE — 82962 GLUCOSE BLOOD TEST: CPT | Mod: 91

## 2024-06-02 PROCEDURE — A9270 NON-COVERED ITEM OR SERVICE: HCPCS | Performed by: INTERNAL MEDICINE

## 2024-06-02 RX ORDER — AMLODIPINE BESYLATE 5 MG/1
5 TABLET ORAL
Status: DISCONTINUED | OUTPATIENT
Start: 2024-06-02 | End: 2024-06-05

## 2024-06-02 RX ORDER — QUETIAPINE FUMARATE 25 MG/1
25 TABLET, FILM COATED ORAL ONCE
Status: COMPLETED | OUTPATIENT
Start: 2024-06-02 | End: 2024-06-02

## 2024-06-02 RX ADMIN — ACETAMINOPHEN 650 MG: 325 TABLET, FILM COATED ORAL at 08:31

## 2024-06-02 RX ADMIN — WATER 1026 MEQ: 1 INJECTION INTRAMUSCULAR; INTRAVENOUS; SUBCUTANEOUS at 16:09

## 2024-06-02 RX ADMIN — QUETIAPINE FUMARATE 25 MG: 25 TABLET ORAL at 23:08

## 2024-06-02 RX ADMIN — INSULIN HUMAN 1 UNITS: 100 INJECTION, SOLUTION PARENTERAL at 11:55

## 2024-06-02 RX ADMIN — IPRATROPIUM BROMIDE AND ALBUTEROL SULFATE 3 ML: 2.5; .5 SOLUTION RESPIRATORY (INHALATION) at 19:19

## 2024-06-02 RX ADMIN — FENTANYL CITRATE 25 MCG: 50 INJECTION, SOLUTION INTRAMUSCULAR; INTRAVENOUS at 21:40

## 2024-06-02 RX ADMIN — LABETALOL HYDROCHLORIDE 10 MG: 5 INJECTION, SOLUTION INTRAVENOUS at 06:14

## 2024-06-02 RX ADMIN — Medication: at 06:00

## 2024-06-02 RX ADMIN — DEXMEDETOMIDINE 0.5 MCG/KG/HR: 100 INJECTION, SOLUTION INTRAVENOUS at 14:15

## 2024-06-02 RX ADMIN — DEXMEDETOMIDINE 1.1 MCG/KG/HR: 100 INJECTION, SOLUTION INTRAVENOUS at 06:24

## 2024-06-02 RX ADMIN — FENTANYL CITRATE 50 MCG: 50 INJECTION, SOLUTION INTRAMUSCULAR; INTRAVENOUS at 13:57

## 2024-06-02 RX ADMIN — DEXMEDETOMIDINE 0.8 MCG/KG/HR: 100 INJECTION, SOLUTION INTRAVENOUS at 23:34

## 2024-06-02 RX ADMIN — Medication: at 17:52

## 2024-06-02 RX ADMIN — IPRATROPIUM BROMIDE AND ALBUTEROL SULFATE 3 ML: 2.5; .5 SOLUTION RESPIRATORY (INHALATION) at 06:51

## 2024-06-02 RX ADMIN — LANSOPRAZOLE 30 MG: 30 TABLET, ORALLY DISINTEGRATING ORAL at 06:00

## 2024-06-02 RX ADMIN — FENTANYL CITRATE 50 MCG: 50 INJECTION, SOLUTION INTRAMUSCULAR; INTRAVENOUS at 01:17

## 2024-06-02 RX ADMIN — POTASSIUM BICARBONATE 50 MEQ: 978 TABLET, EFFERVESCENT ORAL at 02:55

## 2024-06-02 RX ADMIN — INSULIN HUMAN 1 UNITS: 100 INJECTION, SOLUTION PARENTERAL at 23:16

## 2024-06-02 RX ADMIN — INSULIN HUMAN 1 UNITS: 100 INJECTION, SOLUTION PARENTERAL at 00:11

## 2024-06-02 RX ADMIN — IPRATROPIUM BROMIDE AND ALBUTEROL SULFATE 3 ML: 2.5; .5 SOLUTION RESPIRATORY (INHALATION) at 14:51

## 2024-06-02 RX ADMIN — HYDRALAZINE HYDROCHLORIDE 10 MG: 20 INJECTION, SOLUTION INTRAMUSCULAR; INTRAVENOUS at 07:37

## 2024-06-02 RX ADMIN — IPRATROPIUM BROMIDE AND ALBUTEROL SULFATE 3 ML: 2.5; .5 SOLUTION RESPIRATORY (INHALATION) at 10:39

## 2024-06-02 RX ADMIN — AMLODIPINE BESYLATE 5 MG: 5 TABLET ORAL at 10:14

## 2024-06-02 RX ADMIN — HYDRALAZINE HYDROCHLORIDE 10 MG: 20 INJECTION, SOLUTION INTRAMUSCULAR; INTRAVENOUS at 00:09

## 2024-06-02 ASSESSMENT — PAIN DESCRIPTION - PAIN TYPE
TYPE: ACUTE PAIN

## 2024-06-02 NOTE — PROGRESS NOTES
Critical Care Progress Note    Date of admission  5/29/2024    Chief Complaint  Jl Mueller is a 78 y.o. male smoker w/ PMHx s/f HTN, CAD, HLD, prior MI, AAA s/p bypass graft stenting, COPD on 2L home O2, two prior hemorrhagic strokes w/o reported deficits, BPH s/p prostate artery embolization who initially presented to an outside facility via EMS on 5/29/2024 for evaluation. Per the patient's wife, the patient was eating dinner when he stated he had a headache and felt weak. He then had two episodes of emesis and became altered to GCS11; he was intubated at some point prior to transfer to this facility due to his neuro status. On imaging at the outside facility, he was found to have a left cerebellar hemorrhage, mild perihematomal edema with subtle parenchymal displacement. 4th ventricle with moderate compression but open. for which he was transferred to this facility for higher level of care. On arrival, the patient was moving all extremities and withdrew to pain in all extremities. His pupils are pinpoint, and he over breaths vent. Repeat imaging redemonstrates area of Lt-cerebellar hemorrhage.  A CTA was completed which, does not clearly show an aneurysmal/vascular malformation. He is being admitted to the ICU for hypertonic saline therapy, Q1h neurological checks, and strict blood pressure control as well as ventilator management.     Hospital Course  5/30- DDAVP for platelet inhibition.   5/31- No clinical change  6/1- Extubated    Interval Problem Update  Reviewed last 24 hour events:              - Tm: Afebrile              - HR: 60-80s (RBBB)              - SBP: 120-150s (goal 110-140) add Norvasc              - Neuro: Q2 hour exams. RASS -2 to +3, intermittently following commands              - GI: EN at goal              - UOP: 735 mL              - Park: Yes              - Lines: PICC line              - PPx:  Home PPI, No dvt.    - Mobility level1    Infusions:  3% Sodium Acetate @ Na  -150    Review of Systems  Review of Systems   Unable to perform ROS: Medical condition        Vital Signs for last 24 hours   Pulse:  [] 72  Resp:  [20-50] 28  BP: (103-161)/(54-79) 104/54  SpO2:  [91 %-100 %] 96 %    Hemodynamic parameters for last 24 hours       Respiratory Information for the last 24 hours       Physical Exam  Constitutional:       General: He is not in acute distress.  HENT:      Head: Normocephalic.      Mouth/Throat:      Mouth: Mucous membranes are moist.   Eyes:      Pupils: Pupils are equal, round, and reactive to light.      Comments: Constricted bilaterally consistent with propofol infusion   Cardiovascular:      Rate and Rhythm: Regular rhythm. Bradycardia present.      Heart sounds: No murmur heard.     No friction rub. No gallop.   Pulmonary:      Effort: No respiratory distress.      Breath sounds: No wheezing or rales.   Abdominal:      General: There is no distension.      Palpations: Abdomen is soft. There is no mass.      Tenderness: There is no abdominal tenderness.   Musculoskeletal:      Cervical back: Neck supple.      Right lower leg: No edema.      Left lower leg: No edema.   Skin:     General: Skin is warm and dry.      Capillary Refill: Capillary refill takes less than 2 seconds.   Neurological:      Comments: Awake, following commands, incomprehensible speech.  Globally weak, no focal neurologic deficits.   Psychiatric:         Mood and Affect: Mood normal.         Behavior: Behavior normal.      Comments: GUZMAN         Medications  Current Facility-Administered Medications   Medication Dose Route Frequency Provider Last Rate Last Admin    amLODIPine (Norvasc) tablet 5 mg  5 mg Enteral Tube Q DAY Reyes Gray M.D.   5 mg at 06/02/24 1014    dexmedetomidine (PRECEDEX) 400 mcg/100mL NS premix infusion  0.1-1.5 mcg/kg/hr (Ideal) Intravenous Continuous Reyes Gray M.D. 7.1 mL/hr at 06/02/24 1159 0.5 mcg/kg/hr at 06/02/24 1159    ipratropium-albuterol (DUONEB)  nebulizer solution  3 mL Nebulization 4X/DAY (RT) Reyes Gray M.D.   3 mL at 06/02/24 1039    ipratropium-albuterol (DUONEB) nebulizer solution  3 mL Nebulization Q2HRS PRN (RT) Reyes Gray M.D.        labetalol (Normodyne/Trandate) injection 10 mg  10 mg Intravenous Q3HRS PRN Sarina L. Latona   10 mg at 06/02/24 0614    hydrALAZINE (Apresoline) injection 10 mg  10 mg Intravenous Q3HRS PRN Sarina L. Latona   10 mg at 06/02/24 0737    Pharmacy Consult: Enteral tube insertion - review meds/change route/product selection  1 Each Other PHARMACY TO DOSE Reyes Gray M.D.        Hypertonic Sodium Acetate (513 mEq Na Acetate per liter) infusion  513 mEq/L Intravenous Continuous Reyes Gray M.D. 30 mL/hr at 06/02/24 0720 Rate Change not Required at 06/02/24 0720    diazePAM (Valium) injection 5 mg  5 mg Intravenous Q6HRS PRN Sarina Euceda        insulin regular (HumuLIN R,NovoLIN R) injection  1-6 Units Subcutaneous Q6HRS Sarina LAye Latona   1 Units at 06/02/24 1155    And    dextrose 50% (D50W) injection 25 g  25 g Intravenous Q15 MIN PRN Sarina L. Latona   25 g at 05/31/24 1902    senna-docusate (Pericolace Or Senokot S) 8.6-50 MG per tablet 2 Tablet  2 Tablet Enteral Tube Q EVENING Sarina L. Latona   2 Tablet at 05/31/24 1825    And    polyethylene glycol/lytes (Miralax) Packet 1 Packet  1 Packet Enteral Tube QDAY PRN Sarina LAye Lattanja        Respiratory Therapy Consult   Nebulization Continuous RT Sarina Euceda MD Alert...ICU Electrolyte Replacement per Pharmacy   Other PHARMACY TO DOSE Sarina LAye Euceda        acetaminophen (Tylenol) tablet 650 mg  650 mg Enteral Tube Q4HRS PRN Sarina L. Latona   650 mg at 06/02/24 0831    Or    acetaminophen (Tylenol) suppository 650 mg  650 mg Rectal Q4HRS PRN Sarina LAye Lattanja        ondansetron (Zofran ODT) dispertab 4 mg  4 mg Enteral Tube Q4HRS PRN Sarina Euceda        Or    ondansetron (Zofran) syringe/vial injection 4 mg  4 mg Intravenous Q4HRS PRN Sarina RODRIGUEZ  Latona        albuterol (Proventil) 2.5mg/0.5ml nebulizer solution 2.5 mg  2.5 mg Nebulization Q4HRS PRN Sarina RODRIGUEZ Latona   2.5 mg at 06/01/24 1344    fentaNYL (Sublimaze) injection 25-50 mcg  25-50 mcg Intravenous Q HOUR PRN Sarina RODRIGUEZ Latona   50 mcg at 06/02/24 0117    lansoprazole (Prevacid) solutab 30 mg  30 mg Enteral Tube DAILY Reyes Gray M.D.   30 mg at 06/02/24 0600    mineral oil-pet hydrophilic (Aquaphor) ointment   Topical BID Reyes Gray M.D.   Given at 06/02/24 0600       Fluids    Intake/Output Summary (Last 24 hours) at 6/2/2024 1228  Last data filed at 6/2/2024 1200  Gross per 24 hour   Intake 2438.85 ml   Output 1660 ml   Net 778.85 ml       Laboratory  Recent Labs     05/30/24  2306 05/31/24  0341 06/01/24  0438   ISTATAPH 7.430 7.387* 7.410   ISTATAPCO2 35.8 40.5* 40.7*   ISTATAPO2 115* 58* 79   ISTATATCO2 25 26 27   [id number] 99 89* 96   ISTATARTHCO3 23.7 24.4 25.8*   ISTATARTBE 0 -1 1   ISTATTEMP 97.4 F 97.5 F 37.2 C   ISTATFIO2 40 30 30   ISTATSPEC Arterial Arterial Arterial   ISTATAPHTC 7.440 7.396* 7.407   [id number] 111* 56* 80         Recent Labs     05/31/24  0601 05/31/24  1210 06/01/24  0435 06/01/24  1109 06/01/24  2355 06/02/24  0605 06/02/24  1145   SODIUM 150*   < > 152*   < > 150* 150* 151*   POTASSIUM 4.3   < > 3.5*   < > 3.4* 4.1 3.7   CHLORIDE 116*   < > 116*   < > 113* 113* 112   CO2 21   < > 26   < > 27 27 31   BUN 20   < > 18   < > 12 14 16   CREATININE 0.68   < > 0.74   < > 0.63 0.58 0.64   MAGNESIUM 2.0  --  2.0  --   --  2.0  --    CALCIUM 8.9   < > 8.4*   < > 8.5 8.8 8.8    < > = values in this interval not displayed.     Recent Labs     06/01/24  2355 06/02/24  0605 06/02/24  1145   GLUCOSE 162* 152* 178*     Recent Labs     05/31/24  0601 06/01/24  0435 06/02/24  0605   WBC 8.5 8.8 10.9*   NEUTSPOLYS 81.80* 76.30* 85.40*   LYMPHOCYTES 8.40* 11.30* 5.20*   MONOCYTES 7.90 8.40 6.00   EOSINOPHILS 1.50 3.40 2.90   BASOPHILS 0.20 0.30 0.20     Recent Labs      05/31/24  0601 06/01/24  0435 06/02/24  0605   RBC 3.76* 3.82* 4.04*   HEMOGLOBIN 10.9* 10.8* 11.6*   HEMATOCRIT 33.5* 34.9* 36.0*   PLATELETCT 175 175 182       Imaging  MRI:   Reviewed    Assessment/Plan  * Cerebellar hemorrhage (HCC)- (present on admission)  Assessment & Plan  ICH score=1  -q2h neurochecks  -MRI brain ICH protocol pending  -Keep -160,   -3% sodium acetate: check serum Na and Osm q6 hour, goal Na 150-160  -Neurosurgery non operative at this time  -No coagulation   -Hold all antithrombotic therapy  -SCDs only for DVT ppx  -keep euvolemic, euthermic, and normoglycemic (140-180)  -Maintain bowel regimen to avoid Valsalva and increased intracranial pressure.  -HOB at 30 degrees  -maintain pCO2 of 35-40; closer to 35  -Atorvastatin 40 mg qHS for possible neuroprotective effect      Acute on chronic respiratory failure with hypoxia ([facility])- (present on admission)  Assessment & Plan  Intubated for airway protection  Extubated 6/1  Mobilization  Pulmonary hygiene    COPD (chronic obstructive pulmonary disease) ([facility])- (present on admission)  Assessment & Plan  Not currently in exacerbation  - CXR now and Qam, no acute process  - Continue home nebulizers as ordered  - Continue PRN nebulizers Q4h  - Wean FIo2 as tolerated    Abdominal aortic aneurysm (AAA) without rupture ([facility])- (present on admission)  Assessment & Plan  S/p EVAR w/ bypass graft stent  - Maintain normotension  - Holding ASA at this time; per wife he takes ASA 3x/week    Hyperchloremic metabolic acidosis  Assessment & Plan  Due to 3% saline for osmotic therapy  Changed 3% saline to 3% sodium acetate    Hyperglycemia- (present on admission)  Assessment & Plan  Moderate glycemic control with insulin therapy    Hyperlipidemia- (present on admission)  Assessment & Plan  Per wife, patient's home medication was discontinued due to muscle cramps  - Start atorvastatin 40mg QHS for neuroprotection; monitor for tolerance  - Obtain Lipid panel  -  Statin therapy; hold for elevated CK/LFTs  - Counseling on lifestyle/dietary modifications    History of stroke- (present on admission)  Assessment & Plan  Patient has had 2 prior hemorrhagic strokes; last in 2015  - No noted deficits per wife    BPH with urinary obstruction- (present on admission)  Assessment & Plan  S/p prostate artery embolization  - Per wife, patient has suffered from urinary retention and incontinence since his surgery  - Continue flomax  - Park for strict I&O    Benign essential hypertension- (present on admission)  Assessment & Plan  Per wife, patient's medications were discontinued as an outpatient because his BP has been controlled  - SBP in the 170s per wife's at home BP cuff measurement; <160 since arrival to this facility  - As needed antihypertensives to maintain -160)  - Norvasc 5 mg         VTE:  Contraindicated  Ulcer: PPI  Lines: None    I have performed a physical exam and reviewed and updated ROS and Plan today (6/2/2024). In review of yesterday's note (6/1/2024), there are no changes except as documented above.     Discussed patient condition and risk of morbidity and/or mortality with Family, RN, RT, Pharmacy, and neurology and neurosurgery      The patient remains critically ill.  I have assessed and reassessed the respiratory status and made ventilator adjustments based upon arterial blood gas analysis, ventilator waveforms and airway mechanics.  I have assessed and reassessed the blood pressure, hemodynamics, cardiovascular and neurologic status. This patient remains at high risk for worsening cardiopulmonary dysfunction and death without the above critical care interventions.    Critical care time = 40 minutes in directly providing and coordinating critical care and extensive data review.  No time overlap and excludes procedures.

## 2024-06-02 NOTE — CARE PLAN
The patient is Watcher - Medium risk of patient condition declining or worsening    Shift Goals  Clinical Goals: stable neuro, RASS -1 to +1, -140  Patient Goals: unable to assess  Family Goals: updates    Progress made toward(s) clinical / shift goals:    Problem: Safety - Medical Restraint  Goal: Remains free of injury from restraints (Restraint for Interference with Medical Device)  Outcome: Progressing  Q2h restraint monitoring performed. Restraints in place due to patient pulling at tubes and lines.      Problem: Knowledge Deficit - Standard  Goal: Patient and family/care givers will demonstrate understanding of plan of care, disease process/condition, diagnostic tests and medications  Outcome: Progressing     Problem: Pain - Standard  Goal: Alleviation of pain or a reduction in pain to the patient’s comfort goal  Outcome: Progressing     Problem: Skin Integrity  Goal: Skin integrity is maintained or improved  Outcome: Progressing  Q2h turns performed, pillows in place for support and positioning, and mepilex applied to maintain skin integrity.      Problem: Hemodynamics  Goal: Patient's hemodynamics, fluid balance and neurologic status will be stable or improve  Outcome: Progressing  Patient treated with PRNs to maintain -140.     Problem: Neuro Status  Goal: Neuro status will remain stable or improve  Outcome: Progressing   Q1h neuro exams performed and have remained stable.     Patient is not progressing towards the following goals: N/A

## 2024-06-02 NOTE — PROGRESS NOTES
Iris Placement    Tube Team verified patient name and medical record number prior to tube placement. Iris feeding tube (43 inches, 10 Tajik) placed at 86 cm in left nare. Per Iris picture, tube appears to be in the stomach.    Nursing Instructions: Await KUB to confirm placement before use for medications or feeding. Stylet, may be removed, please place in labeled bag with insufflation bulb and save in patient medication drawer.     Bridled in placed

## 2024-06-02 NOTE — CARE PLAN
The patient is Watcher - Medium risk of patient condition declining or worsening    Shift Goals  Clinical Goals: neuro checks, mobility, wean Precedex gtt as able  Patient Goals: GUZMAN  Family Goals: mobility, improved neuro exam    Progress made toward(s) clinical / shift goals:  Patient intermittently following commands. Mobilized to edge of bed, stood at edge of bed. Precedex gtt titrated down when appropriate.      Problem: Safety - Medical Restraint  Goal: Remains free of injury from restraints (Restraint for Interference with Medical Device)  Outcome: Progressing  Flowsheets (Taken 6/2/2024 1624)  Addressed this shift: Remains free of injury from restraints (restraint for interference with medical device):   Inform patient/family regarding the reason for restraint   Every 2 hours: Monitor safety, psychosocial status, comfort, nutrition and hydration     Problem: Pain - Standard  Goal: Alleviation of pain or a reduction in pain to the patient’s comfort goal  Outcome: Progressing     Problem: Skin Integrity  Goal: Skin integrity is maintained or improved  Outcome: Progressing     Problem: Fall Risk  Goal: Patient will remain free from falls  Outcome: Progressing     Problem: Neuro Status  Goal: Neuro status will remain stable or improve  Outcome: Progressing         Patient is not progressing towards the following goals:      Problem: Safety - Medical Restraint  Goal: Free from restraint(s) (Restraint for Interference with Medical Device)  Outcome: Not Progressing  Flowsheets (Taken 6/2/2024 1624)  Addressed this shift: Free from restraint(s) (restraint for interference with medical device):   ONCE/SHIFT or MINIMUM Every 12 hours: Assess and document the continuing need for restraints   Every 24 hours: Continued use of restraint requires Licensed Independent Practitioner to perform face to face examination and written order

## 2024-06-02 NOTE — CARE PLAN
Problem: Bronchoconstriction  Goal: Improve in air movement and diminished wheezing  Description: Target End Date:  2 to 3 days    1.  Implement inhaled treatments  2.  Evaluate and manage medication effects  Outcome: Progressing   Duoneb QID home regime

## 2024-06-02 NOTE — THERAPY
Speech Language Therapy Contact Note    Patient Name: Jl Mueller  Age:  78 y.o., Sex:  male  Medical Record #: [id number]  Today's Date: 6/2/2024    Discussed missed therapy with Gabriela Powell RN.       06/02/24 0840   Treatment Variance   Reason For Missed Therapy Medical - Patient not Able To Participate   Pertinent Information   Current Method of Nutrition NGT   Interdisciplinary Plan of Care Collaboration   IDT Collaboration with  Nursing   Collaboration Comments Per RN, Pt is not following commands at this time and is unable to participate in CSE. Will hold and monitor to complete CSE if/when appropriate.

## 2024-06-02 NOTE — PROGRESS NOTES
Neurology Progress Note  Neurohospitalist Service, [facility]    Referring Physician: Reyes Gray M.D.    Chief Complaint   Patient presents with    Possible Stroke     Pt transferred from [place] via Care flight for hemorrhagic CVA. LKW 2030. Pt was eating dinner, and had sudden headache and started vomiting. Pt originally arrived to [place] via EMS with N/V, GCS 11. Pt was intubated at [place]       HPI: Refer to initial documented Neurology H&P, as detailed in the patient's chart.    Interval History:   No acute events overnight, he appears more agitated and was trying to pull his NG tube out and is currently on upper extremity restraints.  Does not follow commands today.  Wife and daughter are at bedside.    Review of systems: In addition to what is detailed in the HPI and/or updated in the interval history, all other systems reviewed and are negative.    Past Medical History:    has a past medical history of Chronic obstructive pulmonary disease (HCC), Hypertension, MI, old, Peptic ulcer, Prostate disorder, and Stroke (HCC).    FHx:  family history is not on file.    SHx:   reports that he does not currently use alcohol. He reports that he does not currently use drugs.    Medications:    Current Facility-Administered Medications:     amLODIPine (Norvasc) tablet 5 mg, 5 mg, Enteral Tube, Q DAY, Reyes Gray M.D., 5 mg at 06/02/24 1014    dexmedetomidine (PRECEDEX) 400 mcg/100mL NS premix infusion, 0.1-1.5 mcg/kg/hr (Ideal), Intravenous, Continuous, Reyes Gray M.D., Last Rate: 15.6 mL/hr at 06/02/24 0624, 1.1 mcg/kg/hr at 06/02/24 0624    ipratropium-albuterol (DUONEB) nebulizer solution, 3 mL, Nebulization, 4X/DAY (RT), Reyes Gray M.D., 3 mL at 06/02/24 1039    ipratropium-albuterol (DUONEB) nebulizer solution, 3 mL, Nebulization, Q2HRS PRN (RT), Reyes Gray M.D.    labetalol (Normodyne/Trandate) injection 10 mg, 10 mg, Intravenous, Q3HRS PRN, Sarina  JENNIFER Euceda, 10 mg at 06/02/24 0614    hydrALAZINE (Apresoline) injection 10 mg, 10 mg, Intravenous, Q3HRS PRN, Sarina Euceda, 10 mg at 06/02/24 0737    Pharmacy Consult: Enteral tube insertion - review meds/change route/product selection, 1 Each, Other, PHARMACY TO DOSE, Reyes Gray M.D.    Hypertonic Sodium Acetate (513 mEq Na Acetate per liter) infusion, 513 mEq/L, Intravenous, Continuous, Reyes Gray M.D., Last Rate: 30 mL/hr at 06/02/24 0720, Rate Change not Required at 06/02/24 0720    diazePAM (Valium) injection 5 mg, 5 mg, Intravenous, Q6HRS PRN, Sarina Euceda    insulin regular (HumuLIN R,NovoLIN R) injection, 1-6 Units, Subcutaneous, Q6HRS, 1 Units at 06/02/24 0011 **AND** POC blood glucose manual result, , , Q6H **AND** NOTIFY MD and PharmD, , , Once **AND** Administer 20 grams of glucose (approximately 8 ounces of fruit juice) every 15 minutes PRN FSBG less than 70 mg/dL, , , PRN **AND** dextrose 50% (D50W) injection 25 g, 25 g, Intravenous, Q15 MIN PRN, Sarina Euceda, 25 g at 05/31/24 1902    senna-docusate (Pericolace Or Senokot S) 8.6-50 MG per tablet 2 Tablet, 2 Tablet, Enteral Tube, Q EVENING, 2 Tablet at 05/31/24 1825 **AND** polyethylene glycol/lytes (Miralax) Packet 1 Packet, 1 Packet, Enteral Tube, QDAY PRN, Sarina Euceda    Respiratory Therapy Consult, , Nebulization, Continuous RT, Sarina Euceda MD Alert...ICU Electrolyte Replacement per Pharmacy, , Other, PHARMACY TO DOSE, Sarina Euceda    acetaminophen (Tylenol) tablet 650 mg, 650 mg, Enteral Tube, Q4HRS PRN, 650 mg at 06/02/24 0831 **OR** acetaminophen (Tylenol) suppository 650 mg, 650 mg, Rectal, Q4HRS PRN, Sarina RODRIGUEZ Lattanja    ondansetron (Zofran ODT) dispertab 4 mg, 4 mg, Enteral Tube, Q4HRS PRN **OR** ondansetron (Zofran) syringe/vial injection 4 mg, 4 mg, Intravenous, Q4HRS PRN, Sarina Euceda    albuterol (Proventil) 2.5mg/0.5ml nebulizer solution 2.5 mg, 2.5 mg, Nebulization, Q4HRS PRN, Sarina RODRIGUEZ Lattanja, 2.5 mg at  06/01/24 1344    fentaNYL (Sublimaze) injection 25-50 mcg, 25-50 mcg, Intravenous, Q HOUR PRN, Sarina RODRIGUEZ Latona, 50 mcg at 06/02/24 0117    lansoprazole (Prevacid) solutab 30 mg, 30 mg, Enteral Tube, DAILY, Reyes Gray M.D., 30 mg at 06/02/24 0600    mineral oil-pet hydrophilic (Aquaphor) ointment, , Topical, BID, Reyes rGay M.D., Given at 06/02/24 0600    Physical Examination:    Vitals:    06/02/24 0700 06/02/24 0800 06/02/24 0900 06/02/24 1000   BP: (!) 141/67 125/59 103/55 122/63   Pulse: 72 80 76 76   Resp: (!) 25 (!) 25 (!) 23 (!) 22   Temp:       TempSrc:  Bladder     SpO2: 98% 96% 96% 97%   Weight:       Height:           General:   Patient is awake and in no acute distress  Neck: Full range of motion  Eyes: Midline, Pupils reactive to light.  CV: RRR  Lungs: No respiratory distress  Extremities: No cyanosis, warm, no significant edema.    NEUROLOGICAL EXAM:   Mental status: Awake, alert but disoriented, not able to follow commands today.  Appears restless.  Speech and language: speech is dysarthric.  He has minimal verbal output and just mumbling noncoherently.  Cranial nerve exam: Pupils are equal, round and reactive to light bilaterally. Visual fields are full. Extraocular muscles are intact.   Face is symmetric, facial sensation is intact.   Motor exam: Moves all 4 extremity antigravity.  Tone is normal. No abnormal movements were seen on exam.  Sensory exam: No sensory deficits identified   Coordination: Difficult to assess as he does not follow commands.   Plantar reflexes: Equivocal  Gait: deferred     Objective Data:    Labs:  Lab Results   Component Value Date/Time    PROTHROMBTM 13.6 05/29/2024 10:49 PM    INR 1.03 05/29/2024 10:49 PM      Lab Results   Component Value Date/Time    WBC 10.9 (H) 06/02/2024 06:05 AM    RBC 4.04 (L) 06/02/2024 06:05 AM    HEMOGLOBIN 11.6 (L) 06/02/2024 06:05 AM    HEMATOCRIT 36.0 (L) 06/02/2024 06:05 AM    MCV 89.1 06/02/2024 06:05 AM    MCH 28.7 06/02/2024  06:05 AM    MCHC 32.2 (L) 06/02/2024 06:05 AM    MPV 12.2 06/02/2024 06:05 AM    NEUTSPOLYS 85.40 (H) 06/02/2024 06:05 AM    LYMPHOCYTES 5.20 (L) 06/02/2024 06:05 AM    MONOCYTES 6.00 06/02/2024 06:05 AM    EOSINOPHILS 2.90 06/02/2024 06:05 AM    BASOPHILS 0.20 06/02/2024 06:05 AM      Lab Results   Component Value Date/Time    SODIUM 150 (H) 06/02/2024 06:05 AM    POTASSIUM 4.1 06/02/2024 06:05 AM    CHLORIDE 113 (H) 06/02/2024 06:05 AM    CO2 27 06/02/2024 06:05 AM    GLUCOSE 152 (H) 06/02/2024 06:05 AM    BUN 14 06/02/2024 06:05 AM    CREATININE 0.58 06/02/2024 06:05 AM    GLOMRATE 78 10/19/2023 09:26 AM      Lab Results   Component Value Date/Time    CHOLSTRLTOT 132 06/01/2024 04:35 AM    LDL see below 06/01/2024 04:35 AM    HDL 41 06/01/2024 04:35 AM    TRIGLYCERIDE 432 (H) 06/01/2024 04:35 AM       Lab Results   Component Value Date/Time    ALKPHOSPHAT 59 05/29/2024 10:49 PM    ASTSGOT 14 05/29/2024 10:49 PM    ALTSGPT 8 05/29/2024 10:49 PM    TBILIRUBIN 0.6 05/29/2024 10:49 PM        Imaging/Testing:    I interpreted and/or reviewed the patient's neuroimaging    DX-ABDOMEN FOR TUBE PLACEMENT   Final Result      Enteric tube tip projects over the stomach.      EC-ECHOCARDIOGRAM COMPLETE W/ CONT   Final Result      DX-CHEST-PORTABLE (1 VIEW)   Final Result         1.  Left basilar atelectasis or subtle infiltrate, decreased since prior study   2.  Trace left pleural effusion   3.  Enlargement of the aortic knob suggesting thoracic aortic aneurysm   4.  Atherosclerosis      MR-BRAIN-WITH & W/O   Final Result      1.  Stable fossa of parenchymal hemorrhage centered in the LEFT cerebellum and extending into the RIGHT cerebellum exerting mass effect on the fourth ventricle which is not completely effaced   2.  Intraventricular blood redemonstrated   3.  Numerous areas of remote microhemorrhage in the supra and infratentorial brain. These could be related to amyloid angiopathy, numerous hypertensive microhemorrhages  or less likely multiple cavernomas   4.  Moderate diffuse atrophy without compelling evidence of hydrocephalus   5.  Advanced white matter changes   6.  4 mm LEFT temporal meningioma   7.  Enhancing nodule in the RIGHT internal auditory canal suspicious for a vestibular schwannoma, less likely other extra-axial mass such as a meningioma, facial nerve schwannoma or metastasis. Consider posterior fossa protocol brain MRI without and with    contrast to further evaluate in when clinically appropriate.      MR-MRA HEAD-W/O   Final Result         MRA OF THE [place] OF [place] WITHIN NORMAL LIMITS.      DX-CHEST-PORTABLE (1 VIEW)   Final Result         1.  Hazy left lower lobe infiltrate   2.  Small left pleural effusion   3.  Enlargement of the aortic knob, appearance suggesting aortic aneurysm.      DX-CHEST-FOR LINE PLACEMENT Perform procedure in: Patient's Room   Final Result      1.  PICC line is in place with the tip projecting over the SVC in satisfactory position.   2.  Mildly enlarged cardiac silhouette with vascular congestion.   3.  Retrocardiac opacity is likely due to atelectasis.         IR-PICC LINE PLACEMENT W/ GUIDANCE > AGE 5   Final Result                  Ultrasound-guided PICC placement performed by qualified nursing staff as    above.          CT-HEAD W/O   Final Result         1.  Left cerebellar hemorrhage, similar compared to prior study.   2.  Minimal bilateral intraventricular hemorrhages, new since prior study.   3.  Nonspecific white matter changes, commonly associated with small vessel ischemic disease.  Associated mild cerebral atrophy is noted.   4.  Atherosclerosis.         DX-CHEST-PORTABLE (1 VIEW)   Final Result         1.  Left basilar atelectasis, no focal infiltrate   2.  Trace left pleural effusion   3.  Atherosclerosis      CT-CTA NECK WITH & W/O-POST PROCESSING   Final Result         1.  Scattered atherosclerosis without significant stenosis or occlusion.   2.  4.2 cm proximal  descending thoracic aortic aneurysm, radiographic follow-up and surveillance recommended as clinically appropriate.         CT-CTA HEAD WITH & W/O-POST PROCESS   Final Result         1.  No large vessel occlusion or aneurysm identified   2.  Large cerebellar hemorrhage predominantly in the left cerebellum      These findings were discussed with the patient's clinician, Nikki Alexander, on 5/29/2024 11:35 PM.      DX-CHEST-PORTABLE (1 VIEW)   Final Result      Tubes as above      Left pleural effusion      CHF/pulmonary edema pattern      See Brown MD   5/30/2024 8:41 AM         CT-HEAD W/O   Final Result      Acute intraparenchymal hemorrhage is noted involving the left cerebellar hemisphere, measuring 3.1 x 4.2 x 3.4 cm, with associated mass effect.      This finding was telephoned to the mentioned attending by on-call radiologist at the time of imaging         AAST Intracranial Hemorrhage Brain Injury Guidelines         Hemorrhage type  mBIG 1           mBIG 2                mBIG 3      Subdural             <4mm               5-7.9 mm             >8mm      Epidural                No                    No                  Yes      Intraparenc'mal   <4mm               5-7.9 mm         >8mm or multi   1 location       or 2 locations      >3 locations      Subarachnoid     <3 sulci       single hemisphere   bi-hemisphere   and <1 mm        or 1-3 mm            or > 3 mm      Intraventricular       No                  No                    Yes      Skull Fracture       None            Non-displaced       Displaced               DLP Reporting Thresholds for Incorrect/Repeated Exams - DLP in mGy*cm   Head/Neck:  0-year-old 3840, 1-year-old 5880, 5-year-old 8770, 10-year-old 17768 and adult 42100   Head:  0-year-old 4540, 1-year-old 7460, 5-year-old 09897, 10-year-old 44391 and adult 95656   Neck:  0-year-old 2940, 1-year-old 4160, 5-year-old 4550, 10-year-old 6320 and adult 8470   Chest:  0-year-old 550, 1-year-old  830, 5-year-old 1200, 10-year-old 3840 and adult 3570   Abd/pelvis:  0-year-old 440, 1-year-old 720, 5-year-old 1080, 10-year-old 3330 and adult 3330   Trunk(C/A/P):  0-year-old 490, 1-year-old 770, 5-year-old 1140, 10-year-old 3570 and adult 3330      OUTSIDE IMAGES-CT CERVICAL SPINE    (Results Pending)       Assessment and Plan:  Jl Mueller is a 78 y.o. with history hypertension, hyperlipidemia, AAA repair, GI bleed, previous stroke with prior ICH in 2015, coronary artery disease who was transferred from [facility] after he had acute onset of nausea vomiting and was found to have left cerebellar ICH.  Patient was evaluated by neurosurgery and no neurosurgical intervention deemed necessary at this point.  Brain MRI obtained which revealed stable left cerebellar intraparenchymal hemorrhage extending into right cerebellum with some mass effect on fourth ventricle.  There is a small intraventricular hemorrhage noted.  There are numerous areas of remote microhemorrhages noted in the supra and infratentorial brain with differential of amyloid angiopathy versus hypertensive microhemorrhages.    Plan:  - Continue every 2 hours neurocheck.  - Continue hypertonic saline with a goal of sodium 150-160, current sodium 150.  - Neurosurgery on board in case of deterioration and need for ventriculostomy.  -Presenting systolic blood pressure < 160, suggest to maintain systolic blood pressure 100-140, target 120, antihypertensives per primary team.  - Hold all antithrombotic medications.  - Maintain euvolemia, normothermia, normoglycemia.  - Maintain bowel regimen to avoid Valsalva and increased intracranial pressure.  - Keep head of bed elevated at 30 degrees.  - Continue atorvastatin 40 mg nightly for possible neuroprotective effect.  - Continue PT, OT and SLP if blood pressure is stable and if he can participate.  - DVT prevention with SCDs.    The evaluation of the patient, and recommended management, was discussed  with Reyes Gray M.D.    Please note that this dictation was created using voice recognition software. I have made every reasonable attempt to correct obvious errors, but I expect that there are errors of grammar and possibly content that I did not discover before finalizing the note.      Aubrey Edwards MD  [facility]

## 2024-06-03 ENCOUNTER — APPOINTMENT (OUTPATIENT)
Dept: RADIOLOGY | Facility: MEDICAL CENTER | Age: 79
End: 2024-06-03
Attending: NURSE PRACTITIONER
Payer: MEDICARE

## 2024-06-03 PROBLEM — G93.40 ENCEPHALOPATHY ACUTE: Status: ACTIVE | Noted: 2024-06-03

## 2024-06-03 LAB
ALBUMIN SERPL BCP-MCNC: 3.2 G/DL (ref 3.2–4.9)
ALBUMIN/GLOB SERPL: 1.1 G/DL
ALP SERPL-CCNC: 44 U/L (ref 30–99)
ALT SERPL-CCNC: 11 U/L (ref 2–50)
ANION GAP SERPL CALC-SCNC: 10 MMOL/L (ref 7–16)
ANION GAP SERPL CALC-SCNC: 11 MMOL/L (ref 7–16)
ANION GAP SERPL CALC-SCNC: 8 MMOL/L (ref 7–16)
ANION GAP SERPL CALC-SCNC: 8 MMOL/L (ref 7–16)
AST SERPL-CCNC: 16 U/L (ref 12–45)
BASOPHILS # BLD AUTO: 0.2 % (ref 0–1.8)
BASOPHILS # BLD: 0.02 K/UL (ref 0–0.12)
BILIRUB SERPL-MCNC: 0.3 MG/DL (ref 0.1–1.5)
BUN SERPL-MCNC: 18 MG/DL (ref 8–22)
BUN SERPL-MCNC: 20 MG/DL (ref 8–22)
BUN SERPL-MCNC: 21 MG/DL (ref 8–22)
BUN SERPL-MCNC: 21 MG/DL (ref 8–22)
CALCIUM ALBUM COR SERPL-MCNC: 9.7 MG/DL (ref 8.5–10.5)
CALCIUM SERPL-MCNC: 9 MG/DL (ref 8.5–10.5)
CALCIUM SERPL-MCNC: 9.1 MG/DL (ref 8.5–10.5)
CALCIUM SERPL-MCNC: 9.1 MG/DL (ref 8.5–10.5)
CALCIUM SERPL-MCNC: 9.2 MG/DL (ref 8.5–10.5)
CHLORIDE SERPL-SCNC: 114 MMOL/L (ref 96–112)
CHLORIDE SERPL-SCNC: 114 MMOL/L (ref 96–112)
CHLORIDE SERPL-SCNC: 115 MMOL/L (ref 96–112)
CHLORIDE SERPL-SCNC: 120 MMOL/L (ref 96–112)
CO2 SERPL-SCNC: 26 MMOL/L (ref 20–33)
CO2 SERPL-SCNC: 27 MMOL/L (ref 20–33)
CO2 SERPL-SCNC: 30 MMOL/L (ref 20–33)
CO2 SERPL-SCNC: 30 MMOL/L (ref 20–33)
CREAT SERPL-MCNC: 0.58 MG/DL (ref 0.5–1.4)
CREAT SERPL-MCNC: 0.6 MG/DL (ref 0.5–1.4)
CREAT SERPL-MCNC: 0.63 MG/DL (ref 0.5–1.4)
CREAT SERPL-MCNC: 0.65 MG/DL (ref 0.5–1.4)
CRP SERPL HS-MCNC: 9.46 MG/DL (ref 0–0.75)
EOSINOPHIL # BLD AUTO: 0.41 K/UL (ref 0–0.51)
EOSINOPHIL NFR BLD: 4.2 % (ref 0–6.9)
ERYTHROCYTE [DISTWIDTH] IN BLOOD BY AUTOMATED COUNT: 47.1 FL (ref 35.9–50)
GFR SERPLBLD CREATININE-BSD FMLA CKD-EPI: 96 ML/MIN/1.73 M 2
GFR SERPLBLD CREATININE-BSD FMLA CKD-EPI: 97 ML/MIN/1.73 M 2
GFR SERPLBLD CREATININE-BSD FMLA CKD-EPI: 99 ML/MIN/1.73 M 2
GLOBULIN SER CALC-MCNC: 2.9 G/DL (ref 1.9–3.5)
GLUCOSE BLD STRIP.AUTO-MCNC: 129 MG/DL (ref 65–99)
GLUCOSE BLD STRIP.AUTO-MCNC: 149 MG/DL (ref 65–99)
GLUCOSE BLD STRIP.AUTO-MCNC: 151 MG/DL (ref 65–99)
GLUCOSE BLD STRIP.AUTO-MCNC: 153 MG/DL (ref 65–99)
GLUCOSE SERPL-MCNC: 146 MG/DL (ref 65–99)
GLUCOSE SERPL-MCNC: 155 MG/DL (ref 65–99)
GLUCOSE SERPL-MCNC: 161 MG/DL (ref 65–99)
GLUCOSE SERPL-MCNC: 179 MG/DL (ref 65–99)
HCT VFR BLD AUTO: 34.5 % (ref 42–52)
HGB BLD-MCNC: 11 G/DL (ref 14–18)
IMM GRANULOCYTES # BLD AUTO: 0.05 K/UL (ref 0–0.11)
IMM GRANULOCYTES NFR BLD AUTO: 0.5 % (ref 0–0.9)
LYMPHOCYTES # BLD AUTO: 0.69 K/UL (ref 1–4.8)
LYMPHOCYTES NFR BLD: 7 % (ref 22–41)
MAGNESIUM SERPL-MCNC: 2 MG/DL (ref 1.5–2.5)
MCH RBC QN AUTO: 28.6 PG (ref 27–33)
MCHC RBC AUTO-ENTMCNC: 31.9 G/DL (ref 32.3–36.5)
MCV RBC AUTO: 89.8 FL (ref 81.4–97.8)
MONOCYTES # BLD AUTO: 0.68 K/UL (ref 0–0.85)
MONOCYTES NFR BLD AUTO: 6.9 % (ref 0–13.4)
NEUTROPHILS # BLD AUTO: 7.95 K/UL (ref 1.82–7.42)
NEUTROPHILS NFR BLD: 81.2 % (ref 44–72)
NRBC # BLD AUTO: 0 K/UL
NRBC BLD-RTO: 0 /100 WBC (ref 0–0.2)
PLATELET # BLD AUTO: 176 K/UL (ref 164–446)
PMV BLD AUTO: 11.9 FL (ref 9–12.9)
POTASSIUM SERPL-SCNC: 3.7 MMOL/L (ref 3.6–5.5)
POTASSIUM SERPL-SCNC: 3.7 MMOL/L (ref 3.6–5.5)
POTASSIUM SERPL-SCNC: 3.8 MMOL/L (ref 3.6–5.5)
POTASSIUM SERPL-SCNC: 3.9 MMOL/L (ref 3.6–5.5)
PREALB SERPL-MCNC: 10.9 MG/DL (ref 18–38)
PROT SERPL-MCNC: 6.1 G/DL (ref 6–8.2)
RBC # BLD AUTO: 3.84 M/UL (ref 4.7–6.1)
SODIUM SERPL-SCNC: 152 MMOL/L (ref 135–145)
SODIUM SERPL-SCNC: 152 MMOL/L (ref 135–145)
SODIUM SERPL-SCNC: 153 MMOL/L (ref 135–145)
SODIUM SERPL-SCNC: 156 MMOL/L (ref 135–145)
WBC # BLD AUTO: 9.8 K/UL (ref 4.8–10.8)

## 2024-06-03 PROCEDURE — 770022 HCHG ROOM/CARE - ICU (200)

## 2024-06-03 PROCEDURE — 70450 CT HEAD/BRAIN W/O DYE: CPT

## 2024-06-03 PROCEDURE — A9270 NON-COVERED ITEM OR SERVICE: HCPCS | Performed by: INTERNAL MEDICINE

## 2024-06-03 PROCEDURE — 97535 SELF CARE MNGMENT TRAINING: CPT

## 2024-06-03 PROCEDURE — 700102 HCHG RX REV CODE 250 W/ 637 OVERRIDE(OP): Performed by: NURSE PRACTITIONER

## 2024-06-03 PROCEDURE — 83735 ASSAY OF MAGNESIUM: CPT

## 2024-06-03 PROCEDURE — 700102 HCHG RX REV CODE 250 W/ 637 OVERRIDE(OP): Performed by: INTERNAL MEDICINE

## 2024-06-03 PROCEDURE — 86140 C-REACTIVE PROTEIN: CPT

## 2024-06-03 PROCEDURE — 94640 AIRWAY INHALATION TREATMENT: CPT

## 2024-06-03 PROCEDURE — 87040 BLOOD CULTURE FOR BACTERIA: CPT | Mod: 91

## 2024-06-03 PROCEDURE — A9270 NON-COVERED ITEM OR SERVICE: HCPCS | Performed by: NURSE PRACTITIONER

## 2024-06-03 PROCEDURE — 99291 CRITICAL CARE FIRST HOUR: CPT | Mod: GC | Performed by: INTERNAL MEDICINE

## 2024-06-03 PROCEDURE — 700105 HCHG RX REV CODE 258: Performed by: INTERNAL MEDICINE

## 2024-06-03 PROCEDURE — 82962 GLUCOSE BLOOD TEST: CPT | Mod: 91

## 2024-06-03 PROCEDURE — 700111 HCHG RX REV CODE 636 W/ 250 OVERRIDE (IP): Mod: JZ | Performed by: NURSE PRACTITIONER

## 2024-06-03 PROCEDURE — 97163 PT EVAL HIGH COMPLEX 45 MIN: CPT

## 2024-06-03 PROCEDURE — 97167 OT EVAL HIGH COMPLEX 60 MIN: CPT

## 2024-06-03 PROCEDURE — 99232 SBSQ HOSP IP/OBS MODERATE 35: CPT | Performed by: PSYCHIATRY & NEUROLOGY

## 2024-06-03 PROCEDURE — 80053 COMPREHEN METABOLIC PANEL: CPT

## 2024-06-03 PROCEDURE — 80048 BASIC METABOLIC PNL TOTAL CA: CPT | Mod: 91

## 2024-06-03 PROCEDURE — 700101 HCHG RX REV CODE 250: Performed by: INTERNAL MEDICINE

## 2024-06-03 PROCEDURE — 84134 ASSAY OF PREALBUMIN: CPT

## 2024-06-03 PROCEDURE — 85025 COMPLETE CBC W/AUTO DIFF WBC: CPT

## 2024-06-03 RX ORDER — SODIUM CHLORIDE 1 G/1
2 TABLET ORAL
Status: DISCONTINUED | OUTPATIENT
Start: 2024-06-03 | End: 2024-06-05

## 2024-06-03 RX ORDER — LEVALBUTEROL INHALATION SOLUTION 1.25 MG/3ML
1.25 SOLUTION RESPIRATORY (INHALATION)
Status: DISCONTINUED | OUTPATIENT
Start: 2024-06-03 | End: 2024-06-07

## 2024-06-03 RX ORDER — 3% SODIUM CHLORIDE 3 G/100ML
0-60 INJECTION, SOLUTION INTRAVENOUS CONTINUOUS
Status: DISCONTINUED | OUTPATIENT
Start: 2024-06-03 | End: 2024-06-05

## 2024-06-03 RX ORDER — QUETIAPINE FUMARATE 25 MG/1
25 TABLET, FILM COATED ORAL NIGHTLY
Status: DISCONTINUED | OUTPATIENT
Start: 2024-06-04 | End: 2024-06-04

## 2024-06-03 RX ORDER — LEVALBUTEROL INHALATION SOLUTION 1.25 MG/3ML
1.25 SOLUTION RESPIRATORY (INHALATION)
Status: DISCONTINUED | OUTPATIENT
Start: 2024-06-03 | End: 2024-06-03

## 2024-06-03 RX ADMIN — ACETAMINOPHEN 650 MG: 325 TABLET, FILM COATED ORAL at 01:15

## 2024-06-03 RX ADMIN — Medication: at 17:52

## 2024-06-03 RX ADMIN — SODIUM CHLORIDE 2 G: 1 TABLET ORAL at 10:16

## 2024-06-03 RX ADMIN — SODIUM CHLORIDE 30 ML/HR: 3 INJECTION, SOLUTION INTRAVENOUS at 11:16

## 2024-06-03 RX ADMIN — INSULIN HUMAN 1 UNITS: 100 INJECTION, SOLUTION PARENTERAL at 17:51

## 2024-06-03 RX ADMIN — LANSOPRAZOLE 30 MG: 30 TABLET, ORALLY DISINTEGRATING ORAL at 05:41

## 2024-06-03 RX ADMIN — LABETALOL HYDROCHLORIDE 10 MG: 5 INJECTION, SOLUTION INTRAVENOUS at 15:05

## 2024-06-03 RX ADMIN — LEVALBUTEROL 1.25 MG: 1.25 SOLUTION RESPIRATORY (INHALATION) at 07:17

## 2024-06-03 RX ADMIN — AMLODIPINE BESYLATE 5 MG: 5 TABLET ORAL at 05:42

## 2024-06-03 RX ADMIN — DEXMEDETOMIDINE 0.6 MCG/KG/HR: 100 INJECTION, SOLUTION INTRAVENOUS at 11:28

## 2024-06-03 RX ADMIN — SODIUM CHLORIDE 2 G: 1 TABLET ORAL at 17:27

## 2024-06-03 RX ADMIN — POTASSIUM BICARBONATE 25 MEQ: 978 TABLET, EFFERVESCENT ORAL at 08:51

## 2024-06-03 RX ADMIN — SENNOSIDES AND DOCUSATE SODIUM 2 TABLET: 50; 8.6 TABLET ORAL at 17:27

## 2024-06-03 RX ADMIN — Medication: at 06:00

## 2024-06-03 RX ADMIN — ACETAMINOPHEN 650 MG: 325 TABLET, FILM COATED ORAL at 08:51

## 2024-06-03 RX ADMIN — LEVALBUTEROL 1.25 MG: 1.25 SOLUTION RESPIRATORY (INHALATION) at 11:20

## 2024-06-03 RX ADMIN — FENTANYL CITRATE 50 MCG: 50 INJECTION, SOLUTION INTRAMUSCULAR; INTRAVENOUS at 23:24

## 2024-06-03 RX ADMIN — LABETALOL HYDROCHLORIDE 10 MG: 5 INJECTION, SOLUTION INTRAVENOUS at 08:51

## 2024-06-03 RX ADMIN — ACETAMINOPHEN 650 MG: 325 TABLET, FILM COATED ORAL at 17:27

## 2024-06-03 RX ADMIN — DEXMEDETOMIDINE 0.9 MCG/KG/HR: 100 INJECTION, SOLUTION INTRAVENOUS at 21:27

## 2024-06-03 RX ADMIN — LEVALBUTEROL 1.25 MG: 1.25 SOLUTION RESPIRATORY (INHALATION) at 00:21

## 2024-06-03 RX ADMIN — HYDRALAZINE HYDROCHLORIDE 10 MG: 20 INJECTION, SOLUTION INTRAMUSCULAR; INTRAVENOUS at 16:37

## 2024-06-03 ASSESSMENT — COGNITIVE AND FUNCTIONAL STATUS - GENERAL
SUGGESTED CMS G CODE MODIFIER MOBILITY: CL
SUGGESTED CMS G CODE MODIFIER DAILY ACTIVITY: CM
MOVING TO AND FROM BED TO CHAIR: A LOT
CLIMB 3 TO 5 STEPS WITH RAILING: TOTAL
PERSONAL GROOMING: A LOT
EATING MEALS: A LOT
STANDING UP FROM CHAIR USING ARMS: A LOT
HELP NEEDED FOR BATHING: TOTAL
MOVING FROM LYING ON BACK TO SITTING ON SIDE OF FLAT BED: A LOT
WALKING IN HOSPITAL ROOM: A LOT
MOBILITY SCORE: 11
DRESSING REGULAR UPPER BODY CLOTHING: TOTAL
TURNING FROM BACK TO SIDE WHILE IN FLAT BAD: A LOT
DAILY ACTIVITIY SCORE: 8
TOILETING: TOTAL
DRESSING REGULAR LOWER BODY CLOTHING: TOTAL

## 2024-06-03 ASSESSMENT — PAIN DESCRIPTION - PAIN TYPE
TYPE: ACUTE PAIN

## 2024-06-03 ASSESSMENT — GAIT ASSESSMENTS: GAIT LEVEL OF ASSIST: UNABLE TO PARTICIPATE

## 2024-06-03 ASSESSMENT — ACTIVITIES OF DAILY LIVING (ADL): TOILETING: INDEPENDENT

## 2024-06-03 NOTE — ASSESSMENT & PLAN NOTE
Likely delirium in conjunction with his intracranial pathology  Keep patient awake during the day and avoid daytime naps. Remove all unnecessary lines (central lines, peripheral IVs, feeding tubes, leung catheters). Avoid polypharmacy, frequent re-orientation, maximize family time at bedside, use glasses and hearing aids if needed, treat pain, encourage ambulation, minimize benzos/anticholinergic agents.   Ambulate today  Trazadone qHS to assist with sleep  Avoid sedatives

## 2024-06-03 NOTE — PROGRESS NOTES
"Family Medicine ICU Resident Progress Note    Date of admission  5/29/2024    Chief Complaint  Per Dr. Gray note \"Jl Mueller is a 78 y.o. male smoker w/ PMHx s/f HTN, CAD, HLD, prior MI, AAA s/p bypass graft stenting, COPD on 2L home O2, two prior hemorrhagic strokes w/o reported deficits, BPH s/p prostate artery embolization who initially presented to an outside facility via EMS on 5/29/2024 for evaluation. Per the patient's wife, the patient was eating dinner when he stated he had a headache and felt weak. He then had two episodes of emesis and became altered to GCS11; he was intubated at some point prior to transfer to this facility due to his neuro status. On imaging at the outside facility, he was found to have a left cerebellar hemorrhage, mild perihematomal edema with subtle parenchymal displacement. 4th ventricle with moderate compression but open. for which he was transferred to this facility for higher level of care. On arrival, the patient was moving all extremities and withdrew to pain in all extremities. His pupils are pinpoint, and he over breaths vent. Repeat imaging redemonstrates area of Lt-cerebellar hemorrhage. A CTA was completed which, does not clearly show an aneurysmal/vascular malformation. He is being admitted to the ICU for hypertonic saline therapy, Q1h neurological checks, and strict blood pressure control as well as ventilator management.\"    Interval Problem Update  Reviewed last 24 hour events:  -No acute events overnight  -Increased agitation, pulling at NG tube and upper extremity restraints, not able to follow commands  -Speech pathology consult, unable to participate  -Neurology consult with Dr. Devine  -Evaluation by OT/PT, recommend post acute placement    Review of Systems  Review of Systems   Unable to perform ROS: Medical condition        Vital Signs for last 24 hours   Pulse:  [] 91  Resp:  [16-36] 31  BP: (113-151)/(55-93) 136/73  SpO2:  [91 %-98 %] 95 " %      Physical Exam   Physical Exam  Constitutional:       General: He is not in acute distress.     Appearance: He is not toxic-appearing.   HENT:      Head: Normocephalic and atraumatic.      Right Ear: External ear normal.      Left Ear: External ear normal.      Nose: Nose normal. No congestion.      Mouth/Throat:      Mouth: Mucous membranes are moist.      Pharynx: Oropharynx is clear. No oropharyngeal exudate.   Eyes:      Pupils: Pupils are equal, round, and reactive to light.      Comments: Pupils 2mm bilaterally   Cardiovascular:      Rate and Rhythm: Normal rate and regular rhythm.   Pulmonary:      Effort: Pulmonary effort is normal.      Breath sounds: No wheezing or rales.   Abdominal:      General: Abdomen is flat. There is no distension.      Palpations: Abdomen is soft.      Tenderness: There is no abdominal tenderness. There is no guarding or rebound.   Musculoskeletal:         General: No swelling or deformity.      Cervical back: Normal range of motion and neck supple.   Skin:     General: Skin is warm and dry.      Capillary Refill: Capillary refill takes less than 2 seconds.   Neurological:      Mental Status: He is alert.      Comments: Unable to follow commands or participate in exam. Speech unintelligible         Medications  Current Facility-Administered Medications   Medication Dose Route Frequency Provider Last Rate Last Admin    levalbuterol (Xopenex) 1.25 MG/3ML nebulizer solution 1.25 mg  1.25 mg Nebulization Q2HRS PRN (RT) Reyes Gray M.D.        sodium chloride (Salt) tablet 2 g  2 g Enteral Tube TID WITH MEALS Reyes Moseley Jr., D.O.   2 g at 06/03/24 1016    3% sodium chloride (Hypertonic Saline) 500mL infusion  0-60 mL/hr Intravenous Continuous Reyes Moseley Jr., D.O. 30 mL/hr at 06/03/24 1245 30 mL/hr at 06/03/24 1245    amLODIPine (Norvasc) tablet 5 mg  5 mg Enteral Tube Q DAY Reyes Gray M.D.   5 mg at 06/03/24 0542    dexmedetomidine (PRECEDEX) 400 mcg/100mL NS  premix infusion  0.1-1.5 mcg/kg/hr (Ideal) Intravenous Continuous Reyes Gray M.D. 8.5 mL/hr at 06/03/24 1128 0.6 mcg/kg/hr at 06/03/24 1128    labetalol (Normodyne/Trandate) injection 10 mg  10 mg Intravenous Q3HRS PRN Sarina L. Latona   10 mg at 06/03/24 1505    hydrALAZINE (Apresoline) injection 10 mg  10 mg Intravenous Q3HRS PRN Sarina L. Latona   10 mg at 06/02/24 0737    Pharmacy Consult: Enteral tube insertion - review meds/change route/product selection  1 Each Other PHARMACY TO DOSE Reyes Gray M.D.        diazePAM (Valium) injection 5 mg  5 mg Intravenous Q6HRS PRN Sarina Euceda        insulin regular (HumuLIN R,NovoLIN R) injection  1-6 Units Subcutaneous Q6HRS Sarina L. Latona   1 Units at 06/02/24 2316    And    dextrose 50% (D50W) injection 25 g  25 g Intravenous Q15 MIN PRN Sarina L. Latona   25 g at 05/31/24 1902    senna-docusate (Pericolace Or Senokot S) 8.6-50 MG per tablet 2 Tablet  2 Tablet Enteral Tube Q EVENING Sarina L. Latona   2 Tablet at 05/31/24 1825    And    polyethylene glycol/lytes (Miralax) Packet 1 Packet  1 Packet Enteral Tube QDAY PRN Sarina LAye Latona        Respiratory Therapy Consult   Nebulization Continuous RT Sarina Euceda MD Alert...ICU Electrolyte Replacement per Pharmacy   Other PHARMACY TO DOSE Sarina L. Latona        acetaminophen (Tylenol) tablet 650 mg  650 mg Enteral Tube Q4HRS PRN Sarina L. Latona   650 mg at 06/03/24 0851    Or    acetaminophen (Tylenol) suppository 650 mg  650 mg Rectal Q4HRS PRN Sarina L. Latona        ondansetron (Zofran ODT) dispertab 4 mg  4 mg Enteral Tube Q4HRS PRN Sarina RODRIGUEZ Latona        Or    ondansetron (Zofran) syringe/vial injection 4 mg  4 mg Intravenous Q4HRS PRN Sarina JENNIFER Latona        albuterol (Proventil) 2.5mg/0.5ml nebulizer solution 2.5 mg  2.5 mg Nebulization Q4HRS PRN Sarina L. Latona   2.5 mg at 06/01/24 1344    fentaNYL (Sublimaze) injection 25-50 mcg  25-50 mcg Intravenous Q HOUR PRN Sarina ABEL. Latona   25 mcg at  06/02/24 2140    lansoprazole (Prevacid) solutab 30 mg  30 mg Enteral Tube DAILY Reyes Gray M.D.   30 mg at 06/03/24 0541    mineral oil-pet hydrophilic (Aquaphor) ointment   Topical BID Reyes Gray M.D.   Given at 06/03/24 0600       Fluids    Intake/Output Summary (Last 24 hours) at 6/3/2024 1546  Last data filed at 6/3/2024 1400  Gross per 24 hour   Intake 2258.82 ml   Output 2005 ml   Net 253.82 ml       Laboratory  Recent Labs     06/01/24  0438   ISTATAPH 7.410   ISTATAPCO2 40.7*   ISTATAPO2 79   ISTATATCO2 27   TZKFUUF0DKS 96   ISTATARTHCO3 25.8*   ISTATARTBE 1   ISTATTEMP 37.2 C   ISTATFIO2 30   ISTATSPEC Arterial   ISTATAPHTC 7.407   NNGACTWM9NQ 80         Recent Labs     06/01/24  0435 06/01/24  1109 06/02/24  0605 06/02/24  1145 06/02/24  2315 06/03/24  0548 06/03/24  1137   SODIUM 152*   < > 150*   < > 153* 152* 152*   POTASSIUM 3.5*   < > 4.1   < > 3.8 3.7 3.9   CHLORIDE 116*   < > 113*   < > 115* 114* 114*   CO2 26   < > 27   < > 30 27 30   BUN 18   < > 14   < > 18 20 21   CREATININE 0.74   < > 0.58   < > 0.60 0.63 0.65   MAGNESIUM 2.0  --  2.0  --   --  2.0  --    CALCIUM 8.4*   < > 8.8   < > 9.1 9.1 9.0    < > = values in this interval not displayed.     Recent Labs     06/02/24 2315 06/03/24  0548 06/03/24  1137   ALTSGPT  --  11  --    ASTSGOT  --  16  --    ALKPHOSPHAT  --  44  --    TBILIRUBIN  --  0.3  --    PREALBUMIN  --   --  10.9*   GLUCOSE 179* 146* 155*     Recent Labs     06/01/24  0435 06/02/24  0605 06/03/24  0548   WBC 8.8 10.9* 9.8   NEUTSPOLYS 76.30* 85.40* 81.20*   LYMPHOCYTES 11.30* 5.20* 7.00*   MONOCYTES 8.40 6.00 6.90   EOSINOPHILS 3.40 2.90 4.20   BASOPHILS 0.30 0.20 0.20   ASTSGOT  --   --  16   ALTSGPT  --   --  11   ALKPHOSPHAT  --   --  44   TBILIRUBIN  --   --  0.3     Recent Labs     06/01/24  0435 06/02/24  0605 06/03/24  0548   RBC 3.82* 4.04* 3.84*   HEMOGLOBIN 10.8* 11.6* 11.0*   HEMATOCRIT 34.9* 36.0* 34.5*   PLATELETCT 175 182 176       Imaging  CT-HEAD  W/O   Final Result      1.  Advanced cerebral atrophy.   2.  Stable ventriculomegaly with intraventricular hemorrhage.   3.  Advanced supratentorial white matter disease most consistent with microvascular ischemic change.   4.  Multiple old lacunar infarcts as described.   5.  Acute/recent subacute parenchymal hemorrhage in the posterior fossa involving the superior cerebellar vermis and left paramedian cerebellar hemisphere and left lateral cerebellar peduncle. No evidence of recurrent hemorrhage.   6.  Minimal focus of subarachnoid hemorrhage within a single sulcus in the left posterior temporal region. Probably unchanged from prior recent exam.         DX-ABDOMEN FOR TUBE PLACEMENT   Final Result      Enteric tube tip projects over the stomach.      EC-ECHOCARDIOGRAM COMPLETE W/ CONT   Final Result      DX-CHEST-PORTABLE (1 VIEW)   Final Result         1.  Left basilar atelectasis or subtle infiltrate, decreased since prior study   2.  Trace left pleural effusion   3.  Enlargement of the aortic knob suggesting thoracic aortic aneurysm   4.  Atherosclerosis      MR-BRAIN-WITH & W/O   Final Result      1.  Stable fossa of parenchymal hemorrhage centered in the LEFT cerebellum and extending into the RIGHT cerebellum exerting mass effect on the fourth ventricle which is not completely effaced   2.  Intraventricular blood redemonstrated   3.  Numerous areas of remote microhemorrhage in the supra and infratentorial brain. These could be related to amyloid angiopathy, numerous hypertensive microhemorrhages or less likely multiple cavernomas   4.  Moderate diffuse atrophy without compelling evidence of hydrocephalus   5.  Advanced white matter changes   6.  4 mm LEFT temporal meningioma   7.  Enhancing nodule in the RIGHT internal auditory canal suspicious for a vestibular schwannoma, less likely other extra-axial mass such as a meningioma, facial nerve schwannoma or metastasis. Consider posterior fossa protocol brain  MRI without and with    contrast to further evaluate in when clinically appropriate.      MR-MRA HEAD-W/O   Final Result         MRA OF THE [place] WITHIN NORMAL LIMITS.      DX-CHEST-PORTABLE (1 VIEW)   Final Result         1.  Hazy left lower lobe infiltrate   2.  Small left pleural effusion   3.  Enlargement of the aortic knob, appearance suggesting aortic aneurysm.      DX-CHEST-FOR LINE PLACEMENT Perform procedure in: Patient's Room   Final Result      1.  PICC line is in place with the tip projecting over the SVC in satisfactory position.   2.  Mildly enlarged cardiac silhouette with vascular congestion.   3.  Retrocardiac opacity is likely due to atelectasis.         IR-PICC LINE PLACEMENT W/ GUIDANCE > AGE 5   Final Result                  Ultrasound-guided PICC placement performed by qualified nursing staff as    above.          CT-HEAD W/O   Final Result         1.  Left cerebellar hemorrhage, similar compared to prior study.   2.  Minimal bilateral intraventricular hemorrhages, new since prior study.   3.  Nonspecific white matter changes, commonly associated with small vessel ischemic disease.  Associated mild cerebral atrophy is noted.   4.  Atherosclerosis.         DX-CHEST-PORTABLE (1 VIEW)   Final Result         1.  Left basilar atelectasis, no focal infiltrate   2.  Trace left pleural effusion   3.  Atherosclerosis      CT-CTA NECK WITH & W/O-POST PROCESSING   Final Result         1.  Scattered atherosclerosis without significant stenosis or occlusion.   2.  4.2 cm proximal descending thoracic aortic aneurysm, radiographic follow-up and surveillance recommended as clinically appropriate.         CT-CTA HEAD WITH & W/O-POST PROCESS   Final Result         1.  No large vessel occlusion or aneurysm identified   2.  Large cerebellar hemorrhage predominantly in the left cerebellum      These findings were discussed with the patient's clinician, Nikki Alexander, on 5/29/2024 11:35 PM.       DX-CHEST-PORTABLE (1 VIEW)   Final Result      Tubes as above      Left pleural effusion      CHF/pulmonary edema pattern      See Brown MD   5/30/2024 8:41 AM         CT-HEAD W/O   Final Result      Acute intraparenchymal hemorrhage is noted involving the left cerebellar hemisphere, measuring 3.1 x 4.2 x 3.4 cm, with associated mass effect.      This finding was telephoned to the mentioned attending by on-call radiologist at the time of imaging         AAST Intracranial Hemorrhage Brain Injury Guidelines         Hemorrhage type  mBIG 1           mBIG 2                mBIG 3      Subdural             <4mm               5-7.9 mm             >8mm      Epidural                No                    No                  Yes      Intraparenc'mal   <4mm               5-7.9 mm         >8mm or multi   1 location       or 2 locations      >3 locations      Subarachnoid     <3 sulci       single hemisphere   bi-hemisphere   and <1 mm        or 1-3 mm            or > 3 mm      Intraventricular       No                  No                    Yes      Skull Fracture       None            Non-displaced       Displaced               DLP Reporting Thresholds for Incorrect/Repeated Exams - DLP in mGy*cm   Head/Neck:  0-year-old 3840, 1-year-old 5880, 5-year-old 8770, 10-year-old 11455 and adult 39173   Head:  0-year-old 4540, 1-year-old 7460, 5-year-old 35188, 10-year-old 18302 and adult 19877   Neck:  0-year-old 2940, 1-year-old 4160, 5-year-old 4550, 10-year-old 6320 and adult 8470   Chest:  0-year-old 550, 1-year-old 830, 5-year-old 1200, 10-year-old 3840 and adult 3570   Abd/pelvis:  0-year-old 440, 1-year-old 720, 5-year-old 1080, 10-year-old 3330 and adult 3330   Trunk(C/A/P):  0-year-old 490, 1-year-old 770, 5-year-old 1140, 10-year-old 3570 and adult 3330      OUTSIDE IMAGES-CT CERVICAL SPINE    (Results Pending)         Assessment/Plan  Cerebellar hemorrhage (present on admission)  -ICH score equals 1  -Every 2 hours  neurochecks  -CT head shows advanced tubular atrophy, stable ventriculomegaly with intraventricular hemorrhage, advanced supratentorial white matter disease most consistent with microvascular ischemic change, multiple old lacunar infarcts, acute/recent subacute parenchymal hemorrhage in the posterior fossa involving the superior cerebellar vermis and left paramedian cerebellar hemisphere and left lateral cerebellar pedicle.  No acute or recurrent hemorrhage, minimal focus of subarachnoid hemorrhage within a single sulcus of the left posterior temporal region, probably unchanged from prior recent exam.   -MRI brain ICH protocol pending  -Maintain SBP between 110-160.   -3% sodium acetate or sodium chloride if hyperchloremia improving: Monitor serum Na and Osm q6h, goal Na of 150-160  -Neurosurgery consulted, appreciate recommendations, non operative recommendation at this time  -Anticoagulation contraindicated, SCDs only for DVT ppx  -Maintain euvolemia, euthermia, normoglycemia (140-180)  -Maintain bowel regimen and avoid valsalva or other causes of increased ICP   -Head of bed at 30 degrees  -Maintain pCo2 of 35-40; prefer closer to 35  -Atorvastatin 40mg qHS for possible neuro-protective effect    Acute on chronic respiratory failure with hypoxemia (present on admission)  -Intubated for airway protection, extubated on 6/1/24  -Encourage mobilization  -Pulmonary toilet     COPD  -No current exacerbation  -CXR, NAD  -Continue home nebulizers  -RT protocol PRN  -Wean supplemental O2 as tolerated    Hx of AAA without rupture  Status post EVAR with bypass graft stent  -Maintain normotension  -Hold ASA, per family medicated with ASA 3 times weekly    Hyperchloremic metabolic acidosis   -Likely due to 3% saline for osmotic therapy  -Changed to 3% saline to 3% sodium acetate, as chloride normalizes will restart 3% saline    Hyperglycemia (present on admission)  Moderate SSI     Hyperlipidemia  Per wife, patient yumi been  on medications in the past but developed muscle cramping or elevated CK/LFTs  -Start atorvastatin 40mg QHS for neuroprotection, monitor for adverse reaction  -Lipid panel with total cholesterol of 132, triglycerides of 432, HDL of 41, LDL invalid due to hypertriglyceridemia  -Counseling on lifestyle/dietary modifications    History of CVA  History of 2 prior hemorrhagic strokes, last was in 2015  -No chronic deficits per wife    BPH with urinary obstruction (present on admission)  Status post prostate artery embolization, per wife patient has had complications with urinary retention and incontinence since his surgery  -Continue home flomax  -Park for strict I/O    Benign essential hypertension (present on admission)  Pr wife, patient's medications were discontinued as an outpatient because his BP has been controlled  -SBP in 170s per wife on home cuff, <160s since admission  -PRN antihypertensives to maintain -160  -Norvasc 5mg daily     VTE:  Contraindicated  Ulcer: PPI  Lines: PIV  Code status: Full code     I have performed a physical exam and reviewed and updated ROS and Plan today (6/3/2024). In review of yesterday's note (6/2/2024), there are no changes except as documented above.     Discussed patient condition and risk of morbidity and/or mortality with RN, RT, and Dr. Moseley  The patient remains critically ill.  No time overlap and excludes procedures.

## 2024-06-03 NOTE — THERAPY
Occupational Therapy   Initial Evaluation     Patient Name: Jl Mueller  Age:  78 y.o., Sex:  male  Medical Record #: [id number]  Today's Date: 6/3/2024          Assessment  Patient is 78 y.o. male with a diagnosis of L cerebellar hemorrhage.   Pt currently limited by decreased functional mobility, activity tolerance, cognition, strength, AROM,  and pain which are affecting pt's ability to complete ADLs/IADLs at baseline. Pt would benefit from OT services in the acute care setting to maximize functional recovery.       Plan    Occupational Therapy Initial Treatment Plan   Treatment Interventions: (P) Self Care / Activities of Daily Living, Therapeutic Activity  Treatment Frequency: (P) 3 Times per Week  Duration: (P) Until Therapy Goals Met       Discharge Recommendations: (P) Recommend post-acute placement for additional occupational therapy services prior to discharge home        06/03/24 0835   Prior Living Situation   Housing / Facility 2 Story House   Steps In Home 15   Equipment Owned Front-Wheel Walker;Single Point Cane;Wheelchair;Tub / Shower Seat   Prior Level of ADL Function   Self Feeding Independent   Grooming / Hygiene Independent   Bathing Requires Assist   Dressing Independent   Toileting Independent   Active ROM Upper Body   Active ROM Upper Body  X   Comments limited movement from shrldr,  grabbing lines with bilateral hands   Balance Assessment   Sitting Balance (Static) Fair   Sitting Balance (Dynamic) Poor   Standing Balance (Static) Trace +   Standing Balance (Dynamic) Dependent   Weight Shift Sitting Fair   Weight Shift Standing Absent   ADL Assessment   Grooming Maximal Assist   Upper Body Dressing Total Assist   Lower Body Dressing Total Assist   Toileting Total Assist   Functional Mobility   Sit to Stand Moderate Assist   Bed, Chair, Wheelchair Transfer Unable to Participate   Short Term Goals   Short Term Goal # 2 min A with UB dressing   Short Term Goal # 3 mod A with LB dressing    Occupational Therapy Initial Treatment Plan    Treatment Interventions Self Care / Activities of Daily Living;Therapeutic Activity   Treatment Frequency 3 Times per Week   Duration Until Therapy Goals Met   Anticipated Discharge Equipment and Recommendations   Discharge Recommendations Recommend post-acute placement for additional occupational therapy services prior to discharge home

## 2024-06-03 NOTE — CARE PLAN
The patient is Watcher - Medium risk of patient condition declining or worsening    Shift Goals  Clinical Goals: stable neuro, RASS -1 to +1, mobility  Patient Goals: unable to assess  Family Goals: rest, updates    Progress made toward(s) clinical / shift goals:    Problem: Safety - Medical Restraint  Goal: Remains free of injury from restraints (Restraint for Interference with Medical Device)  Outcome: Progressing  Patient required restraints due to him pulling at lines and tubes. Q2h restraint monitoring performed to ensure skin intact during this shift.      Problem: Knowledge Deficit - Standard  Goal: Patient and family/care givers will demonstrate understanding of plan of care, disease process/condition, diagnostic tests and medications  Outcome: Progressing     Problem: Pain - Standard  Goal: Alleviation of pain or a reduction in pain to the patient’s comfort goal  Outcome: Progressing  Patient treated with pharmacologic and nonpharmacologic pain management methods to achieve CPOT less than 2.      Problem: Skin Integrity  Goal: Skin integrity is maintained or improved  Outcome: Progressing  Q2h turns performed, wedges and pillows in use for support and positioning, and mepilex in place to maintain skin integrity during this shift.      Problem: Hemodynamics  Goal: Patient's hemodynamics, fluid balance and neurologic status will be stable or improve  Outcome: Progressing     Problem: Neuro Status  Goal: Neuro status will remain stable or improve  Outcome: Progressing   Q2h neuro exams performed and have remained stable during this shift.     Patient is not progressing towards the following goals: N/A

## 2024-06-03 NOTE — THERAPY
Physical Therapy   Initial Evaluation     Patient Name: Jl Mueller  Age:  78 y.o., Sex:  male  Medical Record #: [id number]  Today's Date: 6/3/2024     Precautions  Precautions: Fall Risk  Comments: baseline 2L of O2. Hx of prior CVA with L-sided deficits    Assessment    Jl Mueller is a 78 year old man presenting with nausea, vomiting, found to have L cerebellar ICH.  MRI brain suggestive of amyloid angiopathy.  In ICU for hypertonic therapy, and close neuromonitoring for delayed obstructive hydrocephalus.  Given relatively poor exam- unclear if related to ICU delirium versus evolving hydrocephalus, recommend repeat head CT today.   Anticipate additional 1-2 days of hypertonic therapy as he is now post-bleed day 5 and in peak edema window and  Secondary prevention is namely strict BP control and avoidance of all antithrombotic therapy. Patient presents to PT eval with impaired insight, balance, strength and mobility.  Session was limited by restlessness and pulling at lines.  He tolerated EOB for 10 minutes and was able to stand with 2 person assist for 30 seconds. At this time he will benefit from placement for further therapy. Will continue to follow while in house.     Plan    Physical Therapy Initial Treatment Plan   Treatment Plan : Bed Mobility, Equipment, Neuro Re-Education / Balance, Self Care / Home Evaluation, Stair Training, Therapeutic Activities, Therapeutic Exercise, Gait Training, Family / Caregiver Training  Treatment Frequency: 4 Times per Week  Duration: Until Therapy Goals Met    DC Equipment Recommendations: Unable to determine at this time  Discharge Recommendations: Recommend post-acute placement for additional physical therapy services prior to discharge home       Subjective  Patient's wife presents and providing all the information about PLOF     Objective       06/03/24 0935   Precautions   Precautions Fall Risk   Comments baseline 2L of O2. Hx of prior CVA with L-sided deficits    Pain   Pain Scales Non Verbal Scale   Non Verbal Descriptors   Non Verbal Scale  Restlessness;Grimacing   Prior Living Situation   Prior Services Continuous (24 Hour) Care Giving Family   Housing / Facility 2 Story House   Steps Into Home 1   Steps In Home 14   Bathroom Set up Walk In Shower   Equipment Owned 4-Wheel Walker;Front-Wheel Walker;Wheelchair;Tub / Shower Seat   Lives with - Patient's Self Care Capacity Spouse   Comments wife provides assist as needed for ADLs and uses a 4WW in the house and a WC in the community   Prior Level of Functional Mobility   Bed Mobility Required Assist   Transfer Status Required Assist   Ambulation Required Assist   Ambulation Distance household distances   Assistive Devices Used 4-Wheel Walker   Wheelchair Required Assist   Stairs Required Assist   Comments wife assists and gaurds for safety   History of Falls   History of Falls No   Cognition    Cognition / Consciousness X   Speech/ Communication Expressive Aphasia;Dysarthric   Orientation Level Not Oriented to Reason;Not Oriented to Time   Level of Consciousness Responds to voice   Ability To Follow Commands Unable to Follow 1 Step Commands   Safety Awareness Impaired;Impulsive   New Learning Impaired   Attention Impaired   Sequencing Impaired   Initiation Impaired   Comments patient with restlessness and not following commands, needs Max multimodal cueing   Passive ROM Upper Body   Passive ROM Upper Body X   Comments resists full ROM   Active ROM Upper Body   Active ROM Upper Body  WDL   Comments as above   Strength Upper Body   Upper Body Strength  X   Comments not following for formal testing, but reaching and grabbing at lines, unclear shoulder strength   Sensation Upper Body   Upper Extremity Sensation  Not Tested   Passive ROM Lower Body   Passive ROM Lower Body X   Comments B knee extension neg 20 deg   Active ROM Lower Body    Active ROM Lower Body  X   Comments as above   Strength Lower Body   Lower Body Strength   X   Gross Strength Generalized Weakness, Equal Bilaterally   Comments able to bear weight but does not follow for formal MMT   Sensation Lower Body   Lower Extremity Sensation   Not Tested   Neurological Concerns   Neurological Concerns Yes   Comments given ICH   Coordination Upper Body   Coordination X   Coordination Lower Body    Coordination Lower Body  X   Vision   Vision Comments unable to test, open eyes to his name   Other Treatments   Other Treatments Provided discussed DC recs with spouse   Balance Assessment   Sitting Balance (Static) Fair -   Sitting Balance (Dynamic) Poor +   Standing Balance (Static) Poor   Standing Balance (Dynamic) Dependent   Weight Shift Sitting Fair   Weight Shift Standing Absent   Comments w/B HHA   Bed Mobility    Supine to Sit Total Assist   Sit to Supine Total Assist   Scooting Total Assist   Rolling Total Assist to Rt.;Total Assist to Lt.   Comments with HOB elevated   Gait Analysis   Gait Level Of Assist Unable to Participate   Functional Mobility   Sit to Stand Moderate Assist   Bed, Chair, Wheelchair Transfer Unable to Participate   ICU Target Mobility Level   ICU Mobility - Targeted Level Level 3A   6 Clicks Assessment - How much HELP from from another person do you currently need... (If the patient hasn't done an activity recently, how much help from another person do you think he/she would need if he/she tried?)   Turning from your back to your side while in a flat bed without using bedrails? 2   Moving from lying on your back to sitting on the side of a flat bed without using bedrails? 2   Moving to and from a bed to a chair (including a wheelchair)? 2   Standing up from a chair using your arms (e.g., wheelchair, or bedside chair)? 2   Walking in hospital room? 2   Climbing 3-5 steps with a railing? 1   6 clicks Mobility Score 11   Activity Tolerance   Sitting Edge of Bed 12 min   Standing 1 min   Comments lathergic and restless at times   Edema / Skin Assessment    Edema / Skin  X   Comments B UE edema   Short Term Goals    Short Term Goal # 1 in 6 visits patient will demo all functional transfers with Jake and LRAD for safe DC   Short Term Goal # 2 in 6 visits patient will ambulate 200' w/FWW and CGA for safe DC   Short Term Goal # 3 in 6 visits patient will navigate 14 stairs with Jake  LRAD for racquel house navigation   Education Group   Education Provided Role of Physical Therapist   Role of Physical Therapist Patient Response Patient;Acceptance;Explanation;Demonstration;Verbal Demonstration;Action Demonstration   Physical Therapy Initial Treatment Plan    Treatment Plan  Bed Mobility;Equipment;Neuro Re-Education / Balance;Self Care / Home Evaluation;Stair Training;Therapeutic Activities;Therapeutic Exercise;Gait Training;Family / Caregiver Training   Treatment Frequency 4 Times per Week   Duration Until Therapy Goals Met   Problem List    Problems Pain;Impaired Bed Mobility;Impaired Transfers;Impaired Ambulation;Impaired Balance;Decreased Activity Tolerance;Safety Awareness Deficits / Cognition;Motor Planning / Sequencing   Anticipated Discharge Equipment and Recommendations   DC Equipment Recommendations Unable to determine at this time   Discharge Recommendations Recommend post-acute placement for additional physical therapy services prior to discharge home     Olivia Monterroso, PT, DPT, GCS

## 2024-06-03 NOTE — THERAPY
Speech Language Therapy Contact Note    Patient Name: Jl Mueller  Age:  78 y.o., Sex:  male  Medical Record #: [id number]  Today's Date: 6/3/2024    Discussed missed therapy with RNGabriela.        06/03/24 0943   Treatment Variance   Reason For Missed Therapy Medical - Patient not Able To Participate   Initial Contact Note    Initial Contact Note  Order Received and Verified, Speech Therapy Evaluation in Progress with Full Report to Follow.   Interdisciplinary Plan of Care Collaboration   IDT Collaboration with  Nursing;Other (See Comments)  (EMR)   Collaboration Comments Per RN, Pt is not following commands at this time and is unable to participate in CSE. Will hold and monitor to complete CSE if/when appropriate.

## 2024-06-03 NOTE — PROGRESS NOTES
Neurology Progress Note  Neurohospitalist Service, [facility]    Referring Physician: Reyes Moseley Jr., D.O.      Interval History: No acute events overnight.  Remains on dex for behavioral control    Review of systems: In addition to what is detailed in the HPI and/or updated in the interval history, all other systems reviewed and are negative.    Past Medical History, Past Surgical History and Social History reviewed and unchanged from prior    Medications:    Current Facility-Administered Medications:     levalbuterol (Xopenex) 1.25 MG/3ML nebulizer solution 1.25 mg, 1.25 mg, Nebulization, 4X/DAY (RT), Reyes Gray M.D., 1.25 mg at 06/03/24 0717    levalbuterol (Xopenex) 1.25 MG/3ML nebulizer solution 1.25 mg, 1.25 mg, Nebulization, Q2HRS PRN (RT), Reyes Gray M.D.    potassium bicarbonate (Klyte) effervescent tablet 25 mEq, 25 mEq, Enteral Tube, Once, Reyes Moseley Jr., D.O.    amLODIPine (Norvasc) tablet 5 mg, 5 mg, Enteral Tube, Q DAY, Reyes Gray M.D., 5 mg at 06/03/24 0542    dexmedetomidine (PRECEDEX) 400 mcg/100mL NS premix infusion, 0.1-1.5 mcg/kg/hr (Ideal), Intravenous, Continuous, Reyes Gray M.D., Last Rate: 8.5 mL/hr at 06/03/24 0231, 0.6 mcg/kg/hr at 06/03/24 0231    labetalol (Normodyne/Trandate) injection 10 mg, 10 mg, Intravenous, Q3HRS PRN, Sarina JENNIFER Latona, 10 mg at 06/02/24 0614    hydrALAZINE (Apresoline) injection 10 mg, 10 mg, Intravenous, Q3HRS PRN, Sarina L. Latona, 10 mg at 06/02/24 0737    Pharmacy Consult: Enteral tube insertion - review meds/change route/product selection, 1 Each, Other, PHARMACY TO DOSE, Reyes Gray M.D.    Hypertonic Sodium Acetate (513 mEq Na Acetate per liter) infusion, 513 mEq/L, Intravenous, Continuous, Reyes Gray M.D., Last Rate: 30 mL/hr at 06/03/24 0654, Rate Verify at 06/03/24 0654    diazePAM (Valium) injection 5 mg, 5 mg, Intravenous, Q6HRS PRN, Sarina Euceda    insulin regular (HumuLIN R,NovoLIN R)  "injection, 1-6 Units, Subcutaneous, Q6HRS, 1 Units at 06/02/24 2316 **AND** POC blood glucose manual result, , , Q6H **AND** NOTIFY MD and PharmD, , , Once **AND** Administer 20 grams of glucose (approximately 8 ounces of fruit juice) every 15 minutes PRN FSBG less than 70 mg/dL, , , PRN **AND** dextrose 50% (D50W) injection 25 g, 25 g, Intravenous, Q15 MIN PRN, Sarina Euceda, 25 g at 05/31/24 1902    senna-docusate (Pericolace Or Senokot S) 8.6-50 MG per tablet 2 Tablet, 2 Tablet, Enteral Tube, Q EVENING, 2 Tablet at 05/31/24 1825 **AND** polyethylene glycol/lytes (Miralax) Packet 1 Packet, 1 Packet, Enteral Tube, QDAY PRN, Sarina Euceda    Respiratory Therapy Consult, , Nebulization, Continuous RT, Sarina Euceda MD Alert...ICU Electrolyte Replacement per Pharmacy, , Other, PHARMACY TO DOSE, Sarina Euceda    acetaminophen (Tylenol) tablet 650 mg, 650 mg, Enteral Tube, Q4HRS PRN, 650 mg at 06/03/24 0115 **OR** acetaminophen (Tylenol) suppository 650 mg, 650 mg, Rectal, Q4HRS PRN, Sarina Euceda    ondansetron (Zofran ODT) dispertab 4 mg, 4 mg, Enteral Tube, Q4HRS PRN **OR** ondansetron (Zofran) syringe/vial injection 4 mg, 4 mg, Intravenous, Q4HRS PRN, Sarina Euceda    albuterol (Proventil) 2.5mg/0.5ml nebulizer solution 2.5 mg, 2.5 mg, Nebulization, Q4HRS PRN, Sarina Euceda, 2.5 mg at 06/01/24 1344    fentaNYL (Sublimaze) injection 25-50 mcg, 25-50 mcg, Intravenous, Q HOUR PRN, Sarina Euceda, 25 mcg at 06/02/24 2140    lansoprazole (Prevacid) solutab 30 mg, 30 mg, Enteral Tube, DAILY, Reyes Gray M.D., 30 mg at 06/03/24 0541    mineral oil-pet hydrophilic (Aquaphor) ointment, , Topical, BID, Reyes Gray M.D., Given at 06/03/24 0600    Physical Examination:   /62   Pulse 90   Temp 36 °C (96.8 °F) (Temporal)   Resp 20   Ht 1.6 m (5' 2.99\")   Wt 68.9 kg (151 lb 14.4 oz)   SpO2 95%   BMI 26.91 kg/m²       General: Eyes closed, sedated  Neck: There is normal range of motion  CV: " Regular rate   Extremities:  Warm, dry, and intact, without peripheral lower extremity edema    NEUROLOGICAL EXAM:     Mental status: Lethargic, not really interactive with me, not oriented  Speech and language:  Dysarthric speech, no reliable command following  Cranial nerve exam: Pupils are equal, round and reactive to light bilaterally. Visual fields are full. There is no nystagmus. Eyes midline.  Not clearly tracking.  Motor exam: There is spontaneous antigravity movements in all 4 extremities  Sensory exam:  Reacts to tactile in all 4 extremities, no obvious neglect  Coordination:  Limited movements to test      Objective Data:    Labs:  Lab Results   Component Value Date/Time    PROTHROMBTM 13.6 05/29/2024 10:49 PM    INR 1.03 05/29/2024 10:49 PM      Lab Results   Component Value Date/Time    WBC 9.8 06/03/2024 05:48 AM    RBC 3.84 (L) 06/03/2024 05:48 AM    HEMOGLOBIN 11.0 (L) 06/03/2024 05:48 AM    HEMATOCRIT 34.5 (L) 06/03/2024 05:48 AM    MCV 89.8 06/03/2024 05:48 AM    MCH 28.6 06/03/2024 05:48 AM    MCHC 31.9 (L) 06/03/2024 05:48 AM    MPV 11.9 06/03/2024 05:48 AM    NEUTSPOLYS 81.20 (H) 06/03/2024 05:48 AM    LYMPHOCYTES 7.00 (L) 06/03/2024 05:48 AM    MONOCYTES 6.90 06/03/2024 05:48 AM    EOSINOPHILS 4.20 06/03/2024 05:48 AM    BASOPHILS 0.20 06/03/2024 05:48 AM      Lab Results   Component Value Date/Time    SODIUM 152 (H) 06/03/2024 05:48 AM    POTASSIUM 3.7 06/03/2024 05:48 AM    CHLORIDE 114 (H) 06/03/2024 05:48 AM    CO2 27 06/03/2024 05:48 AM    GLUCOSE 146 (H) 06/03/2024 05:48 AM    BUN 20 06/03/2024 05:48 AM    CREATININE 0.63 06/03/2024 05:48 AM    GLOMRATE 78 10/19/2023 09:26 AM      Lab Results   Component Value Date/Time    CHOLSTRLTOT 132 06/01/2024 04:35 AM    LDL see below 06/01/2024 04:35 AM    HDL 41 06/01/2024 04:35 AM    TRIGLYCERIDE 432 (H) 06/01/2024 04:35 AM       Lab Results   Component Value Date/Time    ALKPHOSPHAT 44 06/03/2024 05:48 AM    ASTSGOT 16 06/03/2024 05:48 AM     ALTSGPT 11 06/03/2024 05:48 AM    TBILIRUBIN 0.3 06/03/2024 05:48 AM        Imaging/Testing:    I interpreted and/or reviewed the patient's neuroimaging    DX-ABDOMEN FOR TUBE PLACEMENT   Final Result      Enteric tube tip projects over the stomach.      EC-ECHOCARDIOGRAM COMPLETE W/ CONT   Final Result      DX-CHEST-PORTABLE (1 VIEW)   Final Result         1.  Left basilar atelectasis or subtle infiltrate, decreased since prior study   2.  Trace left pleural effusion   3.  Enlargement of the aortic knob suggesting thoracic aortic aneurysm   4.  Atherosclerosis      MR-BRAIN-WITH & W/O   Final Result      1.  Stable fossa of parenchymal hemorrhage centered in the LEFT cerebellum and extending into the RIGHT cerebellum exerting mass effect on the fourth ventricle which is not completely effaced   2.  Intraventricular blood redemonstrated   3.  Numerous areas of remote microhemorrhage in the supra and infratentorial brain. These could be related to amyloid angiopathy, numerous hypertensive microhemorrhages or less likely multiple cavernomas   4.  Moderate diffuse atrophy without compelling evidence of hydrocephalus   5.  Advanced white matter changes   6.  4 mm LEFT temporal meningioma   7.  Enhancing nodule in the RIGHT internal auditory canal suspicious for a vestibular schwannoma, less likely other extra-axial mass such as a meningioma, facial nerve schwannoma or metastasis. Consider posterior fossa protocol brain MRI without and with    contrast to further evaluate in when clinically appropriate.      MR-MRA HEAD-W/O   Final Result         MRA OF THE [place] OF [place] WITHIN NORMAL LIMITS.      DX-CHEST-PORTABLE (1 VIEW)   Final Result         1.  Hazy left lower lobe infiltrate   2.  Small left pleural effusion   3.  Enlargement of the aortic knob, appearance suggesting aortic aneurysm.      DX-CHEST-FOR LINE PLACEMENT Perform procedure in: Patient's Room   Final Result      1.  PICC line is in place with the tip  projecting over the SVC in satisfactory position.   2.  Mildly enlarged cardiac silhouette with vascular congestion.   3.  Retrocardiac opacity is likely due to atelectasis.         IR-PICC LINE PLACEMENT W/ GUIDANCE > AGE 5   Final Result                  Ultrasound-guided PICC placement performed by qualified nursing staff as    above.          CT-HEAD W/O   Final Result         1.  Left cerebellar hemorrhage, similar compared to prior study.   2.  Minimal bilateral intraventricular hemorrhages, new since prior study.   3.  Nonspecific white matter changes, commonly associated with small vessel ischemic disease.  Associated mild cerebral atrophy is noted.   4.  Atherosclerosis.         DX-CHEST-PORTABLE (1 VIEW)   Final Result         1.  Left basilar atelectasis, no focal infiltrate   2.  Trace left pleural effusion   3.  Atherosclerosis      CT-CTA NECK WITH & W/O-POST PROCESSING   Final Result         1.  Scattered atherosclerosis without significant stenosis or occlusion.   2.  4.2 cm proximal descending thoracic aortic aneurysm, radiographic follow-up and surveillance recommended as clinically appropriate.         CT-CTA HEAD WITH & W/O-POST PROCESS   Final Result         1.  No large vessel occlusion or aneurysm identified   2.  Large cerebellar hemorrhage predominantly in the left cerebellum      These findings were discussed with the patient's clinician, Nikki Alexander, on 5/29/2024 11:35 PM.      DX-CHEST-PORTABLE (1 VIEW)   Final Result      Tubes as above      Left pleural effusion      CHF/pulmonary edema pattern      See Brown MD   5/30/2024 8:41 AM         CT-HEAD W/O   Final Result      Acute intraparenchymal hemorrhage is noted involving the left cerebellar hemisphere, measuring 3.1 x 4.2 x 3.4 cm, with associated mass effect.      This finding was telephoned to the mentioned attending by on-call radiologist at the time of imaging         AAST Intracranial Hemorrhage Brain Injury Guidelines          Hemorrhage type  mBIG 1           mBIG 2                mBIG 3      Subdural             <4mm               5-7.9 mm             >8mm      Epidural                No                    No                  Yes      Intraparenc'mal   <4mm               5-7.9 mm         >8mm or multi   1 location       or 2 locations      >3 locations      Subarachnoid     <3 sulci       single hemisphere   bi-hemisphere   and <1 mm        or 1-3 mm            or > 3 mm      Intraventricular       No                  No                    Yes      Skull Fracture       None            Non-displaced       Displaced               DLP Reporting Thresholds for Incorrect/Repeated Exams - DLP in mGy*cm   Head/Neck:  0-year-old 3840, 1-year-old 5880, 5-year-old 8770, 10-year-old 92909 and adult 43493   Head:  0-year-old 4540, 1-year-old 7460, 5-year-old 95560, 10-year-old 78080 and adult 13904   Neck:  0-year-old 2940, 1-year-old 4160, 5-year-old 4550, 10-year-old 6320 and adult 8470   Chest:  0-year-old 550, 1-year-old 830, 5-year-old 1200, 10-year-old 3840 and adult 3570   Abd/pelvis:  0-year-old 440, 1-year-old 720, 5-year-old 1080, 10-year-old 3330 and adult 3330   Trunk(C/A/P):  0-year-old 490, 1-year-old 770, 5-year-old 1140, 10-year-old 3570 and adult 3330      OUTSIDE IMAGES-CT CERVICAL SPINE    (Results Pending)       Assessment and Plan:  Jl Mueller is a 78 year old man presenting with nausea, vomiting, found to have L cerebellar ICH.  MRI brain suggestive of amyloid angiopathy.  In ICU for hypertonic therapy, and close neuromonitoring for delayed obstructive hydrocephalus.  Given relatively poor exam- unclear if related to ICU delirium versus evolving hydrocephalus, recommend repeat head CT today.   Anticipate additional 1-2 days of hypertonic therapy as he is now post-bleed day 5 and in peak edema window and  Secondary prevention is namely strict BP control and avoidance of all antithrombotic therapy.    Problem  list:   Left cerebellar ICH   Amyloid angiopathy    Plan:   - continue q2h neurochecks   - repeat non-contrast head CT today   - STRICT SBP goal 100-140, currently at goal at current regimen   - continue 3% NaCl for goal a 150-160, anticipate 1-2 additional days of therapy   - continue atorvastatin 40mg daily for statin neuroprotective effects in ICH   - no antithrombotic therapy, SCDs only indefinitely   - maintain normothermia, net even I/O, FSBS 140-180    The evaluation of the patient, and recommended management, was discussed with Dr. Vincenzo Moseley, ICU attending. I have performed a physical exam and reviewed and updated ROS and Plan today (6/3/2024).     Alfredito Devine MD  Vascular Neurology

## 2024-06-03 NOTE — HOSPITAL COURSE
"\"Jl Mueller is a 78 y.o. male smoker w/ PMHx s/f HTN, CAD, HLD, prior MI, AAA s/p bypass graft stenting, COPD on 2L home O2, two prior hemorrhagic strokes w/o reported deficits, BPH s/p prostate artery embolization who initially presented to an outside facility via EMS on 5/29/2024 for evaluation. Per the patient's wife, the patient was eating dinner when he stated he had a headache and felt weak. He then had two episodes of emesis and became altered to GCS11; he was intubated at some point prior to transfer to this facility due to his neuro status. On imaging at the outside facility, he was found to have a left cerebellar hemorrhage, mild perihematomal edema with subtle parenchymal displacement. 4th ventricle with moderate compression but open. for which he was transferred to this facility for higher level of care. On arrival, the patient was moving all extremities and withdrew to pain in all extremities. His pupils are pinpoint, and he over breaths vent. Repeat imaging redemonstrates area of Lt-cerebellar hemorrhage. A CTA was completed which, does not clearly show an aneurysmal/vascular malformation. He is being admitted to the ICU for hypertonic saline therapy, Q1h neurological checks, and strict blood pressure control as well as ventilator management.\"    5/30- DDAVP for platelet inhibition.   5/31- No clinical change  6/1- Extubated  6/2 - RASS fluctuating, norvasc added   6/3 - confused, on dex. 3% on going  6/4 - continuing 3%, CXR unremarkable, decrease sedation  6/5 - D/C 3%, decrease sedation, ambulate  6/6 - improved delirium  6/7 - continue to ambulate and re-orient  "

## 2024-06-03 NOTE — DIETARY
Nutrition Services Brief Update:    Problem: Nutritional:  Goal: Nutrition support tolerated and meeting greater than 85% of estimated needs  Outcome: MET    Pt is receiving TF Impact Peptide 1.5 at goal rate 40 ml/hr. Pt is extubated on 3 L oxymask. Gluc 146, on hypertonic Na acetate.     RD following.

## 2024-06-04 ENCOUNTER — APPOINTMENT (OUTPATIENT)
Dept: RADIOLOGY | Facility: MEDICAL CENTER | Age: 79
End: 2024-06-04
Attending: INTERNAL MEDICINE
Payer: MEDICARE

## 2024-06-04 DIAGNOSIS — I61.4 NONTRAUMATIC INTRACEREBRAL HEMORRHAGE OF CEREBELLUM, UNSPECIFIED LATERALITY (HCC): ICD-10-CM

## 2024-06-04 LAB
ANION GAP SERPL CALC-SCNC: 10 MMOL/L (ref 7–16)
ANION GAP SERPL CALC-SCNC: 7 MMOL/L (ref 7–16)
ANION GAP SERPL CALC-SCNC: 9 MMOL/L (ref 7–16)
APPEARANCE UR: ABNORMAL
BACTERIA #/AREA URNS HPF: ABNORMAL /HPF
BASOPHILS # BLD AUTO: 0.2 % (ref 0–1.8)
BASOPHILS # BLD: 0.02 K/UL (ref 0–0.12)
BILIRUB UR QL STRIP.AUTO: NEGATIVE
BUN SERPL-MCNC: 24 MG/DL (ref 8–22)
BUN SERPL-MCNC: 26 MG/DL (ref 8–22)
BUN SERPL-MCNC: 30 MG/DL (ref 8–22)
BUN SERPL-MCNC: 31 MG/DL (ref 8–22)
BUN SERPL-MCNC: 31 MG/DL (ref 8–22)
CALCIUM SERPL-MCNC: 8.6 MG/DL (ref 8.5–10.5)
CALCIUM SERPL-MCNC: 9 MG/DL (ref 8.5–10.5)
CALCIUM SERPL-MCNC: 9.1 MG/DL (ref 8.5–10.5)
CALCIUM SERPL-MCNC: 9.4 MG/DL (ref 8.5–10.5)
CALCIUM SERPL-MCNC: 9.4 MG/DL (ref 8.5–10.5)
CHLORIDE SERPL-SCNC: 118 MMOL/L (ref 96–112)
CHLORIDE SERPL-SCNC: 118 MMOL/L (ref 96–112)
CHLORIDE SERPL-SCNC: 119 MMOL/L (ref 96–112)
CHLORIDE SERPL-SCNC: 120 MMOL/L (ref 96–112)
CHLORIDE SERPL-SCNC: 120 MMOL/L (ref 96–112)
CO2 SERPL-SCNC: 25 MMOL/L (ref 20–33)
CO2 SERPL-SCNC: 25 MMOL/L (ref 20–33)
CO2 SERPL-SCNC: 26 MMOL/L (ref 20–33)
COLOR UR: YELLOW
CREAT SERPL-MCNC: 0.64 MG/DL (ref 0.5–1.4)
CREAT SERPL-MCNC: 0.65 MG/DL (ref 0.5–1.4)
CREAT SERPL-MCNC: 0.65 MG/DL (ref 0.5–1.4)
CREAT SERPL-MCNC: 0.68 MG/DL (ref 0.5–1.4)
CREAT SERPL-MCNC: 0.68 MG/DL (ref 0.5–1.4)
EOSINOPHIL # BLD AUTO: 0.4 K/UL (ref 0–0.51)
EOSINOPHIL NFR BLD: 3.8 % (ref 0–6.9)
EPI CELLS #/AREA URNS HPF: NEGATIVE /HPF
ERYTHROCYTE [DISTWIDTH] IN BLOOD BY AUTOMATED COUNT: 50.1 FL (ref 35.9–50)
GFR SERPLBLD CREATININE-BSD FMLA CKD-EPI: 95 ML/MIN/1.73 M 2
GFR SERPLBLD CREATININE-BSD FMLA CKD-EPI: 95 ML/MIN/1.73 M 2
GFR SERPLBLD CREATININE-BSD FMLA CKD-EPI: 96 ML/MIN/1.73 M 2
GFR SERPLBLD CREATININE-BSD FMLA CKD-EPI: 96 ML/MIN/1.73 M 2
GFR SERPLBLD CREATININE-BSD FMLA CKD-EPI: 97 ML/MIN/1.73 M 2
GLUCOSE BLD STRIP.AUTO-MCNC: 134 MG/DL (ref 65–99)
GLUCOSE BLD STRIP.AUTO-MCNC: 136 MG/DL (ref 65–99)
GLUCOSE BLD STRIP.AUTO-MCNC: 141 MG/DL (ref 65–99)
GLUCOSE BLD STRIP.AUTO-MCNC: 150 MG/DL (ref 65–99)
GLUCOSE BLD STRIP.AUTO-MCNC: 160 MG/DL (ref 65–99)
GLUCOSE SERPL-MCNC: 139 MG/DL (ref 65–99)
GLUCOSE SERPL-MCNC: 143 MG/DL (ref 65–99)
GLUCOSE SERPL-MCNC: 146 MG/DL (ref 65–99)
GLUCOSE SERPL-MCNC: 171 MG/DL (ref 65–99)
GLUCOSE SERPL-MCNC: 173 MG/DL (ref 65–99)
GLUCOSE UR STRIP.AUTO-MCNC: NEGATIVE MG/DL
HCT VFR BLD AUTO: 34.2 % (ref 42–52)
HGB BLD-MCNC: 10.3 G/DL (ref 14–18)
HYALINE CASTS #/AREA URNS LPF: ABNORMAL /LPF
IMM GRANULOCYTES # BLD AUTO: 0.08 K/UL (ref 0–0.11)
IMM GRANULOCYTES NFR BLD AUTO: 0.8 % (ref 0–0.9)
KETONES UR STRIP.AUTO-MCNC: ABNORMAL MG/DL
LEUKOCYTE ESTERASE UR QL STRIP.AUTO: ABNORMAL
LYMPHOCYTES # BLD AUTO: 0.67 K/UL (ref 1–4.8)
LYMPHOCYTES NFR BLD: 6.4 % (ref 22–41)
MAGNESIUM SERPL-MCNC: 1.9 MG/DL (ref 1.5–2.5)
MCH RBC QN AUTO: 27.9 PG (ref 27–33)
MCHC RBC AUTO-ENTMCNC: 30.1 G/DL (ref 32.3–36.5)
MCV RBC AUTO: 92.7 FL (ref 81.4–97.8)
MICRO URNS: ABNORMAL
MONOCYTES # BLD AUTO: 0.75 K/UL (ref 0–0.85)
MONOCYTES NFR BLD AUTO: 7.2 % (ref 0–13.4)
NEUTROPHILS # BLD AUTO: 8.56 K/UL (ref 1.82–7.42)
NEUTROPHILS NFR BLD: 81.6 % (ref 44–72)
NITRITE UR QL STRIP.AUTO: NEGATIVE
NRBC # BLD AUTO: 0 K/UL
NRBC BLD-RTO: 0 /100 WBC (ref 0–0.2)
PH UR STRIP.AUTO: 7 [PH] (ref 5–8)
PLATELET # BLD AUTO: 178 K/UL (ref 164–446)
PMV BLD AUTO: 12.1 FL (ref 9–12.9)
POTASSIUM SERPL-SCNC: 3.8 MMOL/L (ref 3.6–5.5)
POTASSIUM SERPL-SCNC: 3.9 MMOL/L (ref 3.6–5.5)
POTASSIUM SERPL-SCNC: 4 MMOL/L (ref 3.6–5.5)
PROT UR QL STRIP: NEGATIVE MG/DL
RBC # BLD AUTO: 3.69 M/UL (ref 4.7–6.1)
RBC # URNS HPF: ABNORMAL /HPF
RBC UR QL AUTO: ABNORMAL
SODIUM SERPL-SCNC: 153 MMOL/L (ref 135–145)
SODIUM SERPL-SCNC: 155 MMOL/L (ref 135–145)
SODIUM SERPL-SCNC: 155 MMOL/L (ref 135–145)
SP GR UR STRIP.AUTO: 1.02
UROBILINOGEN UR STRIP.AUTO-MCNC: 0.2 MG/DL
WBC # BLD AUTO: 10.5 K/UL (ref 4.8–10.8)
WBC #/AREA URNS HPF: ABNORMAL /HPF

## 2024-06-04 PROCEDURE — 99291 CRITICAL CARE FIRST HOUR: CPT | Performed by: INTERNAL MEDICINE

## 2024-06-04 PROCEDURE — 99232 SBSQ HOSP IP/OBS MODERATE 35: CPT | Performed by: PSYCHIATRY & NEUROLOGY

## 2024-06-04 PROCEDURE — 700101 HCHG RX REV CODE 250: Performed by: INTERNAL MEDICINE

## 2024-06-04 PROCEDURE — A9270 NON-COVERED ITEM OR SERVICE: HCPCS | Performed by: INTERNAL MEDICINE

## 2024-06-04 PROCEDURE — A9270 NON-COVERED ITEM OR SERVICE: HCPCS | Performed by: PHYSICAL MEDICINE & REHABILITATION

## 2024-06-04 PROCEDURE — 87186 SC STD MICRODIL/AGAR DIL: CPT

## 2024-06-04 PROCEDURE — 99223 1ST HOSP IP/OBS HIGH 75: CPT | Performed by: PHYSICAL MEDICINE & REHABILITATION

## 2024-06-04 PROCEDURE — 83735 ASSAY OF MAGNESIUM: CPT

## 2024-06-04 PROCEDURE — 82962 GLUCOSE BLOOD TEST: CPT | Mod: 91

## 2024-06-04 PROCEDURE — 700102 HCHG RX REV CODE 250 W/ 637 OVERRIDE(OP): Performed by: NURSE PRACTITIONER

## 2024-06-04 PROCEDURE — 74018 RADEX ABDOMEN 1 VIEW: CPT

## 2024-06-04 PROCEDURE — 700105 HCHG RX REV CODE 258: Performed by: INTERNAL MEDICINE

## 2024-06-04 PROCEDURE — 770022 HCHG ROOM/CARE - ICU (200)

## 2024-06-04 PROCEDURE — A9270 NON-COVERED ITEM OR SERVICE: HCPCS | Performed by: NURSE PRACTITIONER

## 2024-06-04 PROCEDURE — 92610 EVALUATE SWALLOWING FUNCTION: CPT

## 2024-06-04 PROCEDURE — 80048 BASIC METABOLIC PNL TOTAL CA: CPT | Mod: 91

## 2024-06-04 PROCEDURE — 87086 URINE CULTURE/COLONY COUNT: CPT

## 2024-06-04 PROCEDURE — 700101 HCHG RX REV CODE 250: Performed by: NURSE PRACTITIONER

## 2024-06-04 PROCEDURE — 31720 CLEARANCE OF AIRWAYS: CPT

## 2024-06-04 PROCEDURE — 71045 X-RAY EXAM CHEST 1 VIEW: CPT

## 2024-06-04 PROCEDURE — 81001 URINALYSIS AUTO W/SCOPE: CPT

## 2024-06-04 PROCEDURE — 94640 AIRWAY INHALATION TREATMENT: CPT

## 2024-06-04 PROCEDURE — 700102 HCHG RX REV CODE 250 W/ 637 OVERRIDE(OP): Performed by: INTERNAL MEDICINE

## 2024-06-04 PROCEDURE — 85025 COMPLETE CBC W/AUTO DIFF WBC: CPT

## 2024-06-04 PROCEDURE — 700102 HCHG RX REV CODE 250 W/ 637 OVERRIDE(OP): Performed by: PHYSICAL MEDICINE & REHABILITATION

## 2024-06-04 PROCEDURE — 700111 HCHG RX REV CODE 636 W/ 250 OVERRIDE (IP): Mod: JZ | Performed by: INTERNAL MEDICINE

## 2024-06-04 PROCEDURE — 700111 HCHG RX REV CODE 636 W/ 250 OVERRIDE (IP): Performed by: NURSE PRACTITIONER

## 2024-06-04 PROCEDURE — 87077 CULTURE AEROBIC IDENTIFY: CPT | Mod: 91

## 2024-06-04 RX ORDER — OXYCODONE HYDROCHLORIDE 5 MG/1
5 TABLET ORAL EVERY 4 HOURS PRN
Status: DISCONTINUED | OUTPATIENT
Start: 2024-06-04 | End: 2024-06-11

## 2024-06-04 RX ORDER — ENOXAPARIN SODIUM 100 MG/ML
40 INJECTION SUBCUTANEOUS DAILY
Status: DISCONTINUED | OUTPATIENT
Start: 2024-06-04 | End: 2024-06-04

## 2024-06-04 RX ORDER — QUETIAPINE FUMARATE 25 MG/1
25 TABLET, FILM COATED ORAL 2 TIMES DAILY
Status: DISCONTINUED | OUTPATIENT
Start: 2024-06-04 | End: 2024-06-08

## 2024-06-04 RX ORDER — MAGNESIUM SULFATE HEPTAHYDRATE 40 MG/ML
2 INJECTION, SOLUTION INTRAVENOUS ONCE
Status: COMPLETED | OUTPATIENT
Start: 2024-06-04 | End: 2024-06-04

## 2024-06-04 RX ORDER — LEVALBUTEROL INHALATION SOLUTION 1.25 MG/3ML
1.25 SOLUTION RESPIRATORY (INHALATION)
Status: DISCONTINUED | OUTPATIENT
Start: 2024-06-04 | End: 2024-06-07

## 2024-06-04 RX ORDER — PRAVASTATIN SODIUM 20 MG
40 TABLET ORAL EVERY EVENING
Status: DISCONTINUED | OUTPATIENT
Start: 2024-06-04 | End: 2024-06-26 | Stop reason: HOSPADM

## 2024-06-04 RX ORDER — QUETIAPINE FUMARATE 25 MG/1
25 TABLET, FILM COATED ORAL 2 TIMES DAILY
Status: DISCONTINUED | OUTPATIENT
Start: 2024-06-04 | End: 2024-06-04

## 2024-06-04 RX ADMIN — HYDRALAZINE HYDROCHLORIDE 10 MG: 20 INJECTION, SOLUTION INTRAMUSCULAR; INTRAVENOUS at 15:37

## 2024-06-04 RX ADMIN — FENTANYL CITRATE 50 MCG: 50 INJECTION, SOLUTION INTRAMUSCULAR; INTRAVENOUS at 21:06

## 2024-06-04 RX ADMIN — LABETALOL HYDROCHLORIDE 10 MG: 5 INJECTION, SOLUTION INTRAVENOUS at 19:33

## 2024-06-04 RX ADMIN — QUETIAPINE FUMARATE 25 MG: 25 TABLET ORAL at 00:04

## 2024-06-04 RX ADMIN — LANSOPRAZOLE 30 MG: 30 TABLET, ORALLY DISINTEGRATING ORAL at 05:17

## 2024-06-04 RX ADMIN — LABETALOL HYDROCHLORIDE 10 MG: 5 INJECTION, SOLUTION INTRAVENOUS at 07:42

## 2024-06-04 RX ADMIN — PRAVASTATIN SODIUM 40 MG: 20 TABLET ORAL at 17:07

## 2024-06-04 RX ADMIN — HYDRALAZINE HYDROCHLORIDE 10 MG: 20 INJECTION, SOLUTION INTRAMUSCULAR; INTRAVENOUS at 08:34

## 2024-06-04 RX ADMIN — ALBUTEROL SULFATE 2.5 MG: 2.5 SOLUTION RESPIRATORY (INHALATION) at 04:22

## 2024-06-04 RX ADMIN — SODIUM CHLORIDE 2 G: 1 TABLET ORAL at 17:07

## 2024-06-04 RX ADMIN — SENNOSIDES AND DOCUSATE SODIUM 2 TABLET: 50; 8.6 TABLET ORAL at 17:07

## 2024-06-04 RX ADMIN — AMLODIPINE BESYLATE 5 MG: 5 TABLET ORAL at 05:17

## 2024-06-04 RX ADMIN — INSULIN HUMAN 1 UNITS: 100 INJECTION, SOLUTION PARENTERAL at 12:11

## 2024-06-04 RX ADMIN — CEFTRIAXONE SODIUM 1000 MG: 10 INJECTION, POWDER, FOR SOLUTION INTRAVENOUS at 22:04

## 2024-06-04 RX ADMIN — LABETALOL HYDROCHLORIDE 10 MG: 5 INJECTION, SOLUTION INTRAVENOUS at 04:33

## 2024-06-04 RX ADMIN — Medication: at 18:44

## 2024-06-04 RX ADMIN — SODIUM CHLORIDE 2 G: 1 TABLET ORAL at 11:40

## 2024-06-04 RX ADMIN — MAGNESIUM SULFATE HEPTAHYDRATE 2 G: 2 INJECTION, SOLUTION INTRAVENOUS at 07:52

## 2024-06-04 RX ADMIN — ACETAMINOPHEN 650 MG: 325 TABLET, FILM COATED ORAL at 12:21

## 2024-06-04 RX ADMIN — DEXMEDETOMIDINE 0.8 MCG/KG/HR: 100 INJECTION, SOLUTION INTRAVENOUS at 06:04

## 2024-06-04 RX ADMIN — LEVALBUTEROL 1.25 MG: 1.25 SOLUTION RESPIRATORY (INHALATION) at 10:40

## 2024-06-04 RX ADMIN — POTASSIUM BICARBONATE 25 MEQ: 978 TABLET, EFFERVESCENT ORAL at 07:42

## 2024-06-04 RX ADMIN — ACETAMINOPHEN 650 MG: 325 TABLET, FILM COATED ORAL at 19:33

## 2024-06-04 RX ADMIN — OXYCODONE HYDROCHLORIDE 5 MG: 5 TABLET ORAL at 22:17

## 2024-06-04 RX ADMIN — QUETIAPINE FUMARATE 25 MG: 25 TABLET ORAL at 09:32

## 2024-06-04 RX ADMIN — LEVALBUTEROL 1.25 MG: 1.25 SOLUTION RESPIRATORY (INHALATION) at 15:39

## 2024-06-04 RX ADMIN — ACETAMINOPHEN 650 MG: 325 TABLET, FILM COATED ORAL at 04:23

## 2024-06-04 RX ADMIN — Medication 5 MG: at 20:40

## 2024-06-04 RX ADMIN — QUETIAPINE FUMARATE 25 MG: 25 TABLET ORAL at 17:07

## 2024-06-04 RX ADMIN — LABETALOL HYDROCHLORIDE 10 MG: 5 INJECTION, SOLUTION INTRAVENOUS at 12:37

## 2024-06-04 RX ADMIN — LEVALBUTEROL 1.25 MG: 1.25 SOLUTION RESPIRATORY (INHALATION) at 19:35

## 2024-06-04 RX ADMIN — Medication: at 05:17

## 2024-06-04 RX ADMIN — SODIUM CHLORIDE 2 G: 1 TABLET ORAL at 07:42

## 2024-06-04 RX ADMIN — FENTANYL CITRATE 50 MCG: 50 INJECTION, SOLUTION INTRAMUSCULAR; INTRAVENOUS at 17:07

## 2024-06-04 ASSESSMENT — PAIN DESCRIPTION - PAIN TYPE
TYPE: ACUTE PAIN

## 2024-06-04 ASSESSMENT — FIBROSIS 4 INDEX: FIB4 SCORE: 2.11

## 2024-06-04 NOTE — PROGRESS NOTES
"Critical Care Progress Note    Date of admission  5/29/2024    Chief Complaint  78 y.o. male admitted 5/29/2024 with [facility]    Hospital Course  \"Jl Mueller is a 78 y.o. male smoker w/ PMHx s/f HTN, CAD, HLD, prior MI, AAA s/p bypass graft stenting, COPD on 2L home O2, two prior hemorrhagic strokes w/o reported deficits, BPH s/p prostate artery embolization who initially presented to an outside facility via EMS on 5/29/2024 for evaluation. Per the patient's wife, the patient was eating dinner when he stated he had a headache and felt weak. He then had two episodes of emesis and became altered to GCS11; he was intubated at some point prior to transfer to this facility due to his neuro status. On imaging at the outside facility, he was found to have a left cerebellar hemorrhage, mild perihematomal edema with subtle parenchymal displacement. 4th ventricle with moderate compression but open. for which he was transferred to this facility for higher level of care. On arrival, the patient was moving all extremities and withdrew to pain in all extremities. His pupils are pinpoint, and he over breaths vent. Repeat imaging redemonstrates area of Lt-cerebellar hemorrhage. A CTA was completed which, does not clearly show an aneurysmal/vascular malformation. He is being admitted to the ICU for hypertonic saline therapy, Q1h neurological checks, and strict blood pressure control as well as ventilator management.\"    5/30- DDAVP for platelet inhibition.   5/31- No clinical change  6/1- Extubated  6/2 - RASS fluctuating, norvasc added   6/3 - confused, on dex. 3% on going    Interval Problem Update  Reviewed last 24 hour events:   - Restless   - Neuro: confused - on dex, stop today   - HR: 60s-90s   - SBP: 110s-160s   - GI: TF at goal, last BM 6/2   - UOP: 2.1 L/24 hrs, +4.2L net   - Park: yes   - Tm: 38.7   - Lines: PICC   - PPx: GI PPI, DVT lovenox   - 3.5L NC   - CXR (personally reviewed and compared to prior): no new   - " Continue 3%, stop dex, increase Seroquel   - culture for fever, ? Dex related   - Cl up but no acidosis    Yesterday   - No acute events overnight   - Neuro: restless overnight, on dex   - HR: 70s-80s   - SBP: 120s-150s   - GI: TF at goal, last BM yesterday   - UOP: 1.7 L/24 hrs, net +4.4 L   - Park: yes   - Tm: 38.3 - APAP   - Lines: PICC   - PPx: GI PPI, DVT contraindicated, SCDs   - 3L NC   - CXR (personally reviewed and compared to prior): no new   - HTS for 2 more days   - finish fever eval with Blood cx x2    Review of Systems  Review of Systems   Unable to perform ROS: Critical illness        Vital Signs for last 24 hours   Temp:  [36.2 °C (97.1 °F)] 36.2 °C (97.1 °F)  Pulse:  [] 69  Resp:  [16-53] 23  BP: (106-168)/(59-88) 131/68  SpO2:  [90 %-99 %] 97 %    Hemodynamic parameters for last 24 hours       Respiratory Information for the last 24 hours       Physical Exam   Physical Exam  Vitals and nursing note reviewed.   Constitutional:       Appearance: He is ill-appearing.   HENT:      Head: Normocephalic and atraumatic.      Right Ear: External ear normal.      Left Ear: External ear normal.      Nose: Nose normal.      Mouth/Throat:      Mouth: Mucous membranes are dry.      Pharynx: Oropharynx is clear.   Eyes:      Extraocular Movements: Extraocular movements intact.      Conjunctiva/sclera: Conjunctivae normal.   Cardiovascular:      Rate and Rhythm: Normal rate and regular rhythm.      Pulses: Normal pulses.   Pulmonary:      Effort: Pulmonary effort is normal.      Breath sounds: Rales present.   Abdominal:      Palpations: Abdomen is soft.   Musculoskeletal:      Cervical back: Neck supple.   Skin:     General: Skin is warm and dry.      Capillary Refill: Capillary refill takes less than 2 seconds.   Neurological:      General: No focal deficit present.      Mental Status: He is alert.      Comments: Confused, agitated   Psychiatric:         Cognition and Memory: Cognition is impaired. Memory  is impaired.         Medications  Current Facility-Administered Medications   Medication Dose Route Frequency Provider Last Rate Last Admin    levalbuterol (Xopenex) 1.25 MG/3ML nebulizer solution 1.25 mg  1.25 mg Nebulization Q2HRS PRN (RT) Reyes Gray M.D.        sodium chloride (Salt) tablet 2 g  2 g Enteral Tube TID WITH MEALS FABIO Romero Jr..OAye   2 g at 06/03/24 1727    3% sodium chloride (Hypertonic Saline) 500mL infusion  0-60 mL/hr Intravenous Continuous FABIO Romero Jr..O.   Paused at 06/03/24 1831    QUEtiapine (SEROquel) tablet 25 mg  25 mg Enteral Tube Nightly Sarina L. Latona   25 mg at 06/04/24 0004    amLODIPine (Norvasc) tablet 5 mg  5 mg Enteral Tube Q DAY Reyes Gray M.D.   5 mg at 06/04/24 0517    dexmedetomidine (PRECEDEX) 400 mcg/100mL NS premix infusion  0.1-1.5 mcg/kg/hr (Ideal) Intravenous Continuous Reyes Gray M.D. 11.4 mL/hr at 06/04/24 0604 0.8 mcg/kg/hr at 06/04/24 0604    labetalol (Normodyne/Trandate) injection 10 mg  10 mg Intravenous Q3HRS PRN Sarina ABEL. Latona   10 mg at 06/04/24 0433    hydrALAZINE (Apresoline) injection 10 mg  10 mg Intravenous Q3HRS PRN Sarina L. Latona   10 mg at 06/03/24 1637    Pharmacy Consult: Enteral tube insertion - review meds/change route/product selection  1 Each Other PHARMACY TO DOSE Reyes Gray M.D.        diazePAM (Valium) injection 5 mg  5 mg Intravenous Q6HRS PRN Sarina LAye Latona        insulin regular (HumuLIN R,NovoLIN R) injection  1-6 Units Subcutaneous Q6HRS Sarina L. Latona   1 Units at 06/03/24 1751    And    dextrose 50% (D50W) injection 25 g  25 g Intravenous Q15 MIN PRN Sarina L. Latona   25 g at 05/31/24 1902    senna-docusate (Pericolace Or Senokot S) 8.6-50 MG per tablet 2 Tablet  2 Tablet Enteral Tube Q EVENING Sarina Euceda   2 Tablet at 06/03/24 1727    And    polyethylene glycol/lytes (Miralax) Packet 1 Packet  1 Packet Enteral Tube QDAY PRN Sarina Euceda        Respiratory Therapy Consult   Nebulization  Continuous RT Sarina Euceda MD Alert...ICU Electrolyte Replacement per Pharmacy   Other PHARMACY TO DOSE Sarina Euceda        acetaminophen (Tylenol) tablet 650 mg  650 mg Enteral Tube Q4HRS PRN Sarina RODRIGUEZ Latona   650 mg at 06/04/24 0423    Or    acetaminophen (Tylenol) suppository 650 mg  650 mg Rectal Q4HRS PRN Sarina Euceda        ondansetron (Zofran ODT) dispertab 4 mg  4 mg Enteral Tube Q4HRS PRN Sarina Euceda        Or    ondansetron (Zofran) syringe/vial injection 4 mg  4 mg Intravenous Q4HRS PRN Sarina Euceda        albuterol (Proventil) 2.5mg/0.5ml nebulizer solution 2.5 mg  2.5 mg Nebulization Q4HRS PRN Sarina RODRIGUEZ Latona   2.5 mg at 06/04/24 0422    fentaNYL (Sublimaze) injection 25-50 mcg  25-50 mcg Intravenous Q HOUR PRN Sarina Villedaona   50 mcg at 06/03/24 2324    lansoprazole (Prevacid) solutab 30 mg  30 mg Enteral Tube DAILY Reyes Gray M.D.   30 mg at 06/04/24 0517    mineral oil-pet hydrophilic (Aquaphor) ointment   Topical BID Reyes Gray M.D.   Given at 06/04/24 0517       Fluids    Intake/Output Summary (Last 24 hours) at 6/4/2024 0652  Last data filed at 6/4/2024 0600  Gross per 24 hour   Intake 1902.84 ml   Output 2140 ml   Net -237.16 ml       Laboratory          Recent Labs     06/02/24  0605 06/02/24  1145 06/03/24  0548 06/03/24  1137 06/03/24  1747 06/03/24  2341 06/04/24  0528   SODIUM 150*   < > 152*   < > 156* 155* 153*   POTASSIUM 4.1   < > 3.7   < > 3.7 3.8 3.8   CHLORIDE 113*   < > 114*   < > 120* 120* 120*   CO2 27   < > 27   < > 26 26 26   BUN 14   < > 20   < > 21 24* 26*   CREATININE 0.58   < > 0.63   < > 0.58 0.65 0.65   MAGNESIUM 2.0  --  2.0  --   --   --  1.9   CALCIUM 8.8   < > 9.1   < > 9.2 9.0 8.6    < > = values in this interval not displayed.     Recent Labs     06/03/24  0548 06/03/24  1137 06/03/24  1747 06/03/24  2341 06/04/24  0528   ALTSGPT 11  --   --   --   --    ASTSGOT 16  --   --   --   --    ALKPHOSPHAT 44  --   --   --   --    TBILIRUBIN  0.3  --   --   --   --    PREALBUMIN  --  10.9*  --   --   --    GLUCOSE 146* 155* 161* 146* 139*     Recent Labs     06/02/24 0605 06/03/24  0548 06/04/24  0528   WBC 10.9* 9.8 10.5   NEUTSPOLYS 85.40* 81.20* 81.60*   LYMPHOCYTES 5.20* 7.00* 6.40*   MONOCYTES 6.00 6.90 7.20   EOSINOPHILS 2.90 4.20 3.80   BASOPHILS 0.20 0.20 0.20   ASTSGOT  --  16  --    ALTSGPT  --  11  --    ALKPHOSPHAT  --  44  --    TBILIRUBIN  --  0.3  --      Recent Labs     06/02/24 0605 06/03/24 0548 06/04/24  0528   RBC 4.04* 3.84* 3.69*   HEMOGLOBIN 11.6* 11.0* 10.3*   HEMATOCRIT 36.0* 34.5* 34.2*   PLATELETCT 182 176 178       Imaging  CT:    Reviewed    Assessment/Plan  * Cerebellar hemorrhage (HCC)- (present on admission)  Assessment & Plan  ICH score=1  q2h neurochecks  MRI brain: Stable fossa of parenchymal hemorrhage centered in the LEFT cerebellum and extending into the RIGHT cerebellum exerting mass effect on the fourth ventricle which is not completely effaced Likely CAA changes  Keep -160,   3% check serum Na and Osm q6 hour, goal Na 150-160  Neurosurgery non operative at this time  No coagulation   Hold all antithrombotic therapy  SCDs only for DVT ppx  Keep euvolemic, euthermic, and normoglycemic (140-180)  Maintain bowel regimen to avoid Valsalva and increased intracranial pressure.  HOB at 30 degrees  maintain pCO2 of 35-40; closer to 35  Pravastatin 40 mg qHS for possible neuroprotective effect    Encephalopathy acute- (present on admission)  Assessment & Plan  Likely delirium in conjunction with his intracranial pathology  Keep patient awake during the day and avoid daytime naps. Remove all unnecessary lines (central lines, peripheral IVs, feeding tubes, leung catheters). Avoid polypharmacy, frequent re-orientation, maximize family time at bedside, use glasses and hearing aids if needed, treat pain, encourage ambulation, minimize benzos/anticholinergic agents.   Stop Precedex, continue Seroquel    Hyperchloremic  metabolic acidosis  Assessment & Plan  Improved, continue salt tabs and HTS    Hyperglycemia- (present on admission)  Assessment & Plan  Goal blood glucose 140-180  sliding scale insulin, accuchecks  hypoglycemia protocol    Acute on chronic respiratory failure with hypoxia (HCC)- (present on admission)  Assessment & Plan  Intubated for airway protection  Extubated 6/1  Mobilization  Pulmonary hygiene  RT/O2 Protocols  Titrate supplemental FiO2 to maintain SpO2 >92%    Hyperlipidemia- (present on admission)  Assessment & Plan  Per wife, patient's home medication was discontinued due to muscle cramps  Pravastatin 40mg QHS  Lipid panel reviewed  Counseling on lifestyle/dietary modifications    History of stroke- (present on admission)  Assessment & Plan  Patient has had 2 prior hemorrhagic strokes; last in 2015  No noted deficits per wife    BPH with urinary obstruction- (present on admission)  Assessment & Plan  S/p prostate surgery  Per wife, patient has suffered from urinary retention and incontinence since his surgery  Continue flomax  Park for strict I&O    Benign essential hypertension- (present on admission)  Assessment & Plan  Per wife, patient's medications were discontinued as an outpatient because his BP has been controlled  SBP in the 170s per wife's at home BP cuff measurement; <160 since arrival to this facility  As needed antihypertensives to maintain -160)  Norvasc 5 mg    Abdominal aortic aneurysm (AAA) without rupture (HCC)- (present on admission)  Assessment & Plan  S/p EVAR w/ bypass graft stent  Maintain normotension  Holding ASA at this time; per wife he takes ASA 3x/week    COPD (chronic obstructive pulmonary disease) (HCC)- (present on admission)  Assessment & Plan  Not currently in exacerbation  CXR, no acute process  Continue home nebulizers as ordered  Continue PRN nebulizers Q4h  Wean FiO2 as tolerated         VTE:  Contraindicated  Ulcer: PPI  Lines: Park Catheter  Ongoing  indication addressed and PICC    I have performed a physical exam and reviewed and updated ROS and Plan today (6/4/2024). In review of yesterday's note (6/3/2024), there are no changes except as documented above.     Discussed patient condition and risk of morbidity and/or mortality with Family, RN, RT, Pharmacy, Code status disscussed, Charge nurse / hot rounds, Patient, and neurology    The patient remains critically ill.  Critical care time = 49 minutes in directly providing and coordinating critical care and extensive data review.  No time overlap and excludes procedures.    Please note that this dictation was created using voice recognition software. The accuracy of the dictation is limited to the abilities of the software. I have made every reasonable attempt to correct obvious errors, but I expect that there are errors of grammar and possibly content that I did not discover before finalizing the note.

## 2024-06-04 NOTE — PROGRESS NOTES
3% sodium chloride paused during day shift. Notified NOC APRN of next Na lab result per order. Na result of 155. Keep 3% paused per APRN.

## 2024-06-04 NOTE — THERAPY
Speech Language Pathology   Clinical Swallow Evaluation     Patient Name: lJ Mueller  AGE:  78 y.o., SEX:  male  Medical Record #: [id number]  Date of Service: 6/4/2024      History of Present Illness  77 yo male presented via EMS to outside facility on 5/29/24 for evaluation. Per the patient's wife, the patient was eating dinner when he stated he had a headache and felt weak. He then had two episodes of emesis and became altered to GCS11; he was intubated at some point prior to transfer to this facility due to his neuro status. Extubated 6/1/24.     CMHx: Cerebellar hemorrhage, Encephalopathy acute, Hyperchloremic metabolic acidosis     PMHx: HTN, CAD, HLD, prior MI, AAA s/p bypass graft stenting, COPD on 2L home O2, two prior hemorrhagic strokes w/o reported deficits, BPH s/p prostate artery embolization    MRI Brain 5/31:   1.  Stable fossa of parenchymal hemorrhage centered in the LEFT cerebellum and extending into the RIGHT cerebellum exerting mass effect on the fourth ventricle which is not completely effaced  2.  Intraventricular blood redemonstrated  3.  Numerous areas of remote microhemorrhage in the supra and infratentorial brain. These could be related to amyloid angiopathy, numerous hypertensive microhemorrhages or less likely multiple cavernomas  4.  Moderate diffuse atrophy without compelling evidence of hydrocephalus  5.  Advanced white matter changes  6.  4 mm LEFT temporal meningioma  7.  Enhancing nodule in the RIGHT internal auditory canal suspicious for a vestibular schwannoma, less likely other extra-axial mass such as a meningioma, facial nerve schwannoma or metastasis. Consider posterior fossa protocol brain MRI without and with   contrast to further evaluate in when clinically appropriate.    CXR 6/1/24:   1.  Left basilar atelectasis or subtle infiltrate, decreased since prior study  2.  Trace left pleural effusion  3.  Enlargement of the aortic knob suggesting thoracic aortic  "aneurysm  4.  Atherosclerosis    CT Head 6/3:   1.  Advanced cerebral atrophy.  2.  Stable ventriculomegaly with intraventricular hemorrhage.  3.  Advanced supratentorial white matter disease most consistent with microvascular ischemic change.  4.  Multiple old lacunar infarcts as described.  5.  Acute/recent subacute parenchymal hemorrhage in the posterior fossa involving the superior cerebellar vermis and left paramedian cerebellar hemisphere and left lateral cerebellar peduncle. No evidence of recurrent hemorrhage.  6.  Minimal focus of subarachnoid hemorrhage within a single sulcus in the left posterior temporal region. Probably unchanged from prior recent exam.    CXR 6/4:   1. Mild interstitial prominence could represent vascular congestion.  2. Cardiomegaly.  3. Removal of endotracheal tube.    Hx of ST per EMR  -2/2/21- Diet upgraded to dys II textures/thin liquids.   -1/30/21-  CSE completed recommending dys III/thin liquids.     General Information:  Vitals  O2 (LPM): 3.5  O2 Delivery Device: Silicone Nasal Cannula  Vitals Comments: SLP A RN to transition pt from oxymask to NC on 3.5 L for PO trials  Level of Consciousness: Alert, Awake  Patient Behaviors: Confused, Restless  Orientation: Self  Follows Directives: No    Prior Living Situation & Level of Function:  Prior Services: Continuous (24 Hour) Care Giving Family, Skilled Home Health Services (HH PT/OT per spouse report)  Lives with - Patient's Self Care Capacity: Spouse  Comments: wife provides assist as needed for ADLs and uses a 4WW in the house and a WC in the community  Communication: Hx of CVA- spouse decline working w/ SLP for cognition previously  Swallowing: Hx of CVA- spouse endorsing recent HH SLP eval d/t coughing on thin liquids. Per spouse, SLP recommended continuing a regular diet/thin liquids but wanted pt to \"take single sips of liquids at a time and swallow hard.\" Per spouse, pt not compliant with SLP recommendations.    Oral " Mechanism Evaluation:  Dentition: Edentulous, Denture(s) not available at time of evaluation   Facial Symmetry: Equal (at rest, did not follow commands)  Facial Sensation: Pt did not follow commands to assess     Labial Observations: Bilateral weakness, Pt did not follow commands to assess   Lingual Observations: Xerostomia (did not follow commands for lateralization/excursion)  Motor Speech: minimally intelligible, query r/t motor speech vs incomprehensible speech 2/2 mentation      Laryngeal Function:  Secretion Management: Adequate  Voice Quality: Hoarse (per spouse)   Cough: Perceptually weak (congested)    Subjective  Pt cleared by RN for CSE. RN in room w/ SLP upon arrival to position pt upright into Weaver's. Transitioned from oxymask to NC for trials. B/l wrist restraints applied. Spouse present at bedside- provided information on PLOF. NGT in situ.    Assessment  Current Method of Nutrition: NGT  Positioning: Weaver's (60-90 degrees)  Bolus Administration: SLP, Family member (spouse)  O2 (LPM): 3.5 O2 Delivery Device: Silicone Nasal Cannula  Factor(s) Affecting Performance: Impaired endurance, Impaired mental status, Impaired command following    Swallowing Trials:  Swallowing Trials  Ice: Impaired    Comments:   SLP and spouse attempted to provide oral care- pt noted to be orally aversive- attempts to bite on sponge/expectorate liquid in mouth. Notably becoming more agitated/restless. Ice chip trials attempted x2. Required max cues from spouse for oral acceptance. Pt w/ initially adequate containment, however, eventually appreciated to expectorate both ice chips after prolonged oral bolus holding (>15 seconds). HLE was appreciated to palpation on first ice chip trial- suspect reflexive 2/2 ice chip melting. Congested coughing appreciated following single reflexive swallow concerning for airway invasion. D/t oral aversion/increasing agitation, further trials discontinued.     Clinical Impressions  Pt  "appreciated to be orally aversive on this date- increasing agitation/expectoration of ice chips trialed. Congested coughing appreciated following single reflexive swallow concerning for airway invasion. Pt would likely benefit from an instrumental swallow study; However is not appropriate to participate at this junction re: poor tolerance of PO, oral aversion/agitation, impaired command following. Suspected oropharyngeal dysphagia which is likely acute on chronic given cerebellar hemorrhage, intubation hx, encephalopathy w/ hx of CVAs and spouse report of dysphagia. Risk of aspiration and related sequelae can be mitigated with frequent, thorough, oral care and mobility as medically appropriate. SLP to follow for pre-feeding trials. Spouse and RN updated to same.     Recommendations  Diet Consistency: NPO/NGT  Instrumentation: Instrumental swallow study pending clinical progress  Medication: Non Oral  Oral Care: Q2h     SLP Treatment Plan  Treatment Plan: Dysphagia Treatment, Patient/Family/Caregiver Training (pending Cog/SLE)  SLP Frequency: 4x Per Week  Estimated Duration: Until Therapy Goals Met    Anticipated Discharge Needs  Discharge Recommendations: Recommend post-acute placement for additional speech therapy services prior to discharge home   Therapy Recommendations Upon DC: Dysphagia Training, Patient / Family / Caregiver Education, Community Re-Integration (pending Cog/SLE)      Patient / Family Goals  Patient / Family Goal #1: \"for him to eat\" per spouse  Short Term Goals  Short Term Goal # 1: Pt will participate in pre-feeding trials to determine approp. for participation in instrumental swallow study vs oral diet initation.    Cierra Hernandez, SLP   "

## 2024-06-04 NOTE — DISCHARGE PLANNING
Physiatry to consult.     [id number]-Please review the consult from Dr. Harden regarding post acute recommendations.  [facility] will no longer follow.  Please reach out to myself with any questions.

## 2024-06-04 NOTE — CARE PLAN
"The patient is Watcher - Medium risk of patient condition declining or worsening    Shift Goals  Clinical Goals: Q2 hr neuro exams, RAAS -1 to +1, Q6hr Na  Patient Goals: Unable to assess  Family Goals: updates, rest, \"fever control\"    The patient is progressing towards the following goals:  Problem: Safety - Medical Restraint  Goal: Remains free of injury from restraints (Restraint for Interference with Medical Device)  Outcome: Progressing     Problem: Pain - Standard  Goal: Alleviation of pain or a reduction in pain to the patient’s comfort goal  6/4/2024 0131 by Genny Gu R.N.  Outcome: Progressing  6/4/2024 0130 by NE SandovalNAye  Outcome: Progressing     Problem: Fall Risk  Goal: Patient will remain free from falls  Outcome: Progressing     Problem: Hemodynamics  Goal: Patient's hemodynamics, fluid balance and neurologic status will be stable or improve  Outcome: Progressing     Problem: Neuro Status  Goal: Neuro status will remain stable or improve  Outcome: Progressing  Q2hr neuro checks throughout shift     The patient is not progressing towards the following goals:   Problem: Safety - Medical Restraint  Goal: Free from restraint(s) (Restraint for Interference with Medical Device)  Outcome: Not Progressing     Problem: Knowledge Deficit - Standard  Goal: Patient and family/care givers will demonstrate understanding of plan of care, disease process/condition, diagnostic tests and medications  Outcome: Not Progressing  No learning evidence        "

## 2024-06-04 NOTE — DIETARY
"Nutrition Services: Brief Nutrition Support Update    Pt is extubated requiring reassessment of estimated needs. SLP just saw for speech eval and recommends NPO/feeding tube.    Assessment:  Weight: 65.7 kg (144 lbs), Height: 5'3\" (160 cm), BMI: 27.11 (overweight)    Calculation/Equation: MSJ x 1.2 = 1528 kcals  Calories/day: 1500 - 1800 (23 - 27 kcals/kg)  Grams Protein/day: 83 - 104 (1.2 - 1.5 gm/kg)    Skin: partial thickness bilateral scar tissue/dryness to back shoulder midline  Labs: Na 153, glucose 171, BUN 30, A1C 5.6 on 5/30/24  Meds: SSI, prevacid, melatonin, pravastatin, seroquel, senna, salt tabs 2 gm TID, anti-emetics prn, miralax prn,   Last BM: 6/1  CHO-controlled formula is indicated in setting of hyperglycemia on problem list and pt is on ICU floor.     Recommendations/Plan:  Start TF Glucerna 1.2 at 25 ml/hr with re-introducing fiber into diet and advance per protocol to goal rate of 60 ml/hr to provide 1728 kcals, 86 gm protein, and 1159 ml free water per day.   Fluids per MD.   Monitor blood sugar levels.     RD following.           "

## 2024-06-04 NOTE — PROGRESS NOTES
Neurology Progress Note  Neurohospitalist Service, [facility]    Referring Physician: Reyes Moseley Jr., D.O.      Interval History: No acute events overnight.  Repeat head CT without evidence of obstructive hydrocephalus.  SBPs 110s-130s.    Review of systems: In addition to what is detailed in the HPI and/or updated in the interval history, all other systems reviewed and are negative.    Past Medical History, Past Surgical History and Social History reviewed and unchanged from prior    Medications:    Current Facility-Administered Medications:     magnesium sulfate IVPB premix 2 g, 2 g, Intravenous, Once, Reyes Moseley Jr., D.O.    potassium bicarbonate (Klyte) effervescent tablet 25 mEq, 25 mEq, Enteral Tube, Once, Reyes Moseley Jr., D.O.    levalbuterol (Xopenex) 1.25 MG/3ML nebulizer solution 1.25 mg, 1.25 mg, Nebulization, Q2HRS PRN (RT), Reyes Gray M.D.    sodium chloride (Salt) tablet 2 g, 2 g, Enteral Tube, TID WITH MEALS, Reyes Moseley Jr., D.O., 2 g at 06/03/24 1727    3% sodium chloride (Hypertonic Saline) 500mL infusion, 0-60 mL/hr, Intravenous, Continuous, Reyes Moseley Jr., D.O., Paused at 06/03/24 1831    QUEtiapine (SEROquel) tablet 25 mg, 25 mg, Enteral Tube, Nightly, Sarina Euceda, 25 mg at 06/04/24 0004    amLODIPine (Norvasc) tablet 5 mg, 5 mg, Enteral Tube, Q DAY, Reyes Gray M.D., 5 mg at 06/04/24 0517    dexmedetomidine (PRECEDEX) 400 mcg/100mL NS premix infusion, 0.1-0.5 mcg/kg/hr (Ideal), Intravenous, Continuous, Reyes Moseley Jr., D.O., Last Rate: 7.1 mL/hr at 06/04/24 0707, 0.5 mcg/kg/hr at 06/04/24 0707    labetalol (Normodyne/Trandate) injection 10 mg, 10 mg, Intravenous, Q3HRS PRN, Sarina RODRIGUEZ Latona, 10 mg at 06/04/24 0433    hydrALAZINE (Apresoline) injection 10 mg, 10 mg, Intravenous, Q3HRS PRN, Sarina Euceda, 10 mg at 06/03/24 1637    Pharmacy Consult: Enteral tube insertion - review meds/change route/product selection, 1 Each, Other,  PHARMACY TO DOSE, Reyes Gray M.D.    diazePAM (Valium) injection 5 mg, 5 mg, Intravenous, Q6HRS PRN, Sarina Euceda    insulin regular (HumuLIN R,NovoLIN R) injection, 1-6 Units, Subcutaneous, Q6HRS, 1 Units at 06/03/24 1751 **AND** POC blood glucose manual result, , , Q6H **AND** NOTIFY MD and PharmD, , , Once **AND** Administer 20 grams of glucose (approximately 8 ounces of fruit juice) every 15 minutes PRN FSBG less than 70 mg/dL, , , PRN **AND** dextrose 50% (D50W) injection 25 g, 25 g, Intravenous, Q15 MIN PRN, Sarina Euceda, 25 g at 05/31/24 1902    senna-docusate (Pericolace Or Senokot S) 8.6-50 MG per tablet 2 Tablet, 2 Tablet, Enteral Tube, Q EVENING, 2 Tablet at 06/03/24 1727 **AND** polyethylene glycol/lytes (Miralax) Packet 1 Packet, 1 Packet, Enteral Tube, QDAY PRN, Sarina Euceda    Respiratory Therapy Consult, , Nebulization, Continuous RT, Sarina Euceda MD Alert...ICU Electrolyte Replacement per Pharmacy, , Other, PHARMACY TO DOSE, Sarina Euceda    acetaminophen (Tylenol) tablet 650 mg, 650 mg, Enteral Tube, Q4HRS PRN, 650 mg at 06/04/24 0423 **OR** acetaminophen (Tylenol) suppository 650 mg, 650 mg, Rectal, Q4HRS PRN, Sarina Euceda    ondansetron (Zofran ODT) dispertab 4 mg, 4 mg, Enteral Tube, Q4HRS PRN **OR** ondansetron (Zofran) syringe/vial injection 4 mg, 4 mg, Intravenous, Q4HRS PRN, Sarina Euceda    albuterol (Proventil) 2.5mg/0.5ml nebulizer solution 2.5 mg, 2.5 mg, Nebulization, Q4HRS PRN, Sarina Euceda, 2.5 mg at 06/04/24 0422    fentaNYL (Sublimaze) injection 25-50 mcg, 25-50 mcg, Intravenous, Q HOUR PRN, Sarina Euceda, 50 mcg at 06/03/24 4394    lansoprazole (Prevacid) solutab 30 mg, 30 mg, Enteral Tube, DAILY, Reyes Gray M.D., 30 mg at 06/04/24 0517    mineral oil-pet hydrophilic (Aquaphor) ointment, , Topical, BID, Reyes Gray M.D., Given at 06/04/24 0517    Physical Examination:   /68   Pulse 69   Temp 36.2 °C (97.1 °F) (Temporal)   Resp (!)  "23   Ht 1.6 m (5' 2.99\")   Wt 69.4 kg (153 lb)   SpO2 97%   BMI 27.11 kg/m²       General: Eyes open, interactive with wife  Neck: There is normal range of motion  CV: Regular rate   Extremities:  Warm, dry, and intact, without peripheral lower extremity edema    NEUROLOGICAL EXAM:     Mental status:  Eyes open, alert  Speech and language:  Dysarthric speech, no reliable command following  Cranial nerve exam: Visual fields are full to threat. Eyes midline.  Not clearly tracking, but regarding both side of room.  Face symmetric  Motor exam: There is spontaneous antigravity movements in all 4 extremities  Sensory exam:  Reacts to tactile in all 4 extremities, no obvious neglect  Coordination:  Limited movements to test      Objective Data:    Labs:  Lab Results   Component Value Date/Time    PROTHROMBTM 13.6 05/29/2024 10:49 PM    INR 1.03 05/29/2024 10:49 PM      Lab Results   Component Value Date/Time    WBC 10.5 06/04/2024 05:28 AM    RBC 3.69 (L) 06/04/2024 05:28 AM    HEMOGLOBIN 10.3 (L) 06/04/2024 05:28 AM    HEMATOCRIT 34.2 (L) 06/04/2024 05:28 AM    MCV 92.7 06/04/2024 05:28 AM    MCH 27.9 06/04/2024 05:28 AM    MCHC 30.1 (L) 06/04/2024 05:28 AM    MPV 12.1 06/04/2024 05:28 AM    NEUTSPOLYS 81.60 (H) 06/04/2024 05:28 AM    LYMPHOCYTES 6.40 (L) 06/04/2024 05:28 AM    MONOCYTES 7.20 06/04/2024 05:28 AM    EOSINOPHILS 3.80 06/04/2024 05:28 AM    BASOPHILS 0.20 06/04/2024 05:28 AM      Lab Results   Component Value Date/Time    SODIUM 153 (H) 06/04/2024 05:28 AM    POTASSIUM 3.8 06/04/2024 05:28 AM    CHLORIDE 120 (H) 06/04/2024 05:28 AM    CO2 26 06/04/2024 05:28 AM    GLUCOSE 139 (H) 06/04/2024 05:28 AM    BUN 26 (H) 06/04/2024 05:28 AM    CREATININE 0.65 06/04/2024 05:28 AM    GLOMRATE 78 10/19/2023 09:26 AM      Lab Results   Component Value Date/Time    CHOLSTRLTOT 132 06/01/2024 04:35 AM    LDL see below 06/01/2024 04:35 AM    HDL 41 06/01/2024 04:35 AM    TRIGLYCERIDE 432 (H) 06/01/2024 04:35 AM     "   Lab Results   Component Value Date/Time    ALKPHOSPHAT 44 06/03/2024 05:48 AM    ASTSGOT 16 06/03/2024 05:48 AM    ALTSGPT 11 06/03/2024 05:48 AM    TBILIRUBIN 0.3 06/03/2024 05:48 AM        Imaging/Testing:    I interpreted and/or reviewed the patient's neuroimaging    CT-HEAD W/O   Final Result      1.  Advanced cerebral atrophy.   2.  Stable ventriculomegaly with intraventricular hemorrhage.   3.  Advanced supratentorial white matter disease most consistent with microvascular ischemic change.   4.  Multiple old lacunar infarcts as described.   5.  Acute/recent subacute parenchymal hemorrhage in the posterior fossa involving the superior cerebellar vermis and left paramedian cerebellar hemisphere and left lateral cerebellar peduncle. No evidence of recurrent hemorrhage.   6.  Minimal focus of subarachnoid hemorrhage within a single sulcus in the left posterior temporal region. Probably unchanged from prior recent exam.         DX-ABDOMEN FOR TUBE PLACEMENT   Final Result      Enteric tube tip projects over the stomach.      EC-ECHOCARDIOGRAM COMPLETE W/ CONT   Final Result      DX-CHEST-PORTABLE (1 VIEW)   Final Result         1.  Left basilar atelectasis or subtle infiltrate, decreased since prior study   2.  Trace left pleural effusion   3.  Enlargement of the aortic knob suggesting thoracic aortic aneurysm   4.  Atherosclerosis      MR-BRAIN-WITH & W/O   Final Result      1.  Stable fossa of parenchymal hemorrhage centered in the LEFT cerebellum and extending into the RIGHT cerebellum exerting mass effect on the fourth ventricle which is not completely effaced   2.  Intraventricular blood redemonstrated   3.  Numerous areas of remote microhemorrhage in the supra and infratentorial brain. These could be related to amyloid angiopathy, numerous hypertensive microhemorrhages or less likely multiple cavernomas   4.  Moderate diffuse atrophy without compelling evidence of hydrocephalus   5.  Advanced white matter  changes   6.  4 mm LEFT temporal meningioma   7.  Enhancing nodule in the RIGHT internal auditory canal suspicious for a vestibular schwannoma, less likely other extra-axial mass such as a meningioma, facial nerve schwannoma or metastasis. Consider posterior fossa protocol brain MRI without and with    contrast to further evaluate in when clinically appropriate.      MR-MRA HEAD-W/O   Final Result         MRA OF THE [place] WITHIN NORMAL LIMITS.      DX-CHEST-PORTABLE (1 VIEW)   Final Result         1.  Hazy left lower lobe infiltrate   2.  Small left pleural effusion   3.  Enlargement of the aortic knob, appearance suggesting aortic aneurysm.      DX-CHEST-FOR LINE PLACEMENT Perform procedure in: Patient's Room   Final Result      1.  PICC line is in place with the tip projecting over the SVC in satisfactory position.   2.  Mildly enlarged cardiac silhouette with vascular congestion.   3.  Retrocardiac opacity is likely due to atelectasis.         IR-PICC LINE PLACEMENT W/ GUIDANCE > AGE 5   Final Result                  Ultrasound-guided PICC placement performed by qualified nursing staff as    above.          CT-HEAD W/O   Final Result         1.  Left cerebellar hemorrhage, similar compared to prior study.   2.  Minimal bilateral intraventricular hemorrhages, new since prior study.   3.  Nonspecific white matter changes, commonly associated with small vessel ischemic disease.  Associated mild cerebral atrophy is noted.   4.  Atherosclerosis.         DX-CHEST-PORTABLE (1 VIEW)   Final Result         1.  Left basilar atelectasis, no focal infiltrate   2.  Trace left pleural effusion   3.  Atherosclerosis      CT-CTA NECK WITH & W/O-POST PROCESSING   Final Result         1.  Scattered atherosclerosis without significant stenosis or occlusion.   2.  4.2 cm proximal descending thoracic aortic aneurysm, radiographic follow-up and surveillance recommended as clinically appropriate.         CT-CTA HEAD WITH &  W/O-POST PROCESS   Final Result         1.  No large vessel occlusion or aneurysm identified   2.  Large cerebellar hemorrhage predominantly in the left cerebellum      These findings were discussed with the patient's clinician, Nikki Alexander, on 5/29/2024 11:35 PM.      DX-CHEST-PORTABLE (1 VIEW)   Final Result      Tubes as above      Left pleural effusion      CHF/pulmonary edema pattern      See Brown MD   5/30/2024 8:41 AM         CT-HEAD W/O   Final Result      Acute intraparenchymal hemorrhage is noted involving the left cerebellar hemisphere, measuring 3.1 x 4.2 x 3.4 cm, with associated mass effect.      This finding was telephoned to the mentioned attending by on-call radiologist at the time of imaging         AAST Intracranial Hemorrhage Brain Injury Guidelines         Hemorrhage type  mBIG 1           mBIG 2                mBIG 3      Subdural             <4mm               5-7.9 mm             >8mm      Epidural                No                    No                  Yes      Intraparenc'mal   <4mm               5-7.9 mm         >8mm or multi   1 location       or 2 locations      >3 locations      Subarachnoid     <3 sulci       single hemisphere   bi-hemisphere   and <1 mm        or 1-3 mm            or > 3 mm      Intraventricular       No                  No                    Yes      Skull Fracture       None            Non-displaced       Displaced               DLP Reporting Thresholds for Incorrect/Repeated Exams - DLP in mGy*cm   Head/Neck:  0-year-old 3840, 1-year-old 5880, 5-year-old 8770, 10-year-old 85037 and adult 36537   Head:  0-year-old 4540, 1-year-old 7460, 5-year-old 31912, 10-year-old 98762 and adult 39836   Neck:  0-year-old 2940, 1-year-old 4160, 5-year-old 4550, 10-year-old 6320 and adult 8470   Chest:  0-year-old 550, 1-year-old 830, 5-year-old 1200, 10-year-old 3840 and adult 3570   Abd/pelvis:  0-year-old 440, 1-year-old 720, 5-year-old 1080, 10-year-old 3330 and  adult 3330   Trunk(C/A/P):  0-year-old 490, 1-year-old 770, 5-year-old 1140, 10-year-old 3570 and adult 3330      OUTSIDE IMAGES-CT CERVICAL SPINE    (Results Pending)       Assessment and Plan:  Jl Mueller is a 78 year old man presenting with nausea, vomiting, found to have L cerebellar ICH.  MRI brain suggestive of amyloid angiopathy.  Repeat head CT on day 5 post-bleed without evidence of hydrocephalus.  Significant delirium requiring dex infusion.  Anticipate additional day of hypertonic therapy as he is now post-bleed day 6 and in peak edema window. Secondary prevention is namely strict BP control and avoidance of all antithrombotic therapy.    Problem list:   Left cerebellar ICH   Amyloid angiopathy    Plan:   - ok to transition to q4h neurochecks to help with delirium control   - STRICT SBP goal 100-140, currently at goal at current regimen   - continue 3% NaCl PRN for goal a 150-160, anticipate one additional day of therapy   - continue atorvastatin 40mg daily for statin neuroprotective effects in ICH   - no antithrombotic therapy, SCDs only indefinitely   - maintain normothermia, net even I/O, FSBS 140-180   - PT/OT/SLP as tolerated    The evaluation of the patient, and recommended management, was discussed with Dr. Vincenzo Moseley, ICU attending. I have performed a physical exam and reviewed and updated ROS and Plan today (6/4/2024).     Alfredito Devine MD  Vascular Neurology

## 2024-06-04 NOTE — DISCHARGE PLANNING
0811  DPA HARD FAXED REFERRAL :  Agency/Facility Name: [facility]  Fax#: [phone]  Time: 0811

## 2024-06-04 NOTE — CONSULTS
Physical Medicine and Rehabilitation Consultation              Date of initial consultation: 6/4/2024  Requesting provider: ordered by Sarina Euceda at 06/04/24 0709    Consulting provider: Ginna Harden D.O.  Reason for consultation: assess for acute inpatient rehab appropriateness  LOS: 6 Day(s)    Chief complaint: Headache and vomiting    HPI: The patient is a 78 y.o.  male with a past medical history of hypertension, CAD, hyperlipidemia, prior MI, AAA status post bypass grafting, COPD on 2 L O2 and 2 prior hemorrhagic strokes without deficits;  who presented on 5/29/2024 10:46 PM as a transfer from outside hospital with concern for stroke.  Per documentation, patient was eating dinner when he had a sudden headache and started vomiting.  Patient was originally seen at [place], patient required intubation at the outside hospital due to declining neurostatus.  Images obtained at the outside hospital showed a left cerebellar hemorrhage with mild Arsen hematoma edema.  Patient was transferred to [facility] for higher level of care  Upon evaluation at [facility] repeat imaging obtained showed a left cerebellar hemorrhage.  MRI brain obtained showed stable parenchymal hemorrhage in the left cerebellum extending into the right cerebellum with numerous areas of remote microhemorrhage suggestive of amyloid angiopathy.  Neurology and neurosurgery consulted, consulted patient requires hypertonic saline.  Neurosurgery recommending nonoperative management.  Patient's hospital course has been notable for encephalopathy, he required Precedex drip for agitation.  Patient requires strict BP control with SBP goal 100-140.      Patient seen and examined at bedside with nursing and patient's wife. Patient restless in bed. He does follow commands to squeeze fingers, but does not follow commands to open eyes and answer orientation questions. Patient is ordered to self. Full ROS unable to be obtained.     Social Hx:  Patient  lives with spouse in a two-story house with 1 stair to enter, 14 stairs to access upstairs.,  Wife can provide assistance  1 CHERYL  At prior level of function patient required assistance with mobility and ADLs, wife provided assistance with using a wheelchair in the community, patient uses a 4 wheeled walker at home.    Tobacco: Heavy tobacco use, smokes 1 to 3 packs of cigarettes per day since teen years  Alcohol: Occasional  Drugs: Denies    THERAPY:  Restrictions: Fall risk  PT: Functional mobility   6/3 total assist bed mobility, mod assist sit to stand, unable to participate in transfers    OT: ADLs  6/3 total assist upper body dressing, total assist lower body dressing, mod assist sit to stand, unable to participate in transfers    SLP:   6/3 patient was not able to participate in SLP due to not following commands    IMAGING:  CT-HEAD W/O  Narrative: 6/3/2024 1:30 PM    HISTORY/REASON FOR EXAM:  No Improvement in Neuro Exam/Mentation since prior imaging. Evaluate for Hydrocephalus.    TECHNIQUE/EXAM DESCRIPTION AND NUMBER OF VIEWS:  CT of the head without contrast.    The study was performed on a helical multidetector CT scanner. Contiguous axial sections were obtained from the skull base through the vertex.    Up to date radiation dose reduction adjustments have been utilized to meet ALARA standards for radiation dose reduction.    COMPARISON:  Head CT 5/30/2024    FINDINGS:  The calvaria are unremarkable.    There is pattern of advanced cerebral atrophy manifest as enlargement of sulcal markings and subarachnoid spaces as well as ventriculomegaly. Again seen is intraventricular hemorrhage layering dependently in the occipital horns. The ventricular system is   stable.    There is a tiny linear focus of subarachnoid hemorrhage within a single left posterior temporal sulcus (axial image 3, series 7). Probably present on prior exam (axial image 40, series 2 from 5/30/2024).    Advanced supratentorial white matter  disease with extensive hazy and confluent areas of white matter lucency in the supratentorial compartment most consistent with microvascular ischemic change.    There is an old lacunar infarct again seen along the left frontal corona radiata (axial image 14, series 7).    There is an old lacunar infarct in the right basal ganglia. There is an old lacunar infarct in the left posterior basal ganglia. There is an old lacunar infarct in the left thalamus.    In the posterior fossa, again seen is an acute/recent subacute parenchymal hemorrhage involving the superior cerebellar vermis and left paramedian cerebellar hemisphere extending across the left middle cerebellar peduncle. The hematoma measures about 50   mm x 19 mm x 13 mm and shows slightly decreased generalized fullness compared with the prior exam. No evidence of new/recurrent hemorrhage.    Paranasal sinuses now show complete opacification of the right sphenoid air cell. A nasogastric tube traverses the left nasal passageway.    Mastoids in the field of view are unremarkable.  Impression: 1.  Advanced cerebral atrophy.  2.  Stable ventriculomegaly with intraventricular hemorrhage.  3.  Advanced supratentorial white matter disease most consistent with microvascular ischemic change.  4.  Multiple old lacunar infarcts as described.  5.  Acute/recent subacute parenchymal hemorrhage in the posterior fossa involving the superior cerebellar vermis and left paramedian cerebellar hemisphere and left lateral cerebellar peduncle. No evidence of recurrent hemorrhage.  6.  Minimal focus of subarachnoid hemorrhage within a single sulcus in the left posterior temporal region. Probably unchanged from prior recent exam.        PROCEDURES:  None    PMH:  Past Medical History:   Diagnosis Date    Chronic obstructive pulmonary disease (HCC)     Hypertension     MI, old     Peptic ulcer     Prostate disorder     Stroke (HCC)        PSH:  Past Surgical History:   Procedure  "Laterality Date    AAA WITH STENT GRAFT         FHX:  No family history on file.    Medications:  Current Facility-Administered Medications   Medication Dose    magnesium sulfate IVPB premix 2 g  2 g    QUEtiapine (SEROquel) tablet 25 mg  25 mg    levalbuterol (Xopenex) 1.25 MG/3ML nebulizer solution 1.25 mg  1.25 mg    sodium chloride (Salt) tablet 2 g  2 g    3% sodium chloride (Hypertonic Saline) 500mL infusion  0-60 mL/hr    amLODIPine (Norvasc) tablet 5 mg  5 mg    labetalol (Normodyne/Trandate) injection 10 mg  10 mg    hydrALAZINE (Apresoline) injection 10 mg  10 mg    Pharmacy Consult: Enteral tube insertion - review meds/change route/product selection  1 Each    diazePAM (Valium) injection 5 mg  5 mg    insulin regular (HumuLIN R,NovoLIN R) injection  1-6 Units    And    dextrose 50% (D50W) injection 25 g  25 g    senna-docusate (Pericolace Or Senokot S) 8.6-50 MG per tablet 2 Tablet  2 Tablet    And    polyethylene glycol/lytes (Miralax) Packet 1 Packet  1 Packet    Respiratory Therapy Consult      MD Alert...ICU Electrolyte Replacement per Pharmacy      acetaminophen (Tylenol) tablet 650 mg  650 mg    Or    acetaminophen (Tylenol) suppository 650 mg  650 mg    ondansetron (Zofran ODT) dispertab 4 mg  4 mg    Or    ondansetron (Zofran) syringe/vial injection 4 mg  4 mg    albuterol (Proventil) 2.5mg/0.5ml nebulizer solution 2.5 mg  2.5 mg    fentaNYL (Sublimaze) injection 25-50 mcg  25-50 mcg    lansoprazole (Prevacid) solutab 30 mg  30 mg    mineral oil-pet hydrophilic (Aquaphor) ointment         Allergies:  Allergies   Allergen Reactions    Lisinopril      cough    Namenda [Memantine]      \"weakness\"    Rosuvastatin          Physical Exam:  Vitals: /68   Pulse 69   Temp 36.2 °C (97.1 °F) (Temporal)   Resp (!) 23   Ht 1.6 m (5' 2.99\")   Wt 69.4 kg (153 lb)   SpO2 97%   Gen: NAD, laying in bed, restless   Head:  NC/AT  Eyes/ Nose/ Mouth: PERRLA, moist mucous membranes  Cardio: RRR, good distal " perfusion, warm extremities  Pulm: normal respiratory effort, no cyanosis   Abd: Soft NTND, negative borborygmi   Ext: No peripheral edema. No calf tenderness. No clubbing. Soft restraints in place     Mental status: follows commands to squeeze hand   Speech: fluent, no aphasia or dysarthria    CRANIAL NERVES:  2,3: visual acuity grossly intact, PERRL  3,4,6: EOMI bilaterally, no nystagmus or diplopia  5: sensation intact to light touch bilaterally and symmetric  7: no facial asymmetry  8: hearing grossly intact    Motor: 5/5  strength b/l          Sensory:   intact to light touch through out    No clonus at bilateral ankles  Negative Reyes b/l     Tone: no spasticity noted    Coordination:   intact fine motor with fingers bilaterally      Labs: Reviewed and significant for   Recent Labs     06/02/24  0605 06/03/24  0548 06/04/24  0528   RBC 4.04* 3.84* 3.69*   HEMOGLOBIN 11.6* 11.0* 10.3*   HEMATOCRIT 36.0* 34.5* 34.2*   PLATELETCT 182 176 178     Recent Labs     06/03/24  1747 06/03/24  2341 06/04/24  0528   SODIUM 156* 155* 153*   POTASSIUM 3.7 3.8 3.8   CHLORIDE 120* 120* 120*   CO2 26 26 26   GLUCOSE 161* 146* 139*   BUN 21 24* 26*   CREATININE 0.58 0.65 0.65   CALCIUM 9.2 9.0 8.6     Recent Results (from the past 24 hour(s))   BLOOD CULTURE    Collection Time: 06/03/24 10:45 AM    Specimen: Peripheral; Blood   Result Value Ref Range    Significant Indicator NEG     Source BLD     Site Peripheral     Culture Result       No Growth  Note: Blood cultures are incubated for 5 days and  are monitored continuously.Positive blood cultures  are called to the RN and reported as soon as  they are identified.     BLOOD CULTURE    Collection Time: 06/03/24 11:00 AM    Specimen: Peripheral; Blood   Result Value Ref Range    Significant Indicator NEG     Source BLD     Site PERIPHERAL     Culture Result       No Growth  Note: Blood cultures are incubated for 5 days and  are monitored continuously.Positive blood  cultures  are called to the RN and reported as soon as  they are identified.     CRP Quantitive (Non-Cardiac)    Collection Time: 06/03/24 11:37 AM   Result Value Ref Range    Stat C-Reactive Protein 9.46 (H) 0.00 - 0.75 mg/dL   Prealbumin    Collection Time: 06/03/24 11:37 AM   Result Value Ref Range    Pre-Albumin 10.9 (L) 18.0 - 38.0 mg/dL   Basic Metabolic Panel    Collection Time: 06/03/24 11:37 AM   Result Value Ref Range    Sodium 152 (H) 135 - 145 mmol/L    Potassium 3.9 3.6 - 5.5 mmol/L    Chloride 114 (H) 96 - 112 mmol/L    Co2 30 20 - 33 mmol/L    Glucose 155 (H) 65 - 99 mg/dL    Bun 21 8 - 22 mg/dL    Creatinine 0.65 0.50 - 1.40 mg/dL    Calcium 9.0 8.5 - 10.5 mg/dL    Anion Gap 8.0 7.0 - 16.0   ESTIMATED GFR    Collection Time: 06/03/24 11:37 AM   Result Value Ref Range    GFR (CKD-EPI) 96 >60 mL/min/1.73 m 2   POCT glucose device results    Collection Time: 06/03/24 11:37 AM   Result Value Ref Range    POC Glucose, Blood 149 (H) 65 - 99 mg/dL   POCT glucose device results    Collection Time: 06/03/24  5:46 PM   Result Value Ref Range    POC Glucose, Blood 151 (H) 65 - 99 mg/dL   Basic Metabolic Panel    Collection Time: 06/03/24  5:47 PM   Result Value Ref Range    Sodium 156 (H) 135 - 145 mmol/L    Potassium 3.7 3.6 - 5.5 mmol/L    Chloride 120 (H) 96 - 112 mmol/L    Co2 26 20 - 33 mmol/L    Glucose 161 (H) 65 - 99 mg/dL    Bun 21 8 - 22 mg/dL    Creatinine 0.58 0.50 - 1.40 mg/dL    Calcium 9.2 8.5 - 10.5 mg/dL    Anion Gap 10.0 7.0 - 16.0   ESTIMATED GFR    Collection Time: 06/03/24  5:47 PM   Result Value Ref Range    GFR (CKD-EPI) 99 >60 mL/min/1.73 m 2   Basic Metabolic Panel    Collection Time: 06/03/24 11:41 PM   Result Value Ref Range    Sodium 155 (H) 135 - 145 mmol/L    Potassium 3.8 3.6 - 5.5 mmol/L    Chloride 120 (H) 96 - 112 mmol/L    Co2 26 20 - 33 mmol/L    Glucose 146 (H) 65 - 99 mg/dL    Bun 24 (H) 8 - 22 mg/dL    Creatinine 0.65 0.50 - 1.40 mg/dL    Calcium 9.0 8.5 - 10.5 mg/dL     Anion Gap 9.0 7.0 - 16.0   ESTIMATED GFR    Collection Time: 06/03/24 11:41 PM   Result Value Ref Range    GFR (CKD-EPI) 96 >60 mL/min/1.73 m 2   POCT glucose device results    Collection Time: 06/04/24 12:09 AM   Result Value Ref Range    POC Glucose, Blood 141 (H) 65 - 99 mg/dL   Magnesium    Collection Time: 06/04/24  5:28 AM   Result Value Ref Range    Magnesium 1.9 1.5 - 2.5 mg/dL   CBC with Differential    Collection Time: 06/04/24  5:28 AM   Result Value Ref Range    WBC 10.5 4.8 - 10.8 K/uL    RBC 3.69 (L) 4.70 - 6.10 M/uL    Hemoglobin 10.3 (L) 14.0 - 18.0 g/dL    Hematocrit 34.2 (L) 42.0 - 52.0 %    MCV 92.7 81.4 - 97.8 fL    MCH 27.9 27.0 - 33.0 pg    MCHC 30.1 (L) 32.3 - 36.5 g/dL    RDW 50.1 (H) 35.9 - 50.0 fL    Platelet Count 178 164 - 446 K/uL    MPV 12.1 9.0 - 12.9 fL    Neutrophils-Polys 81.60 (H) 44.00 - 72.00 %    Lymphocytes 6.40 (L) 22.00 - 41.00 %    Monocytes 7.20 0.00 - 13.40 %    Eosinophils 3.80 0.00 - 6.90 %    Basophils 0.20 0.00 - 1.80 %    Immature Granulocytes 0.80 0.00 - 0.90 %    Nucleated RBC 0.00 0.00 - 0.20 /100 WBC    Neutrophils (Absolute) 8.56 (H) 1.82 - 7.42 K/uL    Lymphs (Absolute) 0.67 (L) 1.00 - 4.80 K/uL    Monos (Absolute) 0.75 0.00 - 0.85 K/uL    Eos (Absolute) 0.40 0.00 - 0.51 K/uL    Baso (Absolute) 0.02 0.00 - 0.12 K/uL    Immature Granulocytes (abs) 0.08 0.00 - 0.11 K/uL    NRBC (Absolute) 0.00 K/uL   Basic Metabolic Panel    Collection Time: 06/04/24  5:28 AM   Result Value Ref Range    Sodium 153 (H) 135 - 145 mmol/L    Potassium 3.8 3.6 - 5.5 mmol/L    Chloride 120 (H) 96 - 112 mmol/L    Co2 26 20 - 33 mmol/L    Glucose 139 (H) 65 - 99 mg/dL    Bun 26 (H) 8 - 22 mg/dL    Creatinine 0.65 0.50 - 1.40 mg/dL    Calcium 8.6 8.5 - 10.5 mg/dL    Anion Gap 7.0 7.0 - 16.0   ESTIMATED GFR    Collection Time: 06/04/24  5:28 AM   Result Value Ref Range    GFR (CKD-EPI) 96 >60 mL/min/1.73 m 2   POCT glucose device results    Collection Time: 06/04/24  6:07 AM   Result Value  Ref Range    POC Glucose, Blood 134 (H) 65 - 99 mg/dL         ASSESSMENT:  Patient is a 78 y.o. male admitted with left cerebellar stroke    [facility] Code / Diagnosis to Support: 0001.2 - Stroke: Right Body Involvement (Left Brain)  See DISPO details below for recommendations on appropriate level of rehab for this diagnosis.    Barriers to transfer include: Insurance authorization, TCCs to verify disposition, medical clearance and bed availability     Assessment and Plan:  Left cerebellar hemorrhagic stroke  - Admitted with headache and vomiting  - CT head obtained at outside hospital showed evidence of a left cerebellar hemorrhage  -Transferred to [facility], neurology and neurosurgery consulted  - Nonoperative management per neurosurgery  - MRI brain obtained showed evidence of left cerebellar hemorrhage with extension into the right cerebellum and evidence of multiple remote microhemorrhages consistent with findings of amyloid angiopathy  - Patient is on strict BP restriction with SBP goal 100-140 per neurology  - Requires hypertonic saline therapy  - suspect patient is likely uncomfortable with symptoms of dizziness with movement and opening of eyes, if no improvement in restless with Seroquel, recommend trying scopolamine patch for cerebellar stroke symptoms.   - Continue with PT/OT/SLP    Dysphagia  - Has not been able to participate in SLP eval, remains n.p.o.  - Inability to participate due to confusion, poor alertness    COPD  - On 2 L O2 at baseline  - Was extubated on 6/1, has been weaned down to 3.5 L    Agitation  - Previously required Precedex drip for confusion and agitation  - Now transitioned to Seroquel 25 mg twice daily  - melatonin adding qhs to improve sleep     Hypertension  - Strict BP control with SBP goal 100-140  - On amlodipine 5 mg daily    DISPO:  - patient is currently functioning below their level of baseline, recommend post acute rehab  - is not a candidate for IRF level therapy due to poor  tolerance for therapy, unlikely to tolerate 3hrs of therapy 5 days per week   - recommend SNF for prolonged access to rehab   -  PM&R will sign off       Medical Complexity:  Left cerebellar hemorrhagic stroke  Dysphagia  COPD  Hypertension  Impaired mobility and ADLs      DVT PPX: SCDs      Thank you for allowing us to participate in the care of this patient.     Patient was seen for >80 minutes on unit/floor of which > 50% of time was spent on counseling and coordination of care regarding the above, including prognosis, risk reduction, benefits of treatment, and options for next stage of care.    Ginna Harden D.O.   Physical Medicine and Rehabilitation     Please note that this dictation was created using voice recognition software. I have made every reasonable attempt to correct obvious errors, but there may be errors of grammar and possibly content that I did not discover before finalizing the note.

## 2024-06-04 NOTE — CARE PLAN
The patient is Watcher - Medium risk of patient condition declining or worsening    Shift Goals  Clinical Goals: stable neuro, RASS -1 to +1, mobility  Patient Goals: unable to assess  Family Goals: rest, updates    Progress made toward(s) clinical / shift goals:       Problem: Safety - Medical Restraint  Goal: Remains free of injury from restraints (Restraint for Interference with Medical Device)  Outcome: Progressing  Flowsheets (Taken 6/3/2024 1824)  Addressed this shift: Remains free of injury from restraints (restraint for interference with medical device): Every 2 hours: Monitor safety, psychosocial status, comfort, nutrition and hydration     Problem: Pain - Standard  Goal: Alleviation of pain or a reduction in pain to the patient’s comfort goal  Outcome: Progressing     Problem: Skin Integrity  Goal: Skin integrity is maintained or improved  Outcome: Progressing     Problem: Fall Risk  Goal: Patient will remain free from falls  Outcome: Progressing     Problem: Neuro Status  Goal: Neuro status will remain stable or improve  Outcome: Progressing       Patient is not progressing towards the following goals:      Problem: Safety - Medical Restraint  Goal: Free from restraint(s) (Restraint for Interference with Medical Device)  Outcome: Not Progressing  Flowsheets (Taken 6/3/2024 1824)  Addressed this shift: Free from restraint(s) (restraint for interference with medical device):   Every 24 hours: Continued use of restraint requires Licensed Independent Practitioner to perform face to face examination and written order   ONCE/SHIFT or MINIMUM Every 12 hours: Assess and document the continuing need for restraints

## 2024-06-05 LAB
AMMONIA PLAS-SCNC: 21 UMOL/L (ref 11–45)
ANION GAP SERPL CALC-SCNC: 11 MMOL/L (ref 7–16)
ANION GAP SERPL CALC-SCNC: 12 MMOL/L (ref 7–16)
BASOPHILS # BLD AUTO: 0.5 % (ref 0–1.8)
BASOPHILS # BLD: 0.05 K/UL (ref 0–0.12)
BUN SERPL-MCNC: 33 MG/DL (ref 8–22)
BUN SERPL-MCNC: 33 MG/DL (ref 8–22)
CALCIUM SERPL-MCNC: 9.3 MG/DL (ref 8.5–10.5)
CALCIUM SERPL-MCNC: 9.4 MG/DL (ref 8.5–10.5)
CHLORIDE SERPL-SCNC: 118 MMOL/L (ref 96–112)
CHLORIDE SERPL-SCNC: 120 MMOL/L (ref 96–112)
CO2 SERPL-SCNC: 23 MMOL/L (ref 20–33)
CO2 SERPL-SCNC: 26 MMOL/L (ref 20–33)
CREAT SERPL-MCNC: 0.68 MG/DL (ref 0.5–1.4)
CREAT SERPL-MCNC: 0.72 MG/DL (ref 0.5–1.4)
EOSINOPHIL # BLD AUTO: 0.31 K/UL (ref 0–0.51)
EOSINOPHIL NFR BLD: 2.9 % (ref 0–6.9)
ERYTHROCYTE [DISTWIDTH] IN BLOOD BY AUTOMATED COUNT: 50.4 FL (ref 35.9–50)
GFR SERPLBLD CREATININE-BSD FMLA CKD-EPI: 93 ML/MIN/1.73 M 2
GFR SERPLBLD CREATININE-BSD FMLA CKD-EPI: 95 ML/MIN/1.73 M 2
GLUCOSE BLD STRIP.AUTO-MCNC: 129 MG/DL (ref 65–99)
GLUCOSE SERPL-MCNC: 142 MG/DL (ref 65–99)
GLUCOSE SERPL-MCNC: 156 MG/DL (ref 65–99)
HCT VFR BLD AUTO: 35.2 % (ref 42–52)
HGB BLD-MCNC: 10.6 G/DL (ref 14–18)
IMM GRANULOCYTES # BLD AUTO: 0.15 K/UL (ref 0–0.11)
IMM GRANULOCYTES NFR BLD AUTO: 1.4 % (ref 0–0.9)
LYMPHOCYTES # BLD AUTO: 0.53 K/UL (ref 1–4.8)
LYMPHOCYTES NFR BLD: 5 % (ref 22–41)
MAGNESIUM SERPL-MCNC: 2.3 MG/DL (ref 1.5–2.5)
MCH RBC QN AUTO: 27.5 PG (ref 27–33)
MCHC RBC AUTO-ENTMCNC: 30.1 G/DL (ref 32.3–36.5)
MCV RBC AUTO: 91.2 FL (ref 81.4–97.8)
MONOCYTES # BLD AUTO: 0.68 K/UL (ref 0–0.85)
MONOCYTES NFR BLD AUTO: 6.5 % (ref 0–13.4)
NEUTROPHILS # BLD AUTO: 8.8 K/UL (ref 1.82–7.42)
NEUTROPHILS NFR BLD: 83.7 % (ref 44–72)
NRBC # BLD AUTO: 0 K/UL
NRBC BLD-RTO: 0 /100 WBC (ref 0–0.2)
PLATELET # BLD AUTO: 187 K/UL (ref 164–446)
PMV BLD AUTO: 12.1 FL (ref 9–12.9)
POTASSIUM SERPL-SCNC: 3.9 MMOL/L (ref 3.6–5.5)
POTASSIUM SERPL-SCNC: 3.9 MMOL/L (ref 3.6–5.5)
RBC # BLD AUTO: 3.86 M/UL (ref 4.7–6.1)
SODIUM SERPL-SCNC: 154 MMOL/L (ref 135–145)
SODIUM SERPL-SCNC: 156 MMOL/L (ref 135–145)
TSH SERPL DL<=0.005 MIU/L-ACNC: 1.18 UIU/ML (ref 0.38–5.33)
WBC # BLD AUTO: 10.5 K/UL (ref 4.8–10.8)

## 2024-06-05 PROCEDURE — 80048 BASIC METABOLIC PNL TOTAL CA: CPT

## 2024-06-05 PROCEDURE — 99291 CRITICAL CARE FIRST HOUR: CPT | Mod: GC | Performed by: INTERNAL MEDICINE

## 2024-06-05 PROCEDURE — 97530 THERAPEUTIC ACTIVITIES: CPT

## 2024-06-05 PROCEDURE — A9270 NON-COVERED ITEM OR SERVICE: HCPCS | Performed by: PHYSICAL MEDICINE & REHABILITATION

## 2024-06-05 PROCEDURE — 700101 HCHG RX REV CODE 250: Performed by: NURSE PRACTITIONER

## 2024-06-05 PROCEDURE — A9270 NON-COVERED ITEM OR SERVICE: HCPCS | Performed by: INTERNAL MEDICINE

## 2024-06-05 PROCEDURE — 83735 ASSAY OF MAGNESIUM: CPT

## 2024-06-05 PROCEDURE — 700101 HCHG RX REV CODE 250: Performed by: INTERNAL MEDICINE

## 2024-06-05 PROCEDURE — 85025 COMPLETE CBC W/AUTO DIFF WBC: CPT

## 2024-06-05 PROCEDURE — 94669 MECHANICAL CHEST WALL OSCILL: CPT

## 2024-06-05 PROCEDURE — 82962 GLUCOSE BLOOD TEST: CPT

## 2024-06-05 PROCEDURE — 700102 HCHG RX REV CODE 250 W/ 637 OVERRIDE(OP): Performed by: INTERNAL MEDICINE

## 2024-06-05 PROCEDURE — 31720 CLEARANCE OF AIRWAYS: CPT

## 2024-06-05 PROCEDURE — 99233 SBSQ HOSP IP/OBS HIGH 50: CPT | Performed by: PSYCHIATRY & NEUROLOGY

## 2024-06-05 PROCEDURE — 700111 HCHG RX REV CODE 636 W/ 250 OVERRIDE (IP): Performed by: NURSE PRACTITIONER

## 2024-06-05 PROCEDURE — 84443 ASSAY THYROID STIM HORMONE: CPT

## 2024-06-05 PROCEDURE — 82140 ASSAY OF AMMONIA: CPT

## 2024-06-05 PROCEDURE — 770022 HCHG ROOM/CARE - ICU (200)

## 2024-06-05 PROCEDURE — 700102 HCHG RX REV CODE 250 W/ 637 OVERRIDE(OP): Performed by: PHYSICAL MEDICINE & REHABILITATION

## 2024-06-05 PROCEDURE — 700102 HCHG RX REV CODE 250 W/ 637 OVERRIDE(OP): Performed by: NURSE PRACTITIONER

## 2024-06-05 PROCEDURE — A9270 NON-COVERED ITEM OR SERVICE: HCPCS | Performed by: NURSE PRACTITIONER

## 2024-06-05 PROCEDURE — 94640 AIRWAY INHALATION TREATMENT: CPT

## 2024-06-05 RX ORDER — HALOPERIDOL 5 MG/ML
5 INJECTION INTRAMUSCULAR ONCE
Status: COMPLETED | OUTPATIENT
Start: 2024-06-05 | End: 2024-06-05

## 2024-06-05 RX ORDER — AMLODIPINE BESYLATE 10 MG/1
10 TABLET ORAL
Status: DISCONTINUED | OUTPATIENT
Start: 2024-06-06 | End: 2024-06-13

## 2024-06-05 RX ORDER — TRAZODONE HYDROCHLORIDE 100 MG/1
100 TABLET ORAL
Status: DISCONTINUED | OUTPATIENT
Start: 2024-06-05 | End: 2024-06-09

## 2024-06-05 RX ORDER — AMLODIPINE BESYLATE 5 MG/1
5 TABLET ORAL ONCE
Status: COMPLETED | OUTPATIENT
Start: 2024-06-05 | End: 2024-06-05

## 2024-06-05 RX ADMIN — LEVALBUTEROL 1.25 MG: 1.25 SOLUTION RESPIRATORY (INHALATION) at 01:16

## 2024-06-05 RX ADMIN — QUETIAPINE FUMARATE 25 MG: 25 TABLET ORAL at 05:29

## 2024-06-05 RX ADMIN — SENNOSIDES AND DOCUSATE SODIUM 2 TABLET: 50; 8.6 TABLET ORAL at 17:26

## 2024-06-05 RX ADMIN — ACETAMINOPHEN 650 MG: 325 TABLET, FILM COATED ORAL at 17:25

## 2024-06-05 RX ADMIN — OXYCODONE HYDROCHLORIDE 5 MG: 5 TABLET ORAL at 02:20

## 2024-06-05 RX ADMIN — AMLODIPINE BESYLATE 5 MG: 5 TABLET ORAL at 09:51

## 2024-06-05 RX ADMIN — QUETIAPINE FUMARATE 25 MG: 25 TABLET ORAL at 17:26

## 2024-06-05 RX ADMIN — HYDRALAZINE HYDROCHLORIDE 10 MG: 20 INJECTION, SOLUTION INTRAMUSCULAR; INTRAVENOUS at 03:04

## 2024-06-05 RX ADMIN — SODIUM CHLORIDE 2 G: 1 TABLET ORAL at 07:15

## 2024-06-05 RX ADMIN — TRAZODONE HYDROCHLORIDE 100 MG: 100 TABLET ORAL at 20:41

## 2024-06-05 RX ADMIN — Medication: at 05:29

## 2024-06-05 RX ADMIN — LABETALOL HYDROCHLORIDE 10 MG: 5 INJECTION, SOLUTION INTRAVENOUS at 13:07

## 2024-06-05 RX ADMIN — PRAVASTATIN SODIUM 40 MG: 20 TABLET ORAL at 17:26

## 2024-06-05 RX ADMIN — LABETALOL HYDROCHLORIDE 10 MG: 5 INJECTION, SOLUTION INTRAVENOUS at 00:07

## 2024-06-05 RX ADMIN — HALOPERIDOL LACTATE 5 MG: 5 INJECTION, SOLUTION INTRAMUSCULAR at 04:20

## 2024-06-05 RX ADMIN — LEVALBUTEROL 1.25 MG: 1.25 SOLUTION RESPIRATORY (INHALATION) at 06:15

## 2024-06-05 RX ADMIN — LABETALOL HYDROCHLORIDE 10 MG: 5 INJECTION, SOLUTION INTRAVENOUS at 10:12

## 2024-06-05 RX ADMIN — HYDRALAZINE HYDROCHLORIDE 10 MG: 20 INJECTION, SOLUTION INTRAMUSCULAR; INTRAVENOUS at 14:30

## 2024-06-05 RX ADMIN — Medication: at 17:26

## 2024-06-05 RX ADMIN — LEVALBUTEROL 1.25 MG: 1.25 SOLUTION RESPIRATORY (INHALATION) at 15:43

## 2024-06-05 RX ADMIN — FENTANYL CITRATE 50 MCG: 50 INJECTION, SOLUTION INTRAMUSCULAR; INTRAVENOUS at 01:10

## 2024-06-05 RX ADMIN — POLYETHYLENE GLYCOL 3350 1 PACKET: 17 POWDER, FOR SOLUTION ORAL at 17:27

## 2024-06-05 RX ADMIN — CEFTRIAXONE SODIUM 1000 MG: 10 INJECTION, POWDER, FOR SOLUTION INTRAVENOUS at 17:26

## 2024-06-05 RX ADMIN — AMLODIPINE BESYLATE 5 MG: 5 TABLET ORAL at 05:29

## 2024-06-05 RX ADMIN — LANSOPRAZOLE 30 MG: 30 TABLET, ORALLY DISINTEGRATING ORAL at 05:29

## 2024-06-05 RX ADMIN — LABETALOL HYDROCHLORIDE 10 MG: 5 INJECTION, SOLUTION INTRAVENOUS at 06:16

## 2024-06-05 RX ADMIN — LEVALBUTEROL 1.25 MG: 1.25 SOLUTION RESPIRATORY (INHALATION) at 18:24

## 2024-06-05 RX ADMIN — Medication 5 MG: at 20:41

## 2024-06-05 ASSESSMENT — COGNITIVE AND FUNCTIONAL STATUS - GENERAL
PERSONAL GROOMING: A LITTLE
TURNING FROM BACK TO SIDE WHILE IN FLAT BAD: A LITTLE
MOVING TO AND FROM BED TO CHAIR: A LOT
EATING MEALS: A LITTLE
DAILY ACTIVITIY SCORE: 14
TOILETING: A LOT
SUGGESTED CMS G CODE MODIFIER DAILY ACTIVITY: CK
DRESSING REGULAR LOWER BODY CLOTHING: A LOT
MOBILITY SCORE: 10
MOVING FROM LYING ON BACK TO SITTING ON SIDE OF FLAT BED: A LOT
HELP NEEDED FOR BATHING: A LOT
SUGGESTED CMS G CODE MODIFIER MOBILITY: CL
STANDING UP FROM CHAIR USING ARMS: TOTAL
CLIMB 3 TO 5 STEPS WITH RAILING: TOTAL
DRESSING REGULAR UPPER BODY CLOTHING: A LOT
WALKING IN HOSPITAL ROOM: TOTAL

## 2024-06-05 ASSESSMENT — PAIN DESCRIPTION - PAIN TYPE
TYPE: ACUTE PAIN

## 2024-06-05 ASSESSMENT — GAIT ASSESSMENTS
DISTANCE (FEET): 3
ASSISTIVE DEVICE: HAND HELD ASSIST
GAIT LEVEL OF ASSIST: MAXIMAL ASSIST

## 2024-06-05 NOTE — CARE PLAN
Problem: Bronchopulmonary Hygiene  Goal: Increase mobilization of retained secretions  Description: Target End Date:  2 to 3 days    1.  Perform bronchopulmonary therapy as indicated by assessment  2.  Perform airway suctioning  3.  Perform actions to maintain patient airway  Outcome: Not Met  CPT vest QID     Problem: Bronchoconstriction  Goal: Improve in air movement and diminished wheezing  Description: Target End Date:  2 to 3 days    1.  Implement inhaled treatments  2.  Evaluate and manage medication effects  Outcome: Progressing   Xopenex Q6

## 2024-06-05 NOTE — THERAPY
Occupational Therapy  Daily Treatment     Patient Name: Jl Mueller  Age:  78 y.o., Sex:  male  Medical Record #: [id number]  Today's Date: 6/5/2024     Precautions  Precautions: Fall Risk, Swallow Precautions, Nasogastric Tube  Comments: baseline 2L of O2. Hx of prior CVA with L-sided deficits    Assessment     Pt currently limited by decreased functional mobility, activity tolerance, cognition, sensation, strength, AROM, coordination, balance, and pain which are affecting pt's ability to complete ADLs/IADLs at baseline. Pt would benefit from OT services in the acute care setting to maximize functional recovery.      Plan    Treatment Plan Status: (P) Continue Current Treatment Plan  Type of Treatment: Self Care / Activities of Daily Living, Therapeutic Activity  Treatment Frequency: 3 Times per Week  Treatment Duration: Until Therapy Goals Met       Discharge Recommendations: (P) Recommend post-acute placement for additional occupational therapy services prior to discharge home         06/05/24 1057   Cognition    Speech/ Communication Expressive Aphasia   Orientation Level Not Oriented to Reason;Not Oriented to Place;Not Oriented to Time   Ability To Follow Commands Unable to Follow 1 Step Commands  (intermitantly following 20%)   Safety Awareness Impaired;Impulsive   New Learning Impaired   Attention Impaired   Sequencing Impaired   Initiation Impaired   Activities of Daily Living   Grooming Moderate Assist   Upper Body Dressing Moderate Assist   Lower Body Dressing Maximal Assist   Toileting Maximal Assist   Functional Mobility   Sit to Stand Moderate Assist   Bed, Chair, Wheelchair Transfer Moderate Assist   Short Term Goals   Short Term Goal # 1 SBA with grooming tasks   Goal Outcome # 1 Progressing as expected   Short Term Goal # 2 min A with UB dressing   Goal Outcome # 2 Progressing as expected   Short Term Goal # 3 mod A with LB dressing   Goal Outcome # 3 Progressing as expected   Occupational Therapy  Treatment Plan    O.T. Treatment Plan Continue Current Treatment Plan   Anticipated Discharge Equipment and Recommendations   Discharge Recommendations Recommend post-acute placement for additional occupational therapy services prior to discharge home

## 2024-06-05 NOTE — PROGRESS NOTES
Neurology Progress Note  Neurohospitalist Service, [facility]    Referring Physician: Reyes Moseley Jr., D.O.    Chief Complaint   Patient presents with    Possible Stroke     Pt transferred from [place] via Care flight for hemorrhagic CVA. LKW 2030. Pt was eating dinner, and had sudden headache and started vomiting. Pt originally arrived to [place] via EMS with N/V, GCS 11. Pt was intubated at [place]       HPI: Refer to initial documented Neurology H&P, as detailed in the patient's chart.    Interval History 6/5: No new events overnight.    Review of systems: In addition to what is detailed in the HPI and/or updated in the interval history, all other systems reviewed and are negative.    Past Medical History:    has a past medical history of Chronic obstructive pulmonary disease (HCC), Hypertension, MI, old, Peptic ulcer, Prostate disorder, and Stroke (HCC).    FHx:  family history is not on file.    SHx:   reports that he does not currently use alcohol. He reports that he does not currently use drugs.    Medications:    Current Facility-Administered Medications:     QUEtiapine (SEROquel) tablet 25 mg, 25 mg, Enteral Tube, BID, FABIO Romero Jr..O., 25 mg at 06/05/24 0529    melatonin tablet 5 mg, 5 mg, Enteral Tube, Nightly, Ginna Harden D.O., 5 mg at 06/04/24 2040    pravastatin (Pravachol) tablet 40 mg, 40 mg, Enteral Tube, Q EVENING, FABIO Romero Jr..O., 40 mg at 06/04/24 1707    levalbuterol (Xopenex) 1.25 MG/3ML nebulizer solution 1.25 mg, 1.25 mg, Nebulization, Q6HRS (RT), FABIO Romero Jr..O., 1.25 mg at 06/05/24 0615    cefTRIAXone (Rocephin) syringe 1,000 mg, 1,000 mg, Intravenous, Q24HRS, Sarina RODRIGUEZ Latona, 1,000 mg at 06/04/24 2204    oxyCODONE immediate-release (Roxicodone) tablet 5 mg, 5 mg, Enteral Tube, Q4HRS PRN, Sarina RODRIGUEZ Latona, 5 mg at 06/05/24 0220    fentaNYL (Sublimaze) injection 25-50 mcg, 25-50 mcg, Intravenous, Q4HRS PRN, Sarina  JENNIFER Euceda, 50 mcg at 06/05/24 0110    levalbuterol (Xopenex) 1.25 MG/3ML nebulizer solution 1.25 mg, 1.25 mg, Nebulization, Q2HRS PRN (RT), Reyes Gray M.D., 1.25 mg at 06/04/24 1040    sodium chloride (Salt) tablet 2 g, 2 g, Enteral Tube, TID WITH MEALS, FABIO Romero Jr..KALEIGH, 2 g at 06/05/24 0715    3% sodium chloride (Hypertonic Saline) 500mL infusion, 0-60 mL/hr, Intravenous, Continuous, Reyes Moseley Jr., D.O., Paused at 06/03/24 1831    amLODIPine (Norvasc) tablet 5 mg, 5 mg, Enteral Tube, Q DAY, Reyes Gray M.D., 5 mg at 06/05/24 0529    labetalol (Normodyne/Trandate) injection 10 mg, 10 mg, Intravenous, Q3HRS PRN, Sarina Euceda, 10 mg at 06/05/24 0616    hydrALAZINE (Apresoline) injection 10 mg, 10 mg, Intravenous, Q3HRS PRN, Sarina Euceda, 10 mg at 06/05/24 0304    Pharmacy Consult: Enteral tube insertion - review meds/change route/product selection, 1 Each, Other, PHARMACY TO DOSE, Reyes Gray M.D.    diazePAM (Valium) injection 5 mg, 5 mg, Intravenous, Q6HRS PRN, Sarina Euceda    insulin regular (HumuLIN R,NovoLIN R) injection, 1-6 Units, Subcutaneous, Q6HRS, 1 Units at 06/04/24 1211 **AND** POC blood glucose manual result, , , Q6H **AND** NOTIFY MD and PharmD, , , Once **AND** Administer 20 grams of glucose (approximately 8 ounces of fruit juice) every 15 minutes PRN FSBG less than 70 mg/dL, , , PRN **AND** dextrose 50% (D50W) injection 25 g, 25 g, Intravenous, Q15 MIN PRN, Sarina Euceda, 25 g at 05/31/24 1902    senna-docusate (Pericolace Or Senokot S) 8.6-50 MG per tablet 2 Tablet, 2 Tablet, Enteral Tube, Q EVENING, 2 Tablet at 06/04/24 1707 **AND** polyethylene glycol/lytes (Miralax) Packet 1 Packet, 1 Packet, Enteral Tube, QDAY PRN, Sarina Euceda    Respiratory Therapy Consult, , Nebulization, Continuous RT, Sarina Euceda MD Alert...ICU Electrolyte Replacement per Pharmacy, , Other, PHARMACY TO DOSE, Sarina Euceda    acetaminophen (Tylenol) tablet 650 mg, 650 mg,  Enteral Tube, Q4HRS PRN, 650 mg at 06/04/24 1933 **OR** acetaminophen (Tylenol) suppository 650 mg, 650 mg, Rectal, Q4HRS PRN, Sarina Euceda    ondansetron (Zofran ODT) dispertab 4 mg, 4 mg, Enteral Tube, Q4HRS PRN **OR** ondansetron (Zofran) syringe/vial injection 4 mg, 4 mg, Intravenous, Q4HRS PRN, Sarina Euceda    lansoprazole (Prevacid) solutab 30 mg, 30 mg, Enteral Tube, DAILY, Reyes Gray M.D., 30 mg at 06/05/24 0529    mineral oil-pet hydrophilic (Aquaphor) ointment, , Topical, BID, Reyes Gray M.D., Given at 06/05/24 0529    Physical Examination:     Vitals:    06/05/24 0500 06/05/24 0600 06/05/24 0624 06/05/24 0700   BP: 135/63 (!) 148/81  (!) 140/73   Pulse: 91 91 93 (!) 102   Resp: (!) 22 20 (!) 26 (!) 26   Temp:       TempSrc:  Bladder  Bladder   SpO2: 100% 99% 95% 94%   Weight:       Height:               NEUROLOGICAL EXAM:     Patient is awake alert eyes are open however he is somewhat combative irritable.  Not following commands.  Pupils equal reactive.  Midline.  No gaze preference.  Blinks to threat to both sides.  Tracks in the room.  Face metrical.  Spontaneous antigravity movement in all 4.  Reactive tactile touch in all 4.    Objective Data:    Labs:  Lab Results   Component Value Date/Time    PROTHROMBTM 13.6 05/29/2024 10:49 PM    INR 1.03 05/29/2024 10:49 PM      Lab Results   Component Value Date/Time    WBC 10.5 06/05/2024 05:30 AM    RBC 3.86 (L) 06/05/2024 05:30 AM    HEMOGLOBIN 10.6 (L) 06/05/2024 05:30 AM    HEMATOCRIT 35.2 (L) 06/05/2024 05:30 AM    MCV 91.2 06/05/2024 05:30 AM    MCH 27.5 06/05/2024 05:30 AM    MCHC 30.1 (L) 06/05/2024 05:30 AM    MPV 12.1 06/05/2024 05:30 AM    NEUTSPOLYS 83.70 (H) 06/05/2024 05:30 AM    LYMPHOCYTES 5.00 (L) 06/05/2024 05:30 AM    MONOCYTES 6.50 06/05/2024 05:30 AM    EOSINOPHILS 2.90 06/05/2024 05:30 AM    BASOPHILS 0.50 06/05/2024 05:30 AM      Lab Results   Component Value Date/Time    SODIUM 154 (H) 06/05/2024 05:30 AM    POTASSIUM  3.9 06/05/2024 05:30 AM    CHLORIDE 120 (H) 06/05/2024 05:30 AM    CO2 23 06/05/2024 05:30 AM    GLUCOSE 142 (H) 06/05/2024 05:30 AM    BUN 33 (H) 06/05/2024 05:30 AM    CREATININE 0.68 06/05/2024 05:30 AM    GLOMRATE 78 10/19/2023 09:26 AM      Lab Results   Component Value Date/Time    CHOLSTRLTOT 132 06/01/2024 04:35 AM    LDL see below 06/01/2024 04:35 AM    HDL 41 06/01/2024 04:35 AM    TRIGLYCERIDE 432 (H) 06/01/2024 04:35 AM       Lab Results   Component Value Date/Time    ALKPHOSPHAT 44 06/03/2024 05:48 AM    ASTSGOT 16 06/03/2024 05:48 AM    ALTSGPT 11 06/03/2024 05:48 AM    TBILIRUBIN 0.3 06/03/2024 05:48 AM        Imaging/Testing:  DX-CHEST-LIMITED (1 VIEW)   Final Result      Mild interstitial prominence could represent vascular congestion.      Cardiomegaly.      Removal of endotracheal tube.      PU-OWRROFP-5 VIEW   Final Result      Feeding tube tip projects in the second portion of the duodenum.      Nonspecific bowel gas pattern.      CT-HEAD W/O   Final Result      1.  Advanced cerebral atrophy.   2.  Stable ventriculomegaly with intraventricular hemorrhage.   3.  Advanced supratentorial white matter disease most consistent with microvascular ischemic change.   4.  Multiple old lacunar infarcts as described.   5.  Acute/recent subacute parenchymal hemorrhage in the posterior fossa involving the superior cerebellar vermis and left paramedian cerebellar hemisphere and left lateral cerebellar peduncle. No evidence of recurrent hemorrhage.   6.  Minimal focus of subarachnoid hemorrhage within a single sulcus in the left posterior temporal region. Probably unchanged from prior recent exam.         DX-ABDOMEN FOR TUBE PLACEMENT   Final Result      Enteric tube tip projects over the stomach.      EC-ECHOCARDIOGRAM COMPLETE W/ CONT   Final Result      DX-CHEST-PORTABLE (1 VIEW)   Final Result         1.  Left basilar atelectasis or subtle infiltrate, decreased since prior study   2.  Trace left pleural  effusion   3.  Enlargement of the aortic knob suggesting thoracic aortic aneurysm   4.  Atherosclerosis      MR-BRAIN-WITH & W/O   Final Result      1.  Stable fossa of parenchymal hemorrhage centered in the LEFT cerebellum and extending into the RIGHT cerebellum exerting mass effect on the fourth ventricle which is not completely effaced   2.  Intraventricular blood redemonstrated   3.  Numerous areas of remote microhemorrhage in the supra and infratentorial brain. These could be related to amyloid angiopathy, numerous hypertensive microhemorrhages or less likely multiple cavernomas   4.  Moderate diffuse atrophy without compelling evidence of hydrocephalus   5.  Advanced white matter changes   6.  4 mm LEFT temporal meningioma   7.  Enhancing nodule in the RIGHT internal auditory canal suspicious for a vestibular schwannoma, less likely other extra-axial mass such as a meningioma, facial nerve schwannoma or metastasis. Consider posterior fossa protocol brain MRI without and with    contrast to further evaluate in when clinically appropriate.      MR-MRA HEAD-W/O   Final Result         MRA OF THE [place] OF [place] WITHIN NORMAL LIMITS.      DX-CHEST-PORTABLE (1 VIEW)   Final Result         1.  Hazy left lower lobe infiltrate   2.  Small left pleural effusion   3.  Enlargement of the aortic knob, appearance suggesting aortic aneurysm.      DX-CHEST-FOR LINE PLACEMENT Perform procedure in: Patient's Room   Final Result      1.  PICC line is in place with the tip projecting over the SVC in satisfactory position.   2.  Mildly enlarged cardiac silhouette with vascular congestion.   3.  Retrocardiac opacity is likely due to atelectasis.         IR-PICC LINE PLACEMENT W/ GUIDANCE > AGE 5   Final Result                  Ultrasound-guided PICC placement performed by qualified nursing staff as    above.          CT-HEAD W/O   Final Result         1.  Left cerebellar hemorrhage, similar compared to prior study.   2.  Minimal  bilateral intraventricular hemorrhages, new since prior study.   3.  Nonspecific white matter changes, commonly associated with small vessel ischemic disease.  Associated mild cerebral atrophy is noted.   4.  Atherosclerosis.         DX-CHEST-PORTABLE (1 VIEW)   Final Result         1.  Left basilar atelectasis, no focal infiltrate   2.  Trace left pleural effusion   3.  Atherosclerosis      CT-CTA NECK WITH & W/O-POST PROCESSING   Final Result         1.  Scattered atherosclerosis without significant stenosis or occlusion.   2.  4.2 cm proximal descending thoracic aortic aneurysm, radiographic follow-up and surveillance recommended as clinically appropriate.         CT-CTA HEAD WITH & W/O-POST PROCESS   Final Result         1.  No large vessel occlusion or aneurysm identified   2.  Large cerebellar hemorrhage predominantly in the left cerebellum      These findings were discussed with the patient's clinician, Nikki Alexander, on 5/29/2024 11:35 PM.      DX-CHEST-PORTABLE (1 VIEW)   Final Result      Tubes as above      Left pleural effusion      CHF/pulmonary edema pattern      See Brown MD   5/30/2024 8:41 AM         CT-HEAD W/O   Final Result      Acute intraparenchymal hemorrhage is noted involving the left cerebellar hemisphere, measuring 3.1 x 4.2 x 3.4 cm, with associated mass effect.      This finding was telephoned to the mentioned attending by on-call radiologist at the time of imaging         AAST Intracranial Hemorrhage Brain Injury Guidelines         Hemorrhage type  mBIG 1           mBIG 2                mBIG 3      Subdural             <4mm               5-7.9 mm             >8mm      Epidural                No                    No                  Yes      Intraparenc'mal   <4mm               5-7.9 mm         >8mm or multi   1 location       or 2 locations      >3 locations      Subarachnoid     <3 sulci       single hemisphere   bi-hemisphere   and <1 mm        or 1-3 mm            or > 3 mm       Intraventricular       No                  No                    Yes      Skull Fracture       None            Non-displaced       Displaced               DLP Reporting Thresholds for Incorrect/Repeated Exams - DLP in mGy*cm   Head/Neck:  0-year-old 3840, 1-year-old 5880, 5-year-old 8770, 10-year-old 11009 and adult 94245   Head:  0-year-old 4540, 1-year-old 7460, 5-year-old 37239, 10-year-old 52747 and adult 35748   Neck:  0-year-old 2940, 1-year-old 4160, 5-year-old 4550, 10-year-old 6320 and adult 8470   Chest:  0-year-old 550, 1-year-old 830, 5-year-old 1200, 10-year-old 3840 and adult 3570   Abd/pelvis:  0-year-old 440, 1-year-old 720, 5-year-old 1080, 10-year-old 3330 and adult 3330   Trunk(C/A/P):  0-year-old 490, 1-year-old 770, 5-year-old 1140, 10-year-old 3570 and adult 3330      OUTSIDE IMAGES-CT CERVICAL SPINE    (Results Pending)         Assessment and Plan:    78-year-old male presents with nausea vomiting found to have a left cerebellar ICH.  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  Post bleed day number 7.  Currently sodium at goal of 150.  Will start weaning off hypertonic therapies.  Slowly correcting sodium no more than 2-3 point drop per day.  Significant amount of delirium is unclear of the etiology this time.  Would not not expect this type of ICH location because much delirium.    Plan:  1.  Every 4 hours neurochecks  2.  Strict blood pressure goals between 100- 140 systolic.  3.  Okay to start slowly correcting sodium no more than 2 to 3 drop point per day  4.  Continue statin 40 mg daily  5.  SCDs for DVT Phosex only.  No antithrombotic or antiplatelet therapy indefinitely.  6.  PT and OT as tolerated.      Spent over 55 minutes reviewing CT scans, MRI, physician notes, examined the patient and going the care plan with primary team.      This chart was partially generated using voice recognition technology and sound alike word replacement may be present, best efforts were made to  make the chart accurate.    Alfredito Lerner MD  Board Certified Neurology, ABPN  t) [phone]

## 2024-06-05 NOTE — THERAPY
Physical Therapy   Daily Treatment     Patient Name: Jl Mueller  Age:  78 y.o., Sex:  male  Medical Record #: [id number]  Today's Date: 6/5/2024     Precautions  Precautions: Fall Risk;Swallow Precautions;Nasogastric Tube  Comments: hx of left sided deficits    Assessment  Pt continues with delirium, per wife does not smoke or use substances at home; currently following no commands on testing but recognized his wife in the room and scooted back in chair without repetitive cues; able to take some steps; ataxic with reaching L>R less ataxia noted in legs; given report of amyloid finding as well as noted CM conversation with wife re: goals of care never being addressed, may benefit from palliative care consult for delineation of goals; physical progress is guarded given delirium hopeful for improvement given independence prior to admit; will progress as able     Plan    Treatment Plan Status: Continue Current Treatment Plan  Type of Treatment: Bed Mobility, Equipment, Neuro Re-Education / Balance, Self Care / Home Evaluation, Stair Training, Therapeutic Activities, Therapeutic Exercise, Gait Training, Family / Caregiver Training  Treatment Frequency: 4 Times per Week  Treatment Duration: Until Therapy Goals Met    DC Equipment Recommendations: Unable to determine at this time  Discharge Recommendations: Recommend post-acute placement for additional physical therapy services prior to discharge home      Abridged Subjective/Objective     06/05/24 1050   Cognition    Cognition / Consciousness X   Level of Consciousness Alert   Ability To Follow Commands Unable to Follow 1 Step Commands   Safety Awareness Impaired;Impulsive   New Learning Impaired   Attention Impaired   Comments cooperative; wide eyed; appeared to recognize his wife; very wet cough and no resposne to request to cough; closing eyes intermittently and appeasr very agitated overall; wife reports he does not drink or smoke at home;   Balance   Sitting  Balance (Static) Fair -   Sitting Balance (Dynamic) Poor +   Standing Balance (Static) Poor   Standing Balance (Dynamic) Poor -   Weight Shift Sitting Fair   Weight Shift Standing Poor   Skilled Intervention Postural Facilitation;Sequencing;Tactile Cuing;Other (See Comments)   Comments facilitation of hip extension into uprigth and hand holds as he is ataxic wiht arms/reaching; L>R   Bed Mobility    Supine to Sit Maximal Assist   Sit to Supine   (NT, in cardiac chair post)   Gait Analysis   Gait Level Of Assist Maximal Assist   Assistive Device Hand Held Assist   Distance (Feet) 3   # of Times Distance was Traveled 1   Deviation Decreased Base Of Support;Step To;Ataxic   Vision Deficits Impacting Mobility unclear   Skilled Intervention Sequencing;Postural Facilitation;Facilitation   Comments B UE support in HHA due to ataxia and reaching; minimal bradykinetic steps requiring facilitation of flexion   Functional Mobility   Sit to Stand Moderate Assist  (of 2 with HHA)   Bed, Chair, Wheelchair Transfer Moderate Assist  (to max of 2 into cardiac chair)   Short Term Goals    Short Term Goal # 1 Pt will follow 100% of function based commands within 6 visits to ensure participation in PT sessions.   Goal Outcome # 1 goal not met   Short Term Goal # 2 Pt will perform supine<>sit from flat HOB/no railing with min A within 6 visits to ensure independent mobility at home.   Goal Outcome # 2 Goal not met   Short Term Goal # 3 Pt will perform squat pivot transfer with min A within 6 visits to incresae OOB timing.   Goal Outcome # 3 Goal not met   Short Term Goal # 4 Pt will ambulate x 15ft in // bars with min A within 6 visits to progress ambulation tolerance.   Goal Outcome # 4 Goal not met

## 2024-06-05 NOTE — DISCHARGE PLANNING
CM reviewed chart and patient was discussed in IDT rounds.  I let the team and Avril know that he had been accepted to [facility].    Avril told me that she does not like [facility], she just said that b/c of not having to drive.    CM will obtain choice.

## 2024-06-05 NOTE — DISCHARGE PLANNING
"1420  CM obtained Choice for SNF, #1 [facility].  Avril did not make any other choices as she shared with me that she wants him close to her.    While at the bedside, Yuniel started saying, very clear that \"I want to die, I want to die\"  Avril asked him not to say 'things like that'.    As we visited he does direct eye contact to Avril.  He is working to breathe and constantly moving arms and pulling at restraints.    ANABELA went to bedside and asked Yuniel what his name was and he said Yuniel Mueller.  When asked what month it was, he said May.  I reoriented him and when Avril approached the chair, he again repeated, 'I want to die, I want to die'.    Avril became tearful and said she did not know what to do.  She shared with me that Yuniel has always told her he was afraid to die.  He would not even set up POA papers b/c he did not want to talk about it.    Avril is requesting access to My Chart for a couple of reasons.  She tells me she is being updated on My Chart and her daughter that was here, is wanting to be able to follow and support her Mom.     My Chart customer service called and ANABELA requested assistance for Avril to be able to access My Chart.  Avril is the person making decisions on his behalf.      Yuniel is a retired  and would never discuss anything about dying other than to say he was afraid of dying.  They do not have POA papers for any healthcare or financial purposes she tells me.    1448 CM faxed the Choice form to [facility].  "

## 2024-06-05 NOTE — PROGRESS NOTES
0315 - patient noted to be agitated, throwing legs over side rail of bed and attempting to pull at leung.  Patient repositioned and explained safety of not pulling at lines or trying to get out of bed.     PRABHU Euceda notified of agitation at 0318.

## 2024-06-05 NOTE — CARE PLAN
Problem: Bronchoconstriction  Goal: Improve in air movement and diminished wheezing  Description: Target End Date:  2 to 3 days    1.  Implement inhaled treatments  2.  Evaluate and manage medication effects  Outcome: Progressing   Xopenex Q6

## 2024-06-05 NOTE — CARE PLAN
The patient is Watcher - Medium risk of patient condition declining or worsening    Shift Goals  Clinical Goals: stable or improved neuro exam, -140, Q6 NA, enforce normal sleep/wake cycles  Patient Goals: GUZMAN  Family Goals: Updates, promote sleep    Progress made toward(s) clinical / shift goals:        Problem: Safety - Medical Restraint  Goal: Remains free of injury from restraints (Restraint for Interference with Medical Device)  Outcome: Progressing  Flowsheets (Taken 6/5/2024 0313)  Addressed this shift: Remains free of injury from restraints (restraint for interference with medical device):   Determine that other, less restrictive measures have been tried or would not be effective before applying the restraint   Evaluate the patient's condition at the time of restraint application   Inform patient/family regarding the reason for restraint   Every 2 hours: Monitor safety, psychosocial status, comfort, nutrition and hydration  Goal: Free from restraint(s) (Restraint for Interference with Medical Device)  Outcome: Progressing  Flowsheets (Taken 6/5/2024 0313)  Addressed this shift: Free from restraint(s) (restraint for interference with medical device):   ONCE/SHIFT or MINIMUM Every 12 hours: Assess and document the continuing need for restraints   Every 24 hours: Continued use of restraint requires Licensed Independent Practitioner to perform face to face examination and written order   Identify and implement measures to help patient regain control     Problem: Skin Integrity  Goal: Skin integrity is maintained or improved  Outcome: Progressing       Patient is not progressing towards the following goals:      Problem: Knowledge Deficit - Standard  Goal: Patient and family/care givers will demonstrate understanding of plan of care, disease process/condition, diagnostic tests and medications  Outcome: Not Progressing     Problem: Pain - Standard  Goal: Alleviation of pain or a reduction in pain to the  patient’s comfort goal  Outcome: Not Progressing     Problem: Psychosocial  Goal: Patient's level of anxiety will decrease  Outcome: Not Progressing     Problem: Hemodynamics  Goal: Patient's hemodynamics, fluid balance and neurologic status will be stable or improve  Outcome: Not Progressing     Problem: Neuro Status  Goal: Neuro status will remain stable or improve  Outcome: Not Progressing

## 2024-06-05 NOTE — CARE PLAN
The patient is Watcher - Medium risk of patient condition declining or worsening    Shift Goals  Clinical Goals: enforce sleep/wake cycle, neuro checks, trend Na  Patient Goals: GUZMAN  Family Goals: manage patient agitation    Progress made toward(s) clinical / shift goals:       Problem: Safety - Medical Restraint  Goal: Remains free of injury from restraints (Restraint for Interference with Medical Device)  Outcome: Progressing     Problem: Pain - Standard  Goal: Alleviation of pain or a reduction in pain to the patient’s comfort goal  Outcome: Progressing     Problem: Skin Integrity  Goal: Skin integrity is maintained or improved  Outcome: Progressing     Problem: Fall Risk  Goal: Patient will remain free from falls  Outcome: Progressing     Problem: Neuro Status  Goal: Neuro status will remain stable or improve  Outcome: Progressing       Patient is not progressing towards the following goals:      Problem: Safety - Medical Restraint  Goal: Free from restraint(s) (Restraint for Interference with Medical Device)  Outcome: Not Progressing

## 2024-06-06 LAB
ANION GAP SERPL CALC-SCNC: 14 MMOL/L (ref 7–16)
ANION GAP SERPL CALC-SCNC: 9 MMOL/L (ref 7–16)
BASE EXCESS BLDA CALC-SCNC: 4 MMOL/L (ref -4–3)
BASOPHILS # BLD AUTO: 0.3 % (ref 0–1.8)
BASOPHILS # BLD: 0.03 K/UL (ref 0–0.12)
BODY TEMPERATURE: ABNORMAL DEGREES
BUN SERPL-MCNC: 30 MG/DL (ref 8–22)
BUN SERPL-MCNC: 32 MG/DL (ref 8–22)
CALCIUM SERPL-MCNC: 8.4 MG/DL (ref 8.5–10.5)
CALCIUM SERPL-MCNC: 9.3 MG/DL (ref 8.5–10.5)
CHLORIDE SERPL-SCNC: 119 MMOL/L (ref 96–112)
CHLORIDE SERPL-SCNC: 120 MMOL/L (ref 96–112)
CO2 BLDA-SCNC: 31 MMOL/L (ref 20–33)
CO2 SERPL-SCNC: 23 MMOL/L (ref 20–33)
CO2 SERPL-SCNC: 25 MMOL/L (ref 20–33)
CREAT SERPL-MCNC: 0.7 MG/DL (ref 0.5–1.4)
CREAT SERPL-MCNC: 0.72 MG/DL (ref 0.5–1.4)
DELSYS IDSYS: ABNORMAL
EOSINOPHIL # BLD AUTO: 0.05 K/UL (ref 0–0.51)
EOSINOPHIL NFR BLD: 0.5 % (ref 0–6.9)
ERYTHROCYTE [DISTWIDTH] IN BLOOD BY AUTOMATED COUNT: 50.4 FL (ref 35.9–50)
GFR SERPLBLD CREATININE-BSD FMLA CKD-EPI: 93 ML/MIN/1.73 M 2
GFR SERPLBLD CREATININE-BSD FMLA CKD-EPI: 94 ML/MIN/1.73 M 2
GLUCOSE SERPL-MCNC: 127 MG/DL (ref 65–99)
GLUCOSE SERPL-MCNC: 129 MG/DL (ref 65–99)
HCO3 BLDA-SCNC: 29.7 MMOL/L (ref 17–25)
HCT VFR BLD AUTO: 34.6 % (ref 42–52)
HGB BLD-MCNC: 10.6 G/DL (ref 14–18)
IMM GRANULOCYTES # BLD AUTO: 0.16 K/UL (ref 0–0.11)
IMM GRANULOCYTES NFR BLD AUTO: 1.5 % (ref 0–0.9)
LPM ILPM: 7 LPM
LYMPHOCYTES # BLD AUTO: 0.71 K/UL (ref 1–4.8)
LYMPHOCYTES NFR BLD: 6.7 % (ref 22–41)
MAGNESIUM SERPL-MCNC: 2.3 MG/DL (ref 1.5–2.5)
MCH RBC QN AUTO: 28 PG (ref 27–33)
MCHC RBC AUTO-ENTMCNC: 30.6 G/DL (ref 32.3–36.5)
MCV RBC AUTO: 91.3 FL (ref 81.4–97.8)
MONOCYTES # BLD AUTO: 0.75 K/UL (ref 0–0.85)
MONOCYTES NFR BLD AUTO: 7.1 % (ref 0–13.4)
NEUTROPHILS # BLD AUTO: 8.93 K/UL (ref 1.82–7.42)
NEUTROPHILS NFR BLD: 83.9 % (ref 44–72)
NRBC # BLD AUTO: 0 K/UL
NRBC BLD-RTO: 0 /100 WBC (ref 0–0.2)
PCO2 BLDA: 50.9 MMHG (ref 26–37)
PCO2 TEMP ADJ BLDA: 52.7 MMHG (ref 26–37)
PH BLDA: 7.38 [PH] (ref 7.4–7.5)
PH TEMP ADJ BLDA: 7.36 [PH] (ref 7.4–7.5)
PLATELET # BLD AUTO: 185 K/UL (ref 164–446)
PMV BLD AUTO: 11.8 FL (ref 9–12.9)
PO2 BLDA: 98 MMHG (ref 64–87)
PO2 TEMP ADJ BLDA: 103 MMHG (ref 64–87)
POTASSIUM SERPL-SCNC: 3.7 MMOL/L (ref 3.6–5.5)
POTASSIUM SERPL-SCNC: 3.8 MMOL/L (ref 3.6–5.5)
RBC # BLD AUTO: 3.79 M/UL (ref 4.7–6.1)
SAO2 % BLDA: 97 % (ref 93–99)
SODIUM SERPL-SCNC: 154 MMOL/L (ref 135–145)
SODIUM SERPL-SCNC: 156 MMOL/L (ref 135–145)
SPECIMEN DRAWN FROM PATIENT: ABNORMAL
WBC # BLD AUTO: 10.6 K/UL (ref 4.8–10.8)

## 2024-06-06 PROCEDURE — 700102 HCHG RX REV CODE 250 W/ 637 OVERRIDE(OP): Performed by: INTERNAL MEDICINE

## 2024-06-06 PROCEDURE — A9270 NON-COVERED ITEM OR SERVICE: HCPCS | Performed by: NURSE PRACTITIONER

## 2024-06-06 PROCEDURE — 83735 ASSAY OF MAGNESIUM: CPT

## 2024-06-06 PROCEDURE — A9270 NON-COVERED ITEM OR SERVICE: HCPCS | Performed by: PHYSICAL MEDICINE & REHABILITATION

## 2024-06-06 PROCEDURE — A9270 NON-COVERED ITEM OR SERVICE: HCPCS | Performed by: INTERNAL MEDICINE

## 2024-06-06 PROCEDURE — 94664 DEMO&/EVAL PT USE INHALER: CPT

## 2024-06-06 PROCEDURE — 94640 AIRWAY INHALATION TREATMENT: CPT

## 2024-06-06 PROCEDURE — 99232 SBSQ HOSP IP/OBS MODERATE 35: CPT | Performed by: PSYCHIATRY & NEUROLOGY

## 2024-06-06 PROCEDURE — 700111 HCHG RX REV CODE 636 W/ 250 OVERRIDE (IP): Mod: JZ | Performed by: NURSE PRACTITIONER

## 2024-06-06 PROCEDURE — 700105 HCHG RX REV CODE 258: Performed by: STUDENT IN AN ORGANIZED HEALTH CARE EDUCATION/TRAINING PROGRAM

## 2024-06-06 PROCEDURE — 770022 HCHG ROOM/CARE - ICU (200)

## 2024-06-06 PROCEDURE — 82803 BLOOD GASES ANY COMBINATION: CPT

## 2024-06-06 PROCEDURE — 700101 HCHG RX REV CODE 250: Performed by: INTERNAL MEDICINE

## 2024-06-06 PROCEDURE — 94669 MECHANICAL CHEST WALL OSCILL: CPT

## 2024-06-06 PROCEDURE — 99291 CRITICAL CARE FIRST HOUR: CPT | Mod: GC | Performed by: INTERNAL MEDICINE

## 2024-06-06 PROCEDURE — 31720 CLEARANCE OF AIRWAYS: CPT

## 2024-06-06 PROCEDURE — 85025 COMPLETE CBC W/AUTO DIFF WBC: CPT

## 2024-06-06 PROCEDURE — 700102 HCHG RX REV CODE 250 W/ 637 OVERRIDE(OP): Performed by: NURSE PRACTITIONER

## 2024-06-06 PROCEDURE — 80048 BASIC METABOLIC PNL TOTAL CA: CPT

## 2024-06-06 PROCEDURE — 92526 ORAL FUNCTION THERAPY: CPT

## 2024-06-06 PROCEDURE — 700101 HCHG RX REV CODE 250: Performed by: STUDENT IN AN ORGANIZED HEALTH CARE EDUCATION/TRAINING PROGRAM

## 2024-06-06 PROCEDURE — 36600 WITHDRAWAL OF ARTERIAL BLOOD: CPT

## 2024-06-06 PROCEDURE — 700102 HCHG RX REV CODE 250 W/ 637 OVERRIDE(OP): Performed by: PHYSICAL MEDICINE & REHABILITATION

## 2024-06-06 PROCEDURE — 700101 HCHG RX REV CODE 250: Performed by: NURSE PRACTITIONER

## 2024-06-06 RX ORDER — DEXMEDETOMIDINE HYDROCHLORIDE 4 UG/ML
.1-1.5 INJECTION, SOLUTION INTRAVENOUS CONTINUOUS
Status: DISCONTINUED | OUTPATIENT
Start: 2024-06-06 | End: 2024-06-08

## 2024-06-06 RX ADMIN — CEFTRIAXONE SODIUM 1000 MG: 10 INJECTION, POWDER, FOR SOLUTION INTRAVENOUS at 18:31

## 2024-06-06 RX ADMIN — TRAZODONE HYDROCHLORIDE 100 MG: 100 TABLET ORAL at 21:31

## 2024-06-06 RX ADMIN — LANSOPRAZOLE 30 MG: 30 TABLET, ORALLY DISINTEGRATING ORAL at 05:27

## 2024-06-06 RX ADMIN — LEVALBUTEROL 1.25 MG: 1.25 SOLUTION RESPIRATORY (INHALATION) at 01:58

## 2024-06-06 RX ADMIN — QUETIAPINE FUMARATE 25 MG: 25 TABLET ORAL at 05:27

## 2024-06-06 RX ADMIN — Medication: at 21:31

## 2024-06-06 RX ADMIN — FENTANYL CITRATE 25 MCG: 50 INJECTION, SOLUTION INTRAMUSCULAR; INTRAVENOUS at 02:28

## 2024-06-06 RX ADMIN — LEVALBUTEROL 1.25 MG: 1.25 SOLUTION RESPIRATORY (INHALATION) at 14:50

## 2024-06-06 RX ADMIN — AMLODIPINE BESYLATE 10 MG: 10 TABLET ORAL at 05:27

## 2024-06-06 RX ADMIN — SENNOSIDES AND DOCUSATE SODIUM 2 TABLET: 50; 8.6 TABLET ORAL at 17:59

## 2024-06-06 RX ADMIN — DEXMEDETOMIDINE HYDROCHLORIDE 0.2 MCG/KG/HR: 100 INJECTION, SOLUTION INTRAVENOUS at 04:30

## 2024-06-06 RX ADMIN — Medication 5 MG: at 21:31

## 2024-06-06 RX ADMIN — PRAVASTATIN SODIUM 40 MG: 20 TABLET ORAL at 17:58

## 2024-06-06 RX ADMIN — OXYCODONE HYDROCHLORIDE 5 MG: 5 TABLET ORAL at 03:35

## 2024-06-06 RX ADMIN — QUETIAPINE FUMARATE 25 MG: 25 TABLET ORAL at 17:59

## 2024-06-06 RX ADMIN — Medication: at 05:27

## 2024-06-06 RX ADMIN — LEVALBUTEROL 1.25 MG: 1.25 SOLUTION RESPIRATORY (INHALATION) at 20:16

## 2024-06-06 ASSESSMENT — PAIN DESCRIPTION - PAIN TYPE
TYPE: ACUTE PAIN

## 2024-06-06 NOTE — PROGRESS NOTES
Neurology Progress Note  Neurohospitalist Service, [facility]    Referring Physician: Reyes Moseley Jr., D.O.    Chief Complaint   Patient presents with    Possible Stroke     Pt transferred from [place] via Care flight for hemorrhagic CVA. LKW 2030. Pt was eating dinner, and had sudden headache and started vomiting. Pt originally arrived to [place] via EMS with N/V, GCS 11. Pt was intubated at [place]       HPI: Refer to initial documented Neurology H&P, as detailed in the patient's chart.    Interval History 6/6: No new events overnight.    Review of systems: In addition to what is detailed in the HPI and/or updated in the interval history, all other systems reviewed and are negative.    Past Medical History:    has a past medical history of Chronic obstructive pulmonary disease (HCC), Hypertension, MI, old, Peptic ulcer, Prostate disorder, and Stroke (HCC).    FHx:  family history is not on file.    SHx:   reports that he does not currently use alcohol. He reports that he does not currently use drugs.    Medications:    Current Facility-Administered Medications:     dexmedetomidine (PRECEDEX) 400 mcg/100mL NS premix infusion, 0.1-1.5 mcg/kg/hr (Ideal), Intravenous, Continuous, Oni Mackenzie M.D., Last Rate: 4.3 mL/hr at 06/06/24 0643, 0.3 mcg/kg/hr at 06/06/24 0643    traZODone (Desyrel) tablet 100 mg, 100 mg, Enteral Tube, QHS, Reyes Moseley Jr., FABIO.O., 100 mg at 06/05/24 2041    amLODIPine (Norvasc) tablet 10 mg, 10 mg, Enteral Tube, Q DAY, Reyes Moseley Jr. D.O., 10 mg at 06/06/24 0527    QUEtiapine (SEROquel) tablet 25 mg, 25 mg, Enteral Tube, BID, FABIO Romero Jr..O., 25 mg at 06/06/24 0527    melatonin tablet 5 mg, 5 mg, Enteral Tube, Nightly, LOBO KellyOAye, 5 mg at 06/05/24 2041    pravastatin (Pravachol) tablet 40 mg, 40 mg, Enteral Tube, Q EVENING, FABIO Romero Jr..O., 40 mg at 06/05/24 1726    levalbuterol (Xopenex) 1.25 MG/3ML  nebulizer solution 1.25 mg, 1.25 mg, Nebulization, Q6HRS (RT), Reyes Moseley Jr., D.O., 1.25 mg at 06/06/24 0158    cefTRIAXone (Rocephin) syringe 1,000 mg, 1,000 mg, Intravenous, Q24HRS, Sarina L. Latona, 1,000 mg at 06/05/24 1726    oxyCODONE immediate-release (Roxicodone) tablet 5 mg, 5 mg, Enteral Tube, Q4HRS PRN, Sarina L. Latona, 5 mg at 06/06/24 0335    fentaNYL (Sublimaze) injection 25-50 mcg, 25-50 mcg, Intravenous, Q4HRS PRN, Sarina L. Latona, 25 mcg at 06/06/24 0228    levalbuterol (Xopenex) 1.25 MG/3ML nebulizer solution 1.25 mg, 1.25 mg, Nebulization, Q2HRS PRN (RT), Reyes Gray M.D., 1.25 mg at 06/04/24 1040    labetalol (Normodyne/Trandate) injection 10 mg, 10 mg, Intravenous, Q3HRS PRN, Sarina L. Latona, 10 mg at 06/05/24 1307    hydrALAZINE (Apresoline) injection 10 mg, 10 mg, Intravenous, Q3HRS PRN, Sarina L. Latona, 10 mg at 06/05/24 1430    Pharmacy Consult: Enteral tube insertion - review meds/change route/product selection, 1 Each, Other, PHARMACY TO DOSE, Reyes Gray M.D.    diazePAM (Valium) injection 5 mg, 5 mg, Intravenous, Q6HRS PRN, Sarina L. Latona    senna-docusate (Pericolace Or Senokot S) 8.6-50 MG per tablet 2 Tablet, 2 Tablet, Enteral Tube, Q EVENING, 2 Tablet at 06/05/24 1726 **AND** polyethylene glycol/lytes (Miralax) Packet 1 Packet, 1 Packet, Enteral Tube, QDAY PRN, Sarina Villedaona, 1 Packet at 06/05/24 1727    Respiratory Therapy Consult, , Nebulization, Continuous RT, Sarina Euceda MD Alert...ICU Electrolyte Replacement per Pharmacy, , Other, PHARMACY TO DOSE, Sarina Euceda    acetaminophen (Tylenol) tablet 650 mg, 650 mg, Enteral Tube, Q4HRS PRN, 650 mg at 06/05/24 4815 **OR** acetaminophen (Tylenol) suppository 650 mg, 650 mg, Rectal, Q4HRS PRN, Sarina Euceda    ondansetron (Zofran ODT) dispertab 4 mg, 4 mg, Enteral Tube, Q4HRS PRN **OR** ondansetron (Zofran) syringe/vial injection 4 mg, 4 mg, Intravenous, Q4HRS PRN, Sarina Euceda    lansoprazole (Prevacid)  solutab 30 mg, 30 mg, Enteral Tube, DAILY, Reyes Gray M.D., 30 mg at 06/06/24 0527    mineral oil-pet hydrophilic (Aquaphor) ointment, , Topical, BID, Reyes Gray M.D., Given at 06/06/24 0527    Physical Examination:     Vitals:    06/06/24 0500 06/06/24 0600 06/06/24 0700 06/06/24 0741   BP:  125/83 120/67    Pulse: (!) 109 (!) 106 97 88   Resp: (!) 38 (!) 27 (!) 30 (!) 22   Temp:       TempSrc:  Bladder     SpO2: 91% 97% 98% 98%   Weight:       Height:               NEUROLOGICAL EXAM:     Patient awake and alert following more commands this morning.  Pupils equal reactive.  Midline.  No gaze preference.  Blinks to threat to both sides.  Tracks in the room.  Face metrical.  Spontaneous antigravity movement in all 4.  Reactive tactile touch in all 4.    Objective Data:    Labs:  Lab Results   Component Value Date/Time    PROTHROMBTM 13.6 05/29/2024 10:49 PM    INR 1.03 05/29/2024 10:49 PM      Lab Results   Component Value Date/Time    WBC 10.6 06/06/2024 04:50 AM    RBC 3.79 (L) 06/06/2024 04:50 AM    HEMOGLOBIN 10.6 (L) 06/06/2024 04:50 AM    HEMATOCRIT 34.6 (L) 06/06/2024 04:50 AM    MCV 91.3 06/06/2024 04:50 AM    MCH 28.0 06/06/2024 04:50 AM    MCHC 30.6 (L) 06/06/2024 04:50 AM    MPV 11.8 06/06/2024 04:50 AM    NEUTSPOLYS 83.90 (H) 06/06/2024 04:50 AM    LYMPHOCYTES 6.70 (L) 06/06/2024 04:50 AM    MONOCYTES 7.10 06/06/2024 04:50 AM    EOSINOPHILS 0.50 06/06/2024 04:50 AM    BASOPHILS 0.30 06/06/2024 04:50 AM      Lab Results   Component Value Date/Time    SODIUM 156 (H) 06/05/2024 09:07 PM    POTASSIUM 3.9 06/05/2024 09:07 PM    CHLORIDE 118 (H) 06/05/2024 09:07 PM    CO2 26 06/05/2024 09:07 PM    GLUCOSE 156 (H) 06/05/2024 09:07 PM    BUN 33 (H) 06/05/2024 09:07 PM    CREATININE 0.72 06/05/2024 09:07 PM    GLOMRATE 78 10/19/2023 09:26 AM      Lab Results   Component Value Date/Time    CHOLSTRLTOT 132 06/01/2024 04:35 AM    LDL see below 06/01/2024 04:35 AM    HDL 41 06/01/2024 04:35 AM     TRIGLYCERIDE 432 (H) 06/01/2024 04:35 AM       Lab Results   Component Value Date/Time    ALKPHOSPHAT 44 06/03/2024 05:48 AM    ASTSGOT 16 06/03/2024 05:48 AM    ALTSGPT 11 06/03/2024 05:48 AM    TBILIRUBIN 0.3 06/03/2024 05:48 AM        Imaging/Testing:  DX-CHEST-LIMITED (1 VIEW)   Final Result      Mild interstitial prominence could represent vascular congestion.      Cardiomegaly.      Removal of endotracheal tube.      UO-REJRMUQ-5 VIEW   Final Result      Feeding tube tip projects in the second portion of the duodenum.      Nonspecific bowel gas pattern.      CT-HEAD W/O   Final Result      1.  Advanced cerebral atrophy.   2.  Stable ventriculomegaly with intraventricular hemorrhage.   3.  Advanced supratentorial white matter disease most consistent with microvascular ischemic change.   4.  Multiple old lacunar infarcts as described.   5.  Acute/recent subacute parenchymal hemorrhage in the posterior fossa involving the superior cerebellar vermis and left paramedian cerebellar hemisphere and left lateral cerebellar peduncle. No evidence of recurrent hemorrhage.   6.  Minimal focus of subarachnoid hemorrhage within a single sulcus in the left posterior temporal region. Probably unchanged from prior recent exam.         DX-ABDOMEN FOR TUBE PLACEMENT   Final Result      Enteric tube tip projects over the stomach.      EC-ECHOCARDIOGRAM COMPLETE W/ CONT   Final Result      DX-CHEST-PORTABLE (1 VIEW)   Final Result         1.  Left basilar atelectasis or subtle infiltrate, decreased since prior study   2.  Trace left pleural effusion   3.  Enlargement of the aortic knob suggesting thoracic aortic aneurysm   4.  Atherosclerosis      MR-BRAIN-WITH & W/O   Final Result      1.  Stable fossa of parenchymal hemorrhage centered in the LEFT cerebellum and extending into the RIGHT cerebellum exerting mass effect on the fourth ventricle which is not completely effaced   2.  Intraventricular blood redemonstrated   3.   Numerous areas of remote microhemorrhage in the supra and infratentorial brain. These could be related to amyloid angiopathy, numerous hypertensive microhemorrhages or less likely multiple cavernomas   4.  Moderate diffuse atrophy without compelling evidence of hydrocephalus   5.  Advanced white matter changes   6.  4 mm LEFT temporal meningioma   7.  Enhancing nodule in the RIGHT internal auditory canal suspicious for a vestibular schwannoma, less likely other extra-axial mass such as a meningioma, facial nerve schwannoma or metastasis. Consider posterior fossa protocol brain MRI without and with    contrast to further evaluate in when clinically appropriate.      MR-MRA HEAD-W/O   Final Result         MRA OF THE [place] OF [place] WITHIN NORMAL LIMITS.      DX-CHEST-PORTABLE (1 VIEW)   Final Result         1.  Hazy left lower lobe infiltrate   2.  Small left pleural effusion   3.  Enlargement of the aortic knob, appearance suggesting aortic aneurysm.      DX-CHEST-FOR LINE PLACEMENT Perform procedure in: Patient's Room   Final Result      1.  PICC line is in place with the tip projecting over the SVC in satisfactory position.   2.  Mildly enlarged cardiac silhouette with vascular congestion.   3.  Retrocardiac opacity is likely due to atelectasis.         IR-PICC LINE PLACEMENT W/ GUIDANCE > AGE 5   Final Result                  Ultrasound-guided PICC placement performed by qualified nursing staff as    above.          CT-HEAD W/O   Final Result         1.  Left cerebellar hemorrhage, similar compared to prior study.   2.  Minimal bilateral intraventricular hemorrhages, new since prior study.   3.  Nonspecific white matter changes, commonly associated with small vessel ischemic disease.  Associated mild cerebral atrophy is noted.   4.  Atherosclerosis.         DX-CHEST-PORTABLE (1 VIEW)   Final Result         1.  Left basilar atelectasis, no focal infiltrate   2.  Trace left pleural effusion   3.  Atherosclerosis       CT-CTA NECK WITH & W/O-POST PROCESSING   Final Result         1.  Scattered atherosclerosis without significant stenosis or occlusion.   2.  4.2 cm proximal descending thoracic aortic aneurysm, radiographic follow-up and surveillance recommended as clinically appropriate.         CT-CTA HEAD WITH & W/O-POST PROCESS   Final Result         1.  No large vessel occlusion or aneurysm identified   2.  Large cerebellar hemorrhage predominantly in the left cerebellum      These findings were discussed with the patient's clinician, Nikki Alexander, on 5/29/2024 11:35 PM.      DX-CHEST-PORTABLE (1 VIEW)   Final Result      Tubes as above      Left pleural effusion      CHF/pulmonary edema pattern      See Brown MD   5/30/2024 8:41 AM         CT-HEAD W/O   Final Result      Acute intraparenchymal hemorrhage is noted involving the left cerebellar hemisphere, measuring 3.1 x 4.2 x 3.4 cm, with associated mass effect.      This finding was telephoned to the mentioned attending by on-call radiologist at the time of imaging         AAST Intracranial Hemorrhage Brain Injury Guidelines         Hemorrhage type  mBIG 1           mBIG 2                mBIG 3      Subdural             <4mm               5-7.9 mm             >8mm      Epidural                No                    No                  Yes      Intraparenc'mal   <4mm               5-7.9 mm         >8mm or multi   1 location       or 2 locations      >3 locations      Subarachnoid     <3 sulci       single hemisphere   bi-hemisphere   and <1 mm        or 1-3 mm            or > 3 mm      Intraventricular       No                  No                    Yes      Skull Fracture       None            Non-displaced       Displaced               DLP Reporting Thresholds for Incorrect/Repeated Exams - DLP in mGy*cm   Head/Neck:  0-year-old 3840, 1-year-old 5880, 5-year-old 8770, 10-year-old 12881 and adult 35072   Head:  0-year-old 4540, 1-year-old 7460, 5-year-old 38056,  10-year-old 13978 and adult 58011   Neck:  0-year-old 2940, 1-year-old 4160, 5-year-old 4550, 10-year-old 6320 and adult 8470   Chest:  0-year-old 550, 1-year-old 830, 5-year-old 1200, 10-year-old 3840 and adult 3570   Abd/pelvis:  0-year-old 440, 1-year-old 720, 5-year-old 1080, 10-year-old 3330 and adult 3330   Trunk(C/A/P):  0-year-old 490, 1-year-old 770, 5-year-old 1140, 10-year-old 3570 and adult 3330      OUTSIDE IMAGES-CT CERVICAL SPINE    (Results Pending)         Assessment and Plan:    78-year-old male presents with nausea vomiting found to have a left cerebellar ICH.  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  Post bleed day number 7.  Currently sodium at goal of 150.  Will start weaning off hypertonic therapies.  Slowly correcting sodium no more than 2-3 point drop per day.  Significant amount of delirium is unclear of the etiology this time.  Would not not expect this type of ICH location because much delirium.    Date 6/6  Post bleed day #8.  Patient seems to be improved today.  Continue with slowly normalization of the sodium.  No more than 2-3 point drop per day.  No further recommendation neurology.  Neurology will sign off.  Patient to follow-up in stroke clinic.    Plan:  1.  Every 4 hours neurochecks  2.  Strict blood pressure goals between 100- 140 systolic.  3.  Okay to start slowly correcting sodium no more than 2 to 3 drop point per day  4.  Continue statin 40 mg daily  5.  SCDs for DVT prophylactics only.  No antithrombotic or antiplatelet therapy indefinitely.  6.  PT and OT as tolerated.            This chart was partially generated using voice recognition technology and sound alike word replacement may be present, best efforts were made to make the chart accurate.    Alfredito Lerner MD  Board Certified Neurology, ABPN  (t) [phone]

## 2024-06-06 NOTE — RESPIRATORY CARE
"  COPD EDUCATION by COPD CLINICAL EDUCATOR  6/6/2024  at  4:48 PM by Kate Bowen RRT     Patient and his wife were interviewed by education team.  Patient was unable to actively participate in our discussion but wife was available and asking questions. We reviewed  Dl' his medications and delivery devices. He see's ZAY Negron in [place]  regularly along with his PCP.  Provided our information about COPD,and reviewed together.     COPD Screen  COPD Risk Screening  Do you have a history of COPD?: Yes  Do you have a Pulmonologist?: Yes    COPD Assessment  COPD Clinical Specialists ONLY  COPD Education Initiated: Yes--Short Intervention (met with wife with Yuniel present;has COPD reviewed medications and Oxygen; here with [facility]; see's COPD -PA in [place] to manage his disease; reviwed COPD book answered questions)  Is this a COPD exacerbation patient?: No  DME Company: [employer] Equipment Type: 2lpm Oxygen; nebulizer  Pulmonary Follow Up Appointment: 08/27/24  Pulmonologist Name: JOSE RAUL COLLAZO in [place]  Referrals Initiated: Yes  Smoking Cessation: No (quit 2008)  Home Health Care:  (TBD plan SNF vs rehab at [facility])  Mobile Urgent Care Services: N/A  Geriatric Specialty Group: N/A  $ Demo/Eval of SVN's, MDI's and Aerosols: Yes  (OP) Pulmonary Function Testing:  (no PFT completed extensive Emphysema on CT's)  Interdisciplinary Rounds: Attendance at Rounds (30 Min)    PFT Results  No results found for: \"PFT\"         MY COPD ACTION PLAN     It is recommended that patients and physicians /healthcare providers complete this action plan together. This plan should be discussed at each physician visit and updated as needed.    The green, yellow and red zones show groups of symptoms of COPD. This list of symptoms is not comprehensive, and you may experience other symptoms. In the \"Actions\" column, your healthcare provider has recommended actions for you to take based on your symptoms.    Patient Name: " "Jl Mueller   YOB: 1945   Last Updated on:     Green Zone:  I am doing well today Actions     Usual activitiy and exercise level   Take daily medications     Usual amounts of cough and phlegm/mucus   Use oxygen as prescribed     Sleep well at night   Continue regular exercise/diet plan     Appetite is good   At all times avoid cigarette smoke, inhaled irritants     Daily Medications (these medications are taken every day):   Fluticasone Furoate and Vilanterol trifenatate (Breo) 1 Puff Once daily     Additional Information:  Remember to rinse mouth after using this medications    Yellow Zone:  I am having a bad day or a COPD flare Actions     More breathless than usual   Continue daily medications     I have less energy for my daily activities   Use quick relief inhaler as ordered     Increased or thicker phlegm/mucus   Use oxygen as prescribed     Using quick relief inhaler/nebulizer more often   Get plenty of rest     Swelling of ankles more than usual   Use pursed lip breathing     More coughing than usual   At all times avoid cigarette smoke, inhaled irritants     I feel like I have a \"chest cold\"     Poor sleep and my symptoms woke me up     My appetite is not good     My medicine is not helping      Call provider immediately if symptoms don’t improve     Continue daily medications, add rescue medications:   Albuterol  Albuterol 2 Puffs  3mL via nebulizer          Medications to be used during a flare up, (as Discussed with Provider):              Red Zone:  I need urgent medical care Actions     Severe shortness of breath even at rest   Call 911 or seek medical care immediately     Not able to do any activity because of breathing      Fever or shaking chills      Feeling confused or very drowsy       Chest pains      Coughing up blood                  "

## 2024-06-06 NOTE — THERAPY
"Speech Language Pathology   Daily Treatment     Patient Name: Jl Mueller  AGE:  78 y.o., SEX:  male  Medical Record #: [id number]  Date of Service: 6/6/2024    Precautions: Fall Risk, Swallow Precautions, PEG Tube  Comments: baseline 2L of O2. Hx of prior CVA with L-sided deficits     Subjective  Pt seen on this date for dysphagia management. Pt was received awake/alert sitting [facility] w/ b/l wrist restraints and lap belt applied. Spouse present at bedside. On 6L via oxymask. \"He was asking for water today\" -spouse.     Assessment  With max encouragement from SLP/spouse, trials of TN0 (sraw) completed x3. Pt notably ataxic moving oral cavity to straw. Appropriate oral acceptance, containment, and ability to create adequate labial seal for straw sips. Presumed complete A/p transport as no significant oral bolus residue was appreciated upon visual inspection. Pt w/ increased perceptually wet vocal quality, RR, and congested throat clearing on trials concerning for airway invasion. Despite max encouragement from SLP/spouse, pt refusing to participate in additional trials- becoming increasingly agitated and restless.     Clinical Impressions  Pt w/ limited tolerance of PO trials on this date- became increasingly restless and agitated despite max encouragement from SLP/spouse for participation.  Increased perceptually wet vocal quality, RR, and congested throat clearing on trials concerning for airway invasion. Pt would likely benefit from an instrumental swallow study; However is not appropriate to participate at this junction re: poor tolerance of PO, oral aversion/agitation, impaired command following. Suspected oropharyngeal dysphagia which is likely acute on chronic given cerebellar hemorrhage, intubation hx, encephalopathy w/ hx of CVAs and spouse report of dysphagia. Risk of aspiration and related sequelae can be mitigated with frequent, thorough, oral care and mobility as medically appropriate. SLP to follow for " "pre-feeding trials. Spouse and RN updated to same.     Recommendations  Treatment Completed: Dysphagia Treatment  Diet Consistency: NPO/NGT  Instrumentation: Instrumental swallow study pending clinical progress  Medication: Non Oral  Oral Care: Q2h    SLP Treatment Plan  Treatment Plan: Dysphagia Treatment, Patient/Family/Caregiver Training (pending Cog/SLE)  SLP Frequency: 4x Per Week  Estimated Duration: Until Therapy Goals Met    Anticipated Discharge Needs  Discharge Recommendations: Recommend post-acute placement for additional speech therapy services prior to discharge home  Therapy Recommendations Upon DC: Dysphagia Training, Patient / Family / Caregiver Education, Community Re-Integration (pending Cog/SLE)    Patient / Family Goals  Patient / Family Goal #1: \"for him to eat\" per spouse  Goal #1 Outcome: Progressing slower than expected  Short Term Goals  Short Term Goal # 1: Pt will participate in pre-feeding trials to determine approp. for participation in instrumental swallow study vs oral diet initation.  Goal Outcome # 1: Progressing slower than expected    Cierra Hernandez, SLP  "

## 2024-06-06 NOTE — DISCHARGE PLANNING
ANABELA reached out to Admissions at [facility] and left a vm mail for Hortencia as she is out of the office today, expected back in tomorrow.  [phone]

## 2024-06-06 NOTE — CARE PLAN
Problem: Bronchoconstriction  Goal: Improve in air movement and diminished wheezing  Description: Target End Date:  2 to 3 days    1.  Implement inhaled treatments  2.  Evaluate and manage medication effects  Outcome: Not Met     Problem: Bronchopulmonary Hygiene  Goal: Increase mobilization of retained secretions  Description: Target End Date:  2 to 3 days    1.  Perform bronchopulmonary therapy as indicated by assessment  2.  Perform airway suctioning  3.  Perform actions to maintain patient airway  Outcome: Not Met   CPT SVN

## 2024-06-06 NOTE — CARE PLAN
The patient is Watcher - Medium risk of patient condition declining or worsening    Shift Goals  Clinical Goals: enforce sleep/wake cycle, -140  Patient Goals: GUZMAN  Family Goals: agitation management    Progress made toward(s) clinical / shift goals:  Patient following commands on last two neuro checks. Mobilized to cardiac chair for 5 hours today to help with delirium.        Problem: Safety - Medical Restraint  Goal: Remains free of injury from restraints (Restraint for Interference with Medical Device)  Outcome: Progressing     Problem: Pain - Standard  Goal: Alleviation of pain or a reduction in pain to the patient’s comfort goal  Outcome: Progressing     Problem: Skin Integrity  Goal: Skin integrity is maintained or improved  Outcome: Progressing     Problem: Fall Risk  Goal: Patient will remain free from falls  Outcome: Progressing     Problem: Neuro Status  Goal: Neuro status will remain stable or improve  Outcome: Progressing       Patient is not progressing towards the following goals:      Problem: Safety - Medical Restraint  Goal: Free from restraint(s) (Restraint for Interference with Medical Device)  Outcome: Not Progressing

## 2024-06-06 NOTE — PROGRESS NOTES
"Family Medicine Progress Note    Date of admission  5/29/2024    Chief Complaint  78 y.o. male admitted 5/29/2024 with intraparenchymal hemorrhage.     Hospital Course  \"Jl Mueller is a 78 y.o. male smoker w/ PMHx s/f HTN, CAD, HLD, prior MI, AAA s/p bypass graft stenting, COPD on 2L home O2, two prior hemorrhagic strokes w/o reported deficits, BPH s/p prostate artery embolization who initially presented to an outside facility via EMS on 5/29/2024 for evaluation. Per the patient's wife, the patient was eating dinner when he stated he had a headache and felt weak. He then had two episodes of emesis and became altered to GCS11; he was intubated at some point prior to transfer to this facility due to his neuro status. On imaging at the outside facility, he was found to have a left cerebellar hemorrhage, mild perihematomal edema with subtle parenchymal displacement. 4th ventricle with moderate compression but open. for which he was transferred to this facility for higher level of care. On arrival, the patient was moving all extremities and withdrew to pain in all extremities. His pupils are pinpoint, and he over breaths vent. Repeat imaging redemonstrates area of Lt-cerebellar hemorrhage. A CTA was completed which, does not clearly show an aneurysmal/vascular malformation. He is being admitted to the ICU for hypertonic saline therapy, Q1h neurological checks, and strict blood pressure control as well as ventilator management.\"    5/30 - DDAVP for platelet inhibition.   5/31 - No clinical change  6/1- Extubated  6/2 - RASS fluctuating, norvasc added   6/3 - confused, on dex. 3% on going  6/4 - continuing 3%, CXR unremarkable, decrease sedation  6/5 - confused, continuing 3%, chloride slightly increased to 120, ammonia normal at 21, Many bacteria on urine micro start C3, TSH normal, patient mental status improved with up to cardiac chair.   6/6 - Delirium precautions, mobilize    Interval Problem Update  Reviewed " last 24 hour events:   - Neuro: stable   - HR: 80s-110s   - SBP: 110s-170s   - GI: last BM 6/1/24   - UOP: 1000 mL/24 hrs   - Park: secured   - Tm: 97.1   - Lines: Park   - PPx: GI PPI, DVT Contraindicated   - CXR (personally reviewed and compared to prior): no new   - Mobility level 2, eligible for progression yes      Review of Systems  Review of Systems   Unable to perform ROS: Critical illness        Vital Signs for last 24 hours   Pulse:  [] 97  Resp:  [19-40] 30  BP: (101-170)/(53-93) 120/67  SpO2:  [91 %-99 %] 98 %    Hemodynamic parameters for last 24 hours  Blood pressures stable    Respiratory Information for the last 24 hours  SpO2 stable on oxymask 6-8    Physical Exam   Physical Exam  Constitutional:       Appearance: Normal appearance.      Comments: Slight agitation   HENT:      Head: Normocephalic and atraumatic.      Right Ear: External ear normal.      Left Ear: External ear normal.      Nose: Nose normal.      Mouth/Throat:      Mouth: Mucous membranes are moist.      Pharynx: Oropharynx is clear.   Eyes:      Pupils: Pupils are equal, round, and reactive to light.   Cardiovascular:      Rate and Rhythm: Normal rate and regular rhythm.      Pulses: Normal pulses.      Heart sounds: Normal heart sounds.   Pulmonary:      Effort: Pulmonary effort is normal.      Comments: Some upper airway noise, no rales, no wheezes, no rhonchi in lung fields  Abdominal:      General: Abdomen is flat.      Palpations: Abdomen is soft.   Musculoskeletal:      Cervical back: Normal range of motion and neck supple.      Right lower leg: No edema.      Left lower leg: No edema.   Skin:     General: Skin is warm and dry.      Capillary Refill: Capillary refill takes less than 2 seconds.   Neurological:      Mental Status: He is alert. He is disoriented.      Comments: Unable to participate in physical exam, will follow some simple commands         Medications  Current Facility-Administered Medications    Medication Dose Route Frequency Provider Last Rate Last Admin    dexmedetomidine (PRECEDEX) 400 mcg/100mL NS premix infusion  0.1-1.5 mcg/kg/hr (Ideal) Intravenous Continuous Oni Mackenzie M.D. 4.3 mL/hr at 06/06/24 0643 0.3 mcg/kg/hr at 06/06/24 0643    traZODone (Desyrel) tablet 100 mg  100 mg Enteral Tube QHS Reyes Moseley Jr., D.O.   100 mg at 06/05/24 2041    amLODIPine (Norvasc) tablet 10 mg  10 mg Enteral Tube Q DAY Reyes Moseley Jr., D.O.   10 mg at 06/06/24 0527    QUEtiapine (SEROquel) tablet 25 mg  25 mg Enteral Tube BID Reyes Moseley Jr., D.O.   25 mg at 06/06/24 0527    melatonin tablet 5 mg  5 mg Enteral Tube Nightly Ginna Harden, D.O.   5 mg at 06/05/24 2041    pravastatin (Pravachol) tablet 40 mg  40 mg Enteral Tube Q EVENING Reyes Moseley Jr., D.O.   40 mg at 06/05/24 1726    levalbuterol (Xopenex) 1.25 MG/3ML nebulizer solution 1.25 mg  1.25 mg Nebulization Q6HRS (RT) Reyes Moseley Jr., D.O.   1.25 mg at 06/06/24 0158    cefTRIAXone (Rocephin) syringe 1,000 mg  1,000 mg Intravenous Q24HRS Sarina L. Latona   1,000 mg at 06/05/24 1726    oxyCODONE immediate-release (Roxicodone) tablet 5 mg  5 mg Enteral Tube Q4HRS PRN Sarina L. Latona   5 mg at 06/06/24 0335    fentaNYL (Sublimaze) injection 25-50 mcg  25-50 mcg Intravenous Q4HRS PRN Sarina L. Latona   25 mcg at 06/06/24 0228    levalbuterol (Xopenex) 1.25 MG/3ML nebulizer solution 1.25 mg  1.25 mg Nebulization Q2HRS PRN (RT) Reyes Gray M.D.   1.25 mg at 06/04/24 1040    labetalol (Normodyne/Trandate) injection 10 mg  10 mg Intravenous Q3HRS PRN Sarina L. Latona   10 mg at 06/05/24 1307    hydrALAZINE (Apresoline) injection 10 mg  10 mg Intravenous Q3HRS PRN Sarina L. Latona   10 mg at 06/05/24 1430    Pharmacy Consult: Enteral tube insertion - review meds/change route/product selection  1 Each Other PHARMACY TO DOSE Reyes Gray M.D.        diazePAM (Valium) injection 5 mg  5 mg Intravenous Q6HRS PRN Sarina L. Latona         senna-docusate (Pericolace Or Senokot S) 8.6-50 MG per tablet 2 Tablet  2 Tablet Enteral Tube Q EVENING Sarina Villedaona   2 Tablet at 06/05/24 1726    And    polyethylene glycol/lytes (Miralax) Packet 1 Packet  1 Packet Enteral Tube QDAY PRN Sarina RODRIGUEZ Latona   1 Packet at 06/05/24 1727    Respiratory Therapy Consult   Nebulization Continuous RT Sarina Euceda MD Alert...ICU Electrolyte Replacement per Pharmacy   Other PHARMACY TO DOSE Sarina Euceda        acetaminophen (Tylenol) tablet 650 mg  650 mg Enteral Tube Q4HRS PRN Sarina L. Latona   650 mg at 06/05/24 1725    Or    acetaminophen (Tylenol) suppository 650 mg  650 mg Rectal Q4HRS PRN Sarina Euceda        ondansetron (Zofran ODT) dispertab 4 mg  4 mg Enteral Tube Q4HRS PRN Sarina Euceda        Or    ondansetron (Zofran) syringe/vial injection 4 mg  4 mg Intravenous Q4HRS PRN Sarina Euceda        lansoprazole (Prevacid) solutab 30 mg  30 mg Enteral Tube DAILY Reyes Gray M.D.   30 mg at 06/06/24 0527    mineral oil-pet hydrophilic (Aquaphor) ointment   Topical BID Reyes Grya M.D.   Given at 06/06/24 0527       Fluids    Intake/Output Summary (Last 24 hours) at 6/6/2024 0731  Last data filed at 6/6/2024 0643  Gross per 24 hour   Intake 1199.21 ml   Output 1000 ml   Net 199.21 ml       Laboratory  Recent Labs     06/06/24  0125   ISTATAPH 7.375*   ISTATAPCO2 50.9*   ISTATAPO2 98*   ISTATATCO2 31   [id number] 97   ISTATARTHCO3 29.7*   ISTATARTBE 4*   ISTATTEMP 37.8 C   ISTATSPEC Arterial   ISTATAPHTC 7.363*   [id number] 103*         Recent Labs     06/04/24  0528 06/04/24  1140 06/04/24  2314 06/05/24  0530 06/05/24  2107 06/06/24  0450   SODIUM 153*   < > 155* 154* 156*  --    POTASSIUM 3.8   < > 3.8 3.9 3.9  --    CHLORIDE 120*   < > 119* 120* 118*  --    CO2 26   < > 26 23 26  --    BUN 26*   < > 31* 33* 33*  --    CREATININE 0.65   < > 0.64 0.68 0.72  --    MAGNESIUM 1.9  --   --  2.3  --  2.3   CALCIUM 8.6   < > 9.1 9.3 9.4  --     <  > = values in this interval not displayed.     Recent Labs     06/03/24  1137 06/03/24  1747 06/04/24  2314 06/05/24  0530 06/05/24  2107   PREALBUMIN 10.9*  --   --   --   --    GLUCOSE 155*   < > 143* 142* 156*    < > = values in this interval not displayed.     Recent Labs     06/04/24  0528 06/05/24  0530 06/06/24  0450   WBC 10.5 10.5 10.6   NEUTSPOLYS 81.60* 83.70* 83.90*   LYMPHOCYTES 6.40* 5.00* 6.70*   MONOCYTES 7.20 6.50 7.10   EOSINOPHILS 3.80 2.90 0.50   BASOPHILS 0.20 0.50 0.30     Recent Labs     06/04/24 0528 06/05/24  0530 06/06/24  0450   RBC 3.69* 3.86* 3.79*   HEMOGLOBIN 10.3* 10.6* 10.6*   HEMATOCRIT 34.2* 35.2* 34.6*   PLATELETCT 178 187 185       Imaging  DX-CHEST-LIMITED (1 VIEW)   Final Result      Mild interstitial prominence could represent vascular congestion.      Cardiomegaly.      Removal of endotracheal tube.      UD-[id number] VIEW   Final Result      Feeding tube tip projects in the second portion of the duodenum.      Nonspecific bowel gas pattern.      CT-HEAD W/O   Final Result      1.  Advanced cerebral atrophy.   2.  Stable ventriculomegaly with intraventricular hemorrhage.   3.  Advanced supratentorial white matter disease most consistent with microvascular ischemic change.   4.  Multiple old lacunar infarcts as described.   5.  Acute/recent subacute parenchymal hemorrhage in the posterior fossa involving the superior cerebellar vermis and left paramedian cerebellar hemisphere and left lateral cerebellar peduncle. No evidence of recurrent hemorrhage.   6.  Minimal focus of subarachnoid hemorrhage within a single sulcus in the left posterior temporal region. Probably unchanged from prior recent exam.         DX-ABDOMEN FOR TUBE PLACEMENT   Final Result      Enteric tube tip projects over the stomach.      EC-ECHOCARDIOGRAM COMPLETE W/ CONT   Final Result      DX-CHEST-PORTABLE (1 VIEW)   Final Result         1.  Left basilar atelectasis or subtle infiltrate, decreased since  prior study   2.  Trace left pleural effusion   3.  Enlargement of the aortic knob suggesting thoracic aortic aneurysm   4.  Atherosclerosis      MR-BRAIN-WITH & W/O   Final Result      1.  Stable fossa of parenchymal hemorrhage centered in the LEFT cerebellum and extending into the RIGHT cerebellum exerting mass effect on the fourth ventricle which is not completely effaced   2.  Intraventricular blood redemonstrated   3.  Numerous areas of remote microhemorrhage in the supra and infratentorial brain. These could be related to amyloid angiopathy, numerous hypertensive microhemorrhages or less likely multiple cavernomas   4.  Moderate diffuse atrophy without compelling evidence of hydrocephalus   5.  Advanced white matter changes   6.  4 mm LEFT temporal meningioma   7.  Enhancing nodule in the RIGHT internal auditory canal suspicious for a vestibular schwannoma, less likely other extra-axial mass such as a meningioma, facial nerve schwannoma or metastasis. Consider posterior fossa protocol brain MRI without and with    contrast to further evaluate in when clinically appropriate.      MR-MRA HEAD-W/O   Final Result         MRA OF THE [place] WITHIN NORMAL LIMITS.      DX-CHEST-PORTABLE (1 VIEW)   Final Result         1.  Hazy left lower lobe infiltrate   2.  Small left pleural effusion   3.  Enlargement of the aortic knob, appearance suggesting aortic aneurysm.      DX-CHEST-FOR LINE PLACEMENT Perform procedure in: Patient's Room   Final Result      1.  PICC line is in place with the tip projecting over the SVC in satisfactory position.   2.  Mildly enlarged cardiac silhouette with vascular congestion.   3.  Retrocardiac opacity is likely due to atelectasis.         IR-PICC LINE PLACEMENT W/ GUIDANCE > AGE 5   Final Result                  Ultrasound-guided PICC placement performed by qualified nursing staff as    above.          CT-HEAD W/O   Final Result         1.  Left cerebellar hemorrhage, similar  compared to prior study.   2.  Minimal bilateral intraventricular hemorrhages, new since prior study.   3.  Nonspecific white matter changes, commonly associated with small vessel ischemic disease.  Associated mild cerebral atrophy is noted.   4.  Atherosclerosis.         DX-CHEST-PORTABLE (1 VIEW)   Final Result         1.  Left basilar atelectasis, no focal infiltrate   2.  Trace left pleural effusion   3.  Atherosclerosis      CT-CTA NECK WITH & W/O-POST PROCESSING   Final Result         1.  Scattered atherosclerosis without significant stenosis or occlusion.   2.  4.2 cm proximal descending thoracic aortic aneurysm, radiographic follow-up and surveillance recommended as clinically appropriate.         CT-CTA HEAD WITH & W/O-POST PROCESS   Final Result         1.  No large vessel occlusion or aneurysm identified   2.  Large cerebellar hemorrhage predominantly in the left cerebellum      These findings were discussed with the patient's clinician, Nikki Alexander, on 5/29/2024 11:35 PM.      DX-CHEST-PORTABLE (1 VIEW)   Final Result      Tubes as above      Left pleural effusion      CHF/pulmonary edema pattern      See Brown MD   5/30/2024 8:41 AM         CT-HEAD W/O   Final Result      Acute intraparenchymal hemorrhage is noted involving the left cerebellar hemisphere, measuring 3.1 x 4.2 x 3.4 cm, with associated mass effect.      This finding was telephoned to the mentioned attending by on-call radiologist at the time of imaging         AAST Intracranial Hemorrhage Brain Injury Guidelines         Hemorrhage type  mBIG 1           mBIG 2                mBIG 3      Subdural             <4mm               5-7.9 mm             >8mm      Epidural                No                    No                  Yes      Intraparenc'mal   <4mm               5-7.9 mm         >8mm or multi   1 location       or 2 locations      >3 locations      Subarachnoid     <3 sulci       single hemisphere   bi-hemisphere   and <1 mm         or 1-3 mm            or > 3 mm      Intraventricular       No                  No                    Yes      Skull Fracture       None            Non-displaced       Displaced               DLP Reporting Thresholds for Incorrect/Repeated Exams - DLP in mGy*cm   Head/Neck:  0-year-old 3840, 1-year-old 5880, 5-year-old 8770, 10-year-old 28315 and adult 10689   Head:  0-year-old 4540, 1-year-old 7460, 5-year-old 32871, 10-year-old 43155 and adult 30723   Neck:  0-year-old 2940, 1-year-old 4160, 5-year-old 4550, 10-year-old 6320 and adult 8470   Chest:  0-year-old 550, 1-year-old 830, 5-year-old 1200, 10-year-old 3840 and adult 3570   Abd/pelvis:  0-year-old 440, 1-year-old 720, 5-year-old 1080, 10-year-old 3330 and adult 3330   Trunk(C/A/P):  0-year-old 490, 1-year-old 770, 5-year-old 1140, 10-year-old 3570 and adult 3330      OUTSIDE IMAGES-CT CERVICAL SPINE    (Results Pending)         Assessment/Plan  * Cerebellar hemorrhage (HCC)- (present on admission)  Assessment & Plan  -ICH score equals 1  -Every 4 hours neurochecks  -CT head shows advanced tubular atrophy, stable ventriculomegaly with intraventricular hemorrhage, advanced supratentorial white matter disease most consistent with microvascular ischemic change, multiple old lacunar infarcts, acute/recent subacute parenchymal hemorrhage in the posterior fossa involving the superior cerebellar vermis and left paramedian cerebellar hemisphere and left lateral cerebellar pedicle.  No acute or recurrent hemorrhage, minimal focus of subarachnoid hemorrhage within a single sulcus of the left posterior temporal region, probably unchanged from prior recent exam.              -MRI brain ICH protocol shows stable fossa of parenchymal hemorrhage centered in the left cerebellum and extending into the right cerebellum exerting mass effect on the fourth ventricle which is not completely effaced. Likely CAA changes   -Neurology and Neurosurgery consulted, appreciate  recommendations, non operative recommendation at this time  -Maintain SBP between 110-140 mmHg.   -Discontinue 3% sodium   -Will start normalizing Na with goal of 2-3 point change per day.   -Anticoagulation contraindicated, SCDs only for DVT ppx  -Maintain euvolemia, euthermia, normoglycemia (140-180)  -Maintain bowel regimen and avoid valsalva or other causes of increased ICP   -Head of bed at 30 degrees or up to cardiac chair  -Maintain pCo2 of 35-40; prefer closer to 35  -Pravastatin 40mg qHS for possible neuro-protective effect     Encephalopathy acute- (present on admission)  Assessment & Plan  -Likely delirium in conjunction with his intracranial pathology  -Keep patient awake during the day and avoid daytime naps. Remove all unnecessary lines (central lines, peripheral IVs, feeding tubes, leung catheters). Avoid polypharmacy, frequent re-orientation, maximize family time at bedside, use glasses and hearing aids if needed, treat pain, encourage ambulation, minimize benzos/anticholinergic agents.   -First line Seroquel, trazodone to normalize day night cycle only if necessary  -Encouraged to mobilize, up to cardiac chair, Ambulate     Hyperchloremic metabolic acidosis  Assessment & Plan  -Will start to normalize sodium 2-3 points daily  -Improved, stop salt tabs and HTS  -Monitor BMP              -Chloride slightly increased to 120, CTM     Hyperglycemia- (present on admission)  Assessment & Plan  -Goal blood glucose 140-180  -Insulin per SSI, accuchecks  -Hypoglycemia protocol     Acute on chronic respiratory failure with hypoxia (HCC)- (present on admission)  Assessment & Plan  -Initially intubated for airway protection  -Extubated 6/1, stable on oxymask 6-8LPM  -Chest x-ray not impressive on 6/4/2024  -Encourage mobilization, up to cardiac chair  -Pulmonary hygiene, percussive vest  -RT/O2 Protocols  -Titrate supplemental FiO2 to maintain SpO2 >92%     Hyperlipidemia- (present on admission)  Assessment &  Plan  -Per wife, patient's home medication was discontinued due to muscle cramps  -Pravastatin 40mg QHS              -Continue to monitor for adverse reaction, consider CPK if concern for myalgias  -Lipid panel reviewed  -Counseling on lifestyle/dietary modifications     History of stroke- (present on admission)  Assessment & Plan  -Patient has had 2 prior hemorrhagic strokes; last in 2015  -No noted chronic deficits per wife     BPH with urinary obstruction- (present on admission)  Assessment & Plan  -S/p prostate surgery  -Per wife, patient has suffered from urinary retention and incontinence since his surgery  -Continue flomax  -Continue leung for strict I&O     Benign essential hypertension- (present on admission)  Assessment & Plan  -Per wife, patient's medications were discontinued as an outpatient because his BP has been controlled  -SBP in the 170s per wife's at home BP cuff measurement; average 130s-140s systolic over past 24 hours  -As needed antihypertensives to maintain -160  -Norvasc 10 mg     Abdominal aortic aneurysm (AAA) without rupture (HCC)- (present on admission)  Assessment & Plan  -S/p EVAR w/ bypass graft stent  -Maintain normotension  -Holding ASA at this time; per wife he takes ASA 3x/week     COPD (chronic obstructive pulmonary disease) (HCC)- (present on admission)  Assessment & Plan  -Not currently in exacerbation  -CXR, no acute process  -Continue home nebulizers as ordered  -Continue PRN nebulizers Q4h  -Wean FiO2 as tolerated    VTE:  Contraindicated  Ulcer: PPI  Lines: Leung Catheter  Ongoing indication addressed    I have performed a physical exam and reviewed and updated ROS and Plan today (6/6/2024). In review of yesterday's note (6/5/2024), there are no changes except as documented above.     Discussed patient condition and risk of morbidity and/or mortality with Family, RN, RT, Pharmacy, and Dr. Moseley  The patient remains critically ill. No time overlap and excludes  procedures.

## 2024-06-06 NOTE — PROGRESS NOTES
"Family Medicine [facility] Resident Progress Note    Date of admission  5/29/2024    Chief Complaint  78 y.o. male admitted 5/29/2024 with intraparenchymal hemorrhage.     Hospital Course  \"Jl Mueller is a 78 y.o. male smoker w/ PMHx s/f HTN, CAD, HLD, prior MI, AAA s/p bypass graft stenting, COPD on 2L home O2, two prior hemorrhagic strokes w/o reported deficits, BPH s/p prostate artery embolization who initially presented to an outside facility via EMS on 5/29/2024 for evaluation. Per the patient's wife, the patient was eating dinner when he stated he had a headache and felt weak. He then had two episodes of emesis and became altered to GCS11; he was intubated at some point prior to transfer to this facility due to his neuro status. On imaging at the outside facility, he was found to have a left cerebellar hemorrhage, mild perihematomal edema with subtle parenchymal displacement. 4th ventricle with moderate compression but open. for which he was transferred to this facility for higher level of care. On arrival, the patient was moving all extremities and withdrew to pain in all extremities. His pupils are pinpoint, and he over breaths vent. Repeat imaging redemonstrates area of Lt-cerebellar hemorrhage. A CTA was completed which, does not clearly show an aneurysmal/vascular malformation. He is being admitted to the ICU for hypertonic saline therapy, Q1h neurological checks, and strict blood pressure control as well as ventilator management.\"    5/30 - DDAVP for platelet inhibition.   5/31 - No clinical change  6/1- Extubated  6/2 - RASS fluctuating, norvasc added   6/3 - confused, on dex. 3% on going  6/4 - continuing 3%, CXR unremarkable, decrease sedation  6/5 - confused, continuing 3%, chloride slightly increased to 120, ammonia normal at 21, Many bacteria on urine micro start C3, TSH normal, patient mental status improved with up to cardiac chair.     Interval Problem Update  Reviewed last 24 hour events:   - " Neuro: Patient mental status slightly improved with up to cardiac chair, able to follow simple commands, responding with 1 word verbal replies   - HR: 70s to 110s    - SBP: 100s to 160s   - GI: last BM 6/1/24   - UOP: 1340 mL/24 hrs   - Park: secured   - Tm: 97.1   - Lines: PIV, PICC, Park   - PPx: GI PPI, DVT contraindicated   - CXR (personally reviewed and compared to prior): Mild interstitial prominence could represent vascular congestion, Cardiomegaly, ET tube removed   - Mobility level improving, eligible for progression yes      Review of Systems  Review of Systems   Unable to perform ROS: Critical illness        Vital Signs for last 24 hours   Pulse:  [] 90  Resp:  [19-36] 25  BP: (101-170)/() 101/53  SpO2:  [91 %-100 %] 98 %      Physical Exam   Physical Exam  Constitutional:       Appearance: He is obese. He is not toxic-appearing.      Comments: Mild distress, improved with repositioning in cardiac chair   HENT:      Head: Normocephalic and atraumatic.      Nose: Nose normal. No congestion.      Mouth/Throat:      Mouth: Mucous membranes are moist.      Pharynx: Oropharynx is clear.   Eyes:      Pupils: Pupils are equal, round, and reactive to light.   Cardiovascular:      Rate and Rhythm: Normal rate and regular rhythm.      Heart sounds: Normal heart sounds.   Pulmonary:      Effort: Pulmonary effort is normal.      Breath sounds: Normal breath sounds.      Comments: Coarse upper airway sounds, improved with repositioning in cardiac chair  Abdominal:      General: Abdomen is flat. There is no distension.      Palpations: Abdomen is soft.      Tenderness: There is no guarding or rebound.   Musculoskeletal:      Comments: No C/C/E   Skin:     General: Skin is warm and dry.      Capillary Refill: Capillary refill takes less than 2 seconds.      Coloration: Skin is not jaundiced.   Neurological:      Mental Status: He is alert.      Comments: Unable to participate in examination, following  simple commands after up to cardiac chair         Medications  Current Facility-Administered Medications   Medication Dose Route Frequency Provider Last Rate Last Admin    traZODone (Desyrel) tablet 100 mg  100 mg Enteral Tube QHS Reyes Moseley Jr. D.OAye        [START ON 6/6/2024] amLODIPine (Norvasc) tablet 10 mg  10 mg Enteral Tube Q DAY FABIO Romero Jr..O.        QUEtiapine (SEROquel) tablet 25 mg  25 mg Enteral Tube BID Reyes Moseley Jr. D.O.   25 mg at 06/05/24 1726    melatonin tablet 5 mg  5 mg Enteral Tube Nightly FABIO Kelly.O.   5 mg at 06/04/24 2040    pravastatin (Pravachol) tablet 40 mg  40 mg Enteral Tube Q EVENING Reyes Moseley Jr. D.O.   40 mg at 06/05/24 1726    levalbuterol (Xopenex) 1.25 MG/3ML nebulizer solution 1.25 mg  1.25 mg Nebulization Q6HRS (RT) FABIO Romero Jr..SRAVAN.   1.25 mg at 06/05/24 1824    cefTRIAXone (Rocephin) syringe 1,000 mg  1,000 mg Intravenous Q24HRS Sarina L. Latona   1,000 mg at 06/05/24 1726    oxyCODONE immediate-release (Roxicodone) tablet 5 mg  5 mg Enteral Tube Q4HRS PRN Sarina L. Latona   5 mg at 06/05/24 0220    fentaNYL (Sublimaze) injection 25-50 mcg  25-50 mcg Intravenous Q4HRS PRN Sarina L. Latona   50 mcg at 06/05/24 0110    levalbuterol (Xopenex) 1.25 MG/3ML nebulizer solution 1.25 mg  1.25 mg Nebulization Q2HRS PRN (RT) Reyes Gray M.D.   1.25 mg at 06/04/24 1040    labetalol (Normodyne/Trandate) injection 10 mg  10 mg Intravenous Q3HRS PRN Sarina L. Latona   10 mg at 06/05/24 1307    hydrALAZINE (Apresoline) injection 10 mg  10 mg Intravenous Q3HRS PRN Sarina L. Latona   10 mg at 06/05/24 1430    Pharmacy Consult: Enteral tube insertion - review meds/change route/product selection  1 Each Other PHARMACY TO DOSE Reyes Gray M.D.        diazePAM (Valium) injection 5 mg  5 mg Intravenous Q6HRS PRN Sarina RODRIGUEZ Lattanja        senna-docusate (Pericolace Or Senokot S) 8.6-50 MG per tablet 2 Tablet  2 Tablet Enteral Tube Q EVENING Sarina RODRIGUEZ  Latona   2 Tablet at 06/05/24 1726    And    polyethylene glycol/lytes (Miralax) Packet 1 Packet  1 Packet Enteral Tube QDAY PRN Sarina RODRIGUEZ Latona   1 Packet at 06/05/24 1727    Respiratory Therapy Consult   Nebulization Continuous RT Sarina Euceda MD Alert...ICU Electrolyte Replacement per Pharmacy   Other PHARMACY TO DOSE Sarina Euceda        acetaminophen (Tylenol) tablet 650 mg  650 mg Enteral Tube Q4HRS PRN Sarina RODRIGUEZ Latona   650 mg at 06/05/24 1725    Or    acetaminophen (Tylenol) suppository 650 mg  650 mg Rectal Q4HRS PRN Sarina Euceda        ondansetron (Zofran ODT) dispertab 4 mg  4 mg Enteral Tube Q4HRS PRN Sarina Euceda        Or    ondansetron (Zofran) syringe/vial injection 4 mg  4 mg Intravenous Q4HRS PRN Sarina Euceda        lansoprazole (Prevacid) solutab 30 mg  30 mg Enteral Tube DAILY Reyes Gray M.D.   30 mg at 06/05/24 0529    mineral oil-pet hydrophilic (Aquaphor) ointment   Topical BID Reyes Gray M.D.   Given at 06/05/24 1726       Fluids    Intake/Output Summary (Last 24 hours) at 6/5/2024 1930  Last data filed at 6/5/2024 1800  Gross per 24 hour   Intake 1590 ml   Output 1265 ml   Net 325 ml       Laboratory          Recent Labs     06/03/24  0548 06/03/24  1137 06/04/24  0528 06/04/24  1140 06/04/24  1720 06/04/24  2314 06/05/24  0530   SODIUM 152*   < > 153*   < > 153* 155* 154*   POTASSIUM 3.7   < > 3.8   < > 3.9 3.8 3.9   CHLORIDE 114*   < > 120*   < > 118* 119* 120*   CO2 27   < > 26   < > 25 26 23   BUN 20   < > 26*   < > 31* 31* 33*   CREATININE 0.63   < > 0.65   < > 0.68 0.64 0.68   MAGNESIUM 2.0  --  1.9  --   --   --  2.3   CALCIUM 9.1   < > 8.6   < > 9.4 9.1 9.3    < > = values in this interval not displayed.     Recent Labs     06/03/24  0548 06/03/24  1137 06/03/24  1747 06/04/24  1720 06/04/24  2314 06/05/24  0530   ALTSGPT 11  --   --   --   --   --    ASTSGOT 16  --   --   --   --   --    ALKPHOSPHAT 44  --   --   --   --   --    TBILIRUBIN 0.3  --   --    --   --   --    PREALBUMIN  --  10.9*  --   --   --   --    GLUCOSE 146* 155*   < > 173* 143* 142*    < > = values in this interval not displayed.     Recent Labs     06/03/24  0548 06/04/24  0528 06/05/24  0530   WBC 9.8 10.5 10.5   NEUTSPOLYS 81.20* 81.60* 83.70*   LYMPHOCYTES 7.00* 6.40* 5.00*   MONOCYTES 6.90 7.20 6.50   EOSINOPHILS 4.20 3.80 2.90   BASOPHILS 0.20 0.20 0.50   ASTSGOT 16  --   --    ALTSGPT 11  --   --    ALKPHOSPHAT 44  --   --    TBILIRUBIN 0.3  --   --      Recent Labs     06/03/24 0548 06/04/24 0528 06/05/24  0530   RBC 3.84* 3.69* 3.86*   HEMOGLOBIN 11.0* 10.3* 10.6*   HEMATOCRIT 34.5* 34.2* 35.2*   PLATELETCT 176 178 187       Imaging  DX-CHEST-LIMITED (1 VIEW)   Final Result      Mild interstitial prominence could represent vascular congestion.      Cardiomegaly.      Removal of endotracheal tube.      [id number] VIEW   Final Result      Feeding tube tip projects in the second portion of the duodenum.      Nonspecific bowel gas pattern.      CT-HEAD W/O   Final Result      1.  Advanced cerebral atrophy.   2.  Stable ventriculomegaly with intraventricular hemorrhage.   3.  Advanced supratentorial white matter disease most consistent with microvascular ischemic change.   4.  Multiple old lacunar infarcts as described.   5.  Acute/recent subacute parenchymal hemorrhage in the posterior fossa involving the superior cerebellar vermis and left paramedian cerebellar hemisphere and left lateral cerebellar peduncle. No evidence of recurrent hemorrhage.   6.  Minimal focus of subarachnoid hemorrhage within a single sulcus in the left posterior temporal region. Probably unchanged from prior recent exam.         DX-ABDOMEN FOR TUBE PLACEMENT   Final Result      Enteric tube tip projects over the stomach.      EC-ECHOCARDIOGRAM COMPLETE W/ CONT   Final Result      DX-CHEST-PORTABLE (1 VIEW)   Final Result         1.  Left basilar atelectasis or subtle infiltrate, decreased since prior study   2.   Trace left pleural effusion   3.  Enlargement of the aortic knob suggesting thoracic aortic aneurysm   4.  Atherosclerosis      MR-BRAIN-WITH & W/O   Final Result      1.  Stable fossa of parenchymal hemorrhage centered in the LEFT cerebellum and extending into the RIGHT cerebellum exerting mass effect on the fourth ventricle which is not completely effaced   2.  Intraventricular blood redemonstrated   3.  Numerous areas of remote microhemorrhage in the supra and infratentorial brain. These could be related to amyloid angiopathy, numerous hypertensive microhemorrhages or less likely multiple cavernomas   4.  Moderate diffuse atrophy without compelling evidence of hydrocephalus   5.  Advanced white matter changes   6.  4 mm LEFT temporal meningioma   7.  Enhancing nodule in the RIGHT internal auditory canal suspicious for a vestibular schwannoma, less likely other extra-axial mass such as a meningioma, facial nerve schwannoma or metastasis. Consider posterior fossa protocol brain MRI without and with    contrast to further evaluate in when clinically appropriate.      MR-MRA HEAD-W/O   Final Result         MRA OF THE [place] WITHIN NORMAL LIMITS.      DX-CHEST-PORTABLE (1 VIEW)   Final Result         1.  Hazy left lower lobe infiltrate   2.  Small left pleural effusion   3.  Enlargement of the aortic knob, appearance suggesting aortic aneurysm.      DX-CHEST-FOR LINE PLACEMENT Perform procedure in: Patient's Room   Final Result      1.  PICC line is in place with the tip projecting over the SVC in satisfactory position.   2.  Mildly enlarged cardiac silhouette with vascular congestion.   3.  Retrocardiac opacity is likely due to atelectasis.         IR-PICC LINE PLACEMENT W/ GUIDANCE > AGE 5   Final Result                  Ultrasound-guided PICC placement performed by qualified nursing staff as    above.          CT-HEAD W/O   Final Result         1.  Left cerebellar hemorrhage, similar compared to prior  study.   2.  Minimal bilateral intraventricular hemorrhages, new since prior study.   3.  Nonspecific white matter changes, commonly associated with small vessel ischemic disease.  Associated mild cerebral atrophy is noted.   4.  Atherosclerosis.         DX-CHEST-PORTABLE (1 VIEW)   Final Result         1.  Left basilar atelectasis, no focal infiltrate   2.  Trace left pleural effusion   3.  Atherosclerosis      CT-CTA NECK WITH & W/O-POST PROCESSING   Final Result         1.  Scattered atherosclerosis without significant stenosis or occlusion.   2.  4.2 cm proximal descending thoracic aortic aneurysm, radiographic follow-up and surveillance recommended as clinically appropriate.         CT-CTA HEAD WITH & W/O-POST PROCESS   Final Result         1.  No large vessel occlusion or aneurysm identified   2.  Large cerebellar hemorrhage predominantly in the left cerebellum      These findings were discussed with the patient's clinician, Nikki Alexander, on 5/29/2024 11:35 PM.      DX-CHEST-PORTABLE (1 VIEW)   Final Result      Tubes as above      Left pleural effusion      CHF/pulmonary edema pattern      See Brown MD   5/30/2024 8:41 AM         CT-HEAD W/O   Final Result      Acute intraparenchymal hemorrhage is noted involving the left cerebellar hemisphere, measuring 3.1 x 4.2 x 3.4 cm, with associated mass effect.      This finding was telephoned to the mentioned attending by on-call radiologist at the time of imaging         AAST Intracranial Hemorrhage Brain Injury Guidelines         Hemorrhage type  mBIG 1           mBIG 2                mBIG 3      Subdural             <4mm               5-7.9 mm             >8mm      Epidural                No                    No                  Yes      Intraparenc'mal   <4mm               5-7.9 mm         >8mm or multi   1 location       or 2 locations      >3 locations      Subarachnoid     <3 sulci       single hemisphere   bi-hemisphere   and <1 mm        or 1-3 mm             or > 3 mm      Intraventricular       No                  No                    Yes      Skull Fracture       None            Non-displaced       Displaced               DLP Reporting Thresholds for Incorrect/Repeated Exams - DLP in mGy*cm   Head/Neck:  0-year-old 3840, 1-year-old 5880, 5-year-old 8770, 10-year-old 64109 and adult 32049   Head:  0-year-old 4540, 1-year-old 7460, 5-year-old 63190, 10-year-old 96757 and adult 69428   Neck:  0-year-old 2940, 1-year-old 4160, 5-year-old 4550, 10-year-old 6320 and adult 8470   Chest:  0-year-old 550, 1-year-old 830, 5-year-old 1200, 10-year-old 3840 and adult 3570   Abd/pelvis:  0-year-old 440, 1-year-old 720, 5-year-old 1080, 10-year-old 3330 and adult 3330   Trunk(C/A/P):  0-year-old 490, 1-year-old 770, 5-year-old 1140, 10-year-old 3570 and adult 3330      OUTSIDE IMAGES-CT CERVICAL SPINE    (Results Pending)         Assessment/Plan  * Cerebellar hemorrhage (HCC)- (present on admission)  Assessment & Plan  -ICH score equals 1  -Every 2 hours neurochecks  -CT head shows advanced tubular atrophy, stable ventriculomegaly with intraventricular hemorrhage, advanced supratentorial white matter disease most consistent with microvascular ischemic change, multiple old lacunar infarcts, acute/recent subacute parenchymal hemorrhage in the posterior fossa involving the superior cerebellar vermis and left paramedian cerebellar hemisphere and left lateral cerebellar pedicle.  No acute or recurrent hemorrhage, minimal focus of subarachnoid hemorrhage within a single sulcus of the left posterior temporal region, probably unchanged from prior recent exam.              -MRI brain ICH protocol shows stable fossa of parenchymal hemorrhage centered in the left cerebellum and extending into the right cerebellum exerting mass effect on the fourth ventricle which is not completely effaced. Likely CAA changes   -Neurology and Neurosurgery consulted, appreciate recommendations, non  operative recommendation at this time  -Maintain SBP between 110-160.   -3% sodium acetate or sodium chloride if hyperchloremia improving: Monitor serum Na and Osm q6h, goal Na of 150-160   -Monitor chloride, slightly increased to 120  -Anticoagulation contraindicated, SCDs only for DVT ppx  -Maintain euvolemia, euthermia, normoglycemia (140-180)  -Maintain bowel regimen and avoid valsalva or other causes of increased ICP   -Head of bed at 30 degrees or up to cardiac chair  -Maintain pCo2 of 35-40; prefer closer to 35  -Pravastatin 40mg qHS for possible neuro-protective effect    Encephalopathy acute- (present on admission)  Assessment & Plan  -Likely delirium in conjunction with his intracranial pathology  -Keep patient awake during the day and avoid daytime naps. Remove all unnecessary lines (central lines, peripheral IVs, feeding tubes, leung catheters). Avoid polypharmacy, frequent re-orientation, maximize family time at bedside, use glasses and hearing aids if needed, treat pain, encourage ambulation, minimize benzos/anticholinergic agents.   -First line Seroquel, trazodone to normalize day night cycle only if necessary  -Encouraged to mobilize, up to cardiac chair, Ambulate    Hyperchloremic metabolic acidosis  Assessment & Plan  -Improved, stop salt tabs and HTS  -Monitor BMP   -Chloride slightly increased to 120, CTM    Hyperglycemia- (present on admission)  Assessment & Plan  -Goal blood glucose 140-180  -Insulin per SSI, accuchecks  -Hypoglycemia protocol    Acute on chronic respiratory failure with hypoxia (HCC)- (present on admission)  Assessment & Plan  -Initially intubated for airway protection  -Extubated 6/1, stable on oxymask  -Chest x-ray not impressive on 6/4/2024  -Encourage mobilization, up to cardiac chair  -Pulmonary hygiene, percussive vest  -RT/O2 Protocols  -Titrate supplemental FiO2 to maintain SpO2 >92%    Hyperlipidemia- (present on admission)  Assessment & Plan  -Per wife, patient's  home medication was discontinued due to muscle cramps  -Pravastatin 40mg QHS   -Continue to monitor for adverse reaction, consider CPK if concern for myalgias  -Lipid panel reviewed  -Counseling on lifestyle/dietary modifications    History of stroke- (present on admission)  Assessment & Plan  -Patient has had 2 prior hemorrhagic strokes; last in 2015  -No noted chronic deficits per wife    BPH with urinary obstruction- (present on admission)  Assessment & Plan  -S/p prostate surgery  -Per wife, patient has suffered from urinary retention and incontinence since his surgery  -Continue flomax  -Continue leung for strict I&O    Benign essential hypertension- (present on admission)  Assessment & Plan  -Per wife, patient's medications were discontinued as an outpatient because his BP has been controlled  -SBP in the 170s per wife's at home BP cuff measurement; average 130s-140s systolic over past 24 hours  -As needed antihypertensives to maintain -160  -Norvasc 10 mg    Abdominal aortic aneurysm (AAA) without rupture (HCC)- (present on admission)  Assessment & Plan  -S/p EVAR w/ bypass graft stent  -Maintain normotension  -Holding ASA at this time; per wife he takes ASA 3x/week    COPD (chronic obstructive pulmonary disease) (HCC)- (present on admission)  Assessment & Plan  -Not currently in exacerbation  -CXR, no acute process  -Continue home nebulizers as ordered  -Continue PRN nebulizers Q4h  -Wean FiO2 as tolerated    VTE:  Contraindicated  Ulcer: PPI  Lines: Leung Catheter  Ongoing indication addressed and PICC    I have performed a physical exam and reviewed and updated ROS and Plan today (6/5/2024). In review of yesterday's note (6/4/2024), there are no changes except as documented above.     Discussed patient condition and risk of morbidity and/or mortality with Family, RN, RT, Pharmacy, and Dr. Moseley  The patient remains critically ill. No time overlap and excludes procedures.

## 2024-06-07 ENCOUNTER — APPOINTMENT (OUTPATIENT)
Dept: RADIOLOGY | Facility: MEDICAL CENTER | Age: 79
End: 2024-06-07
Attending: INTERNAL MEDICINE
Payer: MEDICARE

## 2024-06-07 LAB
ANION GAP SERPL CALC-SCNC: 10 MMOL/L (ref 7–16)
BASOPHILS # BLD AUTO: 0.5 % (ref 0–1.8)
BASOPHILS # BLD: 0.05 K/UL (ref 0–0.12)
BUN SERPL-MCNC: 25 MG/DL (ref 8–22)
CALCIUM SERPL-MCNC: 9.2 MG/DL (ref 8.5–10.5)
CHLORIDE SERPL-SCNC: 113 MMOL/L (ref 96–112)
CO2 SERPL-SCNC: 26 MMOL/L (ref 20–33)
CREAT SERPL-MCNC: 0.84 MG/DL (ref 0.5–1.4)
EOSINOPHIL # BLD AUTO: 0.31 K/UL (ref 0–0.51)
EOSINOPHIL NFR BLD: 3.1 % (ref 0–6.9)
ERYTHROCYTE [DISTWIDTH] IN BLOOD BY AUTOMATED COUNT: 49.4 FL (ref 35.9–50)
GFR SERPLBLD CREATININE-BSD FMLA CKD-EPI: 89 ML/MIN/1.73 M 2
GLUCOSE SERPL-MCNC: 133 MG/DL (ref 65–99)
HCT VFR BLD AUTO: 34.3 % (ref 42–52)
HGB BLD-MCNC: 10.4 G/DL (ref 14–18)
IMM GRANULOCYTES # BLD AUTO: 0.15 K/UL (ref 0–0.11)
IMM GRANULOCYTES NFR BLD AUTO: 1.5 % (ref 0–0.9)
LYMPHOCYTES # BLD AUTO: 0.58 K/UL (ref 1–4.8)
LYMPHOCYTES NFR BLD: 5.7 % (ref 22–41)
MAGNESIUM SERPL-MCNC: 2.4 MG/DL (ref 1.5–2.5)
MCH RBC QN AUTO: 28 PG (ref 27–33)
MCHC RBC AUTO-ENTMCNC: 30.3 G/DL (ref 32.3–36.5)
MCV RBC AUTO: 92.2 FL (ref 81.4–97.8)
MONOCYTES # BLD AUTO: 0.8 K/UL (ref 0–0.85)
MONOCYTES NFR BLD AUTO: 7.9 % (ref 0–13.4)
NEUTROPHILS # BLD AUTO: 8.2 K/UL (ref 1.82–7.42)
NEUTROPHILS NFR BLD: 81.3 % (ref 44–72)
NRBC # BLD AUTO: 0 K/UL
NRBC BLD-RTO: 0 /100 WBC (ref 0–0.2)
PLATELET # BLD AUTO: 186 K/UL (ref 164–446)
PMV BLD AUTO: 11.9 FL (ref 9–12.9)
POTASSIUM SERPL-SCNC: 4.7 MMOL/L (ref 3.6–5.5)
RBC # BLD AUTO: 3.72 M/UL (ref 4.7–6.1)
SODIUM SERPL-SCNC: 149 MMOL/L (ref 135–145)
WBC # BLD AUTO: 10.1 K/UL (ref 4.8–10.8)

## 2024-06-07 PROCEDURE — 94640 AIRWAY INHALATION TREATMENT: CPT

## 2024-06-07 PROCEDURE — 700102 HCHG RX REV CODE 250 W/ 637 OVERRIDE(OP): Performed by: INTERNAL MEDICINE

## 2024-06-07 PROCEDURE — 70450 CT HEAD/BRAIN W/O DYE: CPT

## 2024-06-07 PROCEDURE — A9270 NON-COVERED ITEM OR SERVICE: HCPCS | Performed by: INTERNAL MEDICINE

## 2024-06-07 PROCEDURE — 700102 HCHG RX REV CODE 250 W/ 637 OVERRIDE(OP): Performed by: NURSE PRACTITIONER

## 2024-06-07 PROCEDURE — 700101 HCHG RX REV CODE 250: Performed by: NURSE PRACTITIONER

## 2024-06-07 PROCEDURE — 99291 CRITICAL CARE FIRST HOUR: CPT | Performed by: INTERNAL MEDICINE

## 2024-06-07 PROCEDURE — A9270 NON-COVERED ITEM OR SERVICE: HCPCS | Performed by: NURSE PRACTITIONER

## 2024-06-07 PROCEDURE — 80048 BASIC METABOLIC PNL TOTAL CA: CPT

## 2024-06-07 PROCEDURE — A9270 NON-COVERED ITEM OR SERVICE: HCPCS | Performed by: PHYSICAL MEDICINE & REHABILITATION

## 2024-06-07 PROCEDURE — 700102 HCHG RX REV CODE 250 W/ 637 OVERRIDE(OP): Performed by: PHYSICAL MEDICINE & REHABILITATION

## 2024-06-07 PROCEDURE — 700101 HCHG RX REV CODE 250: Performed by: INTERNAL MEDICINE

## 2024-06-07 PROCEDURE — 700105 HCHG RX REV CODE 258: Performed by: INTERNAL MEDICINE

## 2024-06-07 PROCEDURE — 85025 COMPLETE CBC W/AUTO DIFF WBC: CPT

## 2024-06-07 PROCEDURE — 770022 HCHG ROOM/CARE - ICU (200)

## 2024-06-07 PROCEDURE — 700111 HCHG RX REV CODE 636 W/ 250 OVERRIDE (IP): Performed by: NURSE PRACTITIONER

## 2024-06-07 PROCEDURE — 83735 ASSAY OF MAGNESIUM: CPT

## 2024-06-07 RX ADMIN — QUETIAPINE FUMARATE 25 MG: 25 TABLET ORAL at 17:48

## 2024-06-07 RX ADMIN — AMLODIPINE BESYLATE 10 MG: 10 TABLET ORAL at 05:00

## 2024-06-07 RX ADMIN — Medication: at 17:42

## 2024-06-07 RX ADMIN — SENNOSIDES AND DOCUSATE SODIUM 2 TABLET: 50; 8.6 TABLET ORAL at 17:42

## 2024-06-07 RX ADMIN — CEFTRIAXONE SODIUM 1000 MG: 10 INJECTION, POWDER, FOR SOLUTION INTRAVENOUS at 17:55

## 2024-06-07 RX ADMIN — TRAZODONE HYDROCHLORIDE 100 MG: 100 TABLET ORAL at 21:00

## 2024-06-07 RX ADMIN — Medication 5 MG: at 21:00

## 2024-06-07 RX ADMIN — LEVALBUTEROL 1.25 MG: 1.25 SOLUTION RESPIRATORY (INHALATION) at 02:14

## 2024-06-07 RX ADMIN — SENNOSIDES AND DOCUSATE SODIUM 2 TABLET: 50; 8.6 TABLET ORAL at 05:13

## 2024-06-07 RX ADMIN — DEXMEDETOMIDINE HYDROCHLORIDE 0.5 MCG/KG/HR: 100 INJECTION, SOLUTION INTRAVENOUS at 15:03

## 2024-06-07 RX ADMIN — LANSOPRAZOLE 30 MG: 30 TABLET, ORALLY DISINTEGRATING ORAL at 05:00

## 2024-06-07 RX ADMIN — QUETIAPINE FUMARATE 25 MG: 25 TABLET ORAL at 05:00

## 2024-06-07 RX ADMIN — Medication: at 05:00

## 2024-06-07 RX ADMIN — POLYETHYLENE GLYCOL 3350 1 PACKET: 17 POWDER, FOR SOLUTION ORAL at 05:00

## 2024-06-07 RX ADMIN — PRAVASTATIN SODIUM 40 MG: 20 TABLET ORAL at 17:43

## 2024-06-07 ASSESSMENT — PAIN DESCRIPTION - PAIN TYPE
TYPE: ACUTE PAIN

## 2024-06-07 NOTE — DISCHARGE PLANNING
CM reviewed chart and patient was discussed in IDT rounds.    ANABELA called [facility] attempting to visit with Hortencia the admissions coordinator.  On 06/06/24 when ANABELA attempted to contact Hortencia, ANABELA was asked to leave a message as Hortencia was not there but would be back Friday (today).

## 2024-06-07 NOTE — PROGRESS NOTES
"Critical Care Progress Note    Date of admission  5/29/2024    Chief Complaint  78 y.o. male admitted 5/29/2024 with [facility]    Hospital Course  \"Jl Mueller is a 78 y.o. male smoker w/ PMHx s/f HTN, CAD, HLD, prior MI, AAA s/p bypass graft stenting, COPD on 2L home O2, two prior hemorrhagic strokes w/o reported deficits, BPH s/p prostate artery embolization who initially presented to an outside facility via EMS on 5/29/2024 for evaluation. Per the patient's wife, the patient was eating dinner when he stated he had a headache and felt weak. He then had two episodes of emesis and became altered to GCS11; he was intubated at some point prior to transfer to this facility due to his neuro status. On imaging at the outside facility, he was found to have a left cerebellar hemorrhage, mild perihematomal edema with subtle parenchymal displacement. 4th ventricle with moderate compression but open. for which he was transferred to this facility for higher level of care. On arrival, the patient was moving all extremities and withdrew to pain in all extremities. His pupils are pinpoint, and he over breaths vent. Repeat imaging redemonstrates area of Lt-cerebellar hemorrhage. A CTA was completed which, does not clearly show an aneurysmal/vascular malformation. He is being admitted to the ICU for hypertonic saline therapy, Q1h neurological checks, and strict blood pressure control as well as ventilator management.\"    5/30- DDAVP for platelet inhibition.   5/31- No clinical change  6/1- Extubated  6/2 - RASS fluctuating, norvasc added   6/3 - confused, on dex. 3% on going  6/4 - continuing 3%, CXR unremarkable, decrease sedation  6/5 - D/C 3%, decrease sedation, ambulate  6/6 - improved delirium    Interval Problem Update  Reviewed last 24 hour events:   - NAEON, still delirious   - Neuro: confused   - HR: 60s-110s   - SBP: 90s-130s   - GI: TF at goal, last BM 6/1   - UOP: 1 L/24 hrs   - Park: yes   - Tm: 37.9   - Lines: " PICC   - PPx: GI PPI, DVT SCDs   - 6L   - CXR (personally reviewed and compared to prior): no new   - CT stable   - Rocephin day 4 of 7, awaiting urine culture    Yesterday   - delirium improving   - Neuro: confused   - HR: 80s-100s   - SBP: 110s-150s   - GI: TF at goal, last BM 6/1 -> advance bowel regimen   - UOP: 1 L/24 hrs, Net + 4.5   - Park: yes   - Tm: 38.1   - Lines: PICC   - PPx: GI PPI, DVT SCDs   - ABG: looks good   - 7L NC   - CXR (personally reviewed and compared to prior): no new   - Continue nonpharmacologic delirium treatments   - Rocephin day 3 of 7    Review of Systems  Review of Systems   Unable to perform ROS: Critical illness        Vital Signs for last 24 hours   Pulse:  [] 79  Resp:  [17-55] 23  BP: ()/(54-87) 132/64  SpO2:  [87 %-100 %] 99 %    Hemodynamic parameters for last 24 hours       Respiratory Information for the last 24 hours       Physical Exam   Physical Exam  Vitals and nursing note reviewed.   Constitutional:       Appearance: He is ill-appearing.   HENT:      Head: Normocephalic and atraumatic.      Right Ear: External ear normal.      Left Ear: External ear normal.      Nose: Nose normal.      Mouth/Throat:      Mouth: Mucous membranes are dry.      Pharynx: Oropharynx is clear.   Eyes:      Extraocular Movements: Extraocular movements intact.      Conjunctiva/sclera: Conjunctivae normal.   Cardiovascular:      Rate and Rhythm: Normal rate and regular rhythm.      Pulses: Normal pulses.   Pulmonary:      Effort: Pulmonary effort is normal.      Breath sounds: Rales present.   Abdominal:      Palpations: Abdomen is soft.   Musculoskeletal:      Cervical back: Neck supple.   Skin:     General: Skin is warm and dry.      Capillary Refill: Capillary refill takes less than 2 seconds.   Neurological:      General: No focal deficit present.      Mental Status: He is alert.      Comments: Confused, agitated   Psychiatric:         Cognition and Memory: Cognition is  impaired. Memory is impaired.         Medications  Current Facility-Administered Medications   Medication Dose Route Frequency Provider Last Rate Last Admin    dexmedetomidine (PRECEDEX) 400 mcg/100mL NS premix infusion  0.1-0.7 mcg/kg/hr (Ideal) Intravenous Continuous Reyes Moseley Jr., D.O. 2.9 mL/hr at 06/07/24 0605 0.2 mcg/kg/hr at 06/07/24 0605    traZODone (Desyrel) tablet 100 mg  100 mg Enteral Tube QHS Reyes Moseley Jr., D.O.   100 mg at 06/06/24 2131    amLODIPine (Norvasc) tablet 10 mg  10 mg Enteral Tube Q DAY Reyes Moseley Jr., D.O.   10 mg at 06/07/24 0500    QUEtiapine (SEROquel) tablet 25 mg  25 mg Enteral Tube BID Reyes Moseley Jr., D.O.   25 mg at 06/07/24 0500    melatonin tablet 5 mg  5 mg Enteral Tube Nightly Ginna Harden D.O.   5 mg at 06/06/24 2131    pravastatin (Pravachol) tablet 40 mg  40 mg Enteral Tube Q EVENING Reyes Moseley Jr., D.O.   40 mg at 06/06/24 1758    levalbuterol (Xopenex) 1.25 MG/3ML nebulizer solution 1.25 mg  1.25 mg Nebulization Q6HRS (RT) Reyes Moseley Jr., D.O.   1.25 mg at 06/07/24 0214    cefTRIAXone (Rocephin) syringe 1,000 mg  1,000 mg Intravenous Q24HRS Sarina L. Latona   1,000 mg at 06/06/24 1831    oxyCODONE immediate-release (Roxicodone) tablet 5 mg  5 mg Enteral Tube Q4HRS PRN Sarina L. Latona   5 mg at 06/06/24 0335    fentaNYL (Sublimaze) injection 25-50 mcg  25-50 mcg Intravenous Q4HRS PRN Sarina L. Latona   25 mcg at 06/06/24 0228    levalbuterol (Xopenex) 1.25 MG/3ML nebulizer solution 1.25 mg  1.25 mg Nebulization Q2HRS PRN (RT) Reyes Gray M.D.   1.25 mg at 06/04/24 1040    labetalol (Normodyne/Trandate) injection 10 mg  10 mg Intravenous Q3HRS PRN Sarina L. Latona   10 mg at 06/05/24 1307    hydrALAZINE (Apresoline) injection 10 mg  10 mg Intravenous Q3HRS PRN Sarina ABEL. Latona   10 mg at 06/05/24 1430    Pharmacy Consult: Enteral tube insertion - review meds/change route/product selection  1 Each Other PHARMACY TO DOSE Reyes Gray M.D.         diazePAM (Valium) injection 5 mg  5 mg Intravenous Q6HRS PRN Sarina Euceda        senna-docusate (Pericolace Or Senokot S) 8.6-50 MG per tablet 2 Tablet  2 Tablet Enteral Tube Q EVENING Sarina Euceda   2 Tablet at 06/07/24 0513    And    polyethylene glycol/lytes (Miralax) Packet 1 Packet  1 Packet Enteral Tube QDAY PRN Sarina Euceda   1 Packet at 06/07/24 0500    Respiratory Therapy Consult   Nebulization Continuous RT Sarina Euceda MD Alert...ICU Electrolyte Replacement per Pharmacy   Other PHARMACY TO DOSE Sarina Euceda        acetaminophen (Tylenol) tablet 650 mg  650 mg Enteral Tube Q4HRS PRN Sarina Euceda   650 mg at 06/05/24 1725    Or    acetaminophen (Tylenol) suppository 650 mg  650 mg Rectal Q4HRS PRN Sarina Euceda        ondansetron (Zofran ODT) dispertab 4 mg  4 mg Enteral Tube Q4HRS PRN Sarina Euceda        Or    ondansetron (Zofran) syringe/vial injection 4 mg  4 mg Intravenous Q4HRS PRN Sarina Euceda        lansoprazole (Prevacid) solutab 30 mg  30 mg Enteral Tube DAILY Reyes Gray M.D.   30 mg at 06/07/24 0500    mineral oil-pet hydrophilic (Aquaphor) ointment   Topical BID Reyes Gray M.D.   Given at 06/07/24 0500       Fluids    Intake/Output Summary (Last 24 hours) at 6/7/2024 0707  Last data filed at 6/7/2024 0617  Gross per 24 hour   Intake 1432.64 ml   Output 1050 ml   Net 382.64 ml       Laboratory  Recent Labs     06/06/24  0125   ISTATAPH 7.375*   ISTATAPCO2 50.9*   ISTATAPO2 98*   ISTATATCO2 31   UVDJCOC7LPG 97   ISTATARTHCO3 29.7*   ISTATARTBE 4*   ISTATTEMP 37.8 C   ISTATSPEC Arterial   ISTATAPHTC 7.363*   EIUKPXXJ2CI 103*         Recent Labs     06/05/24  0530 06/05/24  2107 06/06/24  0450 06/06/24  2145 06/07/24  0551   SODIUM 154* 156* 156* 154*  --    POTASSIUM 3.9 3.9 3.8 3.7  --    CHLORIDE 120* 118* 119* 120*  --    CO2 23 26 23 25  --    BUN 33* 33* 32* 30*  --    CREATININE 0.68 0.72 0.72 0.70  --    MAGNESIUM 2.3  --  2.3  --  2.4    CALCIUM 9.3 9.4 9.3 8.4*  --      Recent Labs     06/05/24  2107 06/06/24  0450 06/06/24  2145   GLUCOSE 156* 129* 127*     Recent Labs     06/05/24  0530 06/06/24  0450 06/07/24  0551   WBC 10.5 10.6 10.1   NEUTSPOLYS 83.70* 83.90* 81.30*   LYMPHOCYTES 5.00* 6.70* 5.70*   MONOCYTES 6.50 7.10 7.90   EOSINOPHILS 2.90 0.50 3.10   BASOPHILS 0.50 0.30 0.50     Recent Labs     06/05/24  0530 06/06/24  0450 06/07/24  0551   RBC 3.86* 3.79* 3.72*   HEMOGLOBIN 10.6* 10.6* 10.4*   HEMATOCRIT 35.2* 34.6* 34.3*   PLATELETCT 187 185 186       Imaging  CT:    Reviewed    Assessment/Plan  * Cerebellar hemorrhage (HCC)- (present on admission)  Assessment & Plan  ICH score=1  q2h neurochecks  MRI brain: Stable fossa of parenchymal hemorrhage centered in the LEFT cerebellum and extending into the RIGHT cerebellum exerting mass effect on the fourth ventricle which is not completely effaced 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  HOB at 30 degrees  maintain pCO2 of 35-40; closer to 35  Pravastatin 40 mg qHS for possible neuroprotective effect  CT 6/7 unchanged    Encephalopathy acute- (present on admission)  Assessment & Plan  Likely delirium in conjunction with his intracranial pathology  Keep patient awake during the day and avoid daytime naps. Remove all unnecessary lines (central lines, peripheral IVs, feeding tubes, leung catheters). Avoid polypharmacy, frequent re-orientation, maximize family time at bedside, use glasses and hearing aids if needed, treat pain, encourage ambulation, minimize benzos/anticholinergic agents.   Ambulate today  Seroquel BID, trazadone    Hyperchloremic metabolic acidosis  Assessment & Plan  Improved, stop salt tabs and  HTS    Hyperglycemia- (present on admission)  Assessment & Plan  Goal blood glucose 140-180  sliding scale insulin, accuchecks  hypoglycemia protocol    Acute on chronic respiratory failure with hypoxia (HCC)- (present on admission)  Assessment & Plan  Intubated for airway protection  Extubated 6/1  Chest x-ray not impressive on 6/4/2024  Mobilization  Pulmonary hygiene  RT/O2 Protocols  Titrate supplemental FiO2 to maintain SpO2 >92%    Hyperlipidemia- (present on admission)  Assessment & Plan  Per wife, patient's home medication was discontinued due to muscle cramps  Pravastatin 40mg QHS  Lipid panel reviewed  Counseling on lifestyle/dietary modifications    History of stroke- (present on admission)  Assessment & Plan  Patient has had 2 prior hemorrhagic strokes; last in 2015  No noted deficits per wife    BPH with urinary obstruction- (present on admission)  Assessment & Plan  S/p prostate surgery  Per wife, patient has suffered from urinary retention and incontinence since his surgery  Continue flomax  Park for strict I&O    Benign essential hypertension- (present on admission)  Assessment & Plan  Per wife, patient's medications were discontinued as an outpatient because his BP has been controlled  SBP in the 170s per wife's at home BP cuff measurement; <160 since arrival to this facility  As needed antihypertensives to maintain -160)  Norvasc 10 mg daily    Abdominal aortic aneurysm (AAA) without rupture (HCC)- (present on admission)  Assessment & Plan  S/p EVAR w/ bypass graft stent  Maintain normotension  Holding ASA at this time; per wife he takes ASA 3x/week    COPD (chronic obstructive pulmonary disease) (HCC)- (present on admission)  Assessment & Plan  Not currently in exacerbation  CXR with no acute process  Continue home nebulizers as ordered  Continue PRN nebulizers Q4h  Wean FiO2 as tolerated         VTE:  Contraindicated  Ulcer: PPI  Lines: Park Catheter  Ongoing indication addressed    I  have performed a physical exam and reviewed and updated ROS and Plan today (6/7/2024). In review of yesterday's note (6/6/2024), there are no changes except as documented above.     Discussed patient condition and risk of morbidity and/or mortality with Family, RN, RT, Pharmacy, Charge nurse / hot rounds, and Patient    The patient remains critically ill.  Critical care time = 34 minutes in directly providing and coordinating critical care and extensive data review.  No time overlap and excludes procedures.    Please note that this dictation was created using voice recognition software. The accuracy of the dictation is limited to the abilities of the software. I have made every reasonable attempt to correct obvious errors, but I expect that there are errors of grammar and possibly content that I did not discover before finalizing the note.

## 2024-06-07 NOTE — CARE PLAN
The patient is Stable - Low risk of patient condition declining or worsening    Shift Goals  Clinical Goals: Manage airway/secretions, Reorient as needed  Patient Goals: GUZMAN  Family Goals: Updates and Communication on POC, given at bedside.    Progress made toward(s) clinical / shift goals:        Problem: Safety - Medical Restraint  Goal: Remains free of injury from restraints (Restraint for Interference with Medical Device)  Description: INTERVENTIONS:  1. Determine that other, less restrictive measures have been tried or would not be effective before applying the restraint  2. Evaluate the patient's condition at the time of restraint application  3. Educate patient/family regarding the reason for restraint  4. Q2H: Monitor safety, psychosocial status, comfort, circulation, respiratory status, LOC, nutrition and hydration  Outcome: Progressing     Problem: Pain - Standard  Goal: Alleviation of pain or a reduction in pain to the patient’s comfort goal  Description: Target End Date:  Prior to discharge or change in level of care    Document on Vitals flowsheet    1.  Document pain using the appropriate pain scale per order or unit policy  2.  Educate and implement non-pharmacologic comfort measures (i.e. relaxation, distraction, massage, cold/heat therapy, etc.)  3.  Pain management medications as ordered  4.  Reassess pain after pain med administration per policy  5.  If opiods administered assess patient's response to pain medication is appropriate per POSS sedation scale  6.  Follow pain management plan developed in collaboration with patient and interdisciplinary team (including palliative care or pain specialists if applicable)  Outcome: Progressing     Problem: Skin Integrity  Goal: Skin integrity is maintained or improved  Description: Target End Date:  Prior to discharge or change in level of care    Document interventions on Skin Risk/Barrera flowsheet groups and corresponding LDA    1.  Assess and monitor  skin integrity, appearance and/or temperature  2.  Assess risk factors for impaired skin integrity and/or pressures ulcers  3.  Implement precautions to protect skin integrity in collaboration with interdisciplinary team  4.  Implement pressure ulcer prevention protocol if at risk for skin breakdown  5.  Confirm wound care consult if at risk for skin breakdown  6.  Ensure patient use of pressure relieving devices  (Low air loss bed, waffle overlay, heel protectors, ROHO cushion, etc)  Outcome: Progressing     Problem: Neuro Status  Goal: Neuro status will remain stable or improve  Description: Target End Date:  Prior to discharge or change in level of care    Document on Neuro assessment in the Assessment flowsheet    1.  Assess and monitor neurologic status per provider order/protocol/unit policy  2.  Assess level of consciousness and orientation  3.  Assess for speech, dysarthria, dysphagia, facial symmetry  4.  Assess visual field, eye movements, gaze preference, pupil reaction and size  5.  Assess muscle strength and motor response in all four extremities  6.  Assess for sensation (numbness and tingling)  7.  Assess basic neuro reflexes (cough, gag, corneal)  8.  Identify changes in neuro status and report to provider for testing/treatment orders  Outcome: Progressing       Patient is not progressing towards the following goals:

## 2024-06-07 NOTE — CARE PLAN
Problem: Bronchoconstriction  Goal: Improve in air movement and diminished wheezing  Description: Target End Date:  2 to 3 days    1.  Implement inhaled treatments  2.  Evaluate and manage medication effects  Outcome: Progressing     Problem: Bronchopulmonary Hygiene  Goal: Increase mobilization of retained secretions  Description: Target End Date:  2 to 3 days    1.  Perform bronchopulmonary therapy as indicated by assessment  2.  Perform airway suctioning  3.  Perform actions to maintain patient airway  Outcome: Progressing   Xopenex Q6  CPT-vest QID

## 2024-06-07 NOTE — THERAPY
Physical Therapy Contact Note    Patient Name: Jl Mueller  Age:  78 y.o., Sex:  male  Medical Record #: [id number]  Today's Date: 6/7/2024    Off unit in AM; will return when able;    Monica HEARN, PT,  [phone]

## 2024-06-08 PROBLEM — N39.0 UTI (URINARY TRACT INFECTION): Status: ACTIVE | Noted: 2024-06-08

## 2024-06-08 LAB
ANION GAP SERPL CALC-SCNC: 11 MMOL/L (ref 7–16)
ANION GAP SERPL CALC-SCNC: 9 MMOL/L (ref 7–16)
BACTERIA BLD CULT: NORMAL
BACTERIA BLD CULT: NORMAL
BACTERIA UR CULT: ABNORMAL
BASOPHILS # BLD AUTO: 0.4 % (ref 0–1.8)
BASOPHILS # BLD: 0.03 K/UL (ref 0–0.12)
BUN SERPL-MCNC: 19 MG/DL (ref 8–22)
BUN SERPL-MCNC: 21 MG/DL (ref 8–22)
CALCIUM SERPL-MCNC: 9.4 MG/DL (ref 8.5–10.5)
CALCIUM SERPL-MCNC: 9.5 MG/DL (ref 8.5–10.5)
CHLORIDE SERPL-SCNC: 108 MMOL/L (ref 96–112)
CHLORIDE SERPL-SCNC: 109 MMOL/L (ref 96–112)
CO2 SERPL-SCNC: 27 MMOL/L (ref 20–33)
CO2 SERPL-SCNC: 29 MMOL/L (ref 20–33)
COMMENT 1642: NORMAL
CREAT SERPL-MCNC: 0.68 MG/DL (ref 0.5–1.4)
CREAT SERPL-MCNC: 0.73 MG/DL (ref 0.5–1.4)
EOSINOPHIL # BLD AUTO: 0.37 K/UL (ref 0–0.51)
EOSINOPHIL NFR BLD: 4.5 % (ref 0–6.9)
ERYTHROCYTE [DISTWIDTH] IN BLOOD BY AUTOMATED COUNT: 48.3 FL (ref 35.9–50)
GFR SERPLBLD CREATININE-BSD FMLA CKD-EPI: 93 ML/MIN/1.73 M 2
GFR SERPLBLD CREATININE-BSD FMLA CKD-EPI: 95 ML/MIN/1.73 M 2
GLUCOSE SERPL-MCNC: 158 MG/DL (ref 65–99)
GLUCOSE SERPL-MCNC: 160 MG/DL (ref 65–99)
HCT VFR BLD AUTO: 34.1 % (ref 42–52)
HGB BLD-MCNC: 10.1 G/DL (ref 14–18)
HYPOCHROMIA BLD QL SMEAR: ABNORMAL
IMM GRANULOCYTES # BLD AUTO: 0.09 K/UL (ref 0–0.11)
IMM GRANULOCYTES NFR BLD AUTO: 1.1 % (ref 0–0.9)
LYMPHOCYTES # BLD AUTO: 0.78 K/UL (ref 1–4.8)
LYMPHOCYTES NFR BLD: 9.5 % (ref 22–41)
MAGNESIUM SERPL-MCNC: 2.4 MG/DL (ref 1.5–2.5)
MCH RBC QN AUTO: 27.2 PG (ref 27–33)
MCHC RBC AUTO-ENTMCNC: 29.6 G/DL (ref 32.3–36.5)
MCV RBC AUTO: 91.9 FL (ref 81.4–97.8)
MONOCYTES # BLD AUTO: 0.79 K/UL (ref 0–0.85)
MONOCYTES NFR BLD AUTO: 9.6 % (ref 0–13.4)
MORPHOLOGY BLD-IMP: NORMAL
NEUTROPHILS # BLD AUTO: 6.17 K/UL (ref 1.82–7.42)
NEUTROPHILS NFR BLD: 74.9 % (ref 44–72)
NRBC # BLD AUTO: 0 K/UL
NRBC BLD-RTO: 0 /100 WBC (ref 0–0.2)
PLATELET # BLD AUTO: 181 K/UL (ref 164–446)
PLATELET BLD QL SMEAR: NORMAL
PMV BLD AUTO: 11.5 FL (ref 9–12.9)
POTASSIUM SERPL-SCNC: 4.1 MMOL/L (ref 3.6–5.5)
POTASSIUM SERPL-SCNC: 4.5 MMOL/L (ref 3.6–5.5)
RBC # BLD AUTO: 3.71 M/UL (ref 4.7–6.1)
RBC BLD AUTO: PRESENT
SIGNIFICANT IND 70042: ABNORMAL
SIGNIFICANT IND 70042: NORMAL
SIGNIFICANT IND 70042: NORMAL
SITE SITE: ABNORMAL
SITE SITE: NORMAL
SITE SITE: NORMAL
SODIUM SERPL-SCNC: 146 MMOL/L (ref 135–145)
SODIUM SERPL-SCNC: 147 MMOL/L (ref 135–145)
SOURCE SOURCE: ABNORMAL
SOURCE SOURCE: NORMAL
SOURCE SOURCE: NORMAL
WBC # BLD AUTO: 8.2 K/UL (ref 4.8–10.8)

## 2024-06-08 PROCEDURE — 700102 HCHG RX REV CODE 250 W/ 637 OVERRIDE(OP): Performed by: NURSE PRACTITIONER

## 2024-06-08 PROCEDURE — 99222 1ST HOSP IP/OBS MODERATE 55: CPT | Performed by: HOSPITALIST

## 2024-06-08 PROCEDURE — 99233 SBSQ HOSP IP/OBS HIGH 50: CPT | Mod: GC | Performed by: INTERNAL MEDICINE

## 2024-06-08 PROCEDURE — 770000 HCHG ROOM/CARE - INTERMEDIATE ICU *

## 2024-06-08 PROCEDURE — 700102 HCHG RX REV CODE 250 W/ 637 OVERRIDE(OP): Performed by: INTERNAL MEDICINE

## 2024-06-08 PROCEDURE — A9270 NON-COVERED ITEM OR SERVICE: HCPCS | Performed by: INTERNAL MEDICINE

## 2024-06-08 PROCEDURE — A9270 NON-COVERED ITEM OR SERVICE: HCPCS | Performed by: NURSE PRACTITIONER

## 2024-06-08 PROCEDURE — 85025 COMPLETE CBC W/AUTO DIFF WBC: CPT

## 2024-06-08 PROCEDURE — 700105 HCHG RX REV CODE 258: Performed by: INTERNAL MEDICINE

## 2024-06-08 PROCEDURE — 700111 HCHG RX REV CODE 636 W/ 250 OVERRIDE (IP): Performed by: NURSE PRACTITIONER

## 2024-06-08 PROCEDURE — 700111 HCHG RX REV CODE 636 W/ 250 OVERRIDE (IP): Mod: JZ | Performed by: HOSPITALIST

## 2024-06-08 PROCEDURE — 80048 BASIC METABOLIC PNL TOTAL CA: CPT

## 2024-06-08 PROCEDURE — 700102 HCHG RX REV CODE 250 W/ 637 OVERRIDE(OP): Performed by: PHYSICAL MEDICINE & REHABILITATION

## 2024-06-08 PROCEDURE — 700101 HCHG RX REV CODE 250: Performed by: INTERNAL MEDICINE

## 2024-06-08 PROCEDURE — 94640 AIRWAY INHALATION TREATMENT: CPT

## 2024-06-08 PROCEDURE — 700111 HCHG RX REV CODE 636 W/ 250 OVERRIDE (IP): Mod: JZ | Performed by: INTERNAL MEDICINE

## 2024-06-08 PROCEDURE — A9270 NON-COVERED ITEM OR SERVICE: HCPCS | Performed by: PHYSICAL MEDICINE & REHABILITATION

## 2024-06-08 PROCEDURE — 83735 ASSAY OF MAGNESIUM: CPT

## 2024-06-08 RX ORDER — LEVALBUTEROL INHALATION SOLUTION 1.25 MG/3ML
1.25 SOLUTION RESPIRATORY (INHALATION)
Status: COMPLETED | OUTPATIENT
Start: 2024-06-08 | End: 2024-06-08

## 2024-06-08 RX ORDER — BISACODYL 10 MG
10 SUPPOSITORY, RECTAL RECTAL DAILY
Status: DISCONTINUED | OUTPATIENT
Start: 2024-06-08 | End: 2024-06-13

## 2024-06-08 RX ORDER — ENEMA 19; 7 G/133ML; G/133ML
1 ENEMA RECTAL ONCE
Status: DISPENSED | OUTPATIENT
Start: 2024-06-08 | End: 2024-06-09

## 2024-06-08 RX ORDER — HALOPERIDOL 5 MG/ML
2 INJECTION INTRAMUSCULAR
Status: DISCONTINUED | OUTPATIENT
Start: 2024-06-08 | End: 2024-06-25

## 2024-06-08 RX ADMIN — Medication: at 17:27

## 2024-06-08 RX ADMIN — POLYETHYLENE GLYCOL 3350 1 PACKET: 17 POWDER, FOR SOLUTION ORAL at 05:17

## 2024-06-08 RX ADMIN — PRAVASTATIN SODIUM 40 MG: 20 TABLET ORAL at 17:27

## 2024-06-08 RX ADMIN — PIPERACILLIN AND TAZOBACTAM 3.38 G: 3; .375 INJECTION, POWDER, FOR SOLUTION INTRAVENOUS at 20:52

## 2024-06-08 RX ADMIN — HALOPERIDOL LACTATE 2 MG: 5 INJECTION, SOLUTION INTRAMUSCULAR at 19:42

## 2024-06-08 RX ADMIN — TRAZODONE HYDROCHLORIDE 100 MG: 100 TABLET ORAL at 20:46

## 2024-06-08 RX ADMIN — LANSOPRAZOLE 30 MG: 30 TABLET, ORALLY DISINTEGRATING ORAL at 05:20

## 2024-06-08 RX ADMIN — SENNOSIDES AND DOCUSATE SODIUM 2 TABLET: 50; 8.6 TABLET ORAL at 17:28

## 2024-06-08 RX ADMIN — HYDRALAZINE HYDROCHLORIDE 10 MG: 20 INJECTION, SOLUTION INTRAMUSCULAR; INTRAVENOUS at 18:15

## 2024-06-08 RX ADMIN — BISACODYL 10 MG: 10 SUPPOSITORY RECTAL at 09:45

## 2024-06-08 RX ADMIN — LEVALBUTEROL 1.25 MG: 1.25 SOLUTION RESPIRATORY (INHALATION) at 08:47

## 2024-06-08 RX ADMIN — QUETIAPINE FUMARATE 25 MG: 25 TABLET ORAL at 05:16

## 2024-06-08 RX ADMIN — HALOPERIDOL LACTATE 2 MG: 5 INJECTION, SOLUTION INTRAMUSCULAR at 22:48

## 2024-06-08 RX ADMIN — Medication: at 05:17

## 2024-06-08 RX ADMIN — PIPERACILLIN AND TAZOBACTAM 3.38 G: 3; .375 INJECTION, POWDER, FOR SOLUTION INTRAVENOUS at 09:48

## 2024-06-08 RX ADMIN — Medication 5 MG: at 20:46

## 2024-06-08 RX ADMIN — AMLODIPINE BESYLATE 10 MG: 10 TABLET ORAL at 05:16

## 2024-06-08 RX ADMIN — PIPERACILLIN AND TAZOBACTAM 3.38 G: 3; .375 INJECTION, POWDER, FOR SOLUTION INTRAVENOUS at 15:02

## 2024-06-08 RX ADMIN — LABETALOL HYDROCHLORIDE 10 MG: 5 INJECTION, SOLUTION INTRAVENOUS at 17:09

## 2024-06-08 ASSESSMENT — PAIN DESCRIPTION - PAIN TYPE
TYPE: ACUTE PAIN

## 2024-06-08 ASSESSMENT — FIBROSIS 4 INDEX: FIB4 SCORE: 2.08

## 2024-06-08 NOTE — CONSULTS
Hospital Medicine Consultation    Date of Service  6/8/2024    Referring Physician  Reyes Moseley DO    Consulting Physician  Jt Jessica M.D.    Reason for Consultation  Hospital medicine consultation requested the patient admitted with a hemorrhagic stroke    History of Presenting Illness  78 y.o. male who presented 5/29/2024 with a complex past medical history including tobacco abuse, hypertension, coronary disease, dyslipidemia, prior myocardial infarction, history of AAA repair, COPD on chronic domicile oxygen at 2 L, history of 2 prior hemorrhagic CVAs, reportedly no significant residual deficits prior to his admission, BPH, history of prostate artery embolization, presenting to a outside facility via EMS on 5/29/2024 for evaluation, the wife reports that the patient was eating dinner and then reported to have a headache, felt weak, had several episodes of emesis and became altered to a GCS of 11, the patient was brought to an outside facility, he was intubated prior to this admission to this facility, secondary to neurologic status, the patient was found to have a left cerebellar hemorrhage, mild Arsen hematoma edema with subtle parenchymal displacement, moderate compression of the fourth ventricle, therefore transferred to this facility for higher level of care, on arrival the patient was moving all extremities, withdrawing to pain, a CTA was completed and did not show clearly a aneurysm or vascular malformation, the patient was admitted to the neuro ICU for hypertonic saline treatment, every hour neurochecks and strict blood pressure control as well as ventilator management, the patient was provided DDAVP for platelet inhibition, there was overall no clinical change, the patient was extubated on 6/1, blood pressure controlled with multimodality medication regimen, the patient required Precedex secondary to confusion, a chest x-ray was found unremarkable, the patient was weaned off hypertonic  sodium, ongoing delirium, CT follow-up was found to be stable, evaluation of the intracranial hemorrhage as expected, the patient with a abnormal urine and consistent of Enterococcus faecalis and Enterobacter cloacae,  The patient seen with the wife at the bedside, currently afebrile, heart rate in the 100s, respiration unlabored, the patient is saturating in the high 90s on 6 L nasal cannula oxygen/facemask, blood pressure in the 120s to 130s over 60s, the patient is extremely restless, he does not answer questions or follow commands, he moves all 4 extremities strongly, he is purposefully trying to get his lapbelt untangled and unbuckled,  The patient is found with a feeding tube, small bore.  Laboratory data white count 8.2, hemoglobin 10.1 hematocrit 34.1, platelet count 181, sodium 147 potassium 4.5, chloride 109, bicarb 27 glucose 160, BUN 21, creatinine 0.68,  The patient felt stabilized to transfer out of the ICU with ongoing sedation and encephalopathy treatment needs.  Review of Systems  Review of Systems   Unable to perform ROS: Mental status change       Past Medical History   has a past medical history of Chronic obstructive pulmonary disease (HCC), Hypertension, MI, old, Peptic ulcer, Prostate disorder, and Stroke (HCC).    Surgical History   has a past surgical history that includes aaa with stent graft.    Family History  No contributory family history is reported    Social History   reports that he does not currently use alcohol. He reports that he does not currently use drugs.  There is no reports of alcohol use    Medications  Prior to Admission Medications   Prescriptions Last Dose Informant Patient Reported? Taking?   B Complex Vitamins (B COMPLEX 1 PO) 5/29/2024 at AM  Yes Yes   Sig: Take 1 Tablet by mouth every day.   Home Care Oxygen   Yes Yes   Sig: Inhale 2 L/min continuous. MAHESH [facility]on in [place]   Multiple Vitamins-Minerals (PRESERVISION AREDS 2) Cap 5/29/2024 at AM  Yes Yes   Sig:  "Take  by mouth.   OXYGEN-HELIUM INH SUPPLY at SUPPLY  Yes No   Sig: Inhale. 2 lt NC   Pitavastatin Calcium (LIVALO) 2 MG Tab 5/28/2024 at PM  Yes Yes   Sig: Take  by mouth every day.   albuterol (PROVENTIL) 2.5mg/0.5ml Nebu Soln PRN at PRN  Yes Yes   Sig: Take 2.5 mg by nebulization every four hours as needed for Shortness of Breath.   albuterol 108 (90 Base) MCG/ACT Aero Soln inhalation aerosol PRN at PRN  No No   Sig: Inhale 2 Puffs every 6 hours as needed for Shortness of Breath.   aspirin EC (ECOTRIN) 81 MG Tablet Delayed Response 5/29/2024 at AM  Yes Yes   Sig: Take 81 mg by mouth every day.   azithromycin (ZITHROMAX) 250 MG Tab 4/29/2024 at COMPLETED  Yes Yes   Sig: Take 250-500 mg by mouth every day. 500MG DAY ONE THEN 250MG DAYS 2-5   fluticasone furoate-vilanterol (BREO ELLIPTA) 200-25 MCG/ACT AEROSOL POWDER, BREATH ACTIVATED   Yes No   Sig: Inhale 1 Puff every day.   guaiFENesin ER (MUCINEX) 600 MG TABLET SR 12 HR Not Taking  No No   Sig: Take 1 Tablet by mouth every day.   Patient not taking: Reported on 5/30/2024   magnesium oxide (MAG-OX) 400 MG Tab tablet 5/29/2024 at AM  Yes Yes   Sig: Take 400 mg by mouth every day.   pantoprazole (PROTONIX) 40 MG Tablet Delayed Response 5/29/2024 at AM  Yes Yes   Sig: Take 40 mg by mouth every day.   predniSONE (DELTASONE) 20 MG Tab 4/29/2024 at COMPLETED  Yes Yes   Sig: Take 40 mg by mouth every day. 5 DAYS   tamsulosin (FLOMAX) 0.4 MG capsule 5/29/2024 at AM  Yes Yes   Sig: Take 0.4 mg by mouth 1/2 hour after breakfast.      Facility-Administered Medications: None       Allergies  Allergies   Allergen Reactions    Lisinopril      cough    Namenda [Memantine]      \"weakness\"    Rosuvastatin        Physical Exam  Pulse:  [] 100  Resp:  [18-47] 25  BP: (105-139)/() 138/62  SpO2:  [93 %-100 %] 97 %    Physical Exam  Vitals and nursing note reviewed.   Constitutional:       Appearance: He is well-developed. He is ill-appearing.      Comments: Elderly male, " restless, confused, nonverbal   HENT:      Head: Normocephalic.      Mouth/Throat:      Mouth: Mucous membranes are dry.   Eyes:      Pupils: Pupils are equal, round, and reactive to light.   Cardiovascular:      Rate and Rhythm: Normal rate and regular rhythm.      Heart sounds: Normal heart sounds.   Pulmonary:      Effort: Pulmonary effort is normal.   Abdominal:      General: Bowel sounds are normal.      Palpations: Abdomen is soft.   Genitourinary:     Penis: Normal.       Rectum: Normal.   Musculoskeletal:         General: Normal range of motion.      Cervical back: Normal range of motion.   Skin:     General: Skin is warm.      Coloration: Skin is pale.      Findings: Bruising present.   Neurological:      Mental Status: He is alert. He is disoriented.      Motor: Weakness present.         Fluids  Date 06/08/24 0700 - 06/09/24 0659   Shift 9599-6900 6420-0257 5921-3315 24 Hour Total   INTAKE   NG/   360   IV Piggyback 86.4   86.4   Shift Total 446.4   446.4   OUTPUT   Urine 450   450   Shift Total 450   450   Weight (kg) 69.8 69.8 69.8 69.8       Laboratory  Recent Labs     06/06/24  0450 06/07/24  0551 06/08/24  0433   WBC 10.6 10.1 8.2   RBC 3.79* 3.72* 3.71*   HEMOGLOBIN 10.6* 10.4* 10.1*   HEMATOCRIT 34.6* 34.3* 34.1*   MCV 91.3 92.2 91.9   MCH 28.0 28.0 27.2   MCHC 30.6* 30.3* 29.6*   RDW 50.4* 49.4 48.3   PLATELETCT 185 186 181   MPV 11.8 11.9 11.5     Recent Labs     06/06/24  2145 06/07/24 2005 06/08/24  1118   SODIUM 154* 149* 147*   POTASSIUM 3.7 4.7 4.5   CHLORIDE 120* 113* 109   CO2 25 26 27   GLUCOSE 127* 133* 160*   BUN 30* 25* 21   CREATININE 0.70 0.84 0.68   CALCIUM 8.4* 9.2 9.5                     Imaging  CT-HEAD W/O   Final Result      1.  Intraparenchymal hemorrhage in the cerebellar vermis and left cerebellum. It demonstrates expected evolution and decreased density.   2.  Redemonstration of vasogenic edema surrounding the hematoma in the cerebellum with mild narrowing of the  fourth ventricle. There is no hydrocephalus.         DX-CHEST-LIMITED (1 VIEW)   Final Result      Mild interstitial prominence could represent vascular congestion.      Cardiomegaly.      Removal of endotracheal tube.      [id number] VIEW   Final Result      Feeding tube tip projects in the second portion of the duodenum.      Nonspecific bowel gas pattern.      CT-HEAD W/O   Final Result      1.  Advanced cerebral atrophy.   2.  Stable ventriculomegaly with intraventricular hemorrhage.   3.  Advanced supratentorial white matter disease most consistent with microvascular ischemic change.   4.  Multiple old lacunar infarcts as described.   5.  Acute/recent subacute parenchymal hemorrhage in the posterior fossa involving the superior cerebellar vermis and left paramedian cerebellar hemisphere and left lateral cerebellar peduncle. No evidence of recurrent hemorrhage.   6.  Minimal focus of subarachnoid hemorrhage within a single sulcus in the left posterior temporal region. Probably unchanged from prior recent exam.         DX-ABDOMEN FOR TUBE PLACEMENT   Final Result      Enteric tube tip projects over the stomach.      EC-ECHOCARDIOGRAM COMPLETE W/ CONT   Final Result      DX-CHEST-PORTABLE (1 VIEW)   Final Result         1.  Left basilar atelectasis or subtle infiltrate, decreased since prior study   2.  Trace left pleural effusion   3.  Enlargement of the aortic knob suggesting thoracic aortic aneurysm   4.  Atherosclerosis      MR-BRAIN-WITH & W/O   Final Result      1.  Stable fossa of parenchymal hemorrhage centered in the LEFT cerebellum and extending into the RIGHT cerebellum exerting mass effect on the fourth ventricle which is not completely effaced   2.  Intraventricular blood redemonstrated   3.  Numerous areas of remote microhemorrhage in the supra and infratentorial brain. These could be related to amyloid angiopathy, numerous hypertensive microhemorrhages or less likely multiple cavernomas   4.   Moderate diffuse atrophy without compelling evidence of hydrocephalus   5.  Advanced white matter changes   6.  4 mm LEFT temporal meningioma   7.  Enhancing nodule in the RIGHT internal auditory canal suspicious for a vestibular schwannoma, less likely other extra-axial mass such as a meningioma, facial nerve schwannoma or metastasis. Consider posterior fossa protocol brain MRI without and with    contrast to further evaluate in when clinically appropriate.      MR-MRA HEAD-W/O   Final Result         MRA OF THE [place] OF [place] WITHIN NORMAL LIMITS.      DX-CHEST-PORTABLE (1 VIEW)   Final Result         1.  Hazy left lower lobe infiltrate   2.  Small left pleural effusion   3.  Enlargement of the aortic knob, appearance suggesting aortic aneurysm.      DX-CHEST-FOR LINE PLACEMENT Perform procedure in: Patient's Room   Final Result      1.  PICC line is in place with the tip projecting over the SVC in satisfactory position.   2.  Mildly enlarged cardiac silhouette with vascular congestion.   3.  Retrocardiac opacity is likely due to atelectasis.         IR-PICC LINE PLACEMENT W/ GUIDANCE > AGE 5   Final Result                  Ultrasound-guided PICC placement performed by qualified nursing staff as    above.          CT-HEAD W/O   Final Result         1.  Left cerebellar hemorrhage, similar compared to prior study.   2.  Minimal bilateral intraventricular hemorrhages, new since prior study.   3.  Nonspecific white matter changes, commonly associated with small vessel ischemic disease.  Associated mild cerebral atrophy is noted.   4.  Atherosclerosis.         DX-CHEST-PORTABLE (1 VIEW)   Final Result         1.  Left basilar atelectasis, no focal infiltrate   2.  Trace left pleural effusion   3.  Atherosclerosis      CT-CTA NECK WITH & W/O-POST PROCESSING   Final Result         1.  Scattered atherosclerosis without significant stenosis or occlusion.   2.  4.2 cm proximal descending thoracic aortic aneurysm,  radiographic follow-up and surveillance recommended as clinically appropriate.         CT-CTA HEAD WITH & W/O-POST PROCESS   Final Result         1.  No large vessel occlusion or aneurysm identified   2.  Large cerebellar hemorrhage predominantly in the left cerebellum      These findings were discussed with the patient's clinician, Nikki Alexander, on 5/29/2024 11:35 PM.      DX-CHEST-PORTABLE (1 VIEW)   Final Result      Tubes as above      Left pleural effusion      CHF/pulmonary edema pattern      See Brown MD   5/30/2024 8:41 AM         CT-HEAD W/O   Final Result      Acute intraparenchymal hemorrhage is noted involving the left cerebellar hemisphere, measuring 3.1 x 4.2 x 3.4 cm, with associated mass effect.      This finding was telephoned to the mentioned attending by on-call radiologist at the time of imaging         AAST Intracranial Hemorrhage Brain Injury Guidelines         Hemorrhage type  mBIG 1           mBIG 2                mBIG 3      Subdural             <4mm               5-7.9 mm             >8mm      Epidural                No                    No                  Yes      Intraparenc'mal   <4mm               5-7.9 mm         >8mm or multi   1 location       or 2 locations      >3 locations      Subarachnoid     <3 sulci       single hemisphere   bi-hemisphere   and <1 mm        or 1-3 mm            or > 3 mm      Intraventricular       No                  No                    Yes      Skull Fracture       None            Non-displaced       Displaced               DLP Reporting Thresholds for Incorrect/Repeated Exams - DLP in mGy*cm   Head/Neck:  0-year-old 3840, 1-year-old 5880, 5-year-old 8770, 10-year-old 47304 and adult 41917   Head:  0-year-old 4540, 1-year-old 7460, 5-year-old 11311, 10-year-old 08982 and adult 77632   Neck:  0-year-old 2940, 1-year-old 4160, 5-year-old 4550, 10-year-old 6320 and adult 8470   Chest:  0-year-old 550, 1-year-old 830, 5-year-old 1200, 10-year-old  3840 and adult 3570   Abd/pelvis:  0-year-old 440, 1-year-old 720, 5-year-old 1080, 10-year-old 3330 and adult 3330   Trunk(C/A/P):  0-year-old 490, 1-year-old 770, 5-year-old 1140, 10-year-old 3570 and adult 3330      OUTSIDE IMAGES-CT CERVICAL SPINE    (Results Pending)       Assessment/Plan  * Cerebellar hemorrhage (HCC)- (present on admission)  Assessment & Plan  ICH score=1  q2h neurochecks  MRI brain: Stable fossa of parenchymal hemorrhage centered in the LEFT cerebellum and extending into the RIGHT cerebellum exerting mass effect on the fourth ventricle which is not completely effaced 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  HOB at 30 degrees  maintain pCO2 of 35-40; closer to 35  Pravastatin 40 mg qHS for possible neuroprotective effect  CT 6/7 unchanged    UTI (urinary tract infection)  Assessment & Plan  Urine culture with E faecalis and E cloacae  Remove leung when able  Zosyn x5-7 days given sensitivities    Encephalopathy acute- (present on admission)  Assessment & Plan  Likely delirium in conjunction with his intracranial pathology  Keep patient awake during the day and avoid daytime naps. Remove all unnecessary lines (central lines, peripheral IVs, feeding tubes, leung catheters). Avoid polypharmacy, frequent re-orientation, maximize family time at bedside, use glasses and hearing aids if needed, treat pain, encourage ambulation, minimize benzos/anticholinergic agents.   Ambulate, get out of restraints is much as possible  Trazadone qHS to assist with sleep  Avoid sedatives    Hyperchloremic metabolic acidosis  Assessment & Plan  Improved    Hyperglycemia- (present on admission)  Assessment & Plan  Goal blood glucose 140-180  Off  insulin  hypoglycemia protocol    Acute on chronic respiratory failure with hypoxia (HCC)- (present on admission)  Assessment & Plan  Intubated for airway protection  Extubated 6/1  Chest x-ray not impressive on 6/4/2024  Mobilization  Pulmonary hygiene  RT/O2 Protocols  Titrate supplemental FiO2 to maintain SpO2 >92%    Hyperlipidemia- (present on admission)  Assessment & Plan  Per wife, patient's home medication was discontinued due to muscle cramps  Pravastatin 40mg QHS  Lipid panel reviewed  Counseling on lifestyle/dietary modifications    History of stroke- (present on admission)  Assessment & Plan  Patient has had 2 prior hemorrhagic strokes; last in 2015  No noted deficits per wife    BPH with urinary obstruction- (present on admission)  Assessment & Plan  S/p prostate surgery  Per wife, patient has suffered from urinary retention and incontinence since his surgery  Continue flomax  [place] for strict I&O    Benign essential hypertension- (present on admission)  Assessment & Plan  Per wife, patient's medications were discontinued as an outpatient because his BP has been controlled  SBP in the 170s per wife's at home BP cuff measurement; <160 since arrival to this facility  As needed antihypertensives to maintain -160)  Norvasc 10 mg daily    Abdominal aortic aneurysm (AAA) without rupture (HCC)- (present on admission)  Assessment & Plan  S/p EVAR w/ bypass graft stent  Maintain normotension  Holding ASA at this time; per wife he takes ASA 3x/week    COPD (chronic obstructive pulmonary disease) (HCC)- (present on admission)  Assessment & Plan  Not currently in exacerbation  CXR with no acute process  Continue home nebulizers as ordered  Continue PRN nebulizers Q4h  Wean FiO2 as tolerated      Plan  6/8/2024  Ongoing need for restraints to prevent the patient to remove vital catheters, feeding tube  Unfortunate development of significant encephalopathy, monitor, gentle management  Serial blood pressure  control  Antibiotics for polymicrobial UTI  Per neurology strict blood pressure control between 101 40, every 4 neurochecks, slow normalization of sodium  Postacute services evaluation, the patient is currently not a candidate for transfer secondary to his agitation, encephalopathy and restraints  See orders  Patient is has a high medical complexity, complex decision making and is at high risk for complication, morbidity, and mortality.  I spent 67 minutes, reviewing the chart, obtaining and/or reviewing separately obtained history. Performing a medically appropriate examination and evaluation.  Counseling and educating the patient. Ordering and reviewing medications, tests, or procedures.   Documenting clinical information in EPIC. Independently interpreting results and communicating results to patient. Discussing future disposition of care with patient, RN and case management.    Thank you for consulting with us, we will follow along closely while the patient is hospitalized      Please note that this dictation was created using voice recognition software. I have made every reasonable attempt to correct obvious errors, but I expect that there are errors of grammar and possibly context that I did not discover before finalizing the note.

## 2024-06-08 NOTE — CARE PLAN
The patient is Watcher - Medium risk of patient condition declining or worsening    Shift Goals  Clinical Goals: manage airway/secretions, rass -1 to +1, SBP <140  Patient Goals: unable to assess  Family Goals: updates, comfort    Progress made toward(s) clinical / shift goals:    Problem: Safety - Medical Restraint  Goal: Remains free of injury from restraints (Restraint for Interference with Medical Device)  Outcome: Progressing     Problem: Knowledge Deficit - Standard  Goal: Patient and family/care givers will demonstrate understanding of plan of care, disease process/condition, diagnostic tests and medications  Outcome: Progressing     Problem: Pain - Standard  Goal: Alleviation of pain or a reduction in pain to the patient’s comfort goal  Outcome: Progressing     Problem: Hemodynamics  Goal: Patient's hemodynamics, fluid balance and neurologic status will be stable or improve  Outcome: Progressing       Patient is not progressing towards the following goals:

## 2024-06-08 NOTE — PROGRESS NOTES
"Family Medicine Progress Note    Date of admission  5/29/2024    Chief Complaint  78 y.o. male admitted 5/29/2024 with intraparenchymal hemorrhage    Hospital Course  \"Jl Mueller is a 78 y.o. male smoker w/ PMHx s/f HTN, CAD, HLD, prior MI, AAA s/p bypass graft stenting, COPD on 2L home O2, two prior hemorrhagic strokes w/o reported deficits, BPH s/p prostate artery embolization who initially presented to an outside facility via EMS on 5/29/2024 for evaluation. Per the patient's wife, the patient was eating dinner when he stated he had a headache and felt weak. He then had two episodes of emesis and became altered to GCS11; he was intubated at some point prior to transfer to this facility due to his neuro status. On imaging at the outside facility, he was found to have a left cerebellar hemorrhage, mild perihematomal edema with subtle parenchymal displacement. 4th ventricle with moderate compression but open. for which he was transferred to this facility for higher level of care. On arrival, the patient was moving all extremities and withdrew to pain in all extremities. His pupils are pinpoint, and he over breaths vent. Repeat imaging redemonstrates area of Lt-cerebellar hemorrhage. A CTA was completed which, does not clearly show an aneurysmal/vascular malformation. He is being admitted to the ICU for hypertonic saline therapy, Q1h neurological checks, and strict blood pressure control as well as ventilator management.\"    5/30- DDAVP for platelet inhibition.   5/31- No clinical change  6/1- Extubated  6/2 - RASS fluctuating, norvasc added   6/3 - confused, on dex. 3% on going  6/4 - continuing 3%, CXR unremarkable, decrease sedation  6/5 - D/C 3%, decrease sedation, ambulate  6/6 - improved delirium  6/7 - continue to ambulate and re-orient  6/8 - Discontinue precedex and re-orient/mobilize     Interval Problem Update  Reviewed last 24 hour events   - Neuro: Intermittently following commands, continued " agitation   - HR: 60s-100s   - SBP: 100s-140s   - GI: abdomen distended, last BM 6/1/24   - UOP: 1830 mL/24 hrs   - Leung: secured   - Tm: Afebrile   - Lines: leung   - PPx: GI PPI, DVT contraindicated   - ABG: Respiratory acidosis with secondary metabolic alkalosis   - CXR (personally reviewed and compared to prior): no new   - Mobility level 2, eligible for progression yes      Review of Systems  Review of Systems   Unable to perform ROS: Medical condition        Vital Signs for last 24 hours   Pulse:  [] 69  Resp:  [18-50] 18  BP: ()/() 110/56  SpO2:  [90 %-100 %] 100 %      Physical Exam   Physical Exam  Constitutional:       General: He is in acute distress.      Appearance: He is ill-appearing.   HENT:      Head: Normocephalic and atraumatic.      Right Ear: External ear normal.      Left Ear: External ear normal.      Nose: Nose normal.      Mouth/Throat:      Mouth: Mucous membranes are moist.      Comments: Thick secretions  Eyes:      Extraocular Movements: Extraocular movements intact.      Pupils: Pupils are equal, round, and reactive to light.   Cardiovascular:      Rate and Rhythm: Normal rate and regular rhythm.      Pulses: Normal pulses.      Heart sounds: Normal heart sounds.   Pulmonary:      Effort: Pulmonary effort is normal. No respiratory distress.      Comments: Diffuse bibasilar crackles, mild expiratory wheezes, coarse upper airway sounds  Abdominal:      General: Bowel sounds are normal. There is distension.      Palpations: Abdomen is soft.      Tenderness: There is no abdominal tenderness. There is no guarding.   Musculoskeletal:         General: No swelling or deformity.      Cervical back: Normal range of motion and neck supple.      Right lower leg: No edema.      Left lower leg: No edema.   Skin:     General: Skin is warm and dry.      Capillary Refill: Capillary refill takes less than 2 seconds.   Neurological:      General: No focal deficit present.      Mental  Status: He is alert. He is disoriented.         Medications  Current Facility-Administered Medications   Medication Dose Route Frequency Provider Last Rate Last Admin    traZODone (Desyrel) tablet 100 mg  100 mg Enteral Tube QHS Reyes Moseley Jr., D.O.   100 mg at 06/07/24 2100    amLODIPine (Norvasc) tablet 10 mg  10 mg Enteral Tube Q DAY Reyes Moseley Jr., D.O.   10 mg at 06/08/24 0516    QUEtiapine (SEROquel) tablet 25 mg  25 mg Enteral Tube BID Reyes Moseley Jr., D.O.   25 mg at 06/08/24 0516    melatonin tablet 5 mg  5 mg Enteral Tube Nightly Ginna Harden D.O.   5 mg at 06/07/24 2100    pravastatin (Pravachol) tablet 40 mg  40 mg Enteral Tube Q EVENING Reyes Moseley Jr., D.O.   40 mg at 06/07/24 1743    cefTRIAXone (Rocephin) syringe 1,000 mg  1,000 mg Intravenous Q24HRS Sarina L. Latona   1,000 mg at 06/07/24 1755    oxyCODONE immediate-release (Roxicodone) tablet 5 mg  5 mg Enteral Tube Q4HRS PRN Sarina L. Latona   5 mg at 06/06/24 0335    fentaNYL (Sublimaze) injection 25-50 mcg  25-50 mcg Intravenous Q4HRS PRN Sarina L. Latona   25 mcg at 06/06/24 0228    labetalol (Normodyne/Trandate) injection 10 mg  10 mg Intravenous Q3HRS PRN Sarina L. Latona   10 mg at 06/05/24 1307    hydrALAZINE (Apresoline) injection 10 mg  10 mg Intravenous Q3HRS PRN Sarina L. Latona   10 mg at 06/05/24 1430    Pharmacy Consult: Enteral tube insertion - review meds/change route/product selection  1 Each Other PHARMACY TO DOSE Reyes Gray M.D.        diazePAM (Valium) injection 5 mg  5 mg Intravenous Q6HRS PRN Sarina L. Latona        senna-docusate (Pericolace Or Senokot S) 8.6-50 MG per tablet 2 Tablet  2 Tablet Enteral Tube Q EVENING Sarina L. Latona   2 Tablet at 06/07/24 1742    And    polyethylene glycol/lytes (Miralax) Packet 1 Packet  1 Packet Enteral Tube QDAY PRN Sarina Euceda   1 Packet at 06/08/24 0517    Respiratory Therapy Consult   Nebulization Continuous RT Sarina Euceda MD Alert...ICU Electrolyte  Replacement per Pharmacy   Other PHARMACY TO DOSE Sarina Euceda        acetaminophen (Tylenol) tablet 650 mg  650 mg Enteral Tube Q4HRS PRN Sarina RODRIGUEZ Latona   650 mg at 06/05/24 1725    Or    acetaminophen (Tylenol) suppository 650 mg  650 mg Rectal Q4HRS PRN Sarina Euceda        ondansetron (Zofran ODT) dispertab 4 mg  4 mg Enteral Tube Q4HRS PRN Sarina Euceda        Or    ondansetron (Zofran) syringe/vial injection 4 mg  4 mg Intravenous Q4HRS PRN Sarina Euceda        lansoprazole (Prevacid) solutab 30 mg  30 mg Enteral Tube DAILY Reyes Gray M.D.   30 mg at 06/08/24 0520    mineral oil-pet hydrophilic (Aquaphor) ointment   Topical BID Reyes Gray M.D.   Given at 06/08/24 0517       Fluids    Intake/Output Summary (Last 24 hours) at 6/8/2024 0724  Last data filed at 6/8/2024 0636  Gross per 24 hour   Intake 2287.31 ml   Output 1830 ml   Net 457.31 ml       Laboratory  Recent Labs     06/06/24  0125   ISTATAPH 7.375*   ISTATAPCO2 50.9*   ISTATAPO2 98*   ISTATATCO2 31   DTZAJMC5CMH 97   ISTATARTHCO3 29.7*   ISTATARTBE 4*   ISTATTEMP 37.8 C   ISTATSPEC Arterial   ISTATAPHTC 7.363*   [id number] 103*         Recent Labs     06/06/24  0450 06/06/24 2145 06/07/24  0551 06/07/24 2005 06/08/24  0433   SODIUM 156* 154*  --  149*  --    POTASSIUM 3.8 3.7  --  4.7  --    CHLORIDE 119* 120*  --  113*  --    CO2 23 25  --  26  --    BUN 32* 30*  --  25*  --    CREATININE 0.72 0.70  --  0.84  --    MAGNESIUM 2.3  --  2.4  --  2.4   CALCIUM 9.3 8.4*  --  9.2  --      Recent Labs     06/06/24  0450 06/06/24  2145 06/07/24 2005   GLUCOSE 129* 127* 133*     Recent Labs     06/06/24  0450 06/07/24  0551 06/08/24 0433   WBC 10.6 10.1 8.2   NEUTSPOLYS 83.90* 81.30* 74.90*   LYMPHOCYTES 6.70* 5.70* 9.50*   MONOCYTES 7.10 7.90 9.60   EOSINOPHILS 0.50 3.10 4.50   BASOPHILS 0.30 0.50 0.40     Recent Labs     06/06/24 0450 06/07/24  0551 06/08/24 0433   RBC 3.79* 3.72* 3.71*   HEMOGLOBIN 10.6* 10.4* 10.1*   HEMATOCRIT  34.6* 34.3* 34.1*   PLATELETCT 185 186 181       Imaging  CT-HEAD W/O   Final Result      1.  Intraparenchymal hemorrhage in the cerebellar vermis and left cerebellum. It demonstrates expected evolution and decreased density.   2.  Redemonstration of vasogenic edema surrounding the hematoma in the cerebellum with mild narrowing of the fourth ventricle. There is no hydrocephalus.         DX-CHEST-LIMITED (1 VIEW)   Final Result      Mild interstitial prominence could represent vascular congestion.      Cardiomegaly.      Removal of endotracheal tube.      [id number] VIEW   Final Result      Feeding tube tip projects in the second portion of the duodenum.      Nonspecific bowel gas pattern.      CT-HEAD W/O   Final Result      1.  Advanced cerebral atrophy.   2.  Stable ventriculomegaly with intraventricular hemorrhage.   3.  Advanced supratentorial white matter disease most consistent with microvascular ischemic change.   4.  Multiple old lacunar infarcts as described.   5.  Acute/recent subacute parenchymal hemorrhage in the posterior fossa involving the superior cerebellar vermis and left paramedian cerebellar hemisphere and left lateral cerebellar peduncle. No evidence of recurrent hemorrhage.   6.  Minimal focus of subarachnoid hemorrhage within a single sulcus in the left posterior temporal region. Probably unchanged from prior recent exam.         DX-ABDOMEN FOR TUBE PLACEMENT   Final Result      Enteric tube tip projects over the stomach.      EC-ECHOCARDIOGRAM COMPLETE W/ CONT   Final Result      DX-CHEST-PORTABLE (1 VIEW)   Final Result         1.  Left basilar atelectasis or subtle infiltrate, decreased since prior study   2.  Trace left pleural effusion   3.  Enlargement of the aortic knob suggesting thoracic aortic aneurysm   4.  Atherosclerosis      MR-BRAIN-WITH & W/O   Final Result      1.  Stable fossa of parenchymal hemorrhage centered in the LEFT cerebellum and extending into the RIGHT cerebellum  exerting mass effect on the fourth ventricle which is not completely effaced   2.  Intraventricular blood redemonstrated   3.  Numerous areas of remote microhemorrhage in the supra and infratentorial brain. These could be related to amyloid angiopathy, numerous hypertensive microhemorrhages or less likely multiple cavernomas   4.  Moderate diffuse atrophy without compelling evidence of hydrocephalus   5.  Advanced white matter changes   6.  4 mm LEFT temporal meningioma   7.  Enhancing nodule in the RIGHT internal auditory canal suspicious for a vestibular schwannoma, less likely other extra-axial mass such as a meningioma, facial nerve schwannoma or metastasis. Consider posterior fossa protocol brain MRI without and with    contrast to further evaluate in when clinically appropriate.      MR-MRA HEAD-W/O   Final Result         MRA OF THE [place] WITHIN NORMAL LIMITS.      DX-CHEST-PORTABLE (1 VIEW)   Final Result         1.  Hazy left lower lobe infiltrate   2.  Small left pleural effusion   3.  Enlargement of the aortic knob, appearance suggesting aortic aneurysm.      DX-CHEST-FOR LINE PLACEMENT Perform procedure in: Patient's Room   Final Result      1.  PICC line is in place with the tip projecting over the SVC in satisfactory position.   2.  Mildly enlarged cardiac silhouette with vascular congestion.   3.  Retrocardiac opacity is likely due to atelectasis.         IR-PICC LINE PLACEMENT W/ GUIDANCE > AGE 5   Final Result                  Ultrasound-guided PICC placement performed by qualified nursing staff as    above.          CT-HEAD W/O   Final Result         1.  Left cerebellar hemorrhage, similar compared to prior study.   2.  Minimal bilateral intraventricular hemorrhages, new since prior study.   3.  Nonspecific white matter changes, commonly associated with small vessel ischemic disease.  Associated mild cerebral atrophy is noted.   4.  Atherosclerosis.         DX-CHEST-PORTABLE (1 VIEW)    Final Result         1.  Left basilar atelectasis, no focal infiltrate   2.  Trace left pleural effusion   3.  Atherosclerosis      CT-CTA NECK WITH & W/O-POST PROCESSING   Final Result         1.  Scattered atherosclerosis without significant stenosis or occlusion.   2.  4.2 cm proximal descending thoracic aortic aneurysm, radiographic follow-up and surveillance recommended as clinically appropriate.         CT-CTA HEAD WITH & W/O-POST PROCESS   Final Result         1.  No large vessel occlusion or aneurysm identified   2.  Large cerebellar hemorrhage predominantly in the left cerebellum      These findings were discussed with the patient's clinician, Nikki Alexander, on 5/29/2024 11:35 PM.      DX-CHEST-PORTABLE (1 VIEW)   Final Result      Tubes as above      Left pleural effusion      CHF/pulmonary edema pattern      See Brown MD   5/30/2024 8:41 AM         CT-HEAD W/O   Final Result      Acute intraparenchymal hemorrhage is noted involving the left cerebellar hemisphere, measuring 3.1 x 4.2 x 3.4 cm, with associated mass effect.      This finding was telephoned to the mentioned attending by on-call radiologist at the time of imaging         AAST Intracranial Hemorrhage Brain Injury Guidelines         Hemorrhage type  mBIG 1           mBIG 2                mBIG 3      Subdural             <4mm               5-7.9 mm             >8mm      Epidural                No                    No                  Yes      Intraparenc'mal   <4mm               5-7.9 mm         >8mm or multi   1 location       or 2 locations      >3 locations      Subarachnoid     <3 sulci       single hemisphere   bi-hemisphere   and <1 mm        or 1-3 mm            or > 3 mm      Intraventricular       No                  No                    Yes      Skull Fracture       None            Non-displaced       Displaced               DLP Reporting Thresholds for Incorrect/Repeated Exams - DLP in mGy*cm   Head/Neck:  0-year-old 3840,  1-year-old 5880, 5-year-old 8770, 10-year-old 02817 and adult 28865   Head:  0-year-old 4540, 1-year-old 7460, 5-year-old 08136, 10-year-old 22180 and adult 52179   Neck:  0-year-old 2940, 1-year-old 4160, 5-year-old 4550, 10-year-old 6320 and adult 8470   Chest:  0-year-old 550, 1-year-old 830, 5-year-old 1200, 10-year-old 3840 and adult 3570   Abd/pelvis:  0-year-old 440, 1-year-old 720, 5-year-old 1080, 10-year-old 3330 and adult 3330   Trunk(C/A/P):  0-year-old 490, 1-year-old 770, 5-year-old 1140, 10-year-old 3570 and adult 3330      OUTSIDE IMAGES-CT CERVICAL SPINE    (Results Pending)         Assessment/Plan  * Cerebellar hemorrhage (HCC)- (present on admission)  Assessment & Plan  - ICH score=1  - q2h neurochecks  - MRI brain: Stable fossa of parenchymal hemorrhage centered in the LEFT cerebellum and extending into the RIGHT cerebellum exerting mass effect on the fourth ventricle which is not completely effaced Likely CAA changes  - Keep -160,   - 3% completed, on FWF now   - Sodium 149  - Neurosurgery consulted, appreciate recommendations.  - Non operative at this time  - Hold all antithrombotic therapy  - SCDs only for DVT ppx  - Keep euvolemic, euthermic, and normoglycemic (140-180)  - Maintain bowel regimen to avoid Valsalva and increased intracranial pressure.  - HOB at 30 degrees  - maintain pCO2 of 35-40; closer to 35  - Pravastatin 40 mg qHS for possible neuroprotective effect  - CT 6/7 unchanged    Encephalopathy acute- (present on admission)  Assessment & Plan  - Likely delirium in conjunction with his intracranial pathology  - Keep patient awake during the day and avoid daytime naps. Remove all unnecessary lines (central lines, peripheral IVs, feeding tubes, leung catheters). Avoid polypharmacy, frequent re-orientation, maximize family time at bedside, use glasses and hearing aids if needed, treat pain, encourage ambulation, minimize benzos/anticholinergic agents.   - Re-orient and Ambulate  today  - Discontinue Seroquel BID  - Continue Trazadone 100mg at bedtime    Hyperchloremic metabolic acidosis  Assessment & Plan  - Improved, stop salt tabs and HTS  - Chloride improved to 113    Hyperglycemia- (present on admission)  Assessment & Plan  - Goal blood glucose 140-180   - BG 120s-130s over the past 24   - SSI, accuchecks  - hypoglycemia protocol    Acute on chronic respiratory failure with hypoxia (HCC)- (present on admission)  Assessment & Plan  - Intubated for airway protection  - Extubated 6/1  - Chest x-ray not impressive on 6/4/2024  - Mobilization  - Pulmonary hygiene  - RT/O2 Protocols  - Titrate supplemental FiO2 to maintain SpO2 >92%  - Consider guaifenesin for thick secretions    Hyperlipidemia- (present on admission)  Assessment & Plan  - Per wife, patient's home medication was discontinued due to muscle cramps  - Pravastatin 40mg QHS  - Lipid panel reviewed  - Counseling on lifestyle/dietary modifications    History of stroke- (present on admission)  Assessment & Plan  - Patient has had 2 prior hemorrhagic strokes; last in 2015  - No noted deficits per wife    BPH with urinary obstruction- (present on admission)  Assessment & Plan  - S/p prostate surgery  - Per wife, patient has suffered from urinary retention and incontinence since his surgery  - Continue flomax  - Park for strict I&O    Benign essential hypertension- (present on admission)  Assessment & Plan  - Per wife, patient's medications were discontinued as an outpatient because his BP has been controlled  - SBP in the 170s per wife's at home BP cuff measurement; <160 since arrival to this facility  - As needed antihypertensives to maintain -160)  -Norvasc 10 mg daily    Abdominal aortic aneurysm (AAA) without rupture (HCC)- (present on admission)  Assessment & Plan  - S/p EVAR w/ bypass graft stent  - Maintain normotension  - Holding ASA at this time; per wife he takes ASA 3x/week    COPD (chronic obstructive pulmonary  disease) (HCC)- (present on admission)  Assessment & Plan  - Not currently in exacerbation  - CXR with no acute process  - Continue home nebulizers as ordered  - Continue PRN nebulizers Q4h  - Wean FiO2 as tolerated         VTE:  Contraindicated  Ulcer: PPI  Lines: Park Catheter  Ongoing indication addressed    I have performed a physical exam and reviewed and updated ROS and Plan today (6/8/2024). In review of yesterday's note (6/7/2024), there are no changes except as documented above.     Discussed patient condition and risk of morbidity and/or mortality with Family, RN, RT, Pharmacy, and Dr. Moseley    The patient remains critically ill. No time overlap and excludes procedures.

## 2024-06-08 NOTE — ASSESSMENT & PLAN NOTE
Per wife, patient's home medication was discontinued due to muscle cramps  Pravastatin 40mg QHS  Lipid panel reviewed  Counseling on lifestyle/dietary modifications

## 2024-06-08 NOTE — ASSESSMENT & PLAN NOTE
Urine culture with E faecalis and E cloacae  Remove leung when able  Zosyn x5-7 days given sensitivities

## 2024-06-09 LAB
ALBUMIN SERPL BCP-MCNC: 3.5 G/DL (ref 3.2–4.9)
ALBUMIN/GLOB SERPL: 1.3 G/DL
ALP SERPL-CCNC: 49 U/L (ref 30–99)
ALT SERPL-CCNC: 25 U/L (ref 2–50)
ANION GAP SERPL CALC-SCNC: 12 MMOL/L (ref 7–16)
ANION GAP SERPL CALC-SCNC: 8 MMOL/L (ref 7–16)
AST SERPL-CCNC: 26 U/L (ref 12–45)
BASOPHILS # BLD AUTO: 0.3 % (ref 0–1.8)
BASOPHILS # BLD: 0.03 K/UL (ref 0–0.12)
BILIRUB SERPL-MCNC: 0.3 MG/DL (ref 0.1–1.5)
BUN SERPL-MCNC: 17 MG/DL (ref 8–22)
BUN SERPL-MCNC: 19 MG/DL (ref 8–22)
CALCIUM ALBUM COR SERPL-MCNC: 9.7 MG/DL (ref 8.5–10.5)
CALCIUM SERPL-MCNC: 9.2 MG/DL (ref 8.5–10.5)
CALCIUM SERPL-MCNC: 9.3 MG/DL (ref 8.5–10.5)
CHLORIDE SERPL-SCNC: 105 MMOL/L (ref 96–112)
CHLORIDE SERPL-SCNC: 108 MMOL/L (ref 96–112)
CO2 SERPL-SCNC: 26 MMOL/L (ref 20–33)
CO2 SERPL-SCNC: 30 MMOL/L (ref 20–33)
CREAT SERPL-MCNC: 0.77 MG/DL (ref 0.5–1.4)
CREAT SERPL-MCNC: 0.79 MG/DL (ref 0.5–1.4)
EKG IMPRESSION: NORMAL
EOSINOPHIL # BLD AUTO: 0.28 K/UL (ref 0–0.51)
EOSINOPHIL NFR BLD: 3.2 % (ref 0–6.9)
ERYTHROCYTE [DISTWIDTH] IN BLOOD BY AUTOMATED COUNT: 46.6 FL (ref 35.9–50)
GFR SERPLBLD CREATININE-BSD FMLA CKD-EPI: 91 ML/MIN/1.73 M 2
GFR SERPLBLD CREATININE-BSD FMLA CKD-EPI: 91 ML/MIN/1.73 M 2
GLOBULIN SER CALC-MCNC: 2.8 G/DL (ref 1.9–3.5)
GLUCOSE SERPL-MCNC: 140 MG/DL (ref 65–99)
GLUCOSE SERPL-MCNC: 146 MG/DL (ref 65–99)
HCT VFR BLD AUTO: 34.4 % (ref 42–52)
HGB BLD-MCNC: 10.7 G/DL (ref 14–18)
IMM GRANULOCYTES # BLD AUTO: 0.14 K/UL (ref 0–0.11)
IMM GRANULOCYTES NFR BLD AUTO: 1.6 % (ref 0–0.9)
LYMPHOCYTES # BLD AUTO: 0.63 K/UL (ref 1–4.8)
LYMPHOCYTES NFR BLD: 7.2 % (ref 22–41)
MAGNESIUM SERPL-MCNC: 2.5 MG/DL (ref 1.5–2.5)
MCH RBC QN AUTO: 27.8 PG (ref 27–33)
MCHC RBC AUTO-ENTMCNC: 31.1 G/DL (ref 32.3–36.5)
MCV RBC AUTO: 89.4 FL (ref 81.4–97.8)
MONOCYTES # BLD AUTO: 0.71 K/UL (ref 0–0.85)
MONOCYTES NFR BLD AUTO: 8.1 % (ref 0–13.4)
NEUTROPHILS # BLD AUTO: 7.02 K/UL (ref 1.82–7.42)
NEUTROPHILS NFR BLD: 79.6 % (ref 44–72)
NRBC # BLD AUTO: 0 K/UL
NRBC BLD-RTO: 0 /100 WBC (ref 0–0.2)
PHOSPHATE SERPL-MCNC: 3.6 MG/DL (ref 2.5–4.5)
PLATELET # BLD AUTO: 198 K/UL (ref 164–446)
PMV BLD AUTO: 11.4 FL (ref 9–12.9)
POTASSIUM SERPL-SCNC: 4 MMOL/L (ref 3.6–5.5)
POTASSIUM SERPL-SCNC: 4.2 MMOL/L (ref 3.6–5.5)
PROT SERPL-MCNC: 6.3 G/DL (ref 6–8.2)
RBC # BLD AUTO: 3.85 M/UL (ref 4.7–6.1)
SODIUM SERPL-SCNC: 143 MMOL/L (ref 135–145)
SODIUM SERPL-SCNC: 146 MMOL/L (ref 135–145)
WBC # BLD AUTO: 8.8 K/UL (ref 4.8–10.8)

## 2024-06-09 PROCEDURE — A9270 NON-COVERED ITEM OR SERVICE: HCPCS | Performed by: NURSE PRACTITIONER

## 2024-06-09 PROCEDURE — 85025 COMPLETE CBC W/AUTO DIFF WBC: CPT

## 2024-06-09 PROCEDURE — A9270 NON-COVERED ITEM OR SERVICE: HCPCS | Performed by: PHYSICAL MEDICINE & REHABILITATION

## 2024-06-09 PROCEDURE — 80053 COMPREHEN METABOLIC PANEL: CPT

## 2024-06-09 PROCEDURE — 700102 HCHG RX REV CODE 250 W/ 637 OVERRIDE(OP): Performed by: PHYSICAL MEDICINE & REHABILITATION

## 2024-06-09 PROCEDURE — 700111 HCHG RX REV CODE 636 W/ 250 OVERRIDE (IP): Mod: JZ | Performed by: HOSPITALIST

## 2024-06-09 PROCEDURE — 93005 ELECTROCARDIOGRAM TRACING: CPT | Performed by: HOSPITALIST

## 2024-06-09 PROCEDURE — 700102 HCHG RX REV CODE 250 W/ 637 OVERRIDE(OP): Performed by: INTERNAL MEDICINE

## 2024-06-09 PROCEDURE — 700105 HCHG RX REV CODE 258: Performed by: INTERNAL MEDICINE

## 2024-06-09 PROCEDURE — 99233 SBSQ HOSP IP/OBS HIGH 50: CPT | Performed by: HOSPITALIST

## 2024-06-09 PROCEDURE — 80048 BASIC METABOLIC PNL TOTAL CA: CPT

## 2024-06-09 PROCEDURE — 84100 ASSAY OF PHOSPHORUS: CPT

## 2024-06-09 PROCEDURE — A9270 NON-COVERED ITEM OR SERVICE: HCPCS | Performed by: INTERNAL MEDICINE

## 2024-06-09 PROCEDURE — 700102 HCHG RX REV CODE 250 W/ 637 OVERRIDE(OP): Performed by: HOSPITALIST

## 2024-06-09 PROCEDURE — 700111 HCHG RX REV CODE 636 W/ 250 OVERRIDE (IP): Mod: JZ | Performed by: INTERNAL MEDICINE

## 2024-06-09 PROCEDURE — 93010 ELECTROCARDIOGRAM REPORT: CPT | Performed by: INTERNAL MEDICINE

## 2024-06-09 PROCEDURE — A9270 NON-COVERED ITEM OR SERVICE: HCPCS | Performed by: HOSPITALIST

## 2024-06-09 PROCEDURE — 700102 HCHG RX REV CODE 250 W/ 637 OVERRIDE(OP): Performed by: NURSE PRACTITIONER

## 2024-06-09 PROCEDURE — 770000 HCHG ROOM/CARE - INTERMEDIATE ICU *

## 2024-06-09 PROCEDURE — 83735 ASSAY OF MAGNESIUM: CPT

## 2024-06-09 RX ORDER — BUSPIRONE HYDROCHLORIDE 5 MG/1
5 TABLET ORAL 3 TIMES DAILY
Status: DISCONTINUED | OUTPATIENT
Start: 2024-06-09 | End: 2024-06-10

## 2024-06-09 RX ORDER — RISPERIDONE 1 MG/1
1 TABLET ORAL 2 TIMES DAILY
Status: DISCONTINUED | OUTPATIENT
Start: 2024-06-09 | End: 2024-06-10

## 2024-06-09 RX ADMIN — HALOPERIDOL LACTATE 2 MG: 5 INJECTION, SOLUTION INTRAMUSCULAR at 02:58

## 2024-06-09 RX ADMIN — HALOPERIDOL LACTATE 2 MG: 5 INJECTION, SOLUTION INTRAMUSCULAR at 20:16

## 2024-06-09 RX ADMIN — PIPERACILLIN AND TAZOBACTAM 3.38 G: 3; .375 INJECTION, POWDER, FOR SOLUTION INTRAVENOUS at 20:17

## 2024-06-09 RX ADMIN — LANSOPRAZOLE 30 MG: 30 TABLET, ORALLY DISINTEGRATING ORAL at 05:13

## 2024-06-09 RX ADMIN — BUSPIRONE HYDROCHLORIDE 5 MG: 5 TABLET ORAL at 12:46

## 2024-06-09 RX ADMIN — BUSPIRONE HYDROCHLORIDE 5 MG: 5 TABLET ORAL at 08:21

## 2024-06-09 RX ADMIN — OXYCODONE HYDROCHLORIDE 5 MG: 5 TABLET ORAL at 22:00

## 2024-06-09 RX ADMIN — Medication 5 MG: at 20:16

## 2024-06-09 RX ADMIN — PIPERACILLIN AND TAZOBACTAM 3.38 G: 3; .375 INJECTION, POWDER, FOR SOLUTION INTRAVENOUS at 12:46

## 2024-06-09 RX ADMIN — AMLODIPINE BESYLATE 10 MG: 10 TABLET ORAL at 05:13

## 2024-06-09 RX ADMIN — Medication: at 05:13

## 2024-06-09 RX ADMIN — PRAVASTATIN SODIUM 40 MG: 20 TABLET ORAL at 17:54

## 2024-06-09 RX ADMIN — BUSPIRONE HYDROCHLORIDE 5 MG: 5 TABLET ORAL at 17:54

## 2024-06-09 RX ADMIN — RISPERIDONE 1 MG: 1 TABLET ORAL at 08:21

## 2024-06-09 RX ADMIN — RISPERIDONE 1 MG: 1 TABLET ORAL at 17:54

## 2024-06-09 RX ADMIN — PIPERACILLIN AND TAZOBACTAM 3.38 G: 3; .375 INJECTION, POWDER, FOR SOLUTION INTRAVENOUS at 05:13

## 2024-06-09 RX ADMIN — HALOPERIDOL LACTATE 2 MG: 5 INJECTION, SOLUTION INTRAMUSCULAR at 09:14

## 2024-06-09 RX ADMIN — Medication: at 20:16

## 2024-06-09 RX ADMIN — LABETALOL HYDROCHLORIDE 10 MG: 5 INJECTION, SOLUTION INTRAVENOUS at 00:11

## 2024-06-09 RX ADMIN — HALOPERIDOL LACTATE 2 MG: 5 INJECTION, SOLUTION INTRAMUSCULAR at 14:39

## 2024-06-09 ASSESSMENT — PAIN DESCRIPTION - PAIN TYPE
TYPE: ACUTE PAIN

## 2024-06-09 NOTE — ASSESSMENT & PLAN NOTE
S/p prostate surgery  Per wife, patient has suffered from urinary retention and incontinence since his surgery  Cardura  Monitor renal function and I/O's  Park removed 6/12:

## 2024-06-09 NOTE — ASSESSMENT & PLAN NOTE
Likely delirium in conjunction with his intracranial pathology  Keep patient awake during the day and avoid daytime naps. Remove all unnecessary lines (central lines, peripheral IVs, feeding tubes, leung catheters). Avoid polypharmacy, frequent re-orientation, maximize family time at bedside, use glasses and hearing aids if needed, treat pain, encourage ambulation, minimize benzos/anticholinergic agents.     Wean Risperdal to see if can improve his cognition.  Oxycodone only for pain

## 2024-06-09 NOTE — ASSESSMENT & PLAN NOTE
Given new Dx of PAFib   Amlodipine transition to metoprolol  Monitor blood pressure and adjust medical therapy accordingly  Anticoagulation is contraindicated with cerebellar hemorrhage at this time.

## 2024-06-09 NOTE — ASSESSMENT & PLAN NOTE
Not currently in exacerbation  Continue PRN nebulizers Q4h  Wean FiO2 as tolerated  RT per protocol prn

## 2024-06-09 NOTE — DISCHARGE PLANNING
CM reviewed chart and patient was discussed in IDT rounds.    CM met with Avril and to clarify, when Avril is discussing 'rehab', she is talking about [facility], not [facility].    ANABELA explained to her that I had reached out to [facility] on Thursday and Friday and Hortencia, the admissions coordinator was not there.      Avril wants [facility] in [place] as they live in [place] and it is so difficult to drive.  If unable to place in [place] then somewhere in [place] is the next choice.

## 2024-06-09 NOTE — ASSESSMENT & PLAN NOTE
Intubated for airway protection  Extubated 6/1  Aspiration precautions  Mobilization as able  Pulmonary hygiene and encourage deep inspiratory efforts.  RT/O2 Protocols  Titrate supplemental FiO2 to maintain SpO2 >92%

## 2024-06-09 NOTE — ASSESSMENT & PLAN NOTE
S/p EVAR w/ bypass graft stent  Maintain normotension  Holding ASA at this time; per wife he takes ASA 3x/week  BP control

## 2024-06-09 NOTE — ASSESSMENT & PLAN NOTE
ICH score=1  Thought due to amyloid  MRI brain: Stable fossa of parenchymal hemorrhage centered in the LEFT cerebellum and extending into the RIGHT cerebellum exerting mass effect on the fourth ventricle which is not completely effaced 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  HOB at 30 degrees  maintain pCO2 of 35-40; closer to 35  Pravastatin 40 mg qHS for possible neuroprotective effect  CT 6/7 unchanged  PEG replaced  06/18  No antiplatelet or anticoagulation therapy as thought due to Amyloid

## 2024-06-09 NOTE — PROGRESS NOTES
Hospital Medicine Daily Progress Note    Date of Service  6/9/2024    Chief Complaint  Jl Mueller is a 78 y.o. male admitted 5/29/2024 with a hemorrhagic stroke    Hospital Course  78 y.o. male who presented 5/29/2024 with a complex past medical history including tobacco abuse, hypertension, coronary disease, dyslipidemia, prior myocardial infarction, history of AAA repair, COPD on chronic domicile oxygen at 2 L, history of 2 prior hemorrhagic CVAs, reportedly no significant residual deficits prior to his admission, BPH, history of prostate artery embolization, presenting to a outside facility via EMS on 5/29/2024 for evaluation, the wife reports that the patient was eating dinner and then reported to have a headache, felt weak, had several episodes of emesis and became altered to a GCS of 11, the patient was brought to an outside facility, he was intubated prior to this admission to this facility, secondary to neurologic status, the patient was found to have a left cerebellar hemorrhage, mild Arsen hematoma edema with subtle parenchymal displacement, moderate compression of the fourth ventricle, therefore transferred to this facility for higher level of care, on arrival the patient was moving all extremities, withdrawing to pain, a CTA was completed and did not show clearly a aneurysm or vascular malformation, the patient was admitted to the neuro ICU for hypertonic saline treatment, every hour neurochecks and strict blood pressure control as well as ventilator management, the patient was provided DDAVP for platelet inhibition, there was overall no clinical change, the patient was extubated on 6/1, blood pressure controlled with multimodality medication regimen, the patient required Precedex secondary to confusion, a chest x-ray was found unremarkable, the patient was weaned off hypertonic sodium, ongoing delirium, CT follow-up was found to be stable, evaluation of the intracranial hemorrhage as expected, the  patient with a abnormal urine and consistent of Enterococcus faecalis and Enterobacter cloacae,  The patient seen with the wife at the bedside, currently afebrile, heart rate in the 100s, respiration unlabored, the patient is saturating in the high 90s on 6 L nasal cannula oxygen/facemask, blood pressure in the 120s to 130s over 60s, the patient is extremely restless, he does not answer questions or follow commands, he moves all 4 extremities strongly, he is purposefully trying to get his lapbelt untangled and unbuckled,  The patient is found with a feeding tube, small bore.  Laboratory data white count 8.2, hemoglobin 10.1 hematocrit 34.1, platelet count 181, sodium 147 potassium 4.5, chloride 109, bicarb 27 glucose 160, BUN 21, creatinine 0.68,    Interval Problem Update  Patient seen and examined today.  Data, Medication data reviewed.  Case discussed with nursing as available.  Plan of Care reviewed with patient and notified of changes.  6/9/2024 the patient is afebrile, heart rate in the 80s to 90s, respiration with some tachypnea, patient still agitated, saturating in the mid 90s on 4 to 5 L nasal cannula oxygen, blood pressure 120s to 130s over 60s,  White second 8.8, hemoglobin 10.7, platelet count 198, sodium 146, potassium 4.2, chloride 108, glucose 140,  I have discussed this patient's plan of care and discharge plan at IDT rounds today with Case Management, Nursing, Nursing leadership, and other members of the IDT team.    Consultants/Specialty  critical care and neurology    Code Status  Full Code    Disposition  The patient is not medically cleared for discharge to home or a post-acute facility.  Anticipate discharge to: skilled nursing facility    I have placed the appropriate orders for post-discharge needs.    Review of Systems  Review of Systems   Unable to perform ROS: Mental status change        Physical Exam  Pulse:  [] 95  Resp:  [19-49] 24  BP: (100-145)/(56-87) 122/62  SpO2:  [85 %-100  %] 95 %    Physical Exam  Vitals and nursing note reviewed.   Constitutional:       Appearance: He is well-developed. He is ill-appearing.      Comments: Pt seen and examined.   HENT:      Head: Normocephalic and atraumatic.   Eyes:      Pupils: Pupils are equal, round, and reactive to light.   Cardiovascular:      Rate and Rhythm: Normal rate and regular rhythm.      Heart sounds: Normal heart sounds.   Pulmonary:      Effort: Pulmonary effort is normal.      Breath sounds: Normal breath sounds.   Abdominal:      General: Bowel sounds are normal.      Palpations: Abdomen is soft.   Musculoskeletal:         General: Normal range of motion.      Cervical back: Normal range of motion and neck supple.   Skin:     General: Skin is warm and dry.   Neurological:      Mental Status: He is alert. He is disoriented.      Motor: Weakness present.   Psychiatric:      Comments: Unobtainable         Fluids    Intake/Output Summary (Last 24 hours) at 6/9/2024 1630  Last data filed at 6/9/2024 1200  Gross per 24 hour   Intake 1094.13 ml   Output 1550 ml   Net -455.87 ml       Laboratory  Recent Labs     06/07/24  0551 06/08/24  0433 06/09/24  0405   WBC 10.1 8.2 8.8   RBC 3.72* 3.71* 3.85*   HEMOGLOBIN 10.4* 10.1* 10.7*   HEMATOCRIT 34.3* 34.1* 34.4*   MCV 92.2 91.9 89.4   MCH 28.0 27.2 27.8   MCHC 30.3* 29.6* 31.1*   RDW 49.4 48.3 46.6   PLATELETCT 186 181 198   MPV 11.9 11.5 11.4     Recent Labs     06/08/24  1118 06/08/24  2310 06/09/24  0405   SODIUM 147* 146* 146*   POTASSIUM 4.5 4.1 4.2   CHLORIDE 109 108 108   CO2 27 29 30   GLUCOSE 160* 158* 140*   BUN 21 19 19   CREATININE 0.68 0.73 0.79   CALCIUM 9.5 9.4 9.3                   Imaging  CT-HEAD W/O   Final Result      1.  Intraparenchymal hemorrhage in the cerebellar vermis and left cerebellum. It demonstrates expected evolution and decreased density.   2.  Redemonstration of vasogenic edema surrounding the hematoma in the cerebellum with mild narrowing of the fourth  ventricle. There is no hydrocephalus.         DX-CHEST-LIMITED (1 VIEW)   Final Result      Mild interstitial prominence could represent vascular congestion.      Cardiomegaly.      Removal of endotracheal tube.      DH-MSUIJJI-4 VIEW   Final Result      Feeding tube tip projects in the second portion of the duodenum.      Nonspecific bowel gas pattern.      CT-HEAD W/O   Final Result      1.  Advanced cerebral atrophy.   2.  Stable ventriculomegaly with intraventricular hemorrhage.   3.  Advanced supratentorial white matter disease most consistent with microvascular ischemic change.   4.  Multiple old lacunar infarcts as described.   5.  Acute/recent subacute parenchymal hemorrhage in the posterior fossa involving the superior cerebellar vermis and left paramedian cerebellar hemisphere and left lateral cerebellar peduncle. No evidence of recurrent hemorrhage.   6.  Minimal focus of subarachnoid hemorrhage within a single sulcus in the left posterior temporal region. Probably unchanged from prior recent exam.         DX-ABDOMEN FOR TUBE PLACEMENT   Final Result      Enteric tube tip projects over the stomach.      EC-ECHOCARDIOGRAM COMPLETE W/ CONT   Final Result      DX-CHEST-PORTABLE (1 VIEW)   Final Result         1.  Left basilar atelectasis or subtle infiltrate, decreased since prior study   2.  Trace left pleural effusion   3.  Enlargement of the aortic knob suggesting thoracic aortic aneurysm   4.  Atherosclerosis      MR-BRAIN-WITH & W/O   Final Result      1.  Stable fossa of parenchymal hemorrhage centered in the LEFT cerebellum and extending into the RIGHT cerebellum exerting mass effect on the fourth ventricle which is not completely effaced   2.  Intraventricular blood redemonstrated   3.  Numerous areas of remote microhemorrhage in the supra and infratentorial brain. These could be related to amyloid angiopathy, numerous hypertensive microhemorrhages or less likely multiple cavernomas   4.  Moderate  diffuse atrophy without compelling evidence of hydrocephalus   5.  Advanced white matter changes   6.  4 mm LEFT temporal meningioma   7.  Enhancing nodule in the RIGHT internal auditory canal suspicious for a vestibular schwannoma, less likely other extra-axial mass such as a meningioma, facial nerve schwannoma or metastasis. Consider posterior fossa protocol brain MRI without and with    contrast to further evaluate in when clinically appropriate.      MR-MRA HEAD-W/O   Final Result         MRA OF THE [place] WITHIN NORMAL LIMITS.      DX-CHEST-PORTABLE (1 VIEW)   Final Result         1.  Hazy left lower lobe infiltrate   2.  Small left pleural effusion   3.  Enlargement of the aortic knob, appearance suggesting aortic aneurysm.      DX-CHEST-FOR LINE PLACEMENT Perform procedure in: Patient's Room   Final Result      1.  PICC line is in place with the tip projecting over the SVC in satisfactory position.   2.  Mildly enlarged cardiac silhouette with vascular congestion.   3.  Retrocardiac opacity is likely due to atelectasis.         IR-PICC LINE PLACEMENT W/ GUIDANCE > AGE 5   Final Result                  Ultrasound-guided PICC placement performed by qualified nursing staff as    above.          CT-HEAD W/O   Final Result         1.  Left cerebellar hemorrhage, similar compared to prior study.   2.  Minimal bilateral intraventricular hemorrhages, new since prior study.   3.  Nonspecific white matter changes, commonly associated with small vessel ischemic disease.  Associated mild cerebral atrophy is noted.   4.  Atherosclerosis.         DX-CHEST-PORTABLE (1 VIEW)   Final Result         1.  Left basilar atelectasis, no focal infiltrate   2.  Trace left pleural effusion   3.  Atherosclerosis      CT-CTA NECK WITH & W/O-POST PROCESSING   Final Result         1.  Scattered atherosclerosis without significant stenosis or occlusion.   2.  4.2 cm proximal descending thoracic aortic aneurysm, radiographic  follow-up and surveillance recommended as clinically appropriate.         CT-CTA HEAD WITH & W/O-POST PROCESS   Final Result         1.  No large vessel occlusion or aneurysm identified   2.  Large cerebellar hemorrhage predominantly in the left cerebellum      These findings were discussed with the patient's clinician, Nikki Alexander, on 5/29/2024 11:35 PM.      DX-CHEST-PORTABLE (1 VIEW)   Final Result      Tubes as above      Left pleural effusion      CHF/pulmonary edema pattern      See Brown MD   5/30/2024 8:41 AM         CT-HEAD W/O   Final Result      Acute intraparenchymal hemorrhage is noted involving the left cerebellar hemisphere, measuring 3.1 x 4.2 x 3.4 cm, with associated mass effect.      This finding was telephoned to the mentioned attending by on-call radiologist at the time of imaging         AAST Intracranial Hemorrhage Brain Injury Guidelines         Hemorrhage type  mBIG 1           mBIG 2                mBIG 3      Subdural             <4mm               5-7.9 mm             >8mm      Epidural                No                    No                  Yes      Intraparenc'mal   <4mm               5-7.9 mm         >8mm or multi   1 location       or 2 locations      >3 locations      Subarachnoid     <3 sulci       single hemisphere   bi-hemisphere   and <1 mm        or 1-3 mm            or > 3 mm      Intraventricular       No                  No                    Yes      Skull Fracture       None            Non-displaced       Displaced               DLP Reporting Thresholds for Incorrect/Repeated Exams - DLP in mGy*cm   Head/Neck:  0-year-old 3840, 1-year-old 5880, 5-year-old 8770, 10-year-old 34804 and adult 86634   Head:  0-year-old 4540, 1-year-old 7460, 5-year-old 04026, 10-year-old 59278 and adult 04513   Neck:  0-year-old 2940, 1-year-old 4160, 5-year-old 4550, 10-year-old 6320 and adult 8470   Chest:  0-year-old 550, 1-year-old 830, 5-year-old 1200, 10-year-old 3840 and adult  3570   Abd/pelvis:  0-year-old 440, 1-year-old 720, 5-year-old 1080, 10-year-old 3330 and adult 3330   Trunk(C/A/P):  0-year-old 490, 1-year-old 770, 5-year-old 1140, 10-year-old 3570 and adult 3330      OUTSIDE IMAGES-CT CERVICAL SPINE    (Results Pending)        Assessment/Plan  * Cerebellar hemorrhage (HCC)- (present on admission)  Assessment & Plan  ICH score=1  q2h neurochecks  MRI brain: Stable fossa of parenchymal hemorrhage centered in the LEFT cerebellum and extending into the RIGHT cerebellum exerting mass effect on the fourth ventricle which is not completely effaced 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  HOB at 30 degrees  maintain pCO2 of 35-40; closer to 35  Pravastatin 40 mg qHS for possible neuroprotective effect  CT 6/7 unchanged    UTI (urinary tract infection)  Assessment & Plan  Urine culture with E faecalis and E cloacae  Remove leung when able  Zosyn x5-7 days given sensitivities    Encephalopathy acute- (present on admission)  Assessment & Plan  Likely delirium in conjunction with his intracranial pathology  Keep patient awake during the day and avoid daytime naps. Remove all unnecessary lines (central lines, peripheral IVs, feeding tubes, leung catheters). Avoid polypharmacy, frequent re-orientation, maximize family time at bedside, use glasses and hearing aids if needed, treat pain, encourage ambulation, minimize benzos/anticholinergic agents.   Ambulate, get out of restraints is much as possible  Trazadone qHS to assist with sleep  Avoid sedatives    Hyperchloremic metabolic acidosis  Assessment & Plan  Improved    Hyperglycemia- (present on admission)  Assessment & Plan  Goal blood glucose 140-180  Off  insulin  hypoglycemia protocol    Acute on chronic respiratory failure with hypoxia (HCC)- (present on admission)  Assessment & Plan  Intubated for airway protection  Extubated 6/1  Chest x-ray not impressive on 6/4/2024  Mobilization  Pulmonary hygiene  RT/O2 Protocols  Titrate supplemental FiO2 to maintain SpO2 >92%    Hyperlipidemia- (present on admission)  Assessment & Plan  Per wife, patient's home medication was discontinued due to muscle cramps  Pravastatin 40mg QHS  Lipid panel reviewed  Counseling on lifestyle/dietary modifications    History of stroke- (present on admission)  Assessment & Plan  Patient has had 2 prior hemorrhagic strokes; last in 2015  No noted deficits per wife    BPH with urinary obstruction- (present on admission)  Assessment & Plan  S/p prostate surgery  Per wife, patient has suffered from urinary retention and incontinence since his surgery  Continue flomax  [place] for strict I&O    Benign essential hypertension- (present on admission)  Assessment & Plan  Per wife, patient's medications were discontinued as an outpatient because his BP has been controlled  SBP in the 170s per wife's at home BP cuff measurement; <160 since arrival to this facility  As needed antihypertensives to maintain -160)  Norvasc 10 mg daily    Abdominal aortic aneurysm (AAA) without rupture (HCC)- (present on admission)  Assessment & Plan  S/p EVAR w/ bypass graft stent  Maintain normotension  Holding ASA at this time; per wife he takes ASA 3x/week    COPD (chronic obstructive pulmonary disease) (HCC)- (present on admission)  Assessment & Plan  Not currently in exacerbation  CXR with no acute process  Continue home nebulizers as ordered  Continue PRN nebulizers Q4h  Wean FiO2 as tolerated     Plan  6/9/2024  Ongoing need for restraints to prevent the patient to remove vital catheters, feeding tube  Unfortunate development of significant encephalopathy, monitor, gentle management  Trial of Risperdal ,  nighttime sedative  blood pressure control  Antibiotics for polymicrobial UTI  Per neurology strict blood pressure control between 100-1 40, every 4 neurochecks, slow normalization of sodium  Postacute services evaluation, the patient is currently not a candidate for transfer secondary to his agitation, encephalopathy and restraints  See orders  Patient is has a high medical complexity, complex decision making and is at high risk for complication, morbidity, and mortality.  I spent 54 minutes, reviewing the chart, obtaining and/or reviewing separately obtained history. Performing a medically appropriate examination and evaluation.  Counseling and educating the patient. Ordering and reviewing medications, tests, or procedures.   Documenting clinical information in EPIC. Independently interpreting results and communicating results to patient. Discussing future disposition of care with patient, RN and case management.    VTE prophylaxis:    pharmacologic prophylaxis contraindicated due to Intracranial hemorrhage      I have performed a physical exam and reviewed and updated ROS and Plan today (6/9/2024). In review of yesterday's note (6/8/2024), there are no changes except as documented above.      Please note that this dictation was created using voice recognition software. I have made every reasonable attempt to correct obvious errors, but I expect that there are errors of grammar and possibly context that I did not discover before finalizing the note.

## 2024-06-10 LAB
ALBUMIN SERPL BCP-MCNC: 3.4 G/DL (ref 3.2–4.9)
ALBUMIN/GLOB SERPL: 1.3 G/DL
ALP SERPL-CCNC: 45 U/L (ref 30–99)
ALT SERPL-CCNC: 24 U/L (ref 2–50)
ANION GAP SERPL CALC-SCNC: 11 MMOL/L (ref 7–16)
ANION GAP SERPL CALC-SCNC: 9 MMOL/L (ref 7–16)
AST SERPL-CCNC: 27 U/L (ref 12–45)
BASOPHILS # BLD AUTO: 0.3 % (ref 0–1.8)
BASOPHILS # BLD: 0.03 K/UL (ref 0–0.12)
BILIRUB SERPL-MCNC: 0.3 MG/DL (ref 0.1–1.5)
BUN SERPL-MCNC: 17 MG/DL (ref 8–22)
BUN SERPL-MCNC: 18 MG/DL (ref 8–22)
CALCIUM ALBUM COR SERPL-MCNC: 9.6 MG/DL (ref 8.5–10.5)
CALCIUM SERPL-MCNC: 9.1 MG/DL (ref 8.5–10.5)
CALCIUM SERPL-MCNC: 9.3 MG/DL (ref 8.5–10.5)
CHLORIDE SERPL-SCNC: 104 MMOL/L (ref 96–112)
CHLORIDE SERPL-SCNC: 105 MMOL/L (ref 96–112)
CO2 SERPL-SCNC: 26 MMOL/L (ref 20–33)
CO2 SERPL-SCNC: 29 MMOL/L (ref 20–33)
CREAT SERPL-MCNC: 0.78 MG/DL (ref 0.5–1.4)
CREAT SERPL-MCNC: 0.83 MG/DL (ref 0.5–1.4)
CRP SERPL HS-MCNC: 0.82 MG/DL (ref 0–0.75)
EOSINOPHIL # BLD AUTO: 0.45 K/UL (ref 0–0.51)
EOSINOPHIL NFR BLD: 4.7 % (ref 0–6.9)
ERYTHROCYTE [DISTWIDTH] IN BLOOD BY AUTOMATED COUNT: 45.4 FL (ref 35.9–50)
GFR SERPLBLD CREATININE-BSD FMLA CKD-EPI: 89 ML/MIN/1.73 M 2
GFR SERPLBLD CREATININE-BSD FMLA CKD-EPI: 91 ML/MIN/1.73 M 2
GLOBULIN SER CALC-MCNC: 2.7 G/DL (ref 1.9–3.5)
GLUCOSE SERPL-MCNC: 125 MG/DL (ref 65–99)
GLUCOSE SERPL-MCNC: 125 MG/DL (ref 65–99)
HCT VFR BLD AUTO: 33.9 % (ref 42–52)
HGB BLD-MCNC: 10.6 G/DL (ref 14–18)
IMM GRANULOCYTES # BLD AUTO: 0.14 K/UL (ref 0–0.11)
IMM GRANULOCYTES NFR BLD AUTO: 1.5 % (ref 0–0.9)
LYMPHOCYTES # BLD AUTO: 0.73 K/UL (ref 1–4.8)
LYMPHOCYTES NFR BLD: 7.6 % (ref 22–41)
MAGNESIUM SERPL-MCNC: 2.5 MG/DL (ref 1.5–2.5)
MCH RBC QN AUTO: 27.7 PG (ref 27–33)
MCHC RBC AUTO-ENTMCNC: 31.3 G/DL (ref 32.3–36.5)
MCV RBC AUTO: 88.5 FL (ref 81.4–97.8)
MONOCYTES # BLD AUTO: 0.68 K/UL (ref 0–0.85)
MONOCYTES NFR BLD AUTO: 7.1 % (ref 0–13.4)
NEUTROPHILS # BLD AUTO: 7.57 K/UL (ref 1.82–7.42)
NEUTROPHILS NFR BLD: 78.8 % (ref 44–72)
NRBC # BLD AUTO: 0 K/UL
NRBC BLD-RTO: 0 /100 WBC (ref 0–0.2)
PHOSPHATE SERPL-MCNC: 3.3 MG/DL (ref 2.5–4.5)
PLATELET # BLD AUTO: 217 K/UL (ref 164–446)
PMV BLD AUTO: 11.7 FL (ref 9–12.9)
POTASSIUM SERPL-SCNC: 4.1 MMOL/L (ref 3.6–5.5)
POTASSIUM SERPL-SCNC: 4.3 MMOL/L (ref 3.6–5.5)
PREALB SERPL-MCNC: 17.2 MG/DL (ref 18–38)
PROT SERPL-MCNC: 6.1 G/DL (ref 6–8.2)
RBC # BLD AUTO: 3.83 M/UL (ref 4.7–6.1)
SODIUM SERPL-SCNC: 141 MMOL/L (ref 135–145)
SODIUM SERPL-SCNC: 143 MMOL/L (ref 135–145)
WBC # BLD AUTO: 9.6 K/UL (ref 4.8–10.8)

## 2024-06-10 PROCEDURE — 700102 HCHG RX REV CODE 250 W/ 637 OVERRIDE(OP): Performed by: INTERNAL MEDICINE

## 2024-06-10 PROCEDURE — 99233 SBSQ HOSP IP/OBS HIGH 50: CPT | Performed by: HOSPITALIST

## 2024-06-10 PROCEDURE — 85025 COMPLETE CBC W/AUTO DIFF WBC: CPT

## 2024-06-10 PROCEDURE — 83735 ASSAY OF MAGNESIUM: CPT

## 2024-06-10 PROCEDURE — 700102 HCHG RX REV CODE 250 W/ 637 OVERRIDE(OP): Performed by: NURSE PRACTITIONER

## 2024-06-10 PROCEDURE — A9270 NON-COVERED ITEM OR SERVICE: HCPCS | Performed by: HOSPITALIST

## 2024-06-10 PROCEDURE — A9270 NON-COVERED ITEM OR SERVICE: HCPCS | Performed by: PHYSICAL MEDICINE & REHABILITATION

## 2024-06-10 PROCEDURE — 700111 HCHG RX REV CODE 636 W/ 250 OVERRIDE (IP): Mod: JZ | Performed by: HOSPITALIST

## 2024-06-10 PROCEDURE — 80048 BASIC METABOLIC PNL TOTAL CA: CPT

## 2024-06-10 PROCEDURE — 80053 COMPREHEN METABOLIC PANEL: CPT

## 2024-06-10 PROCEDURE — A9270 NON-COVERED ITEM OR SERVICE: HCPCS | Performed by: INTERNAL MEDICINE

## 2024-06-10 PROCEDURE — 700102 HCHG RX REV CODE 250 W/ 637 OVERRIDE(OP): Performed by: PHYSICAL MEDICINE & REHABILITATION

## 2024-06-10 PROCEDURE — 97530 THERAPEUTIC ACTIVITIES: CPT

## 2024-06-10 PROCEDURE — 770000 HCHG ROOM/CARE - INTERMEDIATE ICU *

## 2024-06-10 PROCEDURE — 84100 ASSAY OF PHOSPHORUS: CPT

## 2024-06-10 PROCEDURE — A9270 NON-COVERED ITEM OR SERVICE: HCPCS | Performed by: NURSE PRACTITIONER

## 2024-06-10 PROCEDURE — 92526 ORAL FUNCTION THERAPY: CPT

## 2024-06-10 PROCEDURE — 700102 HCHG RX REV CODE 250 W/ 637 OVERRIDE(OP): Performed by: HOSPITALIST

## 2024-06-10 PROCEDURE — 700111 HCHG RX REV CODE 636 W/ 250 OVERRIDE (IP): Mod: JZ | Performed by: INTERNAL MEDICINE

## 2024-06-10 PROCEDURE — 84134 ASSAY OF PREALBUMIN: CPT

## 2024-06-10 PROCEDURE — 86140 C-REACTIVE PROTEIN: CPT

## 2024-06-10 PROCEDURE — 700105 HCHG RX REV CODE 258: Performed by: INTERNAL MEDICINE

## 2024-06-10 RX ORDER — RISPERIDONE 1 MG/1
2 TABLET ORAL 2 TIMES DAILY
Status: DISCONTINUED | OUTPATIENT
Start: 2024-06-10 | End: 2024-06-11

## 2024-06-10 RX ORDER — LOPERAMIDE HYDROCHLORIDE 2 MG/1
2 CAPSULE ORAL 4 TIMES DAILY PRN
Status: DISCONTINUED | OUTPATIENT
Start: 2024-06-10 | End: 2024-06-26 | Stop reason: HOSPADM

## 2024-06-10 RX ADMIN — Medication: at 18:23

## 2024-06-10 RX ADMIN — RISPERIDONE 1 MG: 1 TABLET ORAL at 05:25

## 2024-06-10 RX ADMIN — PIPERACILLIN AND TAZOBACTAM 3.38 G: 3; .375 INJECTION, POWDER, FOR SOLUTION INTRAVENOUS at 04:44

## 2024-06-10 RX ADMIN — AMLODIPINE BESYLATE 10 MG: 10 TABLET ORAL at 05:26

## 2024-06-10 RX ADMIN — Medication: at 05:27

## 2024-06-10 RX ADMIN — PRAVASTATIN SODIUM 40 MG: 20 TABLET ORAL at 17:08

## 2024-06-10 RX ADMIN — LANSOPRAZOLE 30 MG: 30 TABLET, ORALLY DISINTEGRATING ORAL at 05:25

## 2024-06-10 RX ADMIN — HALOPERIDOL LACTATE 2 MG: 5 INJECTION, SOLUTION INTRAMUSCULAR at 15:19

## 2024-06-10 RX ADMIN — OXYCODONE HYDROCHLORIDE 5 MG: 5 TABLET ORAL at 02:40

## 2024-06-10 RX ADMIN — PIPERACILLIN AND TAZOBACTAM 3.38 G: 3; .375 INJECTION, POWDER, FOR SOLUTION INTRAVENOUS at 12:54

## 2024-06-10 RX ADMIN — HALOPERIDOL LACTATE 2 MG: 5 INJECTION, SOLUTION INTRAMUSCULAR at 00:53

## 2024-06-10 RX ADMIN — LOPERAMIDE HYDROCHLORIDE 2 MG: 2 CAPSULE ORAL at 15:19

## 2024-06-10 RX ADMIN — HALOPERIDOL LACTATE 2 MG: 5 INJECTION, SOLUTION INTRAMUSCULAR at 20:18

## 2024-06-10 RX ADMIN — PIPERACILLIN AND TAZOBACTAM 3.38 G: 3; .375 INJECTION, POWDER, FOR SOLUTION INTRAVENOUS at 20:30

## 2024-06-10 RX ADMIN — OXYCODONE HYDROCHLORIDE 5 MG: 5 TABLET ORAL at 22:21

## 2024-06-10 RX ADMIN — RISPERIDONE 2 MG: 1 TABLET ORAL at 17:08

## 2024-06-10 RX ADMIN — Medication 5 MG: at 20:17

## 2024-06-10 RX ADMIN — BUSPIRONE HYDROCHLORIDE 5 MG: 5 TABLET ORAL at 05:25

## 2024-06-10 ASSESSMENT — COGNITIVE AND FUNCTIONAL STATUS - GENERAL
DAILY ACTIVITIY SCORE: 14
MOVING TO AND FROM BED TO CHAIR: A LOT
MOVING FROM LYING ON BACK TO SITTING ON SIDE OF FLAT BED: A LOT
DRESSING REGULAR UPPER BODY CLOTHING: A LOT
TURNING FROM BACK TO SIDE WHILE IN FLAT BAD: A LOT
PERSONAL GROOMING: A LITTLE
TOILETING: A LOT
CLIMB 3 TO 5 STEPS WITH RAILING: TOTAL
HELP NEEDED FOR BATHING: A LOT
DRESSING REGULAR LOWER BODY CLOTHING: A LOT
STANDING UP FROM CHAIR USING ARMS: A LOT
EATING MEALS: A LITTLE
WALKING IN HOSPITAL ROOM: A LOT
MOBILITY SCORE: 11
SUGGESTED CMS G CODE MODIFIER MOBILITY: CL
SUGGESTED CMS G CODE MODIFIER DAILY ACTIVITY: CK

## 2024-06-10 ASSESSMENT — PAIN DESCRIPTION - PAIN TYPE
TYPE: ACUTE PAIN

## 2024-06-10 ASSESSMENT — GAIT ASSESSMENTS: GAIT LEVEL OF ASSIST: UNABLE TO PARTICIPATE

## 2024-06-10 NOTE — DISCHARGE PLANNING
CM reviewed chart and patient was discussed with Nursing, Provider.    ANABELA called Hortencia at [facility].  VM left for her to return call.

## 2024-06-10 NOTE — PROGRESS NOTES
Hospital Medicine Daily Progress Note    Date of Service  6/10/2024    Chief Complaint  Jl Mueller is a 78 y.o. male admitted 5/29/2024 with a hemorrhagic stroke    Hospital Course  78 y.o. male who presented 5/29/2024 with a complex past medical history including tobacco abuse, hypertension, coronary disease, dyslipidemia, prior myocardial infarction, history of AAA repair, COPD on chronic domicile oxygen at 2 L, history of 2 prior hemorrhagic CVAs, reportedly no significant residual deficits prior to his admission, BPH, history of prostate artery embolization, presenting to a outside facility via EMS on 5/29/2024 for evaluation, the wife reports that the patient was eating dinner and then reported to have a headache, felt weak, had several episodes of emesis and became altered to a GCS of 11, the patient was brought to an outside facility, he was intubated prior to this admission to this facility, secondary to neurologic status, the patient was found to have a left cerebellar hemorrhage, mild Arsen hematoma edema with subtle parenchymal displacement, moderate compression of the fourth ventricle, therefore transferred to this facility for higher level of care, on arrival the patient was moving all extremities, withdrawing to pain, a CTA was completed and did not show clearly a aneurysm or vascular malformation, the patient was admitted to the neuro ICU for hypertonic saline treatment, every hour neurochecks and strict blood pressure control as well as ventilator management, the patient was provided DDAVP for platelet inhibition, there was overall no clinical change, the patient was extubated on 6/1, blood pressure controlled with multimodality medication regimen, the patient required Precedex secondary to confusion, a chest x-ray was found unremarkable, the patient was weaned off hypertonic sodium, ongoing delirium, CT follow-up was found to be stable, evaluation of the intracranial hemorrhage as expected,  the patient with a abnormal urine and consistent of Enterococcus faecalis and Enterobacter cloacae,  The patient seen with the wife at the bedside, currently afebrile, heart rate in the 100s, respiration unlabored, the patient is saturating in the high 90s on 6 L nasal cannula oxygen/facemask, blood pressure in the 120s to 130s over 60s, the patient is extremely restless, he does not answer questions or follow commands, he moves all 4 extremities strongly, he is purposefully trying to get his lapbelt untangled and unbuckled,  The patient is found with a feeding tube, small bore.  Laboratory data white count 8.2, hemoglobin 10.1 hematocrit 34.1, platelet count 181, sodium 147 potassium 4.5, chloride 109, bicarb 27 glucose 160, BUN 21, creatinine 0.68,    Interval Problem Update  Patient seen and examined today.  Data, Medication data reviewed.  Case discussed with nursing as available.  Plan of Care reviewed with patient and notified of changes.  6/9/2024 the patient is afebrile, heart rate in the 80s to 90s, respiration with some tachypnea, patient still agitated, saturating in the mid 90s on 4 to 5 L nasal cannula oxygen, blood pressure 120s to 130s over 60s,  White second 8.8, hemoglobin 10.7, platelet count 198, sodium 146, potassium 4.2, chloride 108, glucose 140  6/10 the patient with some better cooperation and ability to communicate with his wife, still lethargic, in restraints, afebrile, heart rate around 100, respiration unlabored, the patient is saturating in the high 90s on 2 L, blood pressure 120s to 1 teens over 60s, [place] 9.6, hemoglobin 10.6, platelet count 217, chemistry today fairly normal except for glucose of 125,  Follow-up SLP noted, the patient still with feeding tube,  Updated wife at the bedside  I have discussed this patient's plan of care and discharge plan at IDT rounds today with Case Management, Nursing, Nursing leadership, and other members of the IDT  team.    Consultants/Specialty  critical care and neurology    Code Status  Full Code    Disposition  The patient is not medically cleared for discharge to home or a post-acute facility.  Anticipate discharge to: skilled nursing facility    I have placed the appropriate orders for post-discharge needs.    Review of Systems  Review of Systems   Unable to perform ROS: Mental status change        Physical Exam  Temp:  [37.2 °C (99 °F)] 37.2 °C (99 °F)  Pulse:  [] 102  Resp:  [18-46] 24  BP: (115-144)/(59-90) 122/64  SpO2:  [92 %-100 %] 96 %    Physical Exam  Vitals and nursing note reviewed.   Constitutional:       Appearance: He is well-developed. He is ill-appearing.      Comments: Pt seen and examined.  Lethargic, ill-appearing male   HENT:      Head: Normocephalic and atraumatic.   Eyes:      Pupils: Pupils are equal, round, and reactive to light.   Cardiovascular:      Rate and Rhythm: Normal rate and regular rhythm.      Heart sounds: Normal heart sounds.   Pulmonary:      Effort: Pulmonary effort is normal.      Breath sounds: Normal breath sounds.   Abdominal:      General: Bowel sounds are normal.      Palpations: Abdomen is soft.   Musculoskeletal:         General: Normal range of motion.      Cervical back: Normal range of motion and neck supple.   Skin:     General: Skin is warm and dry.   Neurological:      Mental Status: He is disoriented.      Motor: Weakness present.      Comments: Lethargic, confused   Psychiatric:      Comments: Unobtainable         Fluids    Intake/Output Summary (Last 24 hours) at 6/10/2024 1445  Last data filed at 6/10/2024 0800  Gross per 24 hour   Intake 968.3 ml   Output 1550 ml   Net -581.7 ml       Laboratory  Recent Labs     06/08/24  0433 06/09/24  0405 06/10/24  0356   WBC 8.2 8.8 9.6   RBC 3.71* 3.85* 3.83*   HEMOGLOBIN 10.1* 10.7* 10.6*   HEMATOCRIT 34.1* 34.4* 33.9*   MCV 91.9 89.4 88.5   MCH 27.2 27.8 27.7   MCHC 29.6* 31.1* 31.3*   RDW 48.3 46.6 45.4   PLATELETCT  181 198 217   MPV 11.5 11.4 11.7     Recent Labs     06/09/24  0405 06/09/24 2026 06/10/24  0356   SODIUM 146* 143 143   POTASSIUM 4.2 4.0 4.3   CHLORIDE 108 105 105   CO2 30 26 29   GLUCOSE 140* 146* 125*   BUN 19 17 18   CREATININE 0.79 0.77 0.78   CALCIUM 9.3 9.2 9.1                   Imaging  CT-HEAD W/O   Final Result      1.  Intraparenchymal hemorrhage in the cerebellar vermis and left cerebellum. It demonstrates expected evolution and decreased density.   2.  Redemonstration of vasogenic edema surrounding the hematoma in the cerebellum with mild narrowing of the fourth ventricle. There is no hydrocephalus.         DX-CHEST-LIMITED (1 VIEW)   Final Result      Mild interstitial prominence could represent vascular congestion.      Cardiomegaly.      Removal of endotracheal tube.      [id number] VIEW   Final Result      Feeding tube tip projects in the second portion of the duodenum.      Nonspecific bowel gas pattern.      CT-HEAD W/O   Final Result      1.  Advanced cerebral atrophy.   2.  Stable ventriculomegaly with intraventricular hemorrhage.   3.  Advanced supratentorial white matter disease most consistent with microvascular ischemic change.   4.  Multiple old lacunar infarcts as described.   5.  Acute/recent subacute parenchymal hemorrhage in the posterior fossa involving the superior cerebellar vermis and left paramedian cerebellar hemisphere and left lateral cerebellar peduncle. No evidence of recurrent hemorrhage.   6.  Minimal focus of subarachnoid hemorrhage within a single sulcus in the left posterior temporal region. Probably unchanged from prior recent exam.         DX-ABDOMEN FOR TUBE PLACEMENT   Final Result      Enteric tube tip projects over the stomach.      EC-ECHOCARDIOGRAM COMPLETE W/ CONT   Final Result      DX-CHEST-PORTABLE (1 VIEW)   Final Result         1.  Left basilar atelectasis or subtle infiltrate, decreased since prior study   2.  Trace left pleural effusion   3.   Enlargement of the aortic knob suggesting thoracic aortic aneurysm   4.  Atherosclerosis      MR-BRAIN-WITH & W/O   Final Result      1.  Stable fossa of parenchymal hemorrhage centered in the LEFT cerebellum and extending into the RIGHT cerebellum exerting mass effect on the fourth ventricle which is not completely effaced   2.  Intraventricular blood redemonstrated   3.  Numerous areas of remote microhemorrhage in the supra and infratentorial brain. These could be related to amyloid angiopathy, numerous hypertensive microhemorrhages or less likely multiple cavernomas   4.  Moderate diffuse atrophy without compelling evidence of hydrocephalus   5.  Advanced white matter changes   6.  4 mm LEFT temporal meningioma   7.  Enhancing nodule in the RIGHT internal auditory canal suspicious for a vestibular schwannoma, less likely other extra-axial mass such as a meningioma, facial nerve schwannoma or metastasis. Consider posterior fossa protocol brain MRI without and with    contrast to further evaluate in when clinically appropriate.      MR-MRA HEAD-W/O   Final Result         MRA OF THE [place] WITHIN NORMAL LIMITS.      DX-CHEST-PORTABLE (1 VIEW)   Final Result         1.  Hazy left lower lobe infiltrate   2.  Small left pleural effusion   3.  Enlargement of the aortic knob, appearance suggesting aortic aneurysm.      DX-CHEST-FOR LINE PLACEMENT Perform procedure in: Patient's Room   Final Result      1.  PICC line is in place with the tip projecting over the SVC in satisfactory position.   2.  Mildly enlarged cardiac silhouette with vascular congestion.   3.  Retrocardiac opacity is likely due to atelectasis.         IR-PICC LINE PLACEMENT W/ GUIDANCE > AGE 5   Final Result                  Ultrasound-guided PICC placement performed by qualified nursing staff as    above.          CT-HEAD W/O   Final Result         1.  Left cerebellar hemorrhage, similar compared to prior study.   2.  Minimal bilateral  intraventricular hemorrhages, new since prior study.   3.  Nonspecific white matter changes, commonly associated with small vessel ischemic disease.  Associated mild cerebral atrophy is noted.   4.  Atherosclerosis.         DX-CHEST-PORTABLE (1 VIEW)   Final Result         1.  Left basilar atelectasis, no focal infiltrate   2.  Trace left pleural effusion   3.  Atherosclerosis      CT-CTA NECK WITH & W/O-POST PROCESSING   Final Result         1.  Scattered atherosclerosis without significant stenosis or occlusion.   2.  4.2 cm proximal descending thoracic aortic aneurysm, radiographic follow-up and surveillance recommended as clinically appropriate.         CT-CTA HEAD WITH & W/O-POST PROCESS   Final Result         1.  No large vessel occlusion or aneurysm identified   2.  Large cerebellar hemorrhage predominantly in the left cerebellum      These findings were discussed with the patient's clinician, Nikki Alexander, on 5/29/2024 11:35 PM.      DX-CHEST-PORTABLE (1 VIEW)   Final Result      Tubes as above      Left pleural effusion      CHF/pulmonary edema pattern      See Brown MD   5/30/2024 8:41 AM         CT-HEAD W/O   Final Result      Acute intraparenchymal hemorrhage is noted involving the left cerebellar hemisphere, measuring 3.1 x 4.2 x 3.4 cm, with associated mass effect.      This finding was telephoned to the mentioned attending by on-call radiologist at the time of imaging         AAST Intracranial Hemorrhage Brain Injury Guidelines         Hemorrhage type  mBIG 1           mBIG 2                mBIG 3      Subdural             <4mm               5-7.9 mm             >8mm      Epidural                No                    No                  Yes      Intraparenc'mal   <4mm               5-7.9 mm         >8mm or multi   1 location       or 2 locations      >3 locations      Subarachnoid     <3 sulci       single hemisphere   bi-hemisphere   and <1 mm        or 1-3 mm            or > 3 mm       Intraventricular       No                  No                    Yes      Skull Fracture       None            Non-displaced       Displaced               DLP Reporting Thresholds for Incorrect/Repeated Exams - DLP in mGy*cm   Head/Neck:  0-year-old 3840, 1-year-old 5880, 5-year-old 8770, 10-year-old 72789 and adult 00878   Head:  0-year-old 4540, 1-year-old 7460, 5-year-old 87065, 10-year-old 10501 and adult 13767   Neck:  0-year-old 2940, 1-year-old 4160, 5-year-old 4550, 10-year-old 6320 and adult 8470   Chest:  0-year-old 550, 1-year-old 830, 5-year-old 1200, 10-year-old 3840 and adult 3570   Abd/pelvis:  0-year-old 440, 1-year-old 720, 5-year-old 1080, 10-year-old 3330 and adult 3330   Trunk(C/A/P):  0-year-old 490, 1-year-old 770, 5-year-old 1140, 10-year-old 3570 and adult 3330      OUTSIDE IMAGES-CT CERVICAL SPINE    (Results Pending)        Assessment/Plan  * Cerebellar hemorrhage (HCC)- (present on admission)  Assessment & Plan  ICH score=1  q2h neurochecks  MRI brain: Stable fossa of parenchymal hemorrhage centered in the LEFT cerebellum and extending into the RIGHT cerebellum exerting mass effect on the fourth ventricle which is not completely effaced 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  HOB at 30 degrees  maintain pCO2 of 35-40; closer to 35  Pravastatin 40 mg qHS for possible neuroprotective effect  CT 6/7 unchanged    UTI (urinary tract infection)  Assessment & Plan  Urine culture with E faecalis and E cloacae  Remove leung when able  Zosyn x5-7 days given sensitivities    Encephalopathy acute- (present on admission)  Assessment & Plan  Likely delirium in conjunction with his intracranial pathology  Keep patient awake during  the day and avoid daytime naps. Remove all unnecessary lines (central lines, peripheral IVs, feeding tubes, leung catheters). Avoid polypharmacy, frequent re-orientation, maximize family time at bedside, use glasses and hearing aids if needed, treat pain, encourage ambulation, minimize benzos/anticholinergic agents.   Ambulate, get out of restraints is much as possible  Started Risperdal, slow improvement    Hyperchloremic metabolic acidosis  Assessment & Plan  Improved    Hyperglycemia- (present on admission)  Assessment & Plan  Goal blood glucose 140-180  Off insulin  hypoglycemia protocol    Acute on chronic respiratory failure with hypoxia (HCC)- (present on admission)  Assessment & Plan  Intubated for airway protection  Extubated 6/1  Chest x-ray not impressive on 6/4/2024  Mobilization  Pulmonary hygiene  RT/O2 Protocols  Titrate supplemental FiO2 to maintain SpO2 >92%    Hyperlipidemia- (present on admission)  Assessment & Plan  Per wife, patient's home medication was discontinued due to muscle cramps  Pravastatin 40mg QHS  Lipid panel reviewed  Counseling on lifestyle/dietary modifications    History of stroke- (present on admission)  Assessment & Plan  Patient has had 2 prior hemorrhagic strokes; last in 2015  No noted deficits per wife    BPH with urinary obstruction- (present on admission)  Assessment & Plan  S/p prostate surgery  Per wife, patient has suffered from urinary retention and incontinence since his surgery  Continue flomax  Leung for strict I&O    Benign essential hypertension- (present on admission)  Assessment & Plan  Per wife, patient's medications were discontinued as an outpatient because his BP has been controlled  SBP in the 170s per wife's at home BP cuff measurement; <160 since arrival to this facility  As needed antihypertensives to maintain -160)  Norvasc 10 mg daily    Abdominal aortic aneurysm (AAA) without rupture (HCC)- (present on admission)  Assessment & Plan  S/p EVAR  w/ bypass graft stent  Maintain normotension  Holding ASA at this time; per wife he takes ASA 3x/week    COPD (chronic obstructive pulmonary disease) ([facility])- (present on admission)  Assessment & Plan  Not currently in exacerbation  CXR with no acute process  Continue home nebulizers as ordered  Continue PRN nebulizers Q4h  Wean FiO2 as tolerated     Plan  6/10/2024  Ongoing need for restraints to prevent the patient to remove vital catheters, feeding tube  Unfortunate development of significant encephalopathy, monitor, gentle management  Trial of Risperdal , nighttime sedative  blood pressure control continue  Antibiotics for polymicrobial UTI to complete  Per neurology strict blood pressure control between 100-1 40, every 4 neurochecks, slow normalization of sodium  Postacute services evaluation, the patient is currently not a candidate for transfer secondary to his agitation, encephalopathy and restraints  See orders  Hopefully the patient can slowly improve and get out of restraints and have a feeding tube removed to transfer to a rehab facility  Patient is has a high medical complexity, complex decision making and is at high risk for complication, morbidity, and mortality.  I spent 53 minutes, reviewing the chart, obtaining and/or reviewing separately obtained history. Performing a medically appropriate examination and evaluation.  Counseling and educating the patient. Ordering and reviewing medications, tests, or procedures.   Documenting clinical information in EPIC. Independently interpreting results and communicating results to patient. Discussing future disposition of care with patient, RN and case management.    VTE prophylaxis:    pharmacologic prophylaxis contraindicated due to Brain bleed      I have performed a physical exam and reviewed and updated ROS and Plan today (6/10/2024). In review of yesterday's note (6/9/2024), there are no changes except as documented above.      Please note that this  dictation was created using voice recognition software. I have made every reasonable attempt to correct obvious errors, but I expect that there are errors of grammar and possibly context that I did not discover before finalizing the note.

## 2024-06-10 NOTE — THERAPY
"Speech Language Pathology   Daily Treatment     Patient Name: Jl Mueller  AGE:  78 y.o., SEX:  male  Medical Record #: [id number]  Date of Service: 6/10/2024    Precautions: Fall Risk, Swallow Precautions, Nasogastric Tube  Comments: baseline 2L of O2. Hx of prior CVA with L-sided deficits     Subjective  Pt seen on this date for dysphagia management. Pt was received awake/alert sitting [facility] w/ b/l wrist restraints and self-release lap belt applied w/ spouse present at bedside.     Assessment  With max encouragement from SLP/spouse, trials of TN0 (sraw) completed x4. Pt notably ataxic moving oral cavity to straw. Appropriate oral acceptance, containment, and ability to create adequate labial seal for straw sips. Presumed complete A/p transport as no significant oral bolus residue was appreciated upon visual inspection. Pt w/ increased perceptually wet vocal quality and RR concerning for airway invasion.  Despite max encouragement from SLP/spouse, pt refusing to participate in additional trials- becoming increasingly agitated, restless, and stating \"no.\"      Clinical Impressions  Pt w/ limited tolerance of PO trials on this date- became increasingly restless and agitated despite max encouragement from SLP/spouse for participation.  Increased perceptually wet vocal quality/RR on trials concerning for airway invasion. Pt would likely benefit from an instrumental swallow study; However is not appropriate to participate at this junction re: poor tolerance of PO, oral aversion/agitation, impaired command following. Suspected oropharyngeal dysphagia which is likely acute on chronic given cerebellar hemorrhage, intubation hx, encephalopathy w/ hx of CVAs and spouse report of dysphagia. Risk of aspiration and related sequelae can be mitigated with frequent, thorough, oral care and mobility as medically appropriate. Pt okay for ice chips w/ RN following oral care to help provide moisture to the oral cavity, prevent atrophy " "of the muscles of deglutition, and aide in secretion management. SLP to follow for pre-feeding trials. Additionally, will complete cognitive-linguistic testing as able/appropriate. Thank you.     Recommendations  Treatment Completed: Dysphagia Treatment  Diet Consistency: NPO/NGT- okay for ice chips w/ RN following oral care  Instrumentation: Instrumental swallow study pending clinical progress  Medication: Non Oral  Supervision: 1:1 feeding with constant supervision (ice chips only)  Oral Care: Q2h (prior to ice chip trials)    SLP Treatment Plan  Treatment Plan: Dysphagia Treatment, Patient/Family/Caregiver Training (pending Cog/SLE)  SLP Frequency: 4x Per Week  Estimated Duration: Until Therapy Goals Met    Anticipated Discharge Needs  Discharge Recommendations: Recommend post-acute placement for additional speech therapy services prior to discharge home  Therapy Recommendations Upon DC: Dysphagia Training, Community Re-Integration, Patient / Family / Caregiver Education (pending Cog/SLE)    Patient / Family Goals  Patient / Family Goal #1: \"for him to eat\" per spouse  Goal #1 Outcome: Progressing slower than expected  Short Term Goals  Short Term Goal # 1: Pt will participate in pre-feeding trials to determine approp. for participation in instrumental swallow study vs oral diet initation.  Goal Outcome # 1: Progressing slower than expected    Cierra Hernandez, SLP  "

## 2024-06-10 NOTE — CARE PLAN
The patient is Watcher - Medium risk of patient condition declining or worsening    Shift Goals  Clinical Goals: Safety  Patient Goals: GUZMAN  Family Goals: Remain updated on POC    Progress made toward(s) clinical / shift goals:    Problem: Safety - Medical Restraint  Goal: Remains free of injury from restraints (Restraint for Interference with Medical Device)  Outcome: Progressing  Goal: Free from restraint(s) (Restraint for Interference with Medical Device)  Outcome: Progressing     Problem: Pain - Standard  Goal: Alleviation of pain or a reduction in pain to the patient’s comfort goal  Outcome: Progressing     Problem: Hemodynamics  Goal: Patient's hemodynamics, fluid balance and neurologic status will be stable or improve  Outcome: Progressing     Problem: Neuro Status  Goal: Neuro status will remain stable or improve  Outcome: Progressing       Patient is not progressing towards the following goals:

## 2024-06-10 NOTE — THERAPY
Physical Therapy   Daily Treatment     Patient Name: Jl Mueller  Age:  78 y.o., Sex:  male  Medical Record #: [id number]  Today's Date: 6/10/2024     Precautions  Precautions: Fall Risk;Swallow Precautions;Nasogastric Tube  Comments: baseline 2L of O2; Hx of prior CVA with L-sided deficits    Assessment    Patient seen for follow up PT treatment session and demos further impaired participation with therapy. He remains restless and agitated with B UE. He demos strength to mobilize but appears to be somewhat self-limiting, but he is aphasic and not answer questions at this time. Patient will benefit from placement for further therapy. However, would also recommend palliative consult, as this is patient's third CVA.  Will continue to follow while in house.     Plan    Treatment Plan Status: Continue Current Treatment Plan  Type of Treatment: Bed Mobility, Equipment, Neuro Re-Education / Balance, Self Care / Home Evaluation, Stair Training, Therapeutic Activities, Therapeutic Exercise, Gait Training, Family / Caregiver Training  Treatment Frequency: 4 Times per Week  Treatment Duration: Until Therapy Goals Met    DC Equipment Recommendations: Unable to determine at this time  Discharge Recommendations: Recommend post-acute placement for additional physical therapy services prior to discharge home      Subjective    Wife presents and agreeable to mobility      Objective       06/10/24 1100   Precautions   Precautions Fall Risk;Swallow Precautions   Comments baseline 2L of O2, B wrist restraints   Vitals   Pulse (!) 105   Blood Pressure  115/59   Respiration (!) 46   Pulse Oximetry 94 %   Non Verbal Descriptors   Non Verbal Scale  Restlessness   Cognition    Cognition / Consciousness X   Speech/ Communication Expressive Aphasia   Level of Consciousness Alert   Ability To Follow Commands Unable to Follow 1 Step Commands   Safety Awareness Impaired;Impulsive   New Learning Impaired   Attention Impaired   Sequencing  Impaired   Initiation Impaired   Comments patient appears frustrated throughout and is restlest and attempting to pull at lines   Strength Lower Body   Lower Body Strength  X   Gross Strength Generalized Weakness, Equal Bilaterally   Neuro-Muscular Treatments   Neuro-Muscular Treatments Compensatory Strategies;Anterior weight shift;Weight Shift Right;Weight Shift Left;Verbal Cuing   Comments not following but demos sufficient strength to follow, possibly volitional   Other Treatments   Other Treatments Provided educated wife about DC recs and expected prognosis.   Balance   Sitting Balance (Static) Fair -   Sitting Balance (Dynamic) Poor +   Standing Balance (Static) Poor   Standing Balance (Dynamic) Poor -   Weight Shift Sitting Fair   Weight Shift Standing Poor   Skilled Intervention Verbal Cuing;Tactile Cuing;Sequencing;Postural Facilitation   Comments B knees blocked for transfers   Bed Mobility    Supine to Sit Maximal Assist   Scooting Maximal Assist   Skilled Intervention Tactile Cuing;Verbal Cuing;Postural Facilitation   Gait Analysis   Gait Level Of Assist Unable to Participate   Functional Mobility   Sit to Stand Moderate Assist   Bed, Chair, Wheelchair Transfer Moderate Assist   Transfer Method Stand Step   Mobility OOB to chair, needing to pivot, not following commands for stepping   Skilled Intervention Verbal Cuing;Compensatory Strategies;Postural Facilitation;Sequencing;Tactile Cuing   ICU Target Mobility Level   ICU Mobility - Targeted Level Level 3B   6 Clicks Assessment - How much HELP from from another person do you currently need... (If the patient hasn't done an activity recently, how much help from another person do you think he/she would need if he/she tried?)   Turning from your back to your side while in a flat bed without using bedrails? 2   Moving from lying on your back to sitting on the side of a flat bed without using bedrails? 2   Moving to and from a bed to a chair (including a  wheelchair)? 2   Standing up from a chair using your arms (e.g., wheelchair, or bedside chair)? 2   Walking in hospital room? 2   Climbing 3-5 steps with a railing? 1   6 clicks Mobility Score 11   Activity Tolerance   Sitting in Chair post session   Sitting Edge of Bed 5 min   Standing 2 min   Short Term Goals    Short Term Goal # 1 Pt will follow 100% of function based commands within 6 visits to ensure participation in PT sessions.   Goal Outcome # 1 goal not met   Short Term Goal # 2 Pt will perform supine<>sit from flat HOB/no railing with min A within 6 visits to ensure independent mobility at home.   Goal Outcome # 2 Goal not met   Short Term Goal # 3 Pt will perform squat pivot transfer with min A within 6 visits to incresae OOB timing.   Goal Outcome # 3 Progressing as expected   Short Term Goal # 4 Pt will ambulate x 15ft in // bars with min A within 6 visits to progress ambulation tolerance.   Goal Outcome # 4 Goal not met   Physical Therapy Treatment Plan   Physical Therapy Treatment Plan Continue Current Treatment Plan   Anticipated Discharge Equipment and Recommendations   DC Equipment Recommendations Unable to determine at this time   Discharge Recommendations Recommend post-acute placement for additional physical therapy services prior to discharge home     Olivia Monterroso, PT, DPT, [facility]

## 2024-06-10 NOTE — CARE PLAN
The patient is Watcher - Medium risk of patient condition declining or worsening    Shift Goals  Clinical Goals: Safety  Patient Goals: GUZMAN  Family Goals: Remain updated on POC    Progress made toward(s) clinical / shift goals:      Problem: Safety - Medical Restraint  Goal: Remains free of injury from restraints (Restraint for Interference with Medical Device)  Outcome: Progressing  Flowsheets (Taken 6/5/2024 0313 by Chen Torres RAyeNAye)  Addressed this shift: Remains free of injury from restraints (restraint for interference with medical device):   Determine that other, less restrictive measures have been tried or would not be effective before applying the restraint   Evaluate the patient's condition at the time of restraint application   Inform patient/family regarding the reason for restraint   Every 2 hours: Monitor safety, psychosocial status, comfort, nutrition and hydration     Problem: Skin Integrity  Goal: Skin integrity is maintained or improved  Outcome: Progressing  Note: Appropriate interventions in place to maintain/ improve skin integrity.

## 2024-06-10 NOTE — THERAPY
Occupational Therapy  Daily Treatment     Patient Name: Jl Mueller  Age:  78 y.o., Sex:  male  Medical Record #: [id number]  Today's Date: 6/10/2024     Precautions  Precautions: Fall Risk, Swallow Precautions, Nasogastric Tube  Comments: baseline 2L of O2; Hx of prior CVA with L-sided deficits    Assessment    Pt currently limited by decreased functional mobility, activity tolerance, cognition, strength, balance, and pain which are affecting pt's ability to complete ADLs/IADLs at baseline. Pt would benefit from OT services in the acute care setting to maximize functional recovery.      Plan    Treatment Plan Status: (P) Continue Current Treatment Plan  Type of Treatment: Self Care / Activities of Daily Living, Therapeutic Activity  Treatment Frequency: 3 Times per Week  Treatment Duration: Until Therapy Goals Met       Discharge Recommendations: (P) Recommend post-acute placement for additional occupational therapy services prior to discharge home       06/10/24 1028   Cognition    Speech/ Communication Expressive Aphasia   Level of Consciousness Responds to voice   Ability To Follow Commands 1 Step   Safety Awareness Impaired;Impulsive   New Learning Impaired   Attention Impaired   Sequencing Impaired   Initiation Impaired   Balance   Sitting Balance (Static) Fair -   Sitting Balance (Dynamic) Poor +   Standing Balance (Static) Poor   Standing Balance (Dynamic) Poor   Weight Shift Sitting Fair   Weight Shift Standing Poor   Activities of Daily Living   Grooming Moderate Assist   Upper Body Dressing Moderate Assist   Lower Body Dressing Maximal Assist   Toileting Maximal Assist   Functional Mobility   Sit to Stand Moderate Assist   Bed, Chair, Wheelchair Transfer Moderate Assist   Short Term Goals   Short Term Goal # 1 SBA with grooming tasks   Goal Outcome # 1 Progressing as expected   Short Term Goal # 2 min A with UB dressing   Goal Outcome # 2 Progressing slower than expected   Short Term Goal # 3 mod A with  CELINA dressing   Goal Outcome # 3 Progressing as expected   Occupational Therapy Treatment Plan    O.T. Treatment Plan Continue Current Treatment Plan   Anticipated Discharge Equipment and Recommendations   Discharge Recommendations Recommend post-acute placement for additional occupational therapy services prior to discharge home

## 2024-06-11 ENCOUNTER — APPOINTMENT (OUTPATIENT)
Dept: RADIOLOGY | Facility: MEDICAL CENTER | Age: 79
End: 2024-06-11
Attending: STUDENT IN AN ORGANIZED HEALTH CARE EDUCATION/TRAINING PROGRAM
Payer: MEDICARE

## 2024-06-11 LAB
AMMONIA PLAS-SCNC: 21 UMOL/L (ref 11–45)
ANION GAP SERPL CALC-SCNC: 11 MMOL/L (ref 7–16)
BUN SERPL-MCNC: 16 MG/DL (ref 8–22)
CALCIUM SERPL-MCNC: 9.3 MG/DL (ref 8.5–10.5)
CHLORIDE SERPL-SCNC: 103 MMOL/L (ref 96–112)
CO2 SERPL-SCNC: 25 MMOL/L (ref 20–33)
CREAT SERPL-MCNC: 0.92 MG/DL (ref 0.5–1.4)
ERYTHROCYTE [DISTWIDTH] IN BLOOD BY AUTOMATED COUNT: 45.3 FL (ref 35.9–50)
GFR SERPLBLD CREATININE-BSD FMLA CKD-EPI: 85 ML/MIN/1.73 M 2
GLUCOSE SERPL-MCNC: 142 MG/DL (ref 65–99)
HCT VFR BLD AUTO: 35.4 % (ref 42–52)
HGB BLD-MCNC: 11.2 G/DL (ref 14–18)
MAGNESIUM SERPL-MCNC: 2.4 MG/DL (ref 1.5–2.5)
MCH RBC QN AUTO: 27.7 PG (ref 27–33)
MCHC RBC AUTO-ENTMCNC: 31.6 G/DL (ref 32.3–36.5)
MCV RBC AUTO: 87.6 FL (ref 81.4–97.8)
PHOSPHATE SERPL-MCNC: 2.9 MG/DL (ref 2.5–4.5)
PLATELET # BLD AUTO: 240 K/UL (ref 164–446)
PMV BLD AUTO: 11.6 FL (ref 9–12.9)
POTASSIUM SERPL-SCNC: 4 MMOL/L (ref 3.6–5.5)
RBC # BLD AUTO: 4.04 M/UL (ref 4.7–6.1)
SODIUM SERPL-SCNC: 139 MMOL/L (ref 135–145)
WBC # BLD AUTO: 10.7 K/UL (ref 4.8–10.8)

## 2024-06-11 PROCEDURE — 700105 HCHG RX REV CODE 258: Performed by: INTERNAL MEDICINE

## 2024-06-11 PROCEDURE — 700102 HCHG RX REV CODE 250 W/ 637 OVERRIDE(OP): Performed by: INTERNAL MEDICINE

## 2024-06-11 PROCEDURE — 83735 ASSAY OF MAGNESIUM: CPT

## 2024-06-11 PROCEDURE — A9270 NON-COVERED ITEM OR SERVICE: HCPCS | Performed by: HOSPITALIST

## 2024-06-11 PROCEDURE — A9270 NON-COVERED ITEM OR SERVICE: HCPCS | Performed by: STUDENT IN AN ORGANIZED HEALTH CARE EDUCATION/TRAINING PROGRAM

## 2024-06-11 PROCEDURE — 770000 HCHG ROOM/CARE - INTERMEDIATE ICU *

## 2024-06-11 PROCEDURE — 700102 HCHG RX REV CODE 250 W/ 637 OVERRIDE(OP): Performed by: STUDENT IN AN ORGANIZED HEALTH CARE EDUCATION/TRAINING PROGRAM

## 2024-06-11 PROCEDURE — 700111 HCHG RX REV CODE 636 W/ 250 OVERRIDE (IP): Mod: JZ | Performed by: HOSPITALIST

## 2024-06-11 PROCEDURE — 700102 HCHG RX REV CODE 250 W/ 637 OVERRIDE(OP): Performed by: HOSPITALIST

## 2024-06-11 PROCEDURE — 700102 HCHG RX REV CODE 250 W/ 637 OVERRIDE(OP): Performed by: NURSE PRACTITIONER

## 2024-06-11 PROCEDURE — 82140 ASSAY OF AMMONIA: CPT

## 2024-06-11 PROCEDURE — 70450 CT HEAD/BRAIN W/O DYE: CPT

## 2024-06-11 PROCEDURE — 99223 1ST HOSP IP/OBS HIGH 75: CPT | Mod: 25 | Performed by: NURSE PRACTITIONER

## 2024-06-11 PROCEDURE — 700102 HCHG RX REV CODE 250 W/ 637 OVERRIDE(OP): Performed by: PHYSICAL MEDICINE & REHABILITATION

## 2024-06-11 PROCEDURE — A9270 NON-COVERED ITEM OR SERVICE: HCPCS | Performed by: NURSE PRACTITIONER

## 2024-06-11 PROCEDURE — 84100 ASSAY OF PHOSPHORUS: CPT

## 2024-06-11 PROCEDURE — 307059 PAD,EAR PROTECTOR: Performed by: HOSPITALIST

## 2024-06-11 PROCEDURE — 700111 HCHG RX REV CODE 636 W/ 250 OVERRIDE (IP): Mod: JZ | Performed by: INTERNAL MEDICINE

## 2024-06-11 PROCEDURE — A9270 NON-COVERED ITEM OR SERVICE: HCPCS | Performed by: INTERNAL MEDICINE

## 2024-06-11 PROCEDURE — 85027 COMPLETE CBC AUTOMATED: CPT

## 2024-06-11 PROCEDURE — 80048 BASIC METABOLIC PNL TOTAL CA: CPT

## 2024-06-11 PROCEDURE — 99497 ADVNCD CARE PLAN 30 MIN: CPT | Performed by: NURSE PRACTITIONER

## 2024-06-11 PROCEDURE — A9270 NON-COVERED ITEM OR SERVICE: HCPCS | Performed by: PHYSICAL MEDICINE & REHABILITATION

## 2024-06-11 PROCEDURE — 99233 SBSQ HOSP IP/OBS HIGH 50: CPT | Mod: GC | Performed by: HOSPITALIST

## 2024-06-11 PROCEDURE — 700111 HCHG RX REV CODE 636 W/ 250 OVERRIDE (IP): Mod: JZ | Performed by: NURSE PRACTITIONER

## 2024-06-11 RX ORDER — ACETAMINOPHEN 650 MG/1
650 SUPPOSITORY RECTAL EVERY 4 HOURS PRN
Status: DISCONTINUED | OUTPATIENT
Start: 2024-06-11 | End: 2024-06-19

## 2024-06-11 RX ORDER — RISPERIDONE 1 MG/1
2 TABLET ORAL 2 TIMES DAILY
Status: DISCONTINUED | OUTPATIENT
Start: 2024-06-11 | End: 2024-06-13

## 2024-06-11 RX ORDER — OXYCODONE HYDROCHLORIDE 10 MG/1
10 TABLET ORAL EVERY 4 HOURS PRN
Status: DISCONTINUED | OUTPATIENT
Start: 2024-06-11 | End: 2024-06-12

## 2024-06-11 RX ORDER — ACETAMINOPHEN 325 MG/1
650 TABLET ORAL EVERY 4 HOURS PRN
Status: DISCONTINUED | OUTPATIENT
Start: 2024-06-11 | End: 2024-06-19

## 2024-06-11 RX ORDER — OXYCODONE HYDROCHLORIDE 5 MG/1
5 TABLET ORAL EVERY 4 HOURS PRN
Status: DISCONTINUED | OUTPATIENT
Start: 2024-06-11 | End: 2024-06-12

## 2024-06-11 RX ADMIN — HALOPERIDOL LACTATE 2 MG: 5 INJECTION, SOLUTION INTRAMUSCULAR at 04:10

## 2024-06-11 RX ADMIN — PRAVASTATIN SODIUM 40 MG: 20 TABLET ORAL at 17:09

## 2024-06-11 RX ADMIN — LANSOPRAZOLE 30 MG: 30 TABLET, ORALLY DISINTEGRATING ORAL at 05:59

## 2024-06-11 RX ADMIN — HALOPERIDOL LACTATE 2 MG: 5 INJECTION, SOLUTION INTRAMUSCULAR at 22:38

## 2024-06-11 RX ADMIN — Medication: at 17:10

## 2024-06-11 RX ADMIN — OXYCODONE HYDROCHLORIDE 10 MG: 10 TABLET ORAL at 23:48

## 2024-06-11 RX ADMIN — LOPERAMIDE HYDROCHLORIDE 2 MG: 2 CAPSULE ORAL at 07:29

## 2024-06-11 RX ADMIN — OXYCODONE HYDROCHLORIDE 5 MG: 5 TABLET ORAL at 07:29

## 2024-06-11 RX ADMIN — Medication: at 05:59

## 2024-06-11 RX ADMIN — OXYCODONE HYDROCHLORIDE 5 MG: 5 TABLET ORAL at 02:50

## 2024-06-11 RX ADMIN — FENTANYL CITRATE 25 MCG: 50 INJECTION, SOLUTION INTRAMUSCULAR; INTRAVENOUS at 09:35

## 2024-06-11 RX ADMIN — PIPERACILLIN AND TAZOBACTAM 3.38 G: 3; .375 INJECTION, POWDER, FOR SOLUTION INTRAVENOUS at 13:36

## 2024-06-11 RX ADMIN — Medication 5 MG: at 22:39

## 2024-06-11 RX ADMIN — PIPERACILLIN AND TAZOBACTAM 3.38 G: 3; .375 INJECTION, POWDER, FOR SOLUTION INTRAVENOUS at 06:05

## 2024-06-11 RX ADMIN — RISPERIDONE 2 MG: 1 TABLET, FILM COATED ORAL at 17:09

## 2024-06-11 RX ADMIN — PIPERACILLIN AND TAZOBACTAM 3.38 G: 3; .375 INJECTION, POWDER, FOR SOLUTION INTRAVENOUS at 22:48

## 2024-06-11 RX ADMIN — HALOPERIDOL LACTATE 2 MG: 5 INJECTION, SOLUTION INTRAMUSCULAR at 00:35

## 2024-06-11 RX ADMIN — LOPERAMIDE HYDROCHLORIDE 2 MG: 2 CAPSULE ORAL at 17:09

## 2024-06-11 RX ADMIN — AMLODIPINE BESYLATE 10 MG: 10 TABLET ORAL at 05:59

## 2024-06-11 RX ADMIN — RISPERIDONE 2 MG: 1 TABLET ORAL at 06:01

## 2024-06-11 ASSESSMENT — PAIN DESCRIPTION - PAIN TYPE
TYPE: ACUTE PAIN

## 2024-06-11 ASSESSMENT — ENCOUNTER SYMPTOMS: FEVER: 1

## 2024-06-11 ASSESSMENT — FIBROSIS 4 INDEX: FIB4 SCORE: 1.79

## 2024-06-11 NOTE — THERAPY
HISTORY OF PRESENT ILLNESS:  Regis Davis is a 40 year old female who comes in today complaining of No chief complaint on file.   which started 4 days ago.  Symptoms have improved. Associated symptoms include sinus congestion, nonproductive cough.  For symptom relief, the patient has tried OTC (over-the-counter) Tylenol, which has provided mild relief..  Denies chest pain and shortness of breath. Did take an at home COVID test that was positive.    PERTINENT PAST MEDICAL HISTORY:  Denies history of \"frequent\" sinus issues, Denies Seasonal and/or Environmental Allergies and Denies history of asthma  Other Sinus Medications Used:  Daily or PRN:  Nothing Additional    No current outpatient medications on file prior to visit.  No current facility-administered medications on file prior to visit.    ALLERGIES:  No Known Allergies   reports that she has never smoked. She has never used smokeless tobacco.       REVIEW OF SYSTEMS:   Ears:  Denies ear symptoms.  Nose:  Positive: rhinorrhea.  Throat:  Raw and Post nasal drip.  Cough:  Dry, nonproductive.  Sinus:  Denies any Sinus Symptoms.  Energy:  mildly decreased.    PHYSICAL EXAMINATION:  Visit Vitals  /88 (BP Location: RUE - Right upper extremity, Patient Position: Sitting, Cuff Size: Regular)   Pulse 80   Temp 97.9 °F (36.6 °C) (Oral)   LMP 06/08/2021 (Approximate)   SpO2 99%     General:  Healthy, alert, in no distress, cooperative.  Lymphatic:  No lymphadenopathy.  Eyes:  Conjunctivae/sclerae normal. No erythema, edema or exudate..  Nose:  nares with clear discharge.  Oropharynx:  Lips, mucosa, and tongue normal. Teeth and gums normal. Oropharynx clear. 1+ tonsils bilaterally.  Ears:  External ears normal. Canals clear. Tympanic membranes are clear with all landmarks noted. .  Sinuses:  nontender.  Heart:  regular rate and rhythm.  Lungs:  lungs are clear to auscultation .       ASSESSMENT:  1. Suspected COVID-19 virus infection          PLAN:  Orders  Speech Language Therapy Contact Note    Patient Name: Jl Mueller  Age:  78 y.o., Sex:  male  Medical Record #: [id number]  Today's Date: 6/11/2024 06/11/24 0934   Treatment Variance   Reason For Missed Therapy Non-Medical - Patient Family Refused   Interdisciplinary Plan of Care Collaboration   IDT Collaboration with  Nursing;Family / Caregiver   Collaboration Comments Pt's wife at bedside and requesting to hold therapy at this time as pt just fell asleep. SLP to follow for therapy on a later date.        Placed This Encounter   • 2019 Novel Coronavirus (SARS-CoV-2)       Symptom Management Recommended:  Mucinex, Pseudoephedrine, Nyquil or Benadryl at bedtime for cough and postnasal drainage.  Advil/Tylenol as directed on package for fever, pain and body aches.  Increase fluid intake and rest.  Humidifier in room at night.  Saline nasal spray as directed.     Patient education materials given.  Patient to follow up with primary care provider if symptoms worsen or fail to improve.    Advised to call 911 if short of breath or develops chest pain at home. Encouraged to eat a healthy diet and get in proper nutrition and vitamins.     SANDOR Pack

## 2024-06-11 NOTE — PROGRESS NOTES
~0330 MD Queen notified of patients change in neuro status, pt restless, not following commands and making incomprehensible sounds . MD ordered CT of head. Pt tolerated CT scan.   ~0630 PT family notified of CT scan on arrival this morning.

## 2024-06-11 NOTE — CARE PLAN
The patient is Watcher - Medium risk of patient condition declining or worsening    Shift Goals  Clinical Goals: Q4 Neuro  Patient Goals: GUZMAN  Family Goals: Updates      Problem: Safety - Medical Restraint  Goal: Remains free of injury from restraints (Restraint for Interference with Medical Device)  Outcome: Progressing  Goal: Free from restraint(s) (Restraint for Interference with Medical Device)  Outcome: Progressing     Problem: Knowledge Deficit - Standard  Goal: Patient and family/care givers will demonstrate understanding of plan of care, disease process/condition, diagnostic tests and medications  Outcome: Progressing     Problem: Pain - Standard  Goal: Alleviation of pain or a reduction in pain to the patient’s comfort goal  Outcome: Progressing     Problem: Skin Integrity  Goal: Skin integrity is maintained or improved  Outcome: Progressing     Problem: Fall Risk  Goal: Patient will remain free from falls  Outcome: Progressing

## 2024-06-11 NOTE — THERAPY
Physical Therapy Contact Note    Patient Name: Jl Mueller  Age:  78 y.o., Sex:  male  Medical Record #: [id number]  Today's Date: 6/11/2024 06/11/24 0955   Interdisciplinary Plan of Care Collaboration   IDT Collaboration with  Nursing   Collaboration Comments Spoke with nursing who stated that pt is not appropriate for therapy today. He is agitated, not oriented, in restraints. Will attempt to assist pt at another time as appropriate.

## 2024-06-11 NOTE — CARE PLAN
The patient is Watcher - Medium risk of patient condition declining or worsening    Shift Goals  Clinical Goals: monitor neuro, SBP <140, wean o2  Patient Goals: caroline  Family Goals: rest and updates    Progress made toward(s) clinical / shift goals:    Problem: Safety - Medical Restraint  Goal: Remains free of injury from restraints (Restraint for Interference with Medical Device)  Outcome: Progressing     Problem: Knowledge Deficit - Standard  Goal: Patient and family/care givers will demonstrate understanding of plan of care, disease process/condition, diagnostic tests and medications  Outcome: Progressing     Problem: Pain - Standard  Goal: Alleviation of pain or a reduction in pain to the patient’s comfort goal  Outcome: Progressing     Problem: Skin Integrity  Goal: Skin integrity is maintained or improved  Outcome: Progressing     Problem: Fall Risk  Goal: Patient will remain free from falls  Outcome: Progressing     Problem: Psychosocial  Goal: Patient's level of anxiety will decrease  Outcome: Progressing     Problem: Hemodynamics  Goal: Patient's hemodynamics, fluid balance and neurologic status will be stable or improve  Outcome: Progressing     Problem: Neuro Status  Goal: Neuro status will remain stable or improve  Outcome: Progressing     Problem: Risk for Aspiration  Goal: Patient's risk for aspiration will be absent or decrease  Outcome: Progressing     Problem: Respiratory  Goal: Patient will achieve/maintain optimum respiratory ventilation and gas exchange  Outcome: Progressing       Patient is not progressing towards the following goals:      Problem: Safety - Medical Restraint  Goal: Free from restraint(s) (Restraint for Interference with Medical Device)  Outcome: Not Met

## 2024-06-11 NOTE — WOUND TEAM
Assisted Wound RN Reece with skin assessment and wound consult evaluation for pressure injury.  Additional staff necessary for turning/standing from chair, repositioning patient, assisting with dressing application and supplies and determining progress, assessment and staging of pressure injuries and/or complicated wound care.

## 2024-06-11 NOTE — PROGRESS NOTES
.4 Eyes Skin Assessment Completed by SALBADOR Gonzalez and SALBADOR Jacobs.    Head WDL  Ears Redness and Non-Blanching possible DTI right ear? Left ear R/B  Nose WDL- left nare Iris with bridle   Mouth WDL  Neck Bruising  Breast/Chest Bruising  Shoulder Blades- discoloration  Spine WDL  (R) Arm/Elbow/Hand Bruising and Discoloration forearm, elbow discoloration   (L) Arm/Elbow/Hand Bruising and Discoloration upper arm and elbow   Abdomen WDL  Groin WDL  Scrotum/Coccyx/Buttocks WDL  (R) Leg WDL  (L) Leg WDL  (R) Heel/Foot/Toe Redness, Blanching, and Boggy  (L) Heel/Foot/Toe Redness, Blanching, and Boggy          Devices In Places ECG, Blood Pressure Cuff, Pulse Ox, and Park      Interventions In Place NC W/Ear Foams, Heel Mepilex, Sacral Mepilex, Heel Float Boots, TAP System, Pillows, Low Air Loss Mattress, Heels Loaded W/Pillows, and Pressure Redistribution Mattress    Possible Skin Injury Yes    Pictures Uploaded Into Epic Yes  Wound Consult Placed Yes  RN Wound Prevention Protocol Ordered Yes

## 2024-06-11 NOTE — CARE PLAN
"The patient is Watcher - Medium risk of patient condition declining or worsening    Shift Goals  Clinical Goals: monitor neuro, SBP goal 110-140 (per Schmidhuber), wean supplemental o2, mobilize/ambulate as able, bedbath, palliative consult for goals of care  Patient Goals: caroline  Family Goals: tx to [facility] so wife can stay at bedside NOC with pt    Progress made toward(s) clinical / shift goals:    Problem: Safety - Medical Restraint  Goal: Remains free of injury from restraints (Restraint for Interference with Medical Device)  Outcome: Progressing  Addressed this shift:    Determine that other, less restrictive measures have been tried or would not be effective before applying the restraint   Evaluate the patient's condition at the time of restraint application   Inform patient/family regarding the reason for restraint   Every 2 hours: Monitor safety, psychosocial status, comfort, nutrition and hydration     Problem: Pain - Standard  Goal: Alleviation of pain or a reduction in pain to the patient’s comfort goal  Outcome: Progressing  Note: Pt appears to be restless r/t desire to have tubes removed and get out of bed, but doesn't appear to be in pain, and has intermittently responded, saying \"no\" to this RN when inquired about pain.     Problem: Skin Integrity  Goal: Skin integrity is maintained or improved  Outcome: Progressing  Note: Despite frequent stooling, pt appears to have no skin breakdown in sophia area.  Blue wipes and barrier paste utilized.     Problem: Fall Risk  Goal: Patient will remain free from falls  Outcome: Progressing  Note: Interventions in place.     Problem: Urinary - Renal Perfusion  Goal: Ability to achieve and maintain adequate renal perfusion and functioning will improve  Outcome: Progressing  Note: Pt voiding adequately.     Problem: Neuro Status  Goal: Neuro status will remain stable or improve  Outcome: Progressing  Note: Pt appears to be mentating better than report.  Able to interpret " "some words; pt intermittently states name and location (\"hospital\") upon assessment.     Problem: Risk for Aspiration  Goal: Patient's risk for aspiration will be absent or decrease  Outcome: Progressing  Note: Wife lowering HOB at start of shift; educated on importance of HOB>30* with TF and risk for aspiration. HOB>30* for remainder of shift.       Patient is not progressing towards the following goals:  Problem: Safety - Medical Restraint  Goal: Free from restraint(s) (Restraint for Interference with Medical Device)  Outcome: Not Progressing  Addressed this shift:    Assess and document the continuing need for restraints   Licensed Independent Practitioner to perform face to face examination and written order   Identify and implement measures to help patient regain control  Note: Pt continues to attempt to pull at leung and iris with every release of restraints, during ROM and turns for stooling/clean up.  Impulsive, intermittently following direction.  Wife at bedside to assist with pt's restlessness.     Problem: Psychosocial  Goal: Patient's level of anxiety will decrease  Outcome: Not Progressing  Note: Haldol appears to have no measurable effect on pt; administered per wife's request.  Resperidone dosage increased from 1mg to 2mg.  Administered as ordered per MAR.     "

## 2024-06-11 NOTE — DIETARY
"Nutrition support weekly update:  Day 13 of admit.  Jl Mueller is a 78 y.o. 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 Current TF via NGT is Glucerna 1.2, goal rate 60 ml/hr ml/hr, providing 1728 kcals, 86 grams protein, 1159 mL free water in 24 hrs.     Assessment:  Weight (24): 71.5 kg via bed scale. Most wts this admit have been 68-69 kg, initial admit wt which nutrition calculations are based on is 65.7 kg.     Nutrition calculations based on wt 69.4 kg:  Calculation/Equation: MSJ (1310 kcal) x 1.2 = 1572 kcal/day  Total Calories / day: 1572 - 1735  (Calories / k.6 - 25)  Total Grams Protein / day: 83 - 104  (Grams Protein / k.2 - 1.5)     Evaluation:   Tube feeding Glucerna 1.2 is running at goal rate 60 mL/hr per EMR.   SLP evaluation deferred by family today per EMR.   Palliative Care met w/ pt and wife today. Per note from that visit, pt's wife \"Avril feels that patient would be open to continuing with artifical nutrition/hydration should NPO status continue to be recommended.\"  Labs: glu 142, pre-alb (6/10): 17.2, CRP (6/10): 0.82. Both CRP and pre-alb showing favorable trends in past week.  MAR: fentanyl, imodium, electrolyte replacement per Pharmacy, zosyn  +2L O2 via nasal cannula  Skin: no documented staged wounds or edema. Pt seen by wound team today, noted to have intact skin per wound team note.  Last BM: , type 7. Per flowsheets review, pt w/ no BM between  and , was given multiple BM meds on  and has had multiple BMs since then. Fiber-containing formula remains appropriate to regulate GI   Current feeding remains appropriate for updated nutrition calculations    Malnutrition risk: No new risks noted    Recommendations/Plan:  Continue TF Glucerna 1.2, goal rate 60 ml/hr ml/hr, providing 1728 kcals, 86 grams protein, 1159 mL free water in 24 hrs.   Additional fluids per MD/DO.  Monitor weight trends.     RD " following.

## 2024-06-11 NOTE — PROGRESS NOTES
Report called to SALBADOR Gonzalez at 1738.  [place] [facility] [place], accompanied by wife.  Off unit at 1750.  VSS and safety measures in place at time of transfer.

## 2024-06-11 NOTE — CARE PLAN
The patient is Watcher - Medium risk of patient condition declining or worsening    Shift Goals  Clinical Goals: monitor neuro, SBP goal 110-140 (per Schmidhuber), wean supplemental o2, mobilize/ambulate as able, bedbath, palliative consult for goals of care  Patient Goals: caroline  Family Goals: tx to [facility] so wife can stay at bedside NOC with pt      Problem: Safety - Medical Restraint  Goal: Remains free of injury from restraints (Restraint for Interference with Medical Device)  Outcome: Progressing  Goal: Free from restraint(s) (Restraint for Interference with Medical Device)  Outcome: Progressing     Problem: Knowledge Deficit - Standard  Goal: Patient and family/care givers will demonstrate understanding of plan of care, disease process/condition, diagnostic tests and medications  Outcome: Progressing     Problem: Pain - Standard  Goal: Alleviation of pain or a reduction in pain to the patient’s comfort goal  Outcome: Progressing     Problem: Skin Integrity  Goal: Skin integrity is maintained or improved  Outcome: Progressing     Problem: Fall Risk  Goal: Patient will remain free from falls  Outcome: Progressing

## 2024-06-11 NOTE — CONSULTS
"MRN: [id number]  Date of palliative consult: 2024  Reason for consult: ACP/GOC  Referring provider: Dr. Jessica  Location of consult: [id number]  Additional consulting services: neurosurgery, neurology, PM&R    HPI:   Jl Mueller is a 78 y.o. male admitted 2024 from OSH where he presented with AMS and weakness. Imaging revealed left cerebellar hemorrhage and he was transferred to [facility] for higher level of care. Admitted to the neuro ICU for hypertonic saline treatment, every hour neurochecks and strict blood pressure control as well as ventilator management, the patient was provided DDAVP for platelet inhibition. Patient was liberated from the ventilator 2024. CTH (6/3/2024) stable. Patient was noted to have a UTI. Palliative care was consulted to assist with GOC discussions.     Significant/pertinent past medical history: tobacco abuse, HTN, CAD, DLD, MI, AAA repair, COPD on 2L, prior hemorrhagic CVAs, BPH, prostate artery embolization    Medication Allergy/Sensitivities:  Allergies   Allergen Reactions    Lisinopril      cough    Namenda [Memantine]      \"weakness\"    Rosuvastatin        ROS:    Review of Systems   Reason unable to perform ROS: acuity of condition.       PE:   Recent vital signs  BMI: Body mass index is 27.93 kg/m².    Temp (24hrs), Av °C (98.6 °F), Min:36.1 °C (96.9 °F), Max:37.5 °C (99.5 °F)  Temperature: 36.1 °C (96.9 °F), Monitored Temp: 37.4 °C (99.3 °F)  Pulse  Av.6  Min: 45  Max: 131   Blood Pressure : 133/65       Physical Exam  Constitutional:       Appearance: He is ill-appearing.   Neurological:      Mental Status: He is alert.   Psychiatric:         Behavior: Behavior is agitated.       Recent Labs     06/10/24  0356 06/10/24  1700 24  0045   SODIUM 143 141 139   POTASSIUM 4.3 4.1 4.0   CHLORIDE 105 104 103   CO2 29 26 25   GLUCOSE 125* 125* 142*   BUN 18 17 16   CREATININE 0.78 0.83 0.92   CALCIUM 9.1 9.3 9.3     Recent Labs     24  0405 " 06/10/24  0356 06/11/24  0045   WBC 8.8 9.6 10.7   RBC 3.85* 3.83* 4.04*   HEMOGLOBIN 10.7* 10.6* 11.2*   HEMATOCRIT 34.4* 33.9* 35.4*   MCV 89.4 88.5 87.6   MCH 27.8 27.7 27.7   MCHC 31.1* 31.3* 31.6*   RDW 46.6 45.4 45.3   PLATELETCT 198 217 240   MPV 11.4 11.7 11.6       ASSESSMENT/PLAN WITH SHARED DECISION MAKING:   Medications reviewed. Labs Reviewed.     Pertinent imaging reviewed.    PHYSICAL ASPECTS OF CARE  Palliative Performance Scale: 30%    # Cerebellar hemorrhage  # UTI  # Encephalopathy  # Acute on chronic respiratory failure with hypoxia  # History of stroke  # COPD    SOCIAL ASPECTS OF CARE  Patient has been  to his wife, Avril, for 53 years.  They moved here from [place].  They have 2 adult daughters and 4 grandchildren, who still live in [place].  Patient is a retired .  Per Avril he is pretty sedentary and enjoys watching [employer].    SPIRITUAL ASPECTS OF CARE   Patient is Shinto.  Wife is appreciative of the visit from a .  Order placed.    GOALS OF CARE/SERIOUS ILLNESS CONVERSATION  Consult received and EMR reviewed. PC APRN attended rounds, case discussed with MD, RN, and pharmacy. Updates appreciated.     Met with patient and his spouse at bedside.  Patient unable to participate in goals of care discussion at this time.  Introduced self and role of palliative care to patient's spouse, Avril.  Dr. Freeman also at bedside and provided brief medical update/hospital review.  Family demonstrates a good understanding of current clinical picture.  She is agreeable to goals of care discussion at this time.  PC APRN remained with Avril and discussed social and medical history and GOC.    Discussed overall health picture and options for future care.  Avril states that she is worried about finances as she is unsure if patient will be able to return to a level of functioning that would allow her to care for him at home.  Expressed concern for this as well and discussed that given  "patient's current mentation and impaired command following, meaningful progress with PT/OT could be a barrier. Avril states that patient would be agreeable to living in a setting other than home if it were necessary for his safety.       Discussed SLP evaluation results and concern for aspiration risk. Avril feels that patient would be open to continuing with artifical nutrition/hydration should NPO status continue to be recommended.  Discussed that artificial nutrition does not fully eliminate risk of aspiration as patient can still aspirate on his own secretions.  Avril verbalized understanding.  Discussed the risk of aspiration pneumonia and possible need for intubation in the future should this occur. Avril feels that patient would be open to this.    Explored patient's expressed values, beliefs, and preferences in order to identify GOC. Avril states that patient has always been interested only in full code, full treat. She states that this is culture and notes \"he wants to have everything done to him, that's all I can say.\" She was somewhat tearful and states that any decision other than full code, full treat would be going against her 's wishes. She states \"it is going to haunt me as long as I live.\"  PC APRN verbalized understanding and provided empathetic support.  Discussed that nobody is requesting that she goes against her 's wishes.  However, it is important that we discuss overall goals of care to ensure that healthcare we are providing is still in line with patient/family's goals.  Avril is a retired nurse and verbalized understanding, would like to maintain FULL CODE status.     Queried if pt has ever discussed healthcare wishes and values or completed an Advance Directive in the past. Patient has never completed an AD and is always resistant to discussing end of life.    Active listening, reflection, reminiscing, validation & normalization, and empathic support utilized throughout this " encounter.  All questions answered and contact information provided.     Code Status: Full    ACP Documents: None    20 minutes spent discussing advance care planning, this time excludes any other billed services.    Interval diagnostic studies and medical documentation entries pertinent to this case were reviewed independently by me. This patient has at least one acute or chronic illness or injury that poses a threat to life or bodily function. This patient suffers from a high risk of mrobidity from additional invasive diagnostic testing or intensive treatment. Discussion of recommendations and coordination of care undertaken with primary provider/treatment team.        PRABHU Colon  Palliative Care Nurse Practitioner   [phone]

## 2024-06-11 NOTE — PROGRESS NOTES
Wound pictures are not available to put into skin assessment, pictures taken, but are not uploaded to Epic at this time.

## 2024-06-11 NOTE — WOUND TEAM
[facility]  Inpatient Services  Initial Wound and Skin Care Evaluation    Admission Date: 5/29/2024     Last order of IP CONSULT TO WOUND CARE was found on 6/10/2024 from Hospital Encounter on 5/29/2024     HPI, PMH, SH: Reviewed    Past Surgical History:   Procedure Laterality Date    AAA WITH STENT GRAFT       Social History     Tobacco Use    Smoking status: Unknown    Smokeless tobacco: Not on file   Substance Use Topics    Alcohol use: Not Currently     Chief Complaint   Patient presents with    Possible Stroke     Pt transferred from [place] via Care flight for hemorrhagic CVA. LKW 2030. Pt was eating dinner, and had sudden headache and started vomiting. Pt originally arrived to [place] via EMS with N/V, GCS 11. Pt was intubated at [place]     Diagnosis: Cerebellar hemorrhage (HCC) [I61.4]    Unit where seen by Wound Team: [id number]     WOUND CONSULT RELATED TO:  Ears, Buttocks and Heels    WOUND TEAM PLAN OF CARE - Frequency of Follow-up:   Nursing to follow dressing orders written for wound care. Contact wound team if area fails to progress, deteriorates or with any questions/concerns if something comes up before next scheduled follow up (See below as to whether wound is following and frequency of wound follow up)   Not following, consult as needed  - any area    WOUND HISTORY:   Pt is a 78yr old male admitted from OSH with AMS and weakness. Imaging revealed left cerebellar hemorrhage and pt was therefore transferred to [facility] for higher level of care. Pt was initially admitted to RICU, pt now on IMCU. Wound team was consulted regarding bilateral ears. Pts spouse reports pt does wear O2 at home.        WOUND ASSESSMENT/LDA                                    Vascular:    MIRTHA:   No results found.    Lab Values:    Lab Results   Component Value Date/Time    WBC 10.7 06/11/2024 12:45 AM    RBC 4.04 (L) 06/11/2024 12:45 AM    HEMOGLOBIN 11.2 (L) 06/11/2024 12:45 AM    HEMATOCRIT  35.4 (L) 06/11/2024 12:45 AM    CREACTPROT 0.82 (H) 06/10/2024 05:00 PM    HBA1C 5.6 05/30/2024 02:00 AM         Culture Results show:  No results found for this or any previous visit (from the past 720 hour(s)).    Pain Level/Medicated:  None, Tolerated without pain medication       INTERVENTIONS BY WOUND TEAM:  Chart and images reviewed. Discussed with bedside RN. All areas of concern (based on picture review, LDA review and discussion with bedside RN) have been thoroughly assessed. Documentation of areas based on significant findings. This RN in to assess patient. Performed standard wound care which includes appropriate positioning, dressing removal and non-selective debridement. Pictures and measurements obtained weekly if/when required.    Area Assessed:  Bilateral Ears  Area intact, gray foam in use. Pt does have some redness and what appears to be scar tissue.     Area Assessed:  Bilateral Elbows  Area intact    Area Assessed: Abdomen/Pannus  Area intact,     Area Assessed: Back  Area intact,     Area Assessed:  Sacrococcygeal area  Area intact, barrier paste due to incontinence    Area Assessed:  Bilateral I.T.s  Area intact, barrier paste    Area Assessed:  BLE  Area intact,     Area Assessed:  Bilateral ankles  Area intact,     Area Assessed:  Bilateral heels  Area intact, Heel offloading dressings applied, pts spouse states he will not wear moon boots which are in room not in use.       Advanced Wound Care Discharge Planning  Number of Clinicians necessary to complete wound care: 1  Is patient requiring IV pain medications for dressing changes:  No   Length of time for dressing change 30 min. (This does not include chart review, pre-medication time, set up, clean up or time spent charting.)    Interdisciplinary consultation: Patient, Bedside RN, Monica ANDERSON (Wound RN).  Pressure injury and staging reviewed with N/A.    EVALUATION / RATIONALE FOR TREATMENT:     Date:  06/11/24  Wound Status:  Initial  evaluation    Pt with intact skin. At risk for breakdown given decreased mobility, incontinence and extended length of stay. Pt is currently on a ICU OSWALD bed, pillows were placed under left side to turn.          Goals: Steady decrease in wound area and depth weekly.    NURSING PLAN OF CARE ORDERS:  RN Prevention Protocol    NUTRITION RECOMMENDATIONS   Wound Team Recommendations:  N/A    DIET ORDERS (From admission to next 24h)       Start     Ordered    06/04/24 1405  Diet: Diet Tube Feed; Formula: Glucerna (Start at 25 ml/hr with re-introduction of fiber and advance per protocol to goal rate); Glucerna: Glucerna 1.2 RTH; Goal Rate (mL/Hour): 60; Duration: 24 HR  ALL MEALS        Question Answer Comment   Diet Diet Tube Feed    Formula: Glucerna Start at 25 ml/hr with re-introduction of fiber and advance per protocol to goal rate   Glucerna: Glucerna 1.2 RTH    Goal Rate (mL/Hour) 60    Route Enteral Tube    Duration 24 HR        06/04/24 1405    05/30/24 0028  Diet NPO Restrict to: Strict (okay to receive meds through the NG/OG tube)  ALL MEALS        Question Answer Comment   Type: Now    Diet NPO Restrict to: Strict okay to receive meds through the NG/OG tube       05/30/24 0031                    PREVENTATIVE INTERVENTIONS:    Q shift Barrera - performed per nursing policy  Q shift pressure point assessments - performed per nursing policy    Surface/Positioning  ICU Low Airloss - Currently in Place  Reposition q 2 hours - Applied this Visit  TAPs Turning system - Ordered    Offloading/Redistribution  Heel offloading dressing (Silicone dressing) - Applied this Visit  Heel float boots (Prevalon boot) - In room not in use due to pt kicking them off      Respiratory  Silicone O2 tubing - Currently in Place  Gray Foam Ear protectors - Currently in Place    Containment/Moisture Prevention    Dri-zuri pad - Currently in Place  Park Catheter - Currently in Place  Barrier wipes - Currently in Place  Barrier paste -  Currently in Place    Anticipated discharge plans:  TBD        Vac Discharge Needs:  Vac Discharge plan is purely a recommendation from wound team and not a requirement for discharge unless otherwise stated by physician.  Not Applicable Pt not on a wound vac

## 2024-06-11 NOTE — PROGRESS NOTES
"[facility] I[facility]U Progress Note      Admit Date: 5/29/2024    Resident(s): Yadira Freeman M.D.   Attending:  Dr. Tashi Alfred    Patient ID:    Name:  Jl Mueller   YOB: 1945  Age:  78 y.o.  male   MRN:  [id number]    Hospital Course (carried forward and updated):  Jl Mueller is a 78 y.o. male with with a past medical history significant for chronic hypoxemic respiratory failure (on 2 L at baseline), 2 previous hemorrhagic CVAs, AAA repair, BPH, tobacco use disorder, HTN, CAD, HLD, and prior MI admitted on 5/29/2024 for acute intraparenchymal hemorrhage involving left cerebellar hemisphere with associated mass effect    \"78 y.o. male who presented 5/29/2024 with a complex past medical history including tobacco abuse, hypertension, coronary disease, dyslipidemia, prior myocardial infarction, history of AAA repair, COPD on chronic domicile oxygen at 2 L, history of 2 prior hemorrhagic CVAs, reportedly no significant residual deficits prior to his admission, BPH, history of prostate artery embolization, presenting to a outside facility via EMS on 5/29/2024 for evaluation, the wife reports that the patient was eating dinner and then reported to have a headache, felt weak, had several episodes of emesis and became altered to a GCS of 11, the patient was brought to an outside facility, he was intubated prior to this admission to this facility, secondary to neurologic status, the patient was found to have a left cerebellar hemorrhage, mild Arsen hematoma edema with subtle parenchymal displacement, moderate compression of the fourth ventricle, therefore transferred to this facility for higher level of care, on arrival the patient was moving all extremities, withdrawing to pain, a CTA was completed and did not show clearly a aneurysm or vascular malformation, the patient was admitted to the neuro ICU for hypertonic saline treatment, every hour neurochecks and strict blood pressure control as well " "as ventilator management, the patient was provided DDAVP for platelet inhibition, there was overall no clinical change, the patient was extubated on 6/1, blood pressure controlled with multimodality medication regimen, the patient required Precedex secondary to confusion, a chest x-ray was found unremarkable, the patient was weaned off hypertonic sodium, ongoing delirium, CT follow-up was found to be stable, evaluation of the intracranial hemorrhage as expected, the patient with a abnormal urine and consistent of Enterococcus faecalis and Enterobacter cloacae,  The patient seen with the wife at the bedside, currently afebrile, heart rate in the 100s, respiration unlabored, the patient is saturating in the high 90s on 6 L nasal cannula oxygen/facemask, blood pressure in the 120s to 130s over 60s, the patient is extremely restless, he does not answer questions or follow commands, he moves all 4 extremities strongly, he is purposefully trying to get his lapbelt untangled and unbuckled,  The patient is found with a feeding tube, small bore.  Laboratory data white count 8.2, hemoglobin 10.1 hematocrit 34.1, platelet count 181, sodium 147 potassium 4.5, chloride 109, bicarb 27 glucose 160, BUN 21, creatinine 0.68,\" per Dr. Jessica 6/10 progress note    Consultants:  PM&R  Neurology  Neurosurgery  Palliative care    Interval Events:  Overnight, patient demonstrated increase in delirium.  Concern for worsening intracranial hemorrhage.  CT scan repeated and negative for overt changes.  WBC 9.6 > 10.7  H&H stable  Platelet count stable  Electrolytes within normal limits  Sodium 139, on maintenance free water flushes  Glucose 142  Ammonia 21  Afebrile, on baseline oxygen requirements maintaining an oxygen saturation of 94 to 97%.  Intermittent tachycardia with a rate as high as 113  Microbiology reviewed and blood cultures no growth to date (6/4/2024); urine culture (6/4/2024) positive for Enterobacter cloacae complex, " Enterococcus faecalis, Zosyn sensitive  In restraints    Vitals Range last 24h:  Pulse:  [] 93  Resp:  [18-35] 22  BP: (105-155)/(57-81) 113/62  SpO2:  [91 %-99 %] 97 %      Intake/Output Summary (Last 24 hours) at 6/11/2024 2007  Last data filed at 6/11/2024 1600  Gross per 24 hour   Intake 840 ml   Output 800 ml   Net 40 ml        Review of Systems   Unable to perform ROS: Mental acuity       PHYSICAL EXAM:  Vitals:    06/11/24 1200 06/11/24 1330 06/11/24 1400 06/11/24 1800   BP: 105/57 113/67 111/57 113/62   Pulse: (!) 108 (!) 109 97 93   Resp: (!) 35 (!) 34 (!) 23 (!) 22   Temp:       TempSrc:       SpO2: 95% 94% 95% 97%   Weight:       Height:        Body mass index is 27.92 kg/m².    O2 therapy: Pulse Oximetry: 97 %, O2 (LPM): 2, O2 Delivery Device: Silicone Nasal Cannula    Date 06/11/24 0700 - 06/12/24 0659   Shift 4786-4832 5488-1430 2416-0382 24 Hour Total   INTAKE   Shift Total       OUTPUT   Urine  800  800     Output (mL) (Urethral Catheter Temperature probe 16 Fr.)  800  800   Stool         Number of Times Stooled 3 x   3 x   Shift Total  800  800   NET  -800  -800        Physical Exam  Constitutional:       Appearance: Normal appearance.      Interventions: He is restrained. Nasal cannula in place.   HENT:      Nose:      Comments: Iris tube in place  Cardiovascular:      Rate and Rhythm: Normal rate and regular rhythm.   Pulmonary:      Effort: Pulmonary effort is normal. No respiratory distress.      Breath sounds: Normal breath sounds.   Skin:     General: Skin is warm and dry.   Neurological:      General: No focal deficit present.      Mental Status: He is alert. Mental status is at baseline.   Psychiatric:         Attention and Perception: He is inattentive.         Behavior: Behavior is uncooperative and agitated.             Recent Labs     06/09/24  0405 06/09/24 2026 06/10/24  0356 06/10/24  1700 06/11/24  0045   SODIUM 146*   < > 143 141 139   POTASSIUM 4.2   < > 4.3 4.1 4.0    CHLORIDE 108   < > 105 104 103   CO2 30   < > 29 26 25   BUN 19   < > 18 17 16   CREATININE 0.79   < > 0.78 0.83 0.92   MAGNESIUM 2.5  --  2.5  --  2.4   PHOSPHORUS 3.6  --  3.3  --  2.9   CALCIUM 9.3   < > 9.1 9.3 9.3    < > = values in this interval not displayed.     Recent Labs     06/09/24  0405 06/09/24  2026 06/10/24  0356 06/10/24  1700 06/11/24  0045   ALTSGPT 25  --  24  --   --    ASTSGOT 26  --  27  --   --    ALKPHOSPHAT 49  --  45  --   --    TBILIRUBIN 0.3  --  0.3  --   --    PREALBUMIN  --   --   --  17.2*  --    GLUCOSE 140*   < > 125* 125* 142*    < > = values in this interval not displayed.     Recent Labs     06/09/24  0405 06/10/24  0356 06/11/24  0045   RBC 3.85* 3.83* 4.04*   HEMOGLOBIN 10.7* 10.6* 11.2*   HEMATOCRIT 34.4* 33.9* 35.4*   PLATELETCT 198 217 240     Recent Labs     06/09/24  0405 06/10/24  0356 06/11/24  0045   WBC 8.8 9.6 10.7   NEUTSPOLYS 79.60* 78.80*  --    LYMPHOCYTES 7.20* 7.60*  --    MONOCYTES 8.10 7.10  --    EOSINOPHILS 3.20 4.70  --    BASOPHILS 0.30 0.30  --    ASTSGOT 26 27  --    ALTSGPT 25 24  --    ALKPHOSPHAT 49 45  --    TBILIRUBIN 0.3 0.3  --        Meds:   risperiDONE  2 mg      oxyCODONE immediate-release  5 mg      Or    oxyCODONE immediate-release  10 mg      acetaminophen  650 mg      Or    acetaminophen  650 mg      loperamide  2 mg      piperacillin-tazobactam  3.375 g Stopped (06/11/24 1736)    bisacodyl  10 mg      haloperidol lactate  2 mg      amLODIPine  10 mg      melatonin  5 mg      pravastatin  40 mg      fentaNYL  25-50 mcg      labetalol  10 mg      hydrALAZINE  10 mg      Pharmacy  1 Each      diazePAM  5 mg      senna-docusate  2 Tablet      And    polyethylene glycol/lytes  1 Packet      Respiratory Therapy Consult        MD Alert...Adult ICU Electrolyte Replacement per Pharmacy        ondansetron  4 mg      Or    ondansetron  4 mg      lansoprazole  30 mg      mineral oil-pet hydrophilic            Imaging:  CT-HEAD W/O   Final Result          1.  Area of cerebellar hemorrhage, decreased since prior study.   2.  Nonspecific white matter changes, commonly associated with small vessel ischemic disease.  Associated mild cerebral atrophy is noted.   3.  Atherosclerosis.         CT-HEAD W/O   Final Result      1.  Intraparenchymal hemorrhage in the cerebellar vermis and left cerebellum. It demonstrates expected evolution and decreased density.   2.  Redemonstration of vasogenic edema surrounding the hematoma in the cerebellum with mild narrowing of the fourth ventricle. There is no hydrocephalus.         DX-CHEST-LIMITED (1 VIEW)   Final Result      Mild interstitial prominence could represent vascular congestion.      Cardiomegaly.      Removal of endotracheal tube.      [id number] VIEW   Final Result      Feeding tube tip projects in the second portion of the duodenum.      Nonspecific bowel gas pattern.      CT-HEAD W/O   Final Result      1.  Advanced cerebral atrophy.   2.  Stable ventriculomegaly with intraventricular hemorrhage.   3.  Advanced supratentorial white matter disease most consistent with microvascular ischemic change.   4.  Multiple old lacunar infarcts as described.   5.  Acute/recent subacute parenchymal hemorrhage in the posterior fossa involving the superior cerebellar vermis and left paramedian cerebellar hemisphere and left lateral cerebellar peduncle. No evidence of recurrent hemorrhage.   6.  Minimal focus of subarachnoid hemorrhage within a single sulcus in the left posterior temporal region. Probably unchanged from prior recent exam.         DX-ABDOMEN FOR TUBE PLACEMENT   Final Result      Enteric tube tip projects over the stomach.      EC-ECHOCARDIOGRAM COMPLETE W/ CONT   Final Result      DX-CHEST-PORTABLE (1 VIEW)   Final Result         1.  Left basilar atelectasis or subtle infiltrate, decreased since prior study   2.  Trace left pleural effusion   3.  Enlargement of the aortic knob suggesting thoracic aortic  aneurysm   4.  Atherosclerosis      MR-BRAIN-WITH & W/O   Final Result      1.  Stable fossa of parenchymal hemorrhage centered in the LEFT cerebellum and extending into the RIGHT cerebellum exerting mass effect on the fourth ventricle which is not completely effaced   2.  Intraventricular blood redemonstrated   3.  Numerous areas of remote microhemorrhage in the supra and infratentorial brain. These could be related to amyloid angiopathy, numerous hypertensive microhemorrhages or less likely multiple cavernomas   4.  Moderate diffuse atrophy without compelling evidence of hydrocephalus   5.  Advanced white matter changes   6.  4 mm LEFT temporal meningioma   7.  Enhancing nodule in the RIGHT internal auditory canal suspicious for a vestibular schwannoma, less likely other extra-axial mass such as a meningioma, facial nerve schwannoma or metastasis. Consider posterior fossa protocol brain MRI without and with    contrast to further evaluate in when clinically appropriate.      MR-MRA HEAD-W/O   Final Result         MRA OF THE [place] WITHIN NORMAL LIMITS.      DX-CHEST-PORTABLE (1 VIEW)   Final Result         1.  Hazy left lower lobe infiltrate   2.  Small left pleural effusion   3.  Enlargement of the aortic knob, appearance suggesting aortic aneurysm.      DX-CHEST-FOR LINE PLACEMENT Perform procedure in: Patient's Room   Final Result      1.  PICC line is in place with the tip projecting over the SVC in satisfactory position.   2.  Mildly enlarged cardiac silhouette with vascular congestion.   3.  Retrocardiac opacity is likely due to atelectasis.         IR-PICC LINE PLACEMENT W/ GUIDANCE > AGE 5   Final Result                  Ultrasound-guided PICC placement performed by qualified nursing staff as    above.          CT-HEAD W/O   Final Result         1.  Left cerebellar hemorrhage, similar compared to prior study.   2.  Minimal bilateral intraventricular hemorrhages, new since prior study.   3.   Nonspecific white matter changes, commonly associated with small vessel ischemic disease.  Associated mild cerebral atrophy is noted.   4.  Atherosclerosis.         DX-CHEST-PORTABLE (1 VIEW)   Final Result         1.  Left basilar atelectasis, no focal infiltrate   2.  Trace left pleural effusion   3.  Atherosclerosis      CT-CTA NECK WITH & W/O-POST PROCESSING   Final Result         1.  Scattered atherosclerosis without significant stenosis or occlusion.   2.  4.2 cm proximal descending thoracic aortic aneurysm, radiographic follow-up and surveillance recommended as clinically appropriate.         CT-CTA HEAD WITH & W/O-POST PROCESS   Final Result         1.  No large vessel occlusion or aneurysm identified   2.  Large cerebellar hemorrhage predominantly in the left cerebellum      These findings were discussed with the patient's clinician, Nikki Alexander, on 5/29/2024 11:35 PM.      DX-CHEST-PORTABLE (1 VIEW)   Final Result      Tubes as above      Left pleural effusion      CHF/pulmonary edema pattern      See Brown MD   5/30/2024 8:41 AM         CT-HEAD W/O   Final Result      Acute intraparenchymal hemorrhage is noted involving the left cerebellar hemisphere, measuring 3.1 x 4.2 x 3.4 cm, with associated mass effect.      This finding was telephoned to the mentioned attending by on-call radiologist at the time of imaging         AAST Intracranial Hemorrhage Brain Injury Guidelines         Hemorrhage type  mBIG 1           mBIG 2                mBIG 3      Subdural             <4mm               5-7.9 mm             >8mm      Epidural                No                    No                  Yes      Intraparenc'mal   <4mm               5-7.9 mm         >8mm or multi   1 location       or 2 locations      >3 locations      Subarachnoid     <3 sulci       single hemisphere   bi-hemisphere   and <1 mm        or 1-3 mm            or > 3 mm      Intraventricular       No                  No                     Yes      Skull Fracture       None            Non-displaced       Displaced               DLP Reporting Thresholds for Incorrect/Repeated Exams - DLP in mGy*cm   Head/Neck:  0-year-old 3840, 1-year-old 5880, 5-year-old 8770, 10-year-old 77749 and adult 88112   Head:  0-year-old 4540, 1-year-old 7460, 5-year-old 93093, 10-year-old 88935 and adult 52134   Neck:  0-year-old 2940, 1-year-old 4160, 5-year-old 4550, 10-year-old 6320 and adult 8470   Chest:  0-year-old 550, 1-year-old 830, 5-year-old 1200, 10-year-old 3840 and adult 3570   Abd/pelvis:  0-year-old 440, 1-year-old 720, 5-year-old 1080, 10-year-old 3330 and adult 3330   Trunk(C/A/P):  0-year-old 490, 1-year-old 770, 5-year-old 1140, 10-year-old 3570 and adult 3330      OUTSIDE IMAGES-CT CERVICAL SPINE    (Results Pending)       ASSESSEMENT and PLAN:  Jl Mueller is a 78 y.o. male with with a past medical history significant for chronic hypoxemic respiratory failure (on 2 L at baseline), 2 previous hemorrhagic CVAs, AAA repair, BPH, tobacco use disorder, HTN, CAD, HLD, and prior MI admitted on 5/29/2024 for acute intraparenchymal hemorrhage involving left cerebellar hemisphere with associated mass effect    * Cerebellar hemorrhage (HCC)- (present on admission)  Assessment & Plan  Neurosurgery consulted and recommended no surgical intervention at this time  PT and OT consulted and recommending postacute placement, patient not a candidate for acute rehab  SLP following.  Patient n.p.o. at this time with Iris feeding tube in place.  We appreciate additional SLP recommendations  S/p 3% hypertonic saline, on maintenance free water flush now with daily BMPs  No AC/DVT PPX at this time secondary to hemorrhagic CVA  Maintain systolic blood pressures between 110-160  Keep euvolemic, euthermic, and normoglycemic  Continue pravastatin      UTI (urinary tract infection)  Assessment & Plan  UTI possibly contributing to altered mental state  6/4 microbiology  reviewed  Urinary pathogens susceptible to Zosyn, current end date scheduled for 6/15/2024    Encephalopathy acute- (present on admission)  Assessment & Plan  Concern for infectious etiology with UTI on Zosyn, concern for delirium as patient is on hospital day 13  Please ensure pain is well-controlled  Please avoid daytime naps, allow family to be at bedside to help reorient patient  Please avoid sedating agents, reorient as clinically appropriate  Ensure the patient remains in her room with the window for daytime/nighttime orientation      COPD (chronic obstructive pulmonary disease) (HCC)- (present on admission)  Assessment & Plan  Patient has a known history of chronic hypoxic respiratory failure secondary to COPD  On baseline oxygen of 2 L 24 hours today per wife  Currently on baseline oxygen requirements, not concerned for COPD exacerbation  RT consultation    BPH with urinary obstruction- (present on admission)  Assessment & Plan  Park in place  Continue tamsulosin    Hyperchloremic metabolic acidosis  Assessment & Plan  Resolved    Hyperglycemia- (present on admission)  Assessment & Plan  No indication for correctional insulin at this time  Monitor    Hyperlipidemia- (present on admission)  Assessment & Plan  Continue home pravastatin    History of stroke- (present on admission)  Assessment & Plan  The patient has a known history of 2 previous hemorrhagic CVAs  Continue pravastatin        DISPO: IMCU for q2h neuro checks    CODE STATUS: Full Code    aYdira Freeman M.D.

## 2024-06-11 NOTE — PROGRESS NOTES
12 hour chart check complete.    Monitor summary  Rhythm 80s-110s SR ST with one run of 7 beats of VTACH overnight  Ectopy: VTACH, rare

## 2024-06-11 NOTE — ASSESSMENT & PLAN NOTE
UTI possibly contributing to altered mental state  6/4 microbiology reviewed  Urinary pathogens susceptible to Zosyn, current end date scheduled for 6/15/2024

## 2024-06-11 NOTE — PROGRESS NOTES
4 Eyes Skin Assessment Completed by SALBADOR Tracey and SALBADOR Gonzalez.    Head WDL  Ears discoloration possible DTI     Nose Left nare iris, Nasal canula, skin intact  Mouth WDL  Neck WDL  Breast/Chest Redness and Bruising    Shoulder Blades Redness and Blanching discoloration    Spine WDL  (R) Arm/Elbow/Hand Redness, Bruising, Abrasion, and Discoloration    (L) Arm/Elbow/Hand Bruising and Discoloration    Abdomen WDL  Groin WDL    Scrotum/Coccyx/Buttocks Redness and Blanching    (R) Leg WDL  (L) Leg WDL  (R) Heel/Foot/Toe Redness and Blanching    (L) Heel/Foot/Toe Redness and Blanching          Devices In Places ECG, Blood Pressure Cuff, Pulse Ox, Park, SCD's, and Nasal Cannula and Iris      Interventions In Place Gray Ear Foams, Heel Mepilex, Sacral Mepilex, Heel Float Boots, TAP System, Elbow Mepilex, Q2 Turns, Low Air Loss Mattress, and Pressure Redistribution Mattress    Possible Skin Injury Yes    Pictures Uploaded Into Epic Yes  Wound Consult Placed Yes  RN Wound Prevention Protocol Ordered Yes

## 2024-06-11 NOTE — PROGRESS NOTES
4 Eyes Skin Assessment Completed by Carlos Zabala RN and SALBADOR Wooten.    Head WDL  Ears Redness and Non-Blanching  Nose Left nare IRIS  Mouth WDL  Neck WDL  Breast/Chest Redness and Bruising  Shoulder Blades Redness and Blanching  Spine WDL  (R) Arm/Elbow/Hand Redness, Bruising, Abrasion, and Discoloration  (L) Arm/Elbow/Hand Bruising and Discoloration  Abdomen WDL  Groin WDL  Scrotum/Coccyx/Buttocks Redness and Blanching  (R) Leg WDL  (L) Leg WDL  (R) Heel/Foot/Toe Redness and Blanching  (L) Heel/Foot/Toe Redness and Blanching          Devices In Places ECG, Blood Pressure Cuff, Pulse Ox, Park, and Nasal Cannula      Interventions In Place NC W/Ear Foams, Heel Mepilex, TAP System, Pillows, Low Air Loss Mattress, Barrier Cream, and Pressure Redistribution Mattress    Possible Skin Injury Yes    Pictures Uploaded Into Epic N/A  Wound Consult Placed Yes  RN Wound Prevention Protocol Ordered Yes

## 2024-06-12 ENCOUNTER — APPOINTMENT (OUTPATIENT)
Dept: CARDIOLOGY | Facility: MEDICAL CENTER | Age: 79
End: 2024-06-12
Attending: STUDENT IN AN ORGANIZED HEALTH CARE EDUCATION/TRAINING PROGRAM
Payer: MEDICARE

## 2024-06-12 ENCOUNTER — APPOINTMENT (OUTPATIENT)
Dept: RADIOLOGY | Facility: MEDICAL CENTER | Age: 79
End: 2024-06-12
Attending: STUDENT IN AN ORGANIZED HEALTH CARE EDUCATION/TRAINING PROGRAM
Payer: MEDICARE

## 2024-06-12 PROBLEM — I47.9 PAROXYSMAL TACHYCARDIA (HCC): Status: ACTIVE | Noted: 2024-06-12

## 2024-06-12 LAB
ALBUMIN SERPL BCP-MCNC: 3.7 G/DL (ref 3.2–4.9)
ALBUMIN/GLOB SERPL: 1.2 G/DL
ALP SERPL-CCNC: 51 U/L (ref 30–99)
ALT SERPL-CCNC: 52 U/L (ref 2–50)
ANION GAP SERPL CALC-SCNC: 12 MMOL/L (ref 7–16)
ANION GAP SERPL CALC-SCNC: 13 MMOL/L (ref 7–16)
AST SERPL-CCNC: 41 U/L (ref 12–45)
BILIRUB SERPL-MCNC: 0.3 MG/DL (ref 0.1–1.5)
BUN SERPL-MCNC: 17 MG/DL (ref 8–22)
BUN SERPL-MCNC: 19 MG/DL (ref 8–22)
CALCIUM ALBUM COR SERPL-MCNC: 9.4 MG/DL (ref 8.5–10.5)
CALCIUM SERPL-MCNC: 9.2 MG/DL (ref 8.5–10.5)
CALCIUM SERPL-MCNC: 9.4 MG/DL (ref 8.5–10.5)
CHLORIDE SERPL-SCNC: 102 MMOL/L (ref 96–112)
CHLORIDE SERPL-SCNC: 104 MMOL/L (ref 96–112)
CO2 SERPL-SCNC: 23 MMOL/L (ref 20–33)
CO2 SERPL-SCNC: 24 MMOL/L (ref 20–33)
CREAT SERPL-MCNC: 0.75 MG/DL (ref 0.5–1.4)
CREAT SERPL-MCNC: 0.96 MG/DL (ref 0.5–1.4)
EKG IMPRESSION: NORMAL
GFR SERPLBLD CREATININE-BSD FMLA CKD-EPI: 81 ML/MIN/1.73 M 2
GFR SERPLBLD CREATININE-BSD FMLA CKD-EPI: 92 ML/MIN/1.73 M 2
GLOBULIN SER CALC-MCNC: 3 G/DL (ref 1.9–3.5)
GLUCOSE SERPL-MCNC: 149 MG/DL (ref 65–99)
GLUCOSE SERPL-MCNC: 155 MG/DL (ref 65–99)
LV EJECT FRACT  99904: 40
LV EJECT FRACT MOD 2C 99903: 37.55
LV EJECT FRACT MOD 4C 99902: 56.36
LV EJECT FRACT MOD BP 99901: 46.51
MAGNESIUM SERPL-MCNC: 2.3 MG/DL (ref 1.5–2.5)
PHOSPHATE SERPL-MCNC: 3.1 MG/DL (ref 2.5–4.5)
POTASSIUM SERPL-SCNC: 4.1 MMOL/L (ref 3.6–5.5)
POTASSIUM SERPL-SCNC: 4.1 MMOL/L (ref 3.6–5.5)
PROT SERPL-MCNC: 6.7 G/DL (ref 6–8.2)
SODIUM SERPL-SCNC: 139 MMOL/L (ref 135–145)
SODIUM SERPL-SCNC: 139 MMOL/L (ref 135–145)
TSH SERPL DL<=0.005 MIU/L-ACNC: 2.93 UIU/ML (ref 0.38–5.33)

## 2024-06-12 PROCEDURE — 700102 HCHG RX REV CODE 250 W/ 637 OVERRIDE(OP): Performed by: STUDENT IN AN ORGANIZED HEALTH CARE EDUCATION/TRAINING PROGRAM

## 2024-06-12 PROCEDURE — 93005 ELECTROCARDIOGRAM TRACING: CPT | Performed by: STUDENT IN AN ORGANIZED HEALTH CARE EDUCATION/TRAINING PROGRAM

## 2024-06-12 PROCEDURE — 700105 HCHG RX REV CODE 258: Performed by: INTERNAL MEDICINE

## 2024-06-12 PROCEDURE — 700102 HCHG RX REV CODE 250 W/ 637 OVERRIDE(OP): Performed by: HOSPITALIST

## 2024-06-12 PROCEDURE — 71045 X-RAY EXAM CHEST 1 VIEW: CPT

## 2024-06-12 PROCEDURE — 92526 ORAL FUNCTION THERAPY: CPT

## 2024-06-12 PROCEDURE — 700102 HCHG RX REV CODE 250 W/ 637 OVERRIDE(OP): Performed by: INTERNAL MEDICINE

## 2024-06-12 PROCEDURE — A9270 NON-COVERED ITEM OR SERVICE: HCPCS | Performed by: INTERNAL MEDICINE

## 2024-06-12 PROCEDURE — 770000 HCHG ROOM/CARE - INTERMEDIATE ICU *

## 2024-06-12 PROCEDURE — A9270 NON-COVERED ITEM OR SERVICE: HCPCS | Performed by: STUDENT IN AN ORGANIZED HEALTH CARE EDUCATION/TRAINING PROGRAM

## 2024-06-12 PROCEDURE — 700111 HCHG RX REV CODE 636 W/ 250 OVERRIDE (IP): Mod: JZ | Performed by: INTERNAL MEDICINE

## 2024-06-12 PROCEDURE — 99233 SBSQ HOSP IP/OBS HIGH 50: CPT | Performed by: HOSPITALIST

## 2024-06-12 PROCEDURE — A9270 NON-COVERED ITEM OR SERVICE: HCPCS | Performed by: PHYSICAL MEDICINE & REHABILITATION

## 2024-06-12 PROCEDURE — 80053 COMPREHEN METABOLIC PANEL: CPT

## 2024-06-12 PROCEDURE — 93010 ELECTROCARDIOGRAM REPORT: CPT | Performed by: INTERNAL MEDICINE

## 2024-06-12 PROCEDURE — 93306 TTE W/DOPPLER COMPLETE: CPT | Mod: 26 | Performed by: INTERNAL MEDICINE

## 2024-06-12 PROCEDURE — 84100 ASSAY OF PHOSPHORUS: CPT

## 2024-06-12 PROCEDURE — 700102 HCHG RX REV CODE 250 W/ 637 OVERRIDE(OP): Performed by: PHYSICAL MEDICINE & REHABILITATION

## 2024-06-12 PROCEDURE — 700117 HCHG RX CONTRAST REV CODE 255: Performed by: STUDENT IN AN ORGANIZED HEALTH CARE EDUCATION/TRAINING PROGRAM

## 2024-06-12 PROCEDURE — 700111 HCHG RX REV CODE 636 W/ 250 OVERRIDE (IP): Mod: JZ | Performed by: HOSPITALIST

## 2024-06-12 PROCEDURE — 700111 HCHG RX REV CODE 636 W/ 250 OVERRIDE (IP): Mod: JZ | Performed by: NURSE PRACTITIONER

## 2024-06-12 PROCEDURE — A9270 NON-COVERED ITEM OR SERVICE: HCPCS | Performed by: HOSPITALIST

## 2024-06-12 PROCEDURE — 84443 ASSAY THYROID STIM HORMONE: CPT

## 2024-06-12 PROCEDURE — 80048 BASIC METABOLIC PNL TOTAL CA: CPT

## 2024-06-12 PROCEDURE — 93306 TTE W/DOPPLER COMPLETE: CPT

## 2024-06-12 PROCEDURE — 83735 ASSAY OF MAGNESIUM: CPT

## 2024-06-12 RX ORDER — OXYCODONE HYDROCHLORIDE 5 MG/1
5 TABLET ORAL EVERY 4 HOURS PRN
Status: DISCONTINUED | OUTPATIENT
Start: 2024-06-12 | End: 2024-06-26 | Stop reason: HOSPADM

## 2024-06-12 RX ORDER — OXYCODONE HYDROCHLORIDE 10 MG/1
10 TABLET ORAL EVERY 4 HOURS PRN
Status: DISCONTINUED | OUTPATIENT
Start: 2024-06-12 | End: 2024-06-23

## 2024-06-12 RX ADMIN — PIPERACILLIN AND TAZOBACTAM 3.38 G: 3; .375 INJECTION, POWDER, FOR SOLUTION INTRAVENOUS at 04:27

## 2024-06-12 RX ADMIN — OXYCODONE HYDROCHLORIDE 10 MG: 10 TABLET ORAL at 04:37

## 2024-06-12 RX ADMIN — RISPERIDONE 2 MG: 1 TABLET, FILM COATED ORAL at 05:44

## 2024-06-12 RX ADMIN — LANSOPRAZOLE 30 MG: 30 TABLET, ORALLY DISINTEGRATING ORAL at 05:40

## 2024-06-12 RX ADMIN — PIPERACILLIN AND TAZOBACTAM 3.38 G: 3; .375 INJECTION, POWDER, FOR SOLUTION INTRAVENOUS at 12:38

## 2024-06-12 RX ADMIN — Medication 5 MG: at 20:15

## 2024-06-12 RX ADMIN — HALOPERIDOL LACTATE 2 MG: 5 INJECTION, SOLUTION INTRAMUSCULAR at 04:23

## 2024-06-12 RX ADMIN — FENTANYL CITRATE 50 MCG: 50 INJECTION, SOLUTION INTRAMUSCULAR; INTRAVENOUS at 21:52

## 2024-06-12 RX ADMIN — HUMAN ALBUMIN MICROSPHERES AND PERFLUTREN 3 ML: 10; .22 INJECTION, SOLUTION INTRAVENOUS at 11:47

## 2024-06-12 RX ADMIN — Medication: at 05:40

## 2024-06-12 RX ADMIN — OXYCODONE HYDROCHLORIDE 10 MG: 10 TABLET ORAL at 23:49

## 2024-06-12 RX ADMIN — OXYCODONE HYDROCHLORIDE 10 MG: 10 TABLET ORAL at 19:43

## 2024-06-12 RX ADMIN — HALOPERIDOL LACTATE 2 MG: 5 INJECTION, SOLUTION INTRAMUSCULAR at 21:37

## 2024-06-12 RX ADMIN — Medication: at 17:11

## 2024-06-12 RX ADMIN — AMLODIPINE BESYLATE 10 MG: 10 TABLET ORAL at 05:41

## 2024-06-12 RX ADMIN — PIPERACILLIN AND TAZOBACTAM 3.38 G: 3; .375 INJECTION, POWDER, FOR SOLUTION INTRAVENOUS at 20:20

## 2024-06-12 RX ADMIN — PRAVASTATIN SODIUM 40 MG: 20 TABLET ORAL at 17:11

## 2024-06-12 RX ADMIN — RISPERIDONE 2 MG: 1 TABLET, FILM COATED ORAL at 17:11

## 2024-06-12 RX ADMIN — LOPERAMIDE HYDROCHLORIDE 2 MG: 2 CAPSULE ORAL at 19:43

## 2024-06-12 ASSESSMENT — PAIN DESCRIPTION - PAIN TYPE
TYPE: ACUTE PAIN

## 2024-06-12 ASSESSMENT — FIBROSIS 4 INDEX: FIB4 SCORE: 1.79

## 2024-06-12 NOTE — PROGRESS NOTES
12 hour chart check complete.    Monitor summary  Rhythm SR 80s to ST 110s bpm  Ectopy: Rare

## 2024-06-12 NOTE — ASSESSMENT & PLAN NOTE
Patient has a known history of chronic hypoxic respiratory failure secondary to COPD  On baseline oxygen of 2 L 24 hours today per wife  Currently on baseline oxygen requirements, not concerned for COPD exacerbation  RT consultation

## 2024-06-12 NOTE — ASSESSMENT & PLAN NOTE
Concern for infectious etiology with UTI on Zosyn, concern for delirium as patient is on hospital day 13  Please ensure pain is well-controlled  Please avoid daytime naps, allow family to be at bedside to help reorient patient  Please avoid sedating agents, reorient as clinically appropriate  Ensure the patient remains in her room with the window for daytime/nighttime orientation

## 2024-06-12 NOTE — DISCHARGE PLANNING
Case Management Discharge Planning    Admission Date: 5/29/2024  GMLOS: 4.6  ALOS: 14    6-Clicks ADL Score: 14  6-Clicks Mobility Score: 11  PT and/or OT Eval ordered: Yes  Post-acute Referrals Ordered: Yes  Post-acute Choice Obtained: NA  Has referral(s) been sent to post-acute provider:  Yes    Anticipated Discharge Dispo: Discharge Disposition: D/T to SNF with Medicare cert in anticipation of skilled care (03)    DME Needed: No    Action(s) Taken:     Pt discussed in IDT rounds. Pt is confused, on 1 L NC, has NG (on tube feeds), on restraints, and has leung.     PT/OT/SLP-recommending post-acute. Pending SNF acceptance.   Per previous CM note, attempting to contact [facility] for acceptance. No accepting local SNFs currently. RN CM to follow-up on referrals and see clinical progression of patient.     Note: Leadership reached out to RN CM. Per leadership, if pt is accepted to local SNFs but the pt/family still wants a SNF in [place], the family will have to cover the costs of transport.   If there are no local accepting SNFs and the only accepting is in [place] then the costs for transport will be covered.     [facility]# [id number]    Escalations Completed: Leadership aware     Medically Clear: No    Next Steps: Placement, SNF acceptance, awaiting     Barriers to Discharge: Medical clearance and Pending Placement, restraints/orientation    Is the patient up for discharge tomorrow: No

## 2024-06-12 NOTE — PROGRESS NOTES
Hospital Medicine Daily Progress Note    Date of Service  6/12/2024    Chief Complaint  Jl Mueller is a 78 y.o. male admitted 5/29/2024 with altered mental status    Hospital Course  78-year-old with a history of BPH, previous hemorrhagic strokes, tobacco use, hypertension, coronary artery disease, dyslipidemia, chronic hypoxic respiratory failure dependent on 2 L of home oxygen therapy. He was admitted on May 29 due to neurologic changes, and was found to have a left cerebellar hemorrhage. CTA was negative for any vascular malformations. Patient did require hypertonic saline and was intubated for airway protection. He was successfully extubated on June 1 and weaned off of hypertonic saline. Hospital stay has been complicated by UTI which has grown back Enterococcus faecalis, and Enterobacter cloaca     Interval Problem Update  ROS limited by pt's mental status  Alert and attends to examiner but does not follow commands.  Is purposeful x 4    AFebrile  Sinus 100s, brief episodes of AFIb RVR to 130s  1 LNC  -140  UOP 2100ml/24hrs    I have discussed this patient's plan of care and discharge plan at IDT rounds today with Case Management, Nursing, Nursing leadership, and other members of the IDT team.    Consultants/Specialty  neurology and palliative care    Code Status  Full Code    Disposition  The patient is not medically cleared for discharge to home or a post-acute facility.      I have placed the appropriate orders for post-discharge needs.    Review of Systems  Review of Systems   Unable to perform ROS: Mental status change        Physical Exam  Temp:  [37.3 °C (99.1 °F)-37.4 °C (99.3 °F)] 37.3 °C (99.1 °F)  Pulse:  [] 107  Resp:  [20-32] 32  BP: (102-142)/(56-76) 142/71  SpO2:  [91 %-97 %] 94 %    Physical Exam  Constitutional:       General: He is not in acute distress.     Appearance: He is well-developed. He is not diaphoretic.   HENT:      Head: Normocephalic and atraumatic.   Eyes:       Conjunctiva/sclera: Conjunctivae normal.   Neck:      Vascular: No JVD.   Cardiovascular:      Rate and Rhythm: Normal rate.      Heart sounds: No murmur heard.     No gallop.   Pulmonary:      Effort: Pulmonary effort is normal. No respiratory distress.      Breath sounds: No stridor. No wheezing or rales.   Abdominal:      Palpations: Abdomen is soft.      Tenderness: There is no abdominal tenderness. There is no guarding or rebound.   Musculoskeletal:      Right lower leg: No edema.      Left lower leg: No edema.   Skin:     General: Skin is warm and dry.      Findings: No rash.   Neurological:      Comments: Alert and attends to examiner  Face symmetric  No verbalizations on my exam  Will not follow visual or verbal commands  Is purposeful x 4 ext         Fluids    Intake/Output Summary (Last 24 hours) at 6/12/2024 1453  Last data filed at 6/12/2024 0553  Gross per 24 hour   Intake 660 ml   Output 2100 ml   Net -1440 ml       Laboratory  Recent Labs     06/10/24  0356 06/11/24  0045   WBC 9.6 10.7   RBC 3.83* 4.04*   HEMOGLOBIN 10.6* 11.2*   HEMATOCRIT 33.9* 35.4*   MCV 88.5 87.6   MCH 27.7 27.7   MCHC 31.3* 31.6*   RDW 45.4 45.3   PLATELETCT 217 240   MPV 11.7 11.6     Recent Labs     06/11/24  0045 06/12/24  0004 06/12/24  0914   SODIUM 139 139 139   POTASSIUM 4.0 4.1 4.1   CHLORIDE 103 104 102   CO2 25 23 24   GLUCOSE 142* 149* 155*   BUN 16 17 19   CREATININE 0.92 0.75 0.96   CALCIUM 9.3 9.4 9.2                   Imaging  EC-ECHOCARDIOGRAM COMPLETE W/ CONT         DX-CHEST-PORTABLE (1 VIEW)   Final Result      1.  Small left pleural effusion and associated atelectasis.   2.  Mild bilateral interstitial opacities, likely mild interstitial edema.         CT-HEAD W/O   Final Result         1.  Area of cerebellar hemorrhage, decreased since prior study.   2.  Nonspecific white matter changes, commonly associated with small vessel ischemic disease.  Associated mild cerebral atrophy is noted.   3.   Atherosclerosis.         CT-HEAD W/O   Final Result      1.  Intraparenchymal hemorrhage in the cerebellar vermis and left cerebellum. It demonstrates expected evolution and decreased density.   2.  Redemonstration of vasogenic edema surrounding the hematoma in the cerebellum with mild narrowing of the fourth ventricle. There is no hydrocephalus.         DX-CHEST-LIMITED (1 VIEW)   Final Result      Mild interstitial prominence could represent vascular congestion.      Cardiomegaly.      Removal of endotracheal tube.      WY-[id number]-4 VIEW   Final Result      Feeding tube tip projects in the second portion of the duodenum.      Nonspecific bowel gas pattern.      CT-HEAD W/O   Final Result      1.  Advanced cerebral atrophy.   2.  Stable ventriculomegaly with intraventricular hemorrhage.   3.  Advanced supratentorial white matter disease most consistent with microvascular ischemic change.   4.  Multiple old lacunar infarcts as described.   5.  Acute/recent subacute parenchymal hemorrhage in the posterior fossa involving the superior cerebellar vermis and left paramedian cerebellar hemisphere and left lateral cerebellar peduncle. No evidence of recurrent hemorrhage.   6.  Minimal focus of subarachnoid hemorrhage within a single sulcus in the left posterior temporal region. Probably unchanged from prior recent exam.         DX-ABDOMEN FOR TUBE PLACEMENT   Final Result      Enteric tube tip projects over the stomach.      EC-ECHOCARDIOGRAM COMPLETE W/ CONT   Final Result      DX-CHEST-PORTABLE (1 VIEW)   Final Result         1.  Left basilar atelectasis or subtle infiltrate, decreased since prior study   2.  Trace left pleural effusion   3.  Enlargement of the aortic knob suggesting thoracic aortic aneurysm   4.  Atherosclerosis      MR-BRAIN-WITH & W/O   Final Result      1.  Stable fossa of parenchymal hemorrhage centered in the LEFT cerebellum and extending into the RIGHT cerebellum exerting mass effect on the  fourth ventricle which is not completely effaced   2.  Intraventricular blood redemonstrated   3.  Numerous areas of remote microhemorrhage in the supra and infratentorial brain. These could be related to amyloid angiopathy, numerous hypertensive microhemorrhages or less likely multiple cavernomas   4.  Moderate diffuse atrophy without compelling evidence of hydrocephalus   5.  Advanced white matter changes   6.  4 mm LEFT temporal meningioma   7.  Enhancing nodule in the RIGHT internal auditory canal suspicious for a vestibular schwannoma, less likely other extra-axial mass such as a meningioma, facial nerve schwannoma or metastasis. Consider posterior fossa protocol brain MRI without and with    contrast to further evaluate in when clinically appropriate.      MR-MRA HEAD-W/O   Final Result         MRA OF THE [place] WITHIN NORMAL LIMITS.      DX-CHEST-PORTABLE (1 VIEW)   Final Result         1.  Hazy left lower lobe infiltrate   2.  Small left pleural effusion   3.  Enlargement of the aortic knob, appearance suggesting aortic aneurysm.      DX-CHEST-FOR LINE PLACEMENT Perform procedure in: Patient's Room   Final Result      1.  PICC line is in place with the tip projecting over the SVC in satisfactory position.   2.  Mildly enlarged cardiac silhouette with vascular congestion.   3.  Retrocardiac opacity is likely due to atelectasis.         IR-PICC LINE PLACEMENT W/ GUIDANCE > AGE 5   Final Result                  Ultrasound-guided PICC placement performed by qualified nursing staff as    above.          CT-HEAD W/O   Final Result         1.  Left cerebellar hemorrhage, similar compared to prior study.   2.  Minimal bilateral intraventricular hemorrhages, new since prior study.   3.  Nonspecific white matter changes, commonly associated with small vessel ischemic disease.  Associated mild cerebral atrophy is noted.   4.  Atherosclerosis.         DX-CHEST-PORTABLE (1 VIEW)   Final Result         1.  Left  basilar atelectasis, no focal infiltrate   2.  Trace left pleural effusion   3.  Atherosclerosis      CT-CTA NECK WITH & W/O-POST PROCESSING   Final Result         1.  Scattered atherosclerosis without significant stenosis or occlusion.   2.  4.2 cm proximal descending thoracic aortic aneurysm, radiographic follow-up and surveillance recommended as clinically appropriate.         CT-CTA HEAD WITH & W/O-POST PROCESS   Final Result         1.  No large vessel occlusion or aneurysm identified   2.  Large cerebellar hemorrhage predominantly in the left cerebellum      These findings were discussed with the patient's clinician, Nikki Alexander, on 5/29/2024 11:35 PM.      DX-CHEST-PORTABLE (1 VIEW)   Final Result      Tubes as above      Left pleural effusion      CHF/pulmonary edema pattern      See Brown MD   5/30/2024 8:41 AM         CT-HEAD W/O   Final Result      Acute intraparenchymal hemorrhage is noted involving the left cerebellar hemisphere, measuring 3.1 x 4.2 x 3.4 cm, with associated mass effect.      This finding was telephoned to the mentioned attending by on-call radiologist at the time of imaging         AAST Intracranial Hemorrhage Brain Injury Guidelines         Hemorrhage type  mBIG 1           mBIG 2                mBIG 3      Subdural             <4mm               5-7.9 mm             >8mm      Epidural                No                    No                  Yes      Intraparenc'mal   <4mm               5-7.9 mm         >8mm or multi   1 location       or 2 locations      >3 locations      Subarachnoid     <3 sulci       single hemisphere   bi-hemisphere   and <1 mm        or 1-3 mm            or > 3 mm      Intraventricular       No                  No                    Yes      Skull Fracture       None            Non-displaced       Displaced               DLP Reporting Thresholds for Incorrect/Repeated Exams - DLP in mGy*cm   Head/Neck:  0-year-old 3840, 1-year-old 5880, 5-year-old  8770, 10-year-old 72219 and adult 04971   Head:  0-year-old 4540, 1-year-old 7460, 5-year-old 58459, 10-year-old 57824 and adult 97496   Neck:  0-year-old 2940, 1-year-old 4160, 5-year-old 4550, 10-year-old 6320 and adult 8470   Chest:  0-year-old 550, 1-year-old 830, 5-year-old 1200, 10-year-old 3840 and adult 3570   Abd/pelvis:  0-year-old 440, 1-year-old 720, 5-year-old 1080, 10-year-old 3330 and adult 3330   Trunk(C/A/P):  0-year-old 490, 1-year-old 770, 5-year-old 1140, 10-year-old 3570 and adult 3330      OUTSIDE IMAGES-CT CERVICAL SPINE    (Results Pending)        Assessment/Plan  * Cerebellar hemorrhage (HCC)- (present on admission)  Assessment & Plan  ICH score=1  Thought due to amyloid  MRI brain: Stable fossa of parenchymal hemorrhage centered in the LEFT cerebellum and extending into the RIGHT cerebellum exerting mass effect on the fourth ventricle which is not completely effaced 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  HOB at 30 degrees  maintain pCO2 of 35-40; closer to 35  Pravastatin 40 mg qHS for possible neuroprotective effect  CT 6/7 unchanged    6/12  Pt doing poorly as regards Neurologic recovery  SLP evaluating and plan FEES tomorrow; may need PEG  DW his wife who states he was always very clear to her he would want everything done which includes feeding tubes if it becomes necessary  No antiplatelet or anticoagulation therapy as thought due to AMyloid    Paroxysmal tachycardia (HCC)  Assessment & Plan  New Dx  Given multiple previous ICH's and likely etiology being amyloid, he is not now nor will he ever be a candidate for anticoagulation  Rate control; strat Dilt  Cont Tele      UTI (urinary tract infection)  Assessment &  Plan  Urine culture with E faecalis and E cloacae  Remove leung when able: doing a voiding trial  Zosyn x 7 days given sensitivities    Encephalopathy acute- (present on admission)  Assessment & Plan  Likely delirium in conjunction with his intracranial pathology  Keep patient awake during the day and avoid daytime naps. Remove all unnecessary lines (central lines, peripheral IVs, feeding tubes, leung catheters). Avoid polypharmacy, frequent re-orientation, maximize family time at bedside, use glasses and hearing aids if needed, treat pain, encourage ambulation, minimize benzos/anticholinergic agents.   Ambulate, get out of restraints is much as possible  Started Risperdal, slow improvement    Hyperchloremic metabolic acidosis  Assessment & Plan  Improved    Hyperglycemia- (present on admission)  Assessment & Plan  Goal blood glucose 140-180  A1c 5.6  Off insulin  hypoglycemia protocol    Acute on chronic respiratory failure with hypoxia (HCC)- (present on admission)  Assessment & Plan  Intubated for airway protection  Extubated 6/1  Chest x-ray not impressive on 6/4/2024  Mobilization  Pulmonary hygiene  RT/O2 Protocols  Titrate supplemental FiO2 to maintain SpO2 >92%  Monitor vol status closely    Hyperlipidemia- (present on admission)  Assessment & Plan  Per wife, patient's home medication was discontinued due to muscle cramps  Pravastatin 40mg QHS  Lipid panel reviewed  Counseling on lifestyle/dietary modifications    History of stroke- (present on admission)  Assessment & Plan  Patient has had 2 prior hemorrhagic strokes; last in 2015  No noted deficits per wife    BPH with urinary obstruction- (present on admission)  Assessment & Plan  S/p prostate surgery  Per wife, patient has suffered from urinary retention and incontinence since his surgery  Continue flomax  Will do a voiding trial    Benign essential hypertension- (present on admission)  Assessment & Plan  Per wife, patient's medications were discontinued  as an outpatient because his BP has been controlled  Amlodipine 10mg  Cont to monitor    Abdominal aortic aneurysm (AAA) without rupture (HCC)- (present on admission)  Assessment & Plan  S/p EVAR w/ bypass graft stent  Maintain normotension  Holding ASA at this time; per wife he takes ASA 3x/week    COPD (chronic obstructive pulmonary disease) (HCC)- (present on admission)  Assessment & Plan  Not currently in exacerbation  CXR with no acute process  Continue home nebulizers as ordered  Continue PRN nebulizers Q4h  Wean FiO2 as tolerated         VTE prophylaxis: VTE Selection    I have performed a physical exam and reviewed and updated ROS and Plan today (6/12/2024). In review of yesterday's note (6/11/2024), there are no changes except as documented above.

## 2024-06-12 NOTE — CARE PLAN
The patient is Watcher - Medium risk of patient condition declining or worsening    Shift Goals  Clinical Goals: q4 neuro, hemodynamic control, rest  Patient Goals: caroline  Family Goals: updates    Progress made toward(s) clinical / shift goals:    Problem: Safety - Medical Restraint  Goal: Remains free of injury from restraints (Restraint for Interference with Medical Device)  Outcome: Progressing     Problem: Knowledge Deficit - Standard  Goal: Patient and family/care givers will demonstrate understanding of plan of care, disease process/condition, diagnostic tests and medications  Outcome: Progressing     Problem: Pain - Standard  Goal: Alleviation of pain or a reduction in pain to the patient’s comfort goal  Outcome: Progressing     Problem: Skin Integrity  Goal: Skin integrity is maintained or improved  Outcome: Progressing     Problem: Fall Risk  Goal: Patient will remain free from falls  Outcome: Progressing     Problem: Psychosocial  Goal: Patient's level of anxiety will decrease  Outcome: Progressing     Problem: Hemodynamics  Goal: Patient's hemodynamics, fluid balance and neurologic status will be stable or improve  Outcome: Progressing     Problem: Neuro Status  Goal: Neuro status will remain stable or improve  Outcome: Progressing     Problem: Risk for Aspiration  Goal: Patient's risk for aspiration will be absent or decrease  Outcome: Progressing     Problem: Respiratory  Goal: Patient will achieve/maintain optimum respiratory ventilation and gas exchange  Outcome: Progressing       Patient is not progressing towards the following goals:

## 2024-06-12 NOTE — PROGRESS NOTES
4 Eyes Skin Assessment Completed by Carlos Zabala RN and SALBADOR Ashford.    Head WDL  Ears Redness and Non-Blanching  Nose Left nare IRIS  Mouth WDL  Neck WDL  Breast/Chest Redness and Bruising  Shoulder Blades Redness and Blanching  Spine WDL  (R) Arm/Elbow/Hand Redness, Bruising, Abrasion, and Discoloration  (L) Arm/Elbow/Hand Bruising and Discoloration  Abdomen WDL  Groin WDL  Scrotum/Coccyx/Buttocks Redness and Blanching  (R) Leg WDL  (L) Leg WDL  (R) Heel/Foot/Toe Redness and Blanching  (L) Heel/Foot/Toe Redness and Blanching          Devices In Places ECG, Blood Pressure Cuff, Pulse Ox, Park, and Nasal Cannula      Interventions In Place NC W/Ear Foams, Heel Mepilex, TAP System, Pillows, Low Air Loss Mattress, Barrier Cream, and Pressure Redistribution Mattress    Possible Skin Injury Yes    Pictures Uploaded Into Epic N/A  Wound Consult Placed Yes  RN Wound Prevention Protocol Ordered Yes

## 2024-06-12 NOTE — PROGRESS NOTES
4 Eyes Skin Assessment Completed by SALBADOR Tracey and Bertin RN.    Head WDL  Ears Redness and Non-Blanching  Nose left nare iris  Mouth WDL  Neck WDL  Breast/Chest Redness and Bruising  Shoulder Blades Redness and Blanching  Spine WDL  (R) Arm/Elbow/Hand Redness, Bruising, Abrasion, and Discoloration  (L) Arm/Elbow/Hand Redness, Bruising, and Discoloration  Abdomen WDL  Groin WDL  Scrotum/Coccyx/Buttocks Redness and Blanching  (R) Leg WDL  (L) Leg WDL  (R) Heel/Foot/Toe Redness and Blanching  (L) Heel/Foot/Toe Redness and Blanching          Devices In Places ECG, Blood Pressure Cuff, Pulse Ox, Park, and Nasal Cannula      Interventions In Place Gray Ear Foams, Heel Mepilex, TAP System, Pillows, Low Air Loss Mattress, Barrier Cream, and Pressure Redistribution Mattress    Possible Skin Injury Yes    Pictures Uploaded Into Epic N/A  Wound Consult Placed Yes  RN Wound Prevention Protocol Ordered Yes

## 2024-06-12 NOTE — ASSESSMENT & PLAN NOTE
Neurosurgery consulted and recommended no surgical intervention at this time  PT and OT consulted and recommending postacute placement, patient not a candidate for acute rehab  SLP following.  Patient n.p.o. at this time with Iris feeding tube in place.  We appreciate additional SLP recommendations  S/p 3% hypertonic saline, on maintenance free water flush now with daily BMPs  No AC/DVT PPX at this time secondary to hemorrhagic CVA  Maintain systolic blood pressures between 110-160  Keep euvolemic, euthermic, and normoglycemic  Continue pravastatin

## 2024-06-12 NOTE — THERAPY
"Speech Language Pathology   Daily Treatment     Patient Name: Jl Mueller  AGE:  78 y.o., SEX:  male  Medical Record #: [id number]  Date of Service: 6/12/2024      Precautions:  Precautions: Fall Risk, Swallow Precautions, Nasogastric Tube         Subjective  Wife present at bedside for session. Pt nonverbal and unable to follow directives. Pt agitated and attempting to pull at restraints.      Assessment  When cued to keep eyes open, pt accepted PO trials of teaspoon sips of thin liquids, straw sips of thin liquids, and small bites of puree. No immediate coughing noted, however, audible wet breath sounds noted which may be concerning for airway invasion. He was unable to follow directives to vocalize to assess vocal quality. Anterior loss of bolus noted with water x1.      Clinical Impressions  Currently pt is presenting with s/sx concerning for an acute dysphagia s/p CVA. Recommend continuation of NPO with NG for nutrition. Recommend a FEES to further assess swallow function; will tentatively complete tomorrow as pt is able to participate. Wife agreeable to FEES.      Recommendations  Treatment Completed: Dysphagia Treatment       Dysphagia Treatment  Diet Consistency: NPO/NGT- okay for ice chips w/ RN following oral care  Instrumentation: FEES  Medication: Non Oral  Oral Care: Q2h                     SLP Treatment Plan  Treatment Plan: Dysphagia Treatment  SLP Frequency: 4x Per Week  Estimated Duration: Until Therapy Goals Met      Anticipated Discharge Needs  Discharge Recommendations: Recommend post-acute placement for additional speech therapy services prior to discharge home  Therapy Recommendations Upon DC: Dysphagia Training, Community Re-Integration, Patient / Family / Caregiver Education      Patient / Family Goals  Patient / Family Goal #1: \"for him to eat\" per spouse  Goal #1 Outcome: Progressing slower than expected  Short Term Goals  Short Term Goal # 1: Pt will participate in pre-feeding trials to " determine approp. for participation in instrumental swallow study vs oral diet initation.  Goal Outcome # 1: Progressing as expected  Short Term Goal # 2: Pt will participate in a FEES to further assess swallow function      Batsheva Fraser, SLP

## 2024-06-12 NOTE — ASSESSMENT & PLAN NOTE
New Dx PAFib  Given multiple previous ICH's and likely etiology being amyloid, he is not a candidate for anticoagulation   Beta-blocker with holding parameters

## 2024-06-13 ENCOUNTER — ANESTHESIA EVENT (OUTPATIENT)
Dept: SURGERY | Facility: MEDICAL CENTER | Age: 79
End: 2024-06-13
Payer: MEDICARE

## 2024-06-13 PROCEDURE — 700111 HCHG RX REV CODE 636 W/ 250 OVERRIDE (IP): Mod: JZ | Performed by: INTERNAL MEDICINE

## 2024-06-13 PROCEDURE — 99231 SBSQ HOSP IP/OBS SF/LOW 25: CPT | Performed by: NURSE PRACTITIONER

## 2024-06-13 PROCEDURE — 700102 HCHG RX REV CODE 250 W/ 637 OVERRIDE(OP): Performed by: HOSPITALIST

## 2024-06-13 PROCEDURE — 92612 ENDOSCOPY SWALLOW (FEES) VID: CPT

## 2024-06-13 PROCEDURE — A9270 NON-COVERED ITEM OR SERVICE: HCPCS | Performed by: INTERNAL MEDICINE

## 2024-06-13 PROCEDURE — A9270 NON-COVERED ITEM OR SERVICE: HCPCS | Performed by: HOSPITALIST

## 2024-06-13 PROCEDURE — A9270 NON-COVERED ITEM OR SERVICE: HCPCS | Performed by: PHYSICAL MEDICINE & REHABILITATION

## 2024-06-13 PROCEDURE — 700105 HCHG RX REV CODE 258: Performed by: INTERNAL MEDICINE

## 2024-06-13 PROCEDURE — 99223 1ST HOSP IP/OBS HIGH 75: CPT | Performed by: NURSE PRACTITIONER

## 2024-06-13 PROCEDURE — 770001 HCHG ROOM/CARE - MED/SURG/GYN PRIV*

## 2024-06-13 PROCEDURE — 99233 SBSQ HOSP IP/OBS HIGH 50: CPT | Performed by: HOSPITALIST

## 2024-06-13 PROCEDURE — 700102 HCHG RX REV CODE 250 W/ 637 OVERRIDE(OP): Performed by: INTERNAL MEDICINE

## 2024-06-13 PROCEDURE — 92526 ORAL FUNCTION THERAPY: CPT

## 2024-06-13 PROCEDURE — 700102 HCHG RX REV CODE 250 W/ 637 OVERRIDE(OP): Performed by: PHYSICAL MEDICINE & REHABILITATION

## 2024-06-13 RX ORDER — RISPERIDONE 0.5 MG/1
2 TABLET ORAL EVERY EVENING
Status: DISCONTINUED | OUTPATIENT
Start: 2024-06-13 | End: 2024-06-23

## 2024-06-13 RX ADMIN — PRAVASTATIN SODIUM 40 MG: 20 TABLET ORAL at 17:06

## 2024-06-13 RX ADMIN — Medication: at 05:11

## 2024-06-13 RX ADMIN — PIPERACILLIN AND TAZOBACTAM 3.38 G: 3; .375 INJECTION, POWDER, FOR SOLUTION INTRAVENOUS at 04:14

## 2024-06-13 RX ADMIN — RISPERIDONE 2 MG: 1 TABLET, FILM COATED ORAL at 05:10

## 2024-06-13 RX ADMIN — RISPERIDONE 2 MG: 1 TABLET, FILM COATED ORAL at 17:08

## 2024-06-13 RX ADMIN — LANSOPRAZOLE 30 MG: 30 TABLET, ORALLY DISINTEGRATING ORAL at 05:10

## 2024-06-13 RX ADMIN — AMLODIPINE BESYLATE 10 MG: 10 TABLET ORAL at 05:10

## 2024-06-13 RX ADMIN — Medication 5 MG: at 20:54

## 2024-06-13 RX ADMIN — LOPERAMIDE HYDROCHLORIDE 2 MG: 2 CAPSULE ORAL at 03:51

## 2024-06-13 RX ADMIN — PIPERACILLIN AND TAZOBACTAM 3.38 G: 3; .375 INJECTION, POWDER, FOR SOLUTION INTRAVENOUS at 14:07

## 2024-06-13 RX ADMIN — PIPERACILLIN AND TAZOBACTAM 3.38 G: 3; .375 INJECTION, POWDER, FOR SOLUTION INTRAVENOUS at 20:54

## 2024-06-13 RX ADMIN — OXYCODONE HYDROCHLORIDE 10 MG: 10 TABLET ORAL at 03:51

## 2024-06-13 RX ADMIN — Medication: at 17:07

## 2024-06-13 ASSESSMENT — PAIN DESCRIPTION - PAIN TYPE
TYPE: ACUTE PAIN

## 2024-06-13 ASSESSMENT — FIBROSIS 4 INDEX
FIB4 SCORE: 1.85
FIB4 SCORE: 1.85

## 2024-06-13 NOTE — PROGRESS NOTES
12 hour chart check complete.    Monitor summary  Rhythm ST 100s, VTACH 173 bpm Afib back to [id number]  Ectopy: VTACH 173 bpm

## 2024-06-13 NOTE — PROGRESS NOTES
Hospital Medicine Daily Progress Note    Date of Service  6/13/2024    Chief Complaint  Jl Mueller is a 78 y.o. male admitted 5/29/2024 with altered mental status    Hospital Course  78-year-old with a history of BPH, previous hemorrhagic strokes, tobacco use, hypertension, coronary artery disease, dyslipidemia, chronic hypoxic respiratory failure dependent on 2 L of home oxygen therapy. He was admitted on May 29 due to neurologic changes, and was found to have a left cerebellar hemorrhage. CTA was negative for any vascular malformations. Patient did require hypertonic saline and was intubated for airway protection. He was successfully extubated on June 1 and weaned off of hypertonic saline. Hospital stay has been complicated by UTI which has grown back Enterococcus faecalis, and Enterobacter cloaca     Interval Problem Update  ROS limited by pt's mental status  Alert and attends to examiner responds to commands and questions though inconsistently  No distress    DW pt's wife at bedside x 25 mins    AFebrile  Sinus 100s, brief episodes of WCT (AFib with aberrancy)  2 LNC  -130  UOP 1250ml/24hrs    I have discussed this patient's plan of care and discharge plan at IDT rounds today with Case Management, Nursing, Nursing leadership, and other members of the IDT team.    Consultants/Specialty  neurology and palliative care    Code Status  Full Code    Disposition  Medically Cleared  I have placed the appropriate orders for post-discharge needs.    Review of Systems  Review of Systems   Unable to perform ROS: Mental status change        Physical Exam  Temp:  [36.1 °C (96.9 °F)-36.8 °C (98.3 °F)] 36.1 °C (96.9 °F)  Pulse:  [] 90  Resp:  [17-32] 17  BP: (109-142)/(55-90) 118/61  SpO2:  [93 %-100 %] 97 %    Physical Exam  Constitutional:       General: He is not in acute distress.     Appearance: He is well-developed. He is not diaphoretic.   HENT:      Head: Normocephalic and atraumatic.   Eyes:       Conjunctiva/sclera: Conjunctivae normal.   Neck:      Vascular: No JVD.   Cardiovascular:      Rate and Rhythm: Normal rate.      Heart sounds: No murmur heard.     No gallop.   Pulmonary:      Effort: Pulmonary effort is normal. No respiratory distress.      Breath sounds: No stridor. No wheezing or rales.   Abdominal:      Palpations: Abdomen is soft.      Tenderness: There is no abdominal tenderness. There is no guarding or rebound.   Musculoskeletal:      Right lower leg: No edema.      Left lower leg: No edema.   Skin:     General: Skin is warm and dry.      Findings: No rash.   Neurological:      Comments: Alert and attends to examiner  Face symmetric  No verbalizations on my exam  Will not follow visual or verbal commands  Is purposeful x 4 ext       Fluids    Intake/Output Summary (Last 24 hours) at 6/13/2024 0653  Last data filed at 6/13/2024 0337  Gross per 24 hour   Intake 510 ml   Output 750 ml   Net -240 ml       Laboratory  Recent Labs     06/11/24  0045   WBC 10.7   RBC 4.04*   HEMOGLOBIN 11.2*   HEMATOCRIT 35.4*   MCV 87.6   MCH 27.7   MCHC 31.6*   RDW 45.3   PLATELETCT 240   MPV 11.6     Recent Labs     06/11/24  0045 06/12/24  0004 06/12/24  0914   SODIUM 139 139 139   POTASSIUM 4.0 4.1 4.1   CHLORIDE 103 104 102   CO2 25 23 24   GLUCOSE 142* 149* 155*   BUN 16 17 19   CREATININE 0.92 0.75 0.96   CALCIUM 9.3 9.4 9.2                   Imaging  EC-ECHOCARDIOGRAM COMPLETE W/ CONT   Final Result      DX-CHEST-PORTABLE (1 VIEW)   Final Result      1.  Small left pleural effusion and associated atelectasis.   2.  Mild bilateral interstitial opacities, likely mild interstitial edema.         CT-HEAD W/O   Final Result         1.  Area of cerebellar hemorrhage, decreased since prior study.   2.  Nonspecific white matter changes, commonly associated with small vessel ischemic disease.  Associated mild cerebral atrophy is noted.   3.  Atherosclerosis.         CT-HEAD W/O   Final Result      1.   Intraparenchymal hemorrhage in the cerebellar vermis and left cerebellum. It demonstrates expected evolution and decreased density.   2.  Redemonstration of vasogenic edema surrounding the hematoma in the cerebellum with mild narrowing of the fourth ventricle. There is no hydrocephalus.         DX-CHEST-LIMITED (1 VIEW)   Final Result      Mild interstitial prominence could represent vascular congestion.      Cardiomegaly.      Removal of endotracheal tube.      [id number] VIEW   Final Result      Feeding tube tip projects in the second portion of the duodenum.      Nonspecific bowel gas pattern.      CT-HEAD W/O   Final Result      1.  Advanced cerebral atrophy.   2.  Stable ventriculomegaly with intraventricular hemorrhage.   3.  Advanced supratentorial white matter disease most consistent with microvascular ischemic change.   4.  Multiple old lacunar infarcts as described.   5.  Acute/recent subacute parenchymal hemorrhage in the posterior fossa involving the superior cerebellar vermis and left paramedian cerebellar hemisphere and left lateral cerebellar peduncle. No evidence of recurrent hemorrhage.   6.  Minimal focus of subarachnoid hemorrhage within a single sulcus in the left posterior temporal region. Probably unchanged from prior recent exam.         DX-ABDOMEN FOR TUBE PLACEMENT   Final Result      Enteric tube tip projects over the stomach.      EC-ECHOCARDIOGRAM COMPLETE W/ CONT   Final Result      DX-CHEST-PORTABLE (1 VIEW)   Final Result         1.  Left basilar atelectasis or subtle infiltrate, decreased since prior study   2.  Trace left pleural effusion   3.  Enlargement of the aortic knob suggesting thoracic aortic aneurysm   4.  Atherosclerosis      MR-BRAIN-WITH & W/O   Final Result      1.  Stable fossa of parenchymal hemorrhage centered in the LEFT cerebellum and extending into the RIGHT cerebellum exerting mass effect on the fourth ventricle which is not completely effaced   2.   Intraventricular blood redemonstrated   3.  Numerous areas of remote microhemorrhage in the supra and infratentorial brain. These could be related to amyloid angiopathy, numerous hypertensive microhemorrhages or less likely multiple cavernomas   4.  Moderate diffuse atrophy without compelling evidence of hydrocephalus   5.  Advanced white matter changes   6.  4 mm LEFT temporal meningioma   7.  Enhancing nodule in the RIGHT internal auditory canal suspicious for a vestibular schwannoma, less likely other extra-axial mass such as a meningioma, facial nerve schwannoma or metastasis. Consider posterior fossa protocol brain MRI without and with    contrast to further evaluate in when clinically appropriate.      MR-MRA HEAD-W/O   Final Result         MRA OF THE [place] WITHIN NORMAL LIMITS.      DX-CHEST-PORTABLE (1 VIEW)   Final Result         1.  Hazy left lower lobe infiltrate   2.  Small left pleural effusion   3.  Enlargement of the aortic knob, appearance suggesting aortic aneurysm.      DX-CHEST-FOR LINE PLACEMENT Perform procedure in: Patient's Room   Final Result      1.  PICC line is in place with the tip projecting over the SVC in satisfactory position.   2.  Mildly enlarged cardiac silhouette with vascular congestion.   3.  Retrocardiac opacity is likely due to atelectasis.         IR-PICC LINE PLACEMENT W/ GUIDANCE > AGE 5   Final Result                  Ultrasound-guided PICC placement performed by qualified nursing staff as    above.          CT-HEAD W/O   Final Result         1.  Left cerebellar hemorrhage, similar compared to prior study.   2.  Minimal bilateral intraventricular hemorrhages, new since prior study.   3.  Nonspecific white matter changes, commonly associated with small vessel ischemic disease.  Associated mild cerebral atrophy is noted.   4.  Atherosclerosis.         DX-CHEST-PORTABLE (1 VIEW)   Final Result         1.  Left basilar atelectasis, no focal infiltrate   2.  Trace  left pleural effusion   3.  Atherosclerosis      CT-CTA NECK WITH & W/O-POST PROCESSING   Final Result         1.  Scattered atherosclerosis without significant stenosis or occlusion.   2.  4.2 cm proximal descending thoracic aortic aneurysm, radiographic follow-up and surveillance recommended as clinically appropriate.         CT-CTA HEAD WITH & W/O-POST PROCESS   Final Result         1.  No large vessel occlusion or aneurysm identified   2.  Large cerebellar hemorrhage predominantly in the left cerebellum      These findings were discussed with the patient's clinician, Nikki Alexander, on 5/29/2024 11:35 PM.      DX-CHEST-PORTABLE (1 VIEW)   Final Result      Tubes as above      Left pleural effusion      CHF/pulmonary edema pattern      See Brown MD   5/30/2024 8:41 AM         CT-HEAD W/O   Final Result      Acute intraparenchymal hemorrhage is noted involving the left cerebellar hemisphere, measuring 3.1 x 4.2 x 3.4 cm, with associated mass effect.      This finding was telephoned to the mentioned attending by on-call radiologist at the time of imaging         AAST Intracranial Hemorrhage Brain Injury Guidelines         Hemorrhage type  mBIG 1           mBIG 2                mBIG 3      Subdural             <4mm               5-7.9 mm             >8mm      Epidural                No                    No                  Yes      Intraparenc'mal   <4mm               5-7.9 mm         >8mm or multi   1 location       or 2 locations      >3 locations      Subarachnoid     <3 sulci       single hemisphere   bi-hemisphere   and <1 mm        or 1-3 mm            or > 3 mm      Intraventricular       No                  No                    Yes      Skull Fracture       None            Non-displaced       Displaced               DLP Reporting Thresholds for Incorrect/Repeated Exams - DLP in mGy*cm   Head/Neck:  0-year-old 3840, 1-year-old 5880, 5-year-old 8770, 10-year-old 89013 and adult 16482   Head:   0-year-old 4540, 1-year-old 7460, 5-year-old 18049, 10-year-old 10843 and adult 73236   Neck:  0-year-old 2940, 1-year-old 4160, 5-year-old 4550, 10-year-old 6320 and adult 8470   Chest:  0-year-old 550, 1-year-old 830, 5-year-old 1200, 10-year-old 3840 and adult 3570   Abd/pelvis:  0-year-old 440, 1-year-old 720, 5-year-old 1080, 10-year-old 3330 and adult 3330   Trunk(C/A/P):  0-year-old 490, 1-year-old 770, 5-year-old 1140, 10-year-old 3570 and adult 3330      OUTSIDE IMAGES-CT CERVICAL SPINE    (Results Pending)        Assessment/Plan  * Cerebellar hemorrhage (HCC)- (present on admission)  Assessment & Plan  ICH score=1  Thought due to amyloid  MRI brain: Stable fossa of parenchymal hemorrhage centered in the LEFT cerebellum and extending into the RIGHT cerebellum exerting mass effect on the fourth ventricle which is not completely effaced 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  HOB at 30 degrees  maintain pCO2 of 35-40; closer to 35  Pravastatin 40 mg qHS for possible neuroprotective effect  CT 6/7 unchanged    6/13  Pt doing poorly as regards Neurologic recovery  FEES today with SLP: did not do well.  Will need PEG.  GI consulted  Again reviewed with his wife at bedside who states he would have wanted a feeding tube if it were necessary  No antiplatelet or anticoagulation therapy as thought due to AMyloid    Paroxysmal tachycardia (HCC)  Assessment & Plan  New Dx PAFib  Given multiple previous ICH's and likely etiology being amyloid, he is not now nor will he ever be a candidate for anticoagulation  Will DC amlodipine and start a BB for rate control and BP managementl  Cont Tele      UTI (urinary tract infection)  Assessment & Plan  Urine  culture with E faecalis and E cloacae  Remove leung when able: doing a voiding trial  Zosyn x 7 days given sensitivities: complete tomorrow    Encephalopathy acute- (present on admission)  Assessment & Plan  Likely delirium in conjunction with his intracranial pathology  Keep patient awake during the day and avoid daytime naps. Remove all unnecessary lines (central lines, peripheral IVs, feeding tubes, leung catheters). Avoid polypharmacy, frequent re-orientation, maximize family time at bedside, use glasses and hearing aids if needed, treat pain, encourage ambulation, minimize benzos/anticholinergic agents.   Ambulate, get out of restraints is much as possible    DC am resperidal   Cont qhs     Hyperchloremic metabolic acidosis  Assessment & Plan  Improved    Hyperglycemia- (present on admission)  Assessment & Plan  Goal blood glucose 140-180  A1c 5.6  Off insulin  hypoglycemia protocol    Acute on chronic respiratory failure with hypoxia (HCC)- (present on admission)  Assessment & Plan  Intubated for airway protection  Extubated 6/1  Chest x-ray not impressive on 6/4/2024  Mobilization  Pulmonary hygiene  RT/O2 Protocols  Titrate supplemental FiO2 to maintain SpO2 >92%  Monitor vol status closely    Hyperlipidemia- (present on admission)  Assessment & Plan  Per wife, patient's home medication was discontinued due to muscle cramps  Pravastatin 40mg QHS  Lipid panel reviewed  Counseling on lifestyle/dietary modifications    History of stroke- (present on admission)  Assessment & Plan  Patient has had 2 prior hemorrhagic strokes; last in 2015  No noted deficits per wife    BPH with urinary obstruction- (present on admission)  Assessment & Plan  S/p prostate surgery  Per wife, patient has suffered from urinary retention and incontinence since his surgery  Continue flomax  Leung removed 6/12: thus far has not had any retention    Benign essential hypertension- (present on admission)  Assessment & Plan  Given new Dx of  PAFib will DC amlodipine and start [id number] BID  Cont to monitor    Abdominal aortic aneurysm (AAA) without rupture (HCC)- (present on admission)  Assessment & Plan  S/p EVAR w/ bypass graft stent  Maintain normotension  Holding ASA at this time; per wife he takes ASA 3x/week    COPD (chronic obstructive pulmonary disease) (HCC)- (present on admission)  Assessment & Plan  Not currently in exacerbation  CXR with no acute process  Continue home nebulizers as ordered  Continue PRN nebulizers Q4h  Wean FiO2 as tolerated         VTE prophylaxis: VTE Selection    I have performed a physical exam and reviewed and updated ROS and Plan today (6/13/2024). In review of yesterday's note (6/12/2024), there are no changes except as documented above.

## 2024-06-13 NOTE — CARE PLAN
The patient is Watcher - Medium risk of patient condition declining or worsening    Shift Goals  Clinical Goals: q4 Neuro  Patient Goals: GUZMAN  Family Goals: updates    Progress made toward(s) clinical / shift goals:    Problem: Safety - Medical Restraint  Goal: Remains free of injury from restraints (Restraint for Interference with Medical Device)  Outcome: Progressing     Problem: Knowledge Deficit - Standard  Goal: Patient and family/care givers will demonstrate understanding of plan of care, disease process/condition, diagnostic tests and medications  Outcome: Progressing     Problem: Pain - Standard  Goal: Alleviation of pain or a reduction in pain to the patient’s comfort goal  Outcome: Progressing     Problem: Skin Integrity  Goal: Skin integrity is maintained or improved  Outcome: Progressing     Problem: Fall Risk  Goal: Patient will remain free from falls  Outcome: Progressing     Problem: Urinary - Renal Perfusion  Goal: Ability to achieve and maintain adequate renal perfusion and functioning will improve  Outcome: Progressing     Problem: Neuro Status  Goal: Neuro status will remain stable or improve  Outcome: Progressing       Patient is not progressing towards the following goals:      Problem: Safety - Medical Restraint  Goal: Free from restraint(s) (Restraint for Interference with Medical Device)  Outcome: Not Progressing

## 2024-06-13 NOTE — PROGRESS NOTES
4 Eyes Skin Assessment Completed by SALBADOR Tracey and SALBADOR Peralta.    Head WDL  Ears Redness and Non-Blanching  Nose Left Nare iris  Mouth WDL  Neck WDL  Breast/Chest Redness and Bruising  Shoulder Blades Redness  Spine WDL  (R) Arm/Elbow/Hand Redness, Bruising, Abrasion, and Discoloration  (L) Arm/Elbow/Hand Bruising and Discoloration  Abdomen WDL  Groin WDL  Scrotum/Coccyx/Buttocks Redness and Blanching  (R) Leg WDL  (L) Leg WDL  (R) Heel/Foot/Toe Redness and Blanching  (L) Heel/Foot/Toe Redness and Blanching          Devices In Places ECG, Blood Pressure Cuff, Pulse Ox, Condom Cath, and Nasal Cannula      Interventions In Place Gray Ear Foams, Heel Mepilex, TAP System, Pillows, Low Air Loss Mattress, Barrier Cream, and Pressure Redistribution Mattress    Possible Skin Injury Yes    Pictures Uploaded Into Epic N/A  Wound Consult Placed Yes  RN Wound Prevention Protocol Ordered Yes

## 2024-06-13 NOTE — CONSULTS
Date of Consultation:  6/13/2024    Patient: : Jl Mueller  MRN: [id number]    Referring Physician:   Tashi Corrales DO    GI:HANANE Hand     Reason for Consultation: PEG tube placement    History of Present Illness:     Not able to obtain full history from patient due to mental status change.  History obtained from a combination of chart review, interview with patient's wife, Avril via telephone.    This is a 78-year-old male with a past medical history including BPH, previous hemorrhagic strokes, AAA with stent graft, former tobacco user, hypertension, coronary artery disease, myocardial infarction (2008), dyslipidemia, chronic hypoxic respiratory failure on 2 LPM NC who was initially admitted on May 29, 2024 due to neurological changes.  He was found to have a left cerebellar hemorrhage.  CT was negative for vascular malformations.  Speech-language pathology evaluated patient and performed FEES study on 6/13/2024.  Patient found to have moderate-severe oropharyngeal dysphagia.  This an acute change from baseline given his acute CVA.   GI was consulted for PEG tube placement.      Otherwise the patient is doing fine without complaints of fever/ chills/ weight loss/ appetite change/ dysphagia/ odynophagia/ heartburn/ nausea/ vomiting/ bloating/ constipation/ melena/ hematochezia or abdominal pain.    Tobacco use: previous smoker anita in 2008 after MI  Alcohol use: social   Illicit drug use: denies    Last EGD: unknown  Last colonoscopy: unknown  NSAID/ASA use: none  Anticoagulation use: none       Past Medical History:   Diagnosis Date    Chronic obstructive pulmonary disease (HCC)     Hypertension     MI, old     Peptic ulcer     Prostate disorder     Stroke (HCC)          Past Surgical History:   Procedure Laterality Date    AAA WITH STENT GRAFT         No family history on file.    Social History     Socioeconomic History    Marital status:    Tobacco Use    Smoking status: Unknown   Vaping  Use    Vaping status: Never Used   Substance and Sexual Activity    Alcohol use: Not Currently    Drug use: Not Currently     Social Determinants of Health     Intimate Partner Violence: Low Risk  (10/19/2023)    Received from [facility]    History of Abuse     History of Abuse: Denies       Review of systems:  Review of Systems   Unable to perform ROS: Mental status change         Physical Exam:  Vitals:    06/13/24 1200 06/13/24 1300 06/13/24 1400 06/13/24 1500   BP: 134/67 114/66 118/62 134/75   Pulse: (!) 109 91 95 (!) 110   Resp: (!) 29 19 19 18   Temp: 36.3 °C (97.4 °F)      TempSrc: Temporal      SpO2: 92% 95% 96% 94%   Weight:       Height:           Physical Exam  Vitals reviewed.   Constitutional:       General: He is sleeping. He is not in acute distress.     Appearance: He is ill-appearing.   HENT:      Head: Normocephalic and atraumatic.      Nose: Nose normal. No congestion.      Comments: NG tube in place with tube feed in progress without difficulty.     Mouth/Throat:      Pharynx: No oropharyngeal exudate.   Cardiovascular:      Rate and Rhythm: Normal rate and regular rhythm.      Pulses: Normal pulses.      Heart sounds: Normal heart sounds. No murmur heard.  Pulmonary:      Effort: Pulmonary effort is normal. No respiratory distress.      Breath sounds: Normal breath sounds.   Abdominal:      General: Abdomen is flat. Bowel sounds are normal. There is no distension.      Palpations: Abdomen is soft.      Tenderness: There is no abdominal tenderness.   Musculoskeletal:      Right lower leg: No edema.      Left lower leg: No edema.   Skin:     General: Skin is warm and dry.      Capillary Refill: Capillary refill takes less than 2 seconds.      Coloration: Skin is not jaundiced.   Neurological:      Comments: Sleeping quietly, arouses with touch.  Does not follow verbal commands.           Labs:  Recent Labs     06/11/24  0045   WBC 10.7   RBC 4.04*   HEMOGLOBIN 11.2*   HEMATOCRIT  35.4*   MCV 87.6   MCH 27.7   MCHC 31.6*   RDW 45.3   PLATELETCT 240   MPV 11.6     Recent Labs     24  0045 24  0004 24  0914   SODIUM 139 139 139   POTASSIUM 4.0 4.1 4.1   CHLORIDE 103 104 102   CO2 25 23 24   GLUCOSE 142* 149* 155*   BUN 16 17 19           Recent Labs     24  0045 24  0914   ASTSGOT  --  41   ALTSGPT  --  52*   TBILIRUBIN  --  0.3   ALKPHOSPHAT  --  51   GLOBULIN  --  3.0   AMMONIA 21  --          Imaging:  EC-ECHOCARDIOGRAM COMPLETE W/ CONT  Transthoracic  Echo Report    Echocardiography Laboratory    CONCLUSIONS    Normal left ventricular chamber size.  The left ventricular ejection fraction is visually estimated to be 40%.  Akinesis of the inferolateral wall.  Dyskinetic apex without thrombus.  Mild to moderate aortic insufficiency.  Normal inferior vena cava size and inspiratory collapse.    JAMARI OLIVARES  Exam Date:         2024                      09:33  Exam Location:     Inpatient  Priority:          Routine    Ordering Physician:        DADA MERINO  Referring Physician:  Sonographer:               Elgin Correa RUST    Age:    78     Gender:    Mal                            e  MRN:    [id number]  :    1945  BSA:    1.68   Ht (in):    63     Wt (lb):    143  Exam Type:     Complete, Contrast    Indications:     Persistent atrial fibrillation  ICD Codes:       I48.1    CPT Codes:           BP:   110    /   58     HR:   95  Technical Quality:       Technically difficult study -                            adequate information is obtained    MEASUREMENTS  (Male / Female) Normal Values  2D ECHO  LV Diastolic Diameter PLAX        4.9 cm                4.2 - 5.9 / 3.9 - 5.3   cm  LV Systolic Diameter PLAX         4.1 cm                2.1 - 4.0 cm  IVS Diastolic Thickness           0.89 cm                 LVPW Diastolic Thickness          0.97 cm                 LVOT Diameter                     1.9 cm                   Estimated LV Ejection Fraction    40 %                    LV Ejection Fraction MOD BP       46.5 %                >= 55  %  LV Ejection Fraction MOD 4C       56.4 %                  LV Ejection Fraction MOD 2C       37.5 %                  IVC Diameter                      1.6 cm                    DOPPLER  AV Peak Velocity                  1.3 m/s                 AV Peak Gradient                  7.2 mmHg                AV Mean Gradient                  3.9 mmHg                AI Pressure Half Time             287 ms                  LVOT Peak Velocity                0.94 m/s                AV Area Cont Eq vti               1.8 cm2                 MV Velocity Time Integral         12.4 cm                 Mitral E Point Velocity           1.1 m/s                 Mitral E to A Ratio               2                       MV Pressure Half Time             29.2 ms                 MV Area PHT                       7.5 cm2                 MV Deceleration Time              101 ms                  Pulmonary Vein Systolic Velocity  0.77 m/s                Pulmonary Vein Diastolic Velocit  0.39 m/s                Pulmonary Vein S/D Ratio          2                       PV Peak Velocity                  1.3 m/s                 PV Peak Gradient                  7 mmHg                    * Indicates values subject to auto-interpretation  LV EF:  40    %    FINDINGS  Left Ventricle  3mL of contrast was used to enhance visualization of the endocardial   border. Existing IV was used at the left forearm. Normal left   ventricular chamber size. Normal left ventricular wall thickness.   Reduced left ventricular systolic function. Akinesis of the   inferolateral wall. Dyskinetic apex without thrombus. Indeterminate   diastolic function. The left ventricular ejection fraction is visually   estimated to be 40%.    Right Ventricle  Normal right ventricular systolic function.    Right Atrium  Normal right atrial size.  Normal inferior vena cava size and   inspiratory collapse.    Left Atrium  Normal left atrial size.    Mitral Valve  Structurally normal mitral valve. No mitral stenosis. No mitral   regurgitation.    Aortic Valve  Structurally normal aortic valve. No aortic valve stenosis. Mild to   moderate aortic insufficiency.    Tricuspid Valve  Structurally normal tricuspid valve. No tricuspid regurgitation. Right   atrial pressure is estimated to be 3 mmHg. Unable to estimate pulmonary   artery pressure due to an inadequate tricuspid regurgitant jet.    Pulmonic Valve  The pulmonic valve is not well visualized. No pulmonic stenosis. No   pulmonic insufficiency.    Pericardium  No pericardial effusion.    Aorta  The ascending aorta diameter is 3.4cm.    Bennett Veronica MD  (Electronically Signed)  Final Date:     12 June 2024                   16:10  DX-CHEST-PORTABLE (1 VIEW)  Narrative: 6/12/2024 9:05 AM    HISTORY/REASON FOR EXAM:  Shortness of Breath.    TECHNIQUE/EXAM DESCRIPTION AND NUMBER OF VIEWS:  Single portable view of the chest.    COMPARISON:  6/4/2024    FINDINGS:    LUNGS: Small left pleural effusion and associated atelectasis. Mild bilateral interstitial opacities.  PNEUMOTHORAX: None.  LINES AND TUBES: Enteric tube tip is distal to the GE junction.  MEDIASTINUM: No cardiomegaly. Atherosclerosis. Stable prominence of the aortic arch and pulmonary arteries.  MUSCULOSKELETAL STRUCTURES: No acute displaced fracture.  Impression: 1.  Small left pleural effusion and associated atelectasis.  2.  Mild bilateral interstitial opacities, likely mild interstitial edema.            Impressions:  Severe-moderate oropharyngeal dysphagia  Left cerebellar hemorrhage  previous hemorrhagic strokes  Hx of AAA with stent graft  former tobacco user  Hypertension  coronary artery disease   myocardial infarction (2008)  Dyslipidemia   chronic hypoxic respiratory failure on 2 L LPM   BPH    MDM:  This is a 70-year-old male with a  past medical history as listed above who presented to [facility] on 5/29/2024 with left cerebellar hemorrhage.  Speech-language involved for ongoing evaluations. FEES study done 6/13/2024 showing moderate-severe oropharyngeal dysphagia.  GI was consulted for PEG tube placement.  Called patient's wife, Avril, and spoke to her about PEG tube placement.  Questions addressed.  She is agreeable to proceed.    Recommendations:  Tube feed okay today; turn off at midnight  Continue to hold antiplatelet/anticoagulation medications  Plan for PEG tube placement tomorrow.    Discussed with patient's wife via telephone, Dr. Corrales, Dr. Cobos      This note was generated using voice recognition software which has a small chance of producing errors of grammar and possibly content. I have made every reasonable attempt to find and correct any obvious errors, but expect that some may not be found prior to finalization of this note.

## 2024-06-13 NOTE — PROGRESS NOTES
"Palliative Care    Room: [id number]    HPI:   Jl Mueller is a 78 y.o. male admitted 5/29/2024 from OSH where he presented with AMS and weakness. Imaging revealed left cerebellar hemorrhage and he was transferred to [facility] for higher level of care. Admitted to the neuro ICU for hypertonic saline treatment, every hour neurochecks and strict blood pressure control as well as ventilator management, the patient was provided DDAVP for platelet inhibition. Patient was liberated from the ventilator 6/1/2024. CTH (6/3/2024) stable. Patient was noted to have a UTI. Palliative care was consulted to assist with GOC discussions. Initial consultation on 6/11/2024.    Discussion/Plan   PC PRABHU met with patient's spouse, Avril, at bedside. She recalls this writer from previous encounter. Confirmed GOC and her goal for patient continues to be longevity as these were his previously expressed wishes. She states that she is willing to proceed with PEG tube. She states \"what else can you do but wait?\"     Avril did have some questions regarding discharge planning and resources available in caring for Yuniel long term. Discussed case management/social work's role in discharge planning and Avril hopes to meet with them today before noon if possible. PC PRABHU discussed with case management. Palliative care to sign off at this point as GOC are established.    I spent a total of 28 minutes reviewing medical records, direct face-to-face time with the patient and/or family, documentation and coordination of care. This is separate from the time spent on advance care planning, which is documented above.    PRABHU Colon  Palliative Care Nurse Practitioner   [phone]   "

## 2024-06-13 NOTE — CARE PLAN
The patient is Watcher - Medium risk of patient condition declining or worsening    Shift Goals  Clinical Goals: q4h neuro, safety, FEES study with speech, PT/OT  Patient Goals: GUZMAN  Family Goals: updates    Progress made toward(s) clinical / shift goals:       Problem: Safety - Medical Restraint  Goal: Remains free of injury from restraints (Restraint for Interference with Medical Device)  Outcome: Progressing     Problem: Knowledge Deficit - Standard  Goal: Patient and family/care givers will demonstrate understanding of plan of care, disease process/condition, diagnostic tests and medications  Outcome: Progressing     Problem: Skin Integrity  Goal: Skin integrity is maintained or improved  Outcome: Progressing     Problem: Psychosocial  Goal: Patient's level of anxiety will decrease  Outcome: Progressing     Problem: Hemodynamics  Goal: Patient's hemodynamics, fluid balance and neurologic status will be stable or improve  Outcome: Progressing     Problem: Neuro Status  Goal: Neuro status will remain stable or improve  Outcome: Progressing

## 2024-06-13 NOTE — THERAPY
"Speech Language Pathology   Daily Treatment     Patient Name: Jl Mueller  AGE:  78 y.o., SEX:  male  Medical Record #: [id number]  Date of Service: 6/13/2024      Precautions:  Precautions: Fall Risk, Swallow Precautions, Nasogastric Tube         Subjective  Pt seen on this date to endure readiness for FEES. Spouse present at bedside and pt more restless today compared to yesterday. He said \"good morning\" today which is an improvement from previous days and spouse endorsed pt has been attempting to talk more.      Assessment  No overt s/sx of aspiration noted with minimal trials of ice chips, teaspoon sip of thin liquids, and 2 cup sips of thin liquids. Anterior loss of bolus noted with cup sips of thin liquids out of R side of mouth. Did not assess 1:1 feeding 2/2 bilateral wrist restraints. Pt required encouragement for PO intake but agreeable with encouragement.       Clinical Impressions  Pt is currently at high risk for aspiration and recommend a FEES to further assess swallow function. SLP to follow for FEES tentatively at 0900 as appropriate.       Recommendations  Treatment Completed: Dysphagia Treatment       Dysphagia Treatment  Diet Consistency: NPO/NGT- okay for ice chips w/ RN following oral care  Instrumentation: FEES  Medication: Non Oral  Supervision: 1:1 feeding with constant supervision (ice chips only)  Oral Care: Q2h                     SLP Treatment Plan  Treatment Plan: Dysphagia Treatment  SLP Frequency: 4x Per Week  Estimated Duration: Until Therapy Goals Met      Anticipated Discharge Needs  Discharge Recommendations: Recommend post-acute placement for additional speech therapy services prior to discharge home  Therapy Recommendations Upon DC: Dysphagia Training, Community Re-Integration, Patient / Family / Caregiver Education      Patient / Family Goals  Patient / Family Goal #1: \"for him to eat\" per spouse  Goal #1 Outcome: Progressing slower than expected  Short Term Goals  Short Term " Goal # 1: Pt will participate in pre-feeding trials to determine approp. for participation in instrumental swallow study vs oral diet initation.  Goal Outcome # 1: Progressing as expected  Short Term Goal # 2: Pt will participate in a FEES to further assess swallow function  Goal Outcome # 2 : Other (see comments)      Batsheva Fraser, SLP

## 2024-06-14 ENCOUNTER — ANESTHESIA (OUTPATIENT)
Dept: SURGERY | Facility: MEDICAL CENTER | Age: 79
End: 2024-06-14
Payer: MEDICARE

## 2024-06-14 PROBLEM — I50.22 CHRONIC SYSTOLIC HEART FAILURE (HCC): Status: ACTIVE | Noted: 2024-06-14

## 2024-06-14 LAB
ANION GAP SERPL CALC-SCNC: 13 MMOL/L (ref 7–16)
BUN SERPL-MCNC: 17 MG/DL (ref 8–22)
CALCIUM SERPL-MCNC: 9.5 MG/DL (ref 8.5–10.5)
CHLORIDE SERPL-SCNC: 102 MMOL/L (ref 96–112)
CO2 SERPL-SCNC: 24 MMOL/L (ref 20–33)
CREAT SERPL-MCNC: 0.82 MG/DL (ref 0.5–1.4)
GFR SERPLBLD CREATININE-BSD FMLA CKD-EPI: 90 ML/MIN/1.73 M 2
GLUCOSE SERPL-MCNC: 118 MG/DL (ref 65–99)
POTASSIUM SERPL-SCNC: 4.1 MMOL/L (ref 3.6–5.5)
SODIUM SERPL-SCNC: 139 MMOL/L (ref 135–145)

## 2024-06-14 PROCEDURE — 700105 HCHG RX REV CODE 258: Performed by: INTERNAL MEDICINE

## 2024-06-14 PROCEDURE — 80048 BASIC METABOLIC PNL TOTAL CA: CPT

## 2024-06-14 PROCEDURE — 160048 HCHG OR STATISTICAL LEVEL 1-5: Performed by: INTERNAL MEDICINE

## 2024-06-14 PROCEDURE — 97112 NEUROMUSCULAR REEDUCATION: CPT

## 2024-06-14 PROCEDURE — 700101 HCHG RX REV CODE 250: Performed by: STUDENT IN AN ORGANIZED HEALTH CARE EDUCATION/TRAINING PROGRAM

## 2024-06-14 PROCEDURE — 160035 HCHG PACU - 1ST 60 MINS PHASE I: Performed by: INTERNAL MEDICINE

## 2024-06-14 PROCEDURE — A9270 NON-COVERED ITEM OR SERVICE: HCPCS | Performed by: HOSPITALIST

## 2024-06-14 PROCEDURE — 160202 HCHG ENDO MINUTES - 1ST 30 MINS LEVEL 3: Performed by: INTERNAL MEDICINE

## 2024-06-14 PROCEDURE — 700105 HCHG RX REV CODE 258: Performed by: STUDENT IN AN ORGANIZED HEALTH CARE EDUCATION/TRAINING PROGRAM

## 2024-06-14 PROCEDURE — 700102 HCHG RX REV CODE 250 W/ 637 OVERRIDE(OP): Performed by: HOSPITALIST

## 2024-06-14 PROCEDURE — 700102 HCHG RX REV CODE 250 W/ 637 OVERRIDE(OP): Performed by: PHYSICAL MEDICINE & REHABILITATION

## 2024-06-14 PROCEDURE — 770001 HCHG ROOM/CARE - MED/SURG/GYN PRIV*

## 2024-06-14 PROCEDURE — 160009 HCHG ANES TIME/MIN: Performed by: INTERNAL MEDICINE

## 2024-06-14 PROCEDURE — 99233 SBSQ HOSP IP/OBS HIGH 50: CPT | Performed by: INTERNAL MEDICINE

## 2024-06-14 PROCEDURE — 700111 HCHG RX REV CODE 636 W/ 250 OVERRIDE (IP): Mod: JZ | Performed by: INTERNAL MEDICINE

## 2024-06-14 PROCEDURE — 160002 HCHG RECOVERY MINUTES (STAT): Performed by: INTERNAL MEDICINE

## 2024-06-14 PROCEDURE — 36415 COLL VENOUS BLD VENIPUNCTURE: CPT

## 2024-06-14 PROCEDURE — 0DH63UZ INSERTION OF FEEDING DEVICE INTO STOMACH, PERCUTANEOUS APPROACH: ICD-10-PCS | Performed by: INTERNAL MEDICINE

## 2024-06-14 PROCEDURE — 700111 HCHG RX REV CODE 636 W/ 250 OVERRIDE (IP): Mod: JG | Performed by: NURSE PRACTITIONER

## 2024-06-14 PROCEDURE — A9270 NON-COVERED ITEM OR SERVICE: HCPCS | Performed by: INTERNAL MEDICINE

## 2024-06-14 PROCEDURE — 700102 HCHG RX REV CODE 250 W/ 637 OVERRIDE(OP): Performed by: INTERNAL MEDICINE

## 2024-06-14 PROCEDURE — 700111 HCHG RX REV CODE 636 W/ 250 OVERRIDE (IP): Performed by: STUDENT IN AN ORGANIZED HEALTH CARE EDUCATION/TRAINING PROGRAM

## 2024-06-14 PROCEDURE — 43246 EGD PLACE GASTROSTOMY TUBE: CPT | Performed by: INTERNAL MEDICINE

## 2024-06-14 PROCEDURE — 97535 SELF CARE MNGMENT TRAINING: CPT

## 2024-06-14 PROCEDURE — 110372 HCHG SHELL REV 278: Performed by: INTERNAL MEDICINE

## 2024-06-14 PROCEDURE — A9270 NON-COVERED ITEM OR SERVICE: HCPCS | Performed by: PHYSICAL MEDICINE & REHABILITATION

## 2024-06-14 DEVICE — KIT 20 FRENCH PULL SAFETY PEG W/ENFIT ENDOVIVE (2EA/BX): Type: IMPLANTABLE DEVICE | Site: ABDOMEN | Status: FUNCTIONAL

## 2024-06-14 RX ORDER — OXYCODONE HCL 5 MG/5 ML
5 SOLUTION, ORAL ORAL
Status: DISCONTINUED | OUTPATIENT
Start: 2024-06-14 | End: 2024-06-14 | Stop reason: HOSPADM

## 2024-06-14 RX ORDER — ONDANSETRON 2 MG/ML
4 INJECTION INTRAMUSCULAR; INTRAVENOUS
Status: DISCONTINUED | OUTPATIENT
Start: 2024-06-14 | End: 2024-06-14 | Stop reason: HOSPADM

## 2024-06-14 RX ORDER — SODIUM CHLORIDE, SODIUM LACTATE, POTASSIUM CHLORIDE, CALCIUM CHLORIDE 600; 310; 30; 20 MG/100ML; MG/100ML; MG/100ML; MG/100ML
INJECTION, SOLUTION INTRAVENOUS
Status: DISCONTINUED | OUTPATIENT
Start: 2024-06-14 | End: 2024-06-14 | Stop reason: SURG

## 2024-06-14 RX ORDER — HYDROMORPHONE HYDROCHLORIDE 1 MG/ML
0.4 INJECTION, SOLUTION INTRAMUSCULAR; INTRAVENOUS; SUBCUTANEOUS
Status: DISCONTINUED | OUTPATIENT
Start: 2024-06-14 | End: 2024-06-14 | Stop reason: HOSPADM

## 2024-06-14 RX ORDER — HYDRALAZINE HYDROCHLORIDE 20 MG/ML
5 INJECTION INTRAMUSCULAR; INTRAVENOUS
Status: DISCONTINUED | OUTPATIENT
Start: 2024-06-14 | End: 2024-06-14 | Stop reason: HOSPADM

## 2024-06-14 RX ORDER — DIPHENHYDRAMINE HYDROCHLORIDE 50 MG/ML
12.5 INJECTION INTRAMUSCULAR; INTRAVENOUS
Status: DISCONTINUED | OUTPATIENT
Start: 2024-06-14 | End: 2024-06-14 | Stop reason: HOSPADM

## 2024-06-14 RX ORDER — IPRATROPIUM BROMIDE AND ALBUTEROL SULFATE 2.5; .5 MG/3ML; MG/3ML
3 SOLUTION RESPIRATORY (INHALATION)
Status: DISCONTINUED | OUTPATIENT
Start: 2024-06-14 | End: 2024-06-14 | Stop reason: HOSPADM

## 2024-06-14 RX ORDER — HYDROMORPHONE HYDROCHLORIDE 1 MG/ML
0.2 INJECTION, SOLUTION INTRAMUSCULAR; INTRAVENOUS; SUBCUTANEOUS
Status: DISCONTINUED | OUTPATIENT
Start: 2024-06-14 | End: 2024-06-14 | Stop reason: HOSPADM

## 2024-06-14 RX ORDER — OXYCODONE HCL 5 MG/5 ML
10 SOLUTION, ORAL ORAL
Status: DISCONTINUED | OUTPATIENT
Start: 2024-06-14 | End: 2024-06-14 | Stop reason: HOSPADM

## 2024-06-14 RX ORDER — EPHEDRINE SULFATE 50 MG/ML
5 INJECTION, SOLUTION INTRAVENOUS
Status: DISCONTINUED | OUTPATIENT
Start: 2024-06-14 | End: 2024-06-14 | Stop reason: HOSPADM

## 2024-06-14 RX ORDER — HYDROMORPHONE HYDROCHLORIDE 1 MG/ML
0.1 INJECTION, SOLUTION INTRAMUSCULAR; INTRAVENOUS; SUBCUTANEOUS
Status: DISCONTINUED | OUTPATIENT
Start: 2024-06-14 | End: 2024-06-14 | Stop reason: HOSPADM

## 2024-06-14 RX ORDER — LIDOCAINE HYDROCHLORIDE 20 MG/ML
INJECTION, SOLUTION EPIDURAL; INFILTRATION; INTRACAUDAL; PERINEURAL PRN
Status: DISCONTINUED | OUTPATIENT
Start: 2024-06-14 | End: 2024-06-14 | Stop reason: SURG

## 2024-06-14 RX ORDER — SODIUM CHLORIDE, SODIUM LACTATE, POTASSIUM CHLORIDE, CALCIUM CHLORIDE 600; 310; 30; 20 MG/100ML; MG/100ML; MG/100ML; MG/100ML
INJECTION, SOLUTION INTRAVENOUS CONTINUOUS
Status: DISCONTINUED | OUTPATIENT
Start: 2024-06-14 | End: 2024-06-14 | Stop reason: HOSPADM

## 2024-06-14 RX ORDER — LABETALOL HYDROCHLORIDE 5 MG/ML
5 INJECTION, SOLUTION INTRAVENOUS
Status: DISCONTINUED | OUTPATIENT
Start: 2024-06-14 | End: 2024-06-14 | Stop reason: HOSPADM

## 2024-06-14 RX ADMIN — Medication: at 17:52

## 2024-06-14 RX ADMIN — PROPOFOL 10 MG: 10 INJECTION, EMULSION INTRAVENOUS at 09:45

## 2024-06-14 RX ADMIN — PIPERACILLIN AND TAZOBACTAM 3.38 G: 3; .375 INJECTION, POWDER, FOR SOLUTION INTRAVENOUS at 22:05

## 2024-06-14 RX ADMIN — PROPOFOL 10 MG: 10 INJECTION, EMULSION INTRAVENOUS at 09:42

## 2024-06-14 RX ADMIN — Medication: at 06:22

## 2024-06-14 RX ADMIN — LIDOCAINE HYDROCHLORIDE 60 MG: 20 INJECTION, SOLUTION EPIDURAL; INFILTRATION; INTRACAUDAL at 09:40

## 2024-06-14 RX ADMIN — Medication 5 MG: at 22:19

## 2024-06-14 RX ADMIN — SODIUM CHLORIDE, POTASSIUM CHLORIDE, SODIUM LACTATE AND CALCIUM CHLORIDE: 600; 310; 30; 20 INJECTION, SOLUTION INTRAVENOUS at 09:37

## 2024-06-14 RX ADMIN — PROPOFOL 20 MG: 10 INJECTION, EMULSION INTRAVENOUS at 09:50

## 2024-06-14 RX ADMIN — PROPOFOL 20 MG: 10 INJECTION, EMULSION INTRAVENOUS at 09:40

## 2024-06-14 RX ADMIN — METOPROLOL TARTRATE 12.5 MG: 25 TABLET, FILM COATED ORAL at 17:47

## 2024-06-14 RX ADMIN — PROPOFOL 20 MG: 10 INJECTION, EMULSION INTRAVENOUS at 09:44

## 2024-06-14 RX ADMIN — PROPOFOL 30 MG: 10 INJECTION, EMULSION INTRAVENOUS at 09:46

## 2024-06-14 RX ADMIN — LABETALOL HYDROCHLORIDE 10 MG: 5 INJECTION, SOLUTION INTRAVENOUS at 04:31

## 2024-06-14 RX ADMIN — OXYCODONE HYDROCHLORIDE 5 MG: 5 TABLET ORAL at 22:18

## 2024-06-14 RX ADMIN — PRAVASTATIN SODIUM 40 MG: 20 TABLET ORAL at 17:47

## 2024-06-14 RX ADMIN — RISPERIDONE 2 MG: 1 TABLET, FILM COATED ORAL at 17:47

## 2024-06-14 RX ADMIN — PIPERACILLIN AND TAZOBACTAM 3.38 G: 3; .375 INJECTION, POWDER, FOR SOLUTION INTRAVENOUS at 13:34

## 2024-06-14 RX ADMIN — PROPOFOL 20 MG: 10 INJECTION, EMULSION INTRAVENOUS at 09:48

## 2024-06-14 RX ADMIN — PIPERACILLIN AND TAZOBACTAM 3.38 G: 3; .375 INJECTION, POWDER, FOR SOLUTION INTRAVENOUS at 04:36

## 2024-06-14 ASSESSMENT — COGNITIVE AND FUNCTIONAL STATUS - GENERAL
DRESSING REGULAR UPPER BODY CLOTHING: A LOT
PERSONAL GROOMING: A LITTLE
HELP NEEDED FOR BATHING: A LOT
SUGGESTED CMS G CODE MODIFIER DAILY ACTIVITY: CK
TOILETING: A LOT
DRESSING REGULAR LOWER BODY CLOTHING: A LOT
EATING MEALS: A LITTLE
DAILY ACTIVITIY SCORE: 14

## 2024-06-14 ASSESSMENT — PAIN DESCRIPTION - PAIN TYPE
TYPE: ACUTE PAIN

## 2024-06-14 ASSESSMENT — PAIN SCALES - GENERAL: PAIN_LEVEL: 2

## 2024-06-14 NOTE — ANESTHESIA TIME REPORT
Anesthesia Start and Stop Event Times       Date Time Event    6/14/2024 0937 Anesthesia Start     1001 Anesthesia Stop          Responsible Staff  06/14/24      Name Role Begin End    César Hester M.D. Anesth 0937 1001          Overtime Reason:  no overtime (within assigned shift)    Comments:

## 2024-06-14 NOTE — CARE PLAN
The patient is Watcher - Medium risk of patient condition declining or worsening    Shift Goals  Clinical Goals: Peg placement, abx  Patient Goals: GUZMAN  Family Goals: GUZMAN    Progress made toward(s) clinical / shift goals:      Problem: Fall Risk  Goal: Patient will remain free from falls  Outcome: Progressing       Patient is not progressing towards the following goals:

## 2024-06-14 NOTE — PROGRESS NOTES
2 RN Skin Assessment Completed by Lizz RN and Melita RN.     Head: WDL  Ears: WDL  Nose: WDL  Mouth: WDL  Neck: WDL  Breasts/Chest: WDL  Shoulder Blades: discoloration   Spine: WDL  (R) Arm/Elbow/hand: WDL  (L) Arm/Elbow/hand: WDL  Abdomen:WDL  Groin: WDL  Sacrum/Coccyx/Buttocks: blanching  (R) Leg: bruising  (L) Leg: bruising  (R) Heel/Foot/Toe: blanching, dry  (L) Heel/Foot/Toe: blanching, dry              Devices in place: BP Cuff and Pulse Ox     Interventions in place: Gray ear foams and Pillows     Possible skin injury found: No     Pictures uploaded into Epic: N/A  Wound Consult Placed: N/A

## 2024-06-14 NOTE — THERAPY
Speech Language Therapy Contact Note    Patient Name: Jl Mueller  Age:  78 y.o., Sex:  male  Medical Record #: [id number]  Today's Date: 6/14/2024 06/14/24 0842   Treatment Variance   Reason For Missed Therapy Medical - Patient  in Procedure   Interdisciplinary Plan of Care Collaboration   IDT Collaboration with  Other (See Comments)   Collaboration Comments Per EMR, pt currently NPO for PEG placement. SLP to follow on a later date. Of note, pt with minimal verbal output and inability to follow directives. Will hold cogitive evaluation until able to participate.

## 2024-06-14 NOTE — OR NURSING
0958- pt arrives from OR to PACU 4, report received from RN and anesthesia. Pt place on monitor. VSS,  NAD noted. O2 6L via mask.    Dressing to abdomen kortney EM noted.     1015-pt opening eyes to voice, mask replaced with NC    1025-report called to KAITLYNN Bergeron RN  Pt placed on transport    1035-wife brought back to bedside    1042-pt transported back to S1, all belongings accounted for.

## 2024-06-14 NOTE — PROGRESS NOTES
Hospital Medicine Daily Progress Note    Date of Service  6/14/2024    Chief Complaint  Jl Mueller is a 78 y.o. male admitted 5/29/2024 with altered mental status    Hospital Course  78-year-old with a history of BPH, previous hemorrhagic strokes, tobacco use, hypertension, coronary artery disease, dyslipidemia, chronic hypoxic respiratory failure dependent on 2 L of home oxygen therapy. He was admitted on May 29 due to neurologic changes, and was found to have a left cerebellar hemorrhage. CTA was negative for any vascular malformations. Patient did require hypertonic saline and was intubated for airway protection. He was successfully extubated on June 1 and weaned off of hypertonic saline. Hospital stay has been complicated by UTI which has grown back Enterococcus faecalis, and Enterobacter cloaca     Interval Problem Update  Patient was seen and examined at bedside.  No acute events overnight. Patient is resting comfortably in bed and in no acute distress. Wife updated at bedside.     PEG today  Tube feeds  2D echo 06/12 - EG 40%    I have discussed this patient's plan of care and discharge plan at IDT rounds today with Case Management, Nursing, Nursing leadership, and other members of the IDT team.    Consultants/Specialty  neurology and palliative care    Code Status  Full Code    Disposition  The patient is not medically cleared for discharge to home or a post-acute facility.      I have placed the appropriate orders for post-discharge needs.    Review of Systems  Review of Systems   Unable to perform ROS: Mental status change        Physical Exam  Temp:  [36.3 °C (97.4 °F)-36.8 °C (98.2 °F)] 36.7 °C (98.1 °F)  Pulse:  [] 84  Resp:  [18-32] 20  BP: (110-152)/(59-79) 133/71  SpO2:  [91 %-100 %] 97 %    Physical Exam  Constitutional:       General: He is not in acute distress.     Appearance: He is well-developed. He is not diaphoretic.   HENT:      Head: Normocephalic and atraumatic.      Right Ear:  External ear normal.      Left Ear: External ear normal.      Nose: No congestion.   Eyes:      General: No scleral icterus.     Conjunctiva/sclera: Conjunctivae normal.   Neck:      Vascular: No JVD.   Cardiovascular:      Rate and Rhythm: Normal rate.      Heart sounds: No murmur heard.  Pulmonary:      Effort: Pulmonary effort is normal.      Breath sounds: No wheezing.   Abdominal:      Palpations: Abdomen is soft.      Tenderness: There is no abdominal tenderness. There is no guarding or rebound.      Comments: PEG in place with abdominal binder   Musculoskeletal:      Right lower leg: No edema.      Left lower leg: No edema.   Skin:     General: Skin is warm and dry.      Findings: Bruising present. No rash.   Neurological:      Comments: Alert and attends to examiner         Fluids    Intake/Output Summary (Last 24 hours) at 6/14/2024 1158  Last data filed at 6/14/2024 1001  Gross per 24 hour   Intake 880 ml   Output --   Net 880 ml       Laboratory        Recent Labs     06/12/24  0004 06/12/24  0914 06/14/24  0009   SODIUM 139 139 139   POTASSIUM 4.1 4.1 4.1   CHLORIDE 104 102 102   CO2 23 24 24   GLUCOSE 149* 155* 118*   BUN 17 19 17   CREATININE 0.75 0.96 0.82   CALCIUM 9.4 9.2 9.5                   Imaging  EC-ECHOCARDIOGRAM COMPLETE W/ CONT   Final Result      DX-CHEST-PORTABLE (1 VIEW)   Final Result      1.  Small left pleural effusion and associated atelectasis.   2.  Mild bilateral interstitial opacities, likely mild interstitial edema.         CT-HEAD W/O   Final Result         1.  Area of cerebellar hemorrhage, decreased since prior study.   2.  Nonspecific white matter changes, commonly associated with small vessel ischemic disease.  Associated mild cerebral atrophy is noted.   3.  Atherosclerosis.         CT-HEAD W/O   Final Result      1.  Intraparenchymal hemorrhage in the cerebellar vermis and left cerebellum. It demonstrates expected evolution and decreased density.   2.  Redemonstration of  vasogenic edema surrounding the hematoma in the cerebellum with mild narrowing of the fourth ventricle. There is no hydrocephalus.         DX-CHEST-LIMITED (1 VIEW)   Final Result      Mild interstitial prominence could represent vascular congestion.      Cardiomegaly.      Removal of endotracheal tube.      WV-YBZEGEX-2 VIEW   Final Result      Feeding tube tip projects in the second portion of the duodenum.      Nonspecific bowel gas pattern.      CT-HEAD W/O   Final Result      1.  Advanced cerebral atrophy.   2.  Stable ventriculomegaly with intraventricular hemorrhage.   3.  Advanced supratentorial white matter disease most consistent with microvascular ischemic change.   4.  Multiple old lacunar infarcts as described.   5.  Acute/recent subacute parenchymal hemorrhage in the posterior fossa involving the superior cerebellar vermis and left paramedian cerebellar hemisphere and left lateral cerebellar peduncle. No evidence of recurrent hemorrhage.   6.  Minimal focus of subarachnoid hemorrhage within a single sulcus in the left posterior temporal region. Probably unchanged from prior recent exam.         DX-ABDOMEN FOR TUBE PLACEMENT   Final Result      Enteric tube tip projects over the stomach.      EC-ECHOCARDIOGRAM COMPLETE W/ CONT   Final Result      DX-CHEST-PORTABLE (1 VIEW)   Final Result         1.  Left basilar atelectasis or subtle infiltrate, decreased since prior study   2.  Trace left pleural effusion   3.  Enlargement of the aortic knob suggesting thoracic aortic aneurysm   4.  Atherosclerosis      MR-BRAIN-WITH & W/O   Final Result      1.  Stable fossa of parenchymal hemorrhage centered in the LEFT cerebellum and extending into the RIGHT cerebellum exerting mass effect on the fourth ventricle which is not completely effaced   2.  Intraventricular blood redemonstrated   3.  Numerous areas of remote microhemorrhage in the supra and infratentorial brain. These could be related to amyloid  angiopathy, numerous hypertensive microhemorrhages or less likely multiple cavernomas   4.  Moderate diffuse atrophy without compelling evidence of hydrocephalus   5.  Advanced white matter changes   6.  4 mm LEFT temporal meningioma   7.  Enhancing nodule in the RIGHT internal auditory canal suspicious for a vestibular schwannoma, less likely other extra-axial mass such as a meningioma, facial nerve schwannoma or metastasis. Consider posterior fossa protocol brain MRI without and with    contrast to further evaluate in when clinically appropriate.      MR-MRA HEAD-W/O   Final Result         MRA OF THE [place] WITHIN NORMAL LIMITS.      DX-CHEST-PORTABLE (1 VIEW)   Final Result         1.  Hazy left lower lobe infiltrate   2.  Small left pleural effusion   3.  Enlargement of the aortic knob, appearance suggesting aortic aneurysm.      DX-CHEST-FOR LINE PLACEMENT Perform procedure in: Patient's Room   Final Result      1.  PICC line is in place with the tip projecting over the SVC in satisfactory position.   2.  Mildly enlarged cardiac silhouette with vascular congestion.   3.  Retrocardiac opacity is likely due to atelectasis.         IR-PICC LINE PLACEMENT W/ GUIDANCE > AGE 5   Final Result                  Ultrasound-guided PICC placement performed by qualified nursing staff as    above.          CT-HEAD W/O   Final Result         1.  Left cerebellar hemorrhage, similar compared to prior study.   2.  Minimal bilateral intraventricular hemorrhages, new since prior study.   3.  Nonspecific white matter changes, commonly associated with small vessel ischemic disease.  Associated mild cerebral atrophy is noted.   4.  Atherosclerosis.         DX-CHEST-PORTABLE (1 VIEW)   Final Result         1.  Left basilar atelectasis, no focal infiltrate   2.  Trace left pleural effusion   3.  Atherosclerosis      CT-CTA NECK WITH & W/O-POST PROCESSING   Final Result         1.  Scattered atherosclerosis without significant  stenosis or occlusion.   2.  4.2 cm proximal descending thoracic aortic aneurysm, radiographic follow-up and surveillance recommended as clinically appropriate.         CT-CTA HEAD WITH & W/O-POST PROCESS   Final Result         1.  No large vessel occlusion or aneurysm identified   2.  Large cerebellar hemorrhage predominantly in the left cerebellum      These findings were discussed with the patient's clinician, Nikki Alexander, on 5/29/2024 11:35 PM.      DX-CHEST-PORTABLE (1 VIEW)   Final Result      Tubes as above      Left pleural effusion      CHF/pulmonary edema pattern      See Brown MD   5/30/2024 8:41 AM         CT-HEAD W/O   Final Result      Acute intraparenchymal hemorrhage is noted involving the left cerebellar hemisphere, measuring 3.1 x 4.2 x 3.4 cm, with associated mass effect.      This finding was telephoned to the mentioned attending by on-call radiologist at the time of imaging         AAST Intracranial Hemorrhage Brain Injury Guidelines         Hemorrhage type  mBIG 1           mBIG 2                mBIG 3      Subdural             <4mm               5-7.9 mm             >8mm      Epidural                No                    No                  Yes      Intraparenc'mal   <4mm               5-7.9 mm         >8mm or multi   1 location       or 2 locations      >3 locations      Subarachnoid     <3 sulci       single hemisphere   bi-hemisphere   and <1 mm        or 1-3 mm            or > 3 mm      Intraventricular       No                  No                    Yes      Skull Fracture       None            Non-displaced       Displaced               DLP Reporting Thresholds for Incorrect/Repeated Exams - DLP in mGy*cm   Head/Neck:  0-year-old 3840, 1-year-old 5880, 5-year-old 8770, 10-year-old 71966 and adult 34681   Head:  0-year-old 4540, 1-year-old 7460, 5-year-old 08460, 10-year-old 13988 and adult 18999   Neck:  0-year-old 2940, 1-year-old 4160, 5-year-old 4550, 10-year-old 6320 and  adult 8470   Chest:  0-year-old 550, 1-year-old 830, 5-year-old 1200, 10-year-old 3840 and adult 3570   Abd/pelvis:  0-year-old 440, 1-year-old 720, 5-year-old 1080, 10-year-old 3330 and adult 3330   Trunk(C/A/P):  0-year-old 490, 1-year-old 770, 5-year-old 1140, 10-year-old 3570 and adult 3330      OUTSIDE IMAGES-CT CERVICAL SPINE    (Results Pending)        Assessment/Plan  * Cerebellar hemorrhage (HCC)- (present on admission)  Assessment & Plan  ICH score=1  Thought due to amyloid  MRI brain: Stable fossa of parenchymal hemorrhage centered in the LEFT cerebellum and extending into the RIGHT cerebellum exerting mass effect on the fourth ventricle which is not completely effaced 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  HOB at 30 degrees  maintain pCO2 of 35-40; closer to 35  Pravastatin 40 mg qHS for possible neuroprotective effect  CT 6/7 unchanged    PEG placed today  No antiplatelet or anticoagulation therapy as thought due to Amyloid    UTI (urinary tract infection)  Assessment & Plan  Urine culture with E faecalis and E cloacae  Remove leung when able: doing a voiding trial  Zosyn x 7 days given sensitivities: complete tomorrow    Encephalopathy acute- (present on admission)  Assessment & Plan  Likely delirium in conjunction with his intracranial pathology  Keep patient awake during the day and avoid daytime naps. Remove all unnecessary lines (central lines, peripheral IVs, feeding tubes, leung catheters). Avoid polypharmacy, frequent re-orientation, maximize family time at bedside, use glasses and hearing aids if needed, treat pain, encourage ambulation, minimize benzos/anticholinergic agents.   Ambulate, get out of restraints is much as  possible    DC am resperidal   Cont qhs     Acute on chronic respiratory failure with hypoxia (HCC)- (present on admission)  Assessment & Plan  Intubated for airway protection  Extubated 6/1  Chest x-ray not impressive on 6/4/2024  Mobilization  Pulmonary hygiene  RT/O2 Protocols  Titrate supplemental FiO2 to maintain SpO2 >92%  Monitor vol status closely    History of stroke- (present on admission)  Assessment & Plan  Patient has had 2 prior hemorrhagic strokes; last in 2015  No noted deficits per wife    COPD (chronic obstructive pulmonary disease) (HCC)- (present on admission)  Assessment & Plan  Not currently in exacerbation  CXR with no acute process  Continue home nebulizers as ordered  Continue PRN nebulizers Q4h  Wean FiO2 as tolerated    Chronic systolic heart failure (HCC)  Assessment & Plan  EF 40%  ACE allergy  BB    Paroxysmal tachycardia (HCC)  Assessment & Plan  New Dx PAFib  Given multiple previous ICH's and likely etiology being amyloid, he is not now nor will he ever be a candidate for anticoagulation  Will DC amlodipine and start a BB for rate control and BP managementl  Cont Tele      Hyperchloremic metabolic acidosis  Assessment & Plan  Improved    Hyperglycemia- (present on admission)  Assessment & Plan  Goal blood glucose 140-180  A1c 5.6  Off insulin  hypoglycemia protocol    Hyperlipidemia- (present on admission)  Assessment & Plan  Per wife, patient's home medication was discontinued due to muscle cramps  Pravastatin 40mg QHS  Lipid panel reviewed  Counseling on lifestyle/dietary modifications    BPH with urinary obstruction- (present on admission)  Assessment & Plan  S/p prostate surgery  Per wife, patient has suffered from urinary retention and incontinence since his surgery  Continue flomax  Park removed 6/12: thus far has not had any retention    Benign essential hypertension- (present on admission)  Assessment & Plan  Given new Dx of PAFib will DC amlodipine and start Takmpotdqd37.5mg  BID  Cont to monitor    Abdominal aortic aneurysm (AAA) without rupture (HCC)- (present on admission)  Assessment & Plan  S/p EVAR w/ bypass graft stent  Maintain normotension  Holding ASA at this time; per wife he takes ASA 3x/week         VTE prophylaxis:   SCDs/TEDs      I have performed a physical exam and reviewed and updated ROS and Plan today (6/14/2024). In review of yesterday's note (6/13/2024), there are no changes except as documented above.    Greater than 51 minutes spent prepping to see patient (e.g. review of tests) obtaining and/or reviewing separately obtained history. Performing a medically appropriate examination and/ evaluation.  Counseling and educating the patient/family/caregiver.  Ordering medications, tests, or procedures.  Referring and communicating with other health care professionals.  Documenting clinical information in EPIC.  Independently interpreting results and communicating results to patient/family/caregiver.  Care coordination.

## 2024-06-14 NOTE — CARE PLAN
The patient is Stable - Low risk of patient condition declining or worsening    Shift Goals  Clinical Goals: Peg placement  Patient Goals: GUZMAN  Family Goals: GUZMAN    Progress made toward(s) clinical / shift goals:     Patient is not progressing towards the following goals:  Problem: Safety - Medical Restraint  Goal: Remains free of injury from restraints (Restraint for Interference with Medical Device)  Outcome: Progressing  Note: Patient has Q2 turns in place, range of motion, and restraints removed throughout the shift to ensure no skin breakdown.      Problem: Skin Integrity  Goal: Skin integrity is maintained or improved  Outcome: Progressing  Note: Q2 hour turns in place, patient changed as needed, barrier paste and heel float boots utilized.

## 2024-06-14 NOTE — THERAPY
Physical Therapy Contact Note    Patient Name: Jl Mueller  Age:  78 y.o., Sex:  male  Medical Record #: [id number]  Today's Date: 6/14/2024 06/14/24 0913   Treatment Variance   Reason For Missed Therapy Medical - Patient  in Procedure   Interdisciplinary Plan of Care Collaboration   IDT Collaboration with  Nursing   Collaboration Comments Patient was to be seen for PT session this AM. Patient currently off the unit for PEG placement. PT to follow up at a later date as able & appropriate.   Session Information   Date / Session Number  6/10-3(1/4, 6/15); Attempt 6/14

## 2024-06-14 NOTE — PROGRESS NOTES
Patient transported back to unit with transport and PACU RN. Vitals started and patient bed alarm in place.

## 2024-06-14 NOTE — THERAPY
Occupational Therapy  Daily Treatment     Patient Name: Jl Mueller  Age:  78 y.o., Sex:  male  Medical Record #: [id number]  Today's Date: 6/14/2024     Precautions  Precautions: Fall Risk, PEG Tube  Comments: baseline 2L of O2; Hx of prior CVA with L-sided deficits    Assessment    Pt seen for OT session. Pt had PEG tube inserted today. Pt completed LB dressing Max A while lying in bed and g/h while seated EOB SPV. Pt able to recognize toothbrush however did not initiate until therapist put toothbrush in his mouth. Pt unable to follow verbal cues, pt sometimes appropriately responded to tactile cues. Pt continues to have poor static and dynamic sitting balance. Pt would still benefit from skilled OT services while admitted and recommend post acute placement at this time.     Plan    Treatment Plan Status: Continue Current Treatment Plan  Type of Treatment: Self Care / Activities of Daily Living, Therapeutic Activity  Treatment Frequency: 3 Times per Week  Treatment Duration: Until Therapy Goals Met    DC Equipment Recommendations: Unable to determine at this time  Discharge Recommendations: Recommend post-acute placement for additional occupational therapy services prior to discharge home       Objective       06/14/24 1438   Precautions   Precautions Fall Risk;PEG Tube   Vitals   Pulse Oximetry 91 %   O2 (LPM) 2   O2 Delivery Device Nasal Cannula   Cognition    Cognition / Consciousness X   Speech/ Communication Delayed Responses;Dysarthric;Expressive Aphasia   Orientation Level Not Oriented to Reason;Not Oriented to Place;Not Oriented to Time   Level of Consciousness Responds to voice   Ability To Follow Commands Unable to Follow 1 Step Commands   Safety Awareness Impaired;Impulsive   New Learning Impaired   Attention Impaired   Sequencing Impaired   Initiation Impaired   Comments pt unable to follow verbal cues, sometimes responds to voice and touch   Neuro-Muscular Treatments   Neuro-Muscular Treatments  Anterior weight shift;Facilitation;Postural Facilitation;Verbal Cuing   Balance   Sitting Balance (Static) Poor +   Sitting Balance (Dynamic) Poor   Weight Shift Sitting Poor   Comments sitting EOB   Bed Mobility    Supine to Sit Maximal Assist   Sit to Supine Minimal Assist   Scooting Maximal Assist   Rolling Maximal Assist to Rt.;Maximum Assist to Lt.   Skilled Intervention Verbal Cuing;Tactile Cuing   Activities of Daily Living   Grooming Supervision;Seated   Upper Body Dressing Maximal Assist   Lower Body Dressing Maximal Assist  (pt was able to pull socks up after therapist donned the toe portion)   Skilled Intervention Verbal Cuing;Tactile Cuing   How much help from another person does the patient currently need...   Putting on and taking off regular lower body clothing? 2   Bathing (including washing, rinsing, and drying)? 2   Toileting, which includes using a toilet, bedpan, or urinal? 2   Putting on and taking off regular upper body clothing? 2   Taking care of personal grooming such as brushing teeth? 3   Eating meals? 3   6 Clicks Daily Activity Score 14   Modified Lily (mRS)   Modified Lily Score 4   Functional Mobility   Sit to Stand Unable to Participate  (STS attempted with 2 therapist, pt unable to stand, will continue to assess)   Mobility bed mobility and EOB   Skilled Intervention Verbal Cuing;Tactile Cuing;Compensatory Strategies;Facilitation;Postural Facilitation;Sequencing   Activity Tolerance   Sitting Edge of Bed 10 mins during g/h   Patient / Family Goals   Patient / Family Goal #1 none stated   Short Term Goals   Short Term Goal # 1 SBA with grooming tasks   Goal Outcome # 1 Progressing as expected   Short Term Goal # 2 min A with UB dressing   Goal Outcome # 2 Progressing as expected   Short Term Goal # 3 mod A with LB dressing   Goal Outcome # 3 Progressing as expected   Education Group   Education Provided Role of Occupational Therapist;Activities of Daily Living;Use of Call Light    Role of Occupational Therapist Patient Response Patient;Acceptance;Explanation   ADL Patient Response Patient;Acceptance;Explanation   Use of Call Light Patient Response Patient;Acceptance;Explanation   Occupational Therapy Treatment Plan    O.T. Treatment Plan Continue Current Treatment Plan   Duration Until Therapy Goals Met   Anticipated Discharge Equipment and Recommendations   DC Equipment Recommendations Unable to determine at this time   Discharge Recommendations Recommend post-acute placement for additional occupational therapy services prior to discharge home   Interdisciplinary Plan of Care Collaboration   IDT Collaboration with  Nursing   Patient Position at End of Therapy In Bed;Bed Alarm On;Wrist Restraints Applied;Call Light within Reach;Tray Table within Reach;Phone within Reach;Family / Friend in Room   Collaboration Comments RN updated

## 2024-06-14 NOTE — ANESTHESIA PREPROCEDURE EVALUATION
Case: [id number] Date/Time: 06/14/24 0940    Procedure: GASTROSCOPY, WITH PEG TUBE PLACEMENT    Location: [facility] ROOM 26 / SURGERY SAME DAY [facility]    Surgeons: Jose Cobos M.D.          77 yo M w/ COPD on 2L home O2, HTN, CAD s/p MI (2008), h/o hemorrhagic stroke, AAA w/ stent, admitted w/ cerebellar hemorrhage, now w/ dysphagia  Relevant Problems   PULMONARY   (positive) COPD (chronic obstructive pulmonary disease) (HCC)      CARDIAC   (positive) Abdominal aortic aneurysm (AAA) without rupture (HCC)   (positive) Atherosclerotic heart disease of native coronary artery without angina pectoris   (positive) Benign essential hypertension   (positive) Non-rheumatic aortic regurgitation   (positive) Paroxysmal tachycardia (HCC)       Physical Exam    Anesthesia Plan

## 2024-06-14 NOTE — PROCEDURES
PATIENT: Jl Mueller  1945      Location : [facility]    PREOPERATIVE DIAGNOSIS/INDICATION: Dysphagia.     POSTOPERATIVE DIAGNOSES:   S/p PEG    PROCEDURE:  EGD WITH PEG placement.    SURGEON:  Josafat Cha MD MPH.     ANESTHESIA:  Per Anesthesia team.     CONSENT:  After confirming the patient's identity, the procedure was explained in detail to the patient. Risks of the procedure including but not limited to bleeding, infection, perforation, sedation risks and risks of missed lesions were explained to the patient. Patient has signed informed consent.    DESCRIPTION:  The patient presented to the procedure room.  After routine checkup was performed, the patient was brought into the endoscopy suite, placed in the left lateral decubitus position and was sedated by anesthesia.  Vital signs were monitored throughout the procedure.  Oxygenation support was provided throughout the procedure. Upper endoscope was inserted into patient's mouth and advanced toward the second portion of the duodenum under direct visualization.  Once the site was reached and examined, the upper endoscope was withdrawn.  Retroflexion was made within the gastric cardia.  The patient tolerated the procedure well.  There were no immediate complications.    PEG placement: An appropriate site was selected and the stomach was trans-illuminated and an optimal position for the PEG tube was identified using good 1:1 transmission method. The skin was infiltrated with local anesthetic and the trocar and sheath were inserted through the abdomen into the stomach under direct visualization. The needle was removed and a guidewire was inserted through the sheath. The guidewire was grasped within the stomach with a snare. It was removed completely out of the mouth and the PEG tube was secured to the guidewire.    The guidewire and PEG tube were then pulled through the mouth and esophagus and snug to the abdominal wall.The external bumper fixer was at   4 cm at the level of the skin. There was no evidence of bleeding. Gastroscope was reintroduced to look for adequate positioning of the PEG and for complications. No complications or bleeding seen. The Bolster was placed on the PEG site. The patient tolerated the procedure well and was transferred to recovery room in stable condition    Medications given: none, he is on regular antibiotic by the primary team.       RECOMMENDATIONS:    1. PEG placed without complication.    Please ensure proper PEG care and tube feeding:  - keep head of bed elevated to at least 30 degrees during tube feeding. The largest factor in preventing aspiration during tube feeding is not the placement of the tube into the stomach or jejunum, but keeping the head elevated to at least 30 degrees during feeding  - Medications and water can be started 6 hours after placement. Tube feeds can be started tomorrow morning.   - PEG needs 0.5-1cm of laxity freely mobile in and out of the gastrostomy tract. Overtightening may impair tract maturation by inducing localized ischemia. Over tightening may also result in buried bumper syndrome.      2. No NSAIDS for 7 days. For full dose anti-coagulation, please hold 24 hours after the procedure. Anti-platelet per the primary/cardiology team.

## 2024-06-14 NOTE — DISCHARGE PLANNING
1450  Agency/Facility Name: [facility]  Outcome: DPA LVM inquiring follow up on pending SNF  DPA will try again soon.

## 2024-06-14 NOTE — ANESTHESIA PREPROCEDURE EVALUATION
Case: [id number] Date/Time: 06/14/24 0940    Procedure: GASTROSCOPY, WITH PEG TUBE PLACEMENT    Location: [facility] ROOM 26 / SURGERY SAME DAY [facility]    Surgeons: Jose Cobos M.D.          79 yo M w/ COPD on 2L home O2, HFrEF (LVEF 40%), CAD s/p MI (2008), HTN, h/o hemorrhagic stroke, AAA w/ stent, admitted w/ cerebellar hemorrhage, now w/ dysphagia    TTE 6/12/24  LVEF 40%, akinesis of the inferolateral wall, mild to moderate AI  Relevant Problems   PULMONARY   (positive) COPD (chronic obstructive pulmonary disease) (HCC)      CARDIAC   (positive) Abdominal aortic aneurysm (AAA) without rupture (HCC)   (positive) Atherosclerotic heart disease of native coronary artery without angina pectoris   (positive) Benign essential hypertension   (positive) Non-rheumatic aortic regurgitation   (positive) Paroxysmal tachycardia (HCC)       Physical Exam    Airway   Mallampati: II  TM distance: >3 FB  Neck ROM: full       Cardiovascular - normal exam  Rhythm: regular  Rate: normal  (-) murmur     Dental       Very poor dentition     Pulmonary - normal exam  Breath sounds clear to auscultation     Abdominal    Neurological - abnormal exam                   Anesthesia Plan    ASA 4   ASA physical status 4 criteria: CVA or TIA - recent (< 3 months)    Plan - MAC               Induction: intravenous    Postoperative Plan: Postoperative administration of opioids is intended.    Pertinent diagnostic labs and testing reviewed    Informed Consent:    Anesthetic plan and risks discussed with patient.    Use of blood products discussed with: patient whom consented to blood products.

## 2024-06-14 NOTE — ANESTHESIA POSTPROCEDURE EVALUATION
Patient: Jl Mueller    Procedure Summary       Date: 06/14/24 Room / Location: [facility] ROOM 26 / SURGERY SAME DAY [facility]    Anesthesia Start: 0937 Anesthesia Stop: 1001    Procedure: GASTROSCOPY, WITH PEG TUBE PLACEMENT (Esophagus) Diagnosis: (PEG Tube Insertion)    Surgeons: Jose Cobos M.D. Responsible Provider: César Hester M.D.    Anesthesia Type: MAC ASA Status: 4            Final Anesthesia Type: MAC  Last vitals  BP   Blood Pressure : 126/78    Temp   36.4 °C (97.6 °F)    Pulse   87   Resp   20    SpO2   91 %      Anesthesia Post Evaluation    Patient location during evaluation: PACU  Patient participation: complete - patient participated  Level of consciousness: awake  Pain score: 2    Airway patency: patent  Anesthetic complications: no  Cardiovascular status: hemodynamically stable  Respiratory status: acceptable  Hydration status: acceptable    PONV: none          No notable events documented.     Nurse Pain Score: 0 (NPRS)

## 2024-06-15 LAB
ALBUMIN SERPL BCP-MCNC: 3.7 G/DL (ref 3.2–4.9)
BASOPHILS # BLD AUTO: 0.4 % (ref 0–1.8)
BASOPHILS # BLD: 0.04 K/UL (ref 0–0.12)
BUN SERPL-MCNC: 18 MG/DL (ref 8–22)
CALCIUM ALBUM COR SERPL-MCNC: 9.4 MG/DL (ref 8.5–10.5)
CALCIUM SERPL-MCNC: 9.2 MG/DL (ref 8.5–10.5)
CHLORIDE SERPL-SCNC: 105 MMOL/L (ref 96–112)
CO2 SERPL-SCNC: 22 MMOL/L (ref 20–33)
CREAT SERPL-MCNC: 0.85 MG/DL (ref 0.5–1.4)
EOSINOPHIL # BLD AUTO: 0.32 K/UL (ref 0–0.51)
EOSINOPHIL NFR BLD: 3.2 % (ref 0–6.9)
ERYTHROCYTE [DISTWIDTH] IN BLOOD BY AUTOMATED COUNT: 47.7 FL (ref 35.9–50)
GFR SERPLBLD CREATININE-BSD FMLA CKD-EPI: 89 ML/MIN/1.73 M 2
GLUCOSE SERPL-MCNC: 97 MG/DL (ref 65–99)
HCT VFR BLD AUTO: 35 % (ref 42–52)
HGB BLD-MCNC: 11 G/DL (ref 14–18)
IMM GRANULOCYTES # BLD AUTO: 0.07 K/UL (ref 0–0.11)
IMM GRANULOCYTES NFR BLD AUTO: 0.7 % (ref 0–0.9)
LYMPHOCYTES # BLD AUTO: 0.64 K/UL (ref 1–4.8)
LYMPHOCYTES NFR BLD: 6.4 % (ref 22–41)
MAGNESIUM SERPL-MCNC: 2.4 MG/DL (ref 1.5–2.5)
MCH RBC QN AUTO: 27.8 PG (ref 27–33)
MCHC RBC AUTO-ENTMCNC: 31.4 G/DL (ref 32.3–36.5)
MCV RBC AUTO: 88.6 FL (ref 81.4–97.8)
MONOCYTES # BLD AUTO: 0.78 K/UL (ref 0–0.85)
MONOCYTES NFR BLD AUTO: 7.8 % (ref 0–13.4)
NEUTROPHILS # BLD AUTO: 8.09 K/UL (ref 1.82–7.42)
NEUTROPHILS NFR BLD: 81.5 % (ref 44–72)
NRBC # BLD AUTO: 0 K/UL
NRBC BLD-RTO: 0 /100 WBC (ref 0–0.2)
PHOSPHATE SERPL-MCNC: 2.7 MG/DL (ref 2.5–4.5)
PLATELET # BLD AUTO: 374 K/UL (ref 164–446)
PMV BLD AUTO: 11.5 FL (ref 9–12.9)
POTASSIUM SERPL-SCNC: 3.9 MMOL/L (ref 3.6–5.5)
RBC # BLD AUTO: 3.95 M/UL (ref 4.7–6.1)
SODIUM SERPL-SCNC: 139 MMOL/L (ref 135–145)
WBC # BLD AUTO: 9.9 K/UL (ref 4.8–10.8)

## 2024-06-15 PROCEDURE — A9270 NON-COVERED ITEM OR SERVICE: HCPCS | Performed by: HOSPITALIST

## 2024-06-15 PROCEDURE — 770001 HCHG ROOM/CARE - MED/SURG/GYN PRIV*

## 2024-06-15 PROCEDURE — 700102 HCHG RX REV CODE 250 W/ 637 OVERRIDE(OP): Performed by: INTERNAL MEDICINE

## 2024-06-15 PROCEDURE — 700102 HCHG RX REV CODE 250 W/ 637 OVERRIDE(OP): Performed by: HOSPITALIST

## 2024-06-15 PROCEDURE — A9270 NON-COVERED ITEM OR SERVICE: HCPCS | Performed by: INTERNAL MEDICINE

## 2024-06-15 PROCEDURE — 80069 RENAL FUNCTION PANEL: CPT

## 2024-06-15 PROCEDURE — 36415 COLL VENOUS BLD VENIPUNCTURE: CPT

## 2024-06-15 PROCEDURE — 99232 SBSQ HOSP IP/OBS MODERATE 35: CPT | Performed by: INTERNAL MEDICINE

## 2024-06-15 PROCEDURE — A9270 NON-COVERED ITEM OR SERVICE: HCPCS | Performed by: PHYSICAL MEDICINE & REHABILITATION

## 2024-06-15 PROCEDURE — 700105 HCHG RX REV CODE 258: Performed by: INTERNAL MEDICINE

## 2024-06-15 PROCEDURE — 700111 HCHG RX REV CODE 636 W/ 250 OVERRIDE (IP): Mod: JZ | Performed by: INTERNAL MEDICINE

## 2024-06-15 PROCEDURE — 700102 HCHG RX REV CODE 250 W/ 637 OVERRIDE(OP): Performed by: PHYSICAL MEDICINE & REHABILITATION

## 2024-06-15 PROCEDURE — 85025 COMPLETE CBC W/AUTO DIFF WBC: CPT

## 2024-06-15 PROCEDURE — 83735 ASSAY OF MAGNESIUM: CPT

## 2024-06-15 RX ADMIN — RISPERIDONE 2 MG: 1 TABLET, FILM COATED ORAL at 17:08

## 2024-06-15 RX ADMIN — OXYCODONE HYDROCHLORIDE 5 MG: 5 TABLET ORAL at 17:09

## 2024-06-15 RX ADMIN — LANSOPRAZOLE 30 MG: 30 TABLET, ORALLY DISINTEGRATING ORAL at 05:30

## 2024-06-15 RX ADMIN — METOPROLOL TARTRATE 12.5 MG: 25 TABLET, FILM COATED ORAL at 05:30

## 2024-06-15 RX ADMIN — PRAVASTATIN SODIUM 40 MG: 20 TABLET ORAL at 17:09

## 2024-06-15 RX ADMIN — Medication 5 MG: at 20:01

## 2024-06-15 RX ADMIN — Medication: at 08:43

## 2024-06-15 RX ADMIN — Medication: at 17:10

## 2024-06-15 RX ADMIN — OXYCODONE HYDROCHLORIDE 5 MG: 5 TABLET ORAL at 05:44

## 2024-06-15 RX ADMIN — OXYCODONE HYDROCHLORIDE 10 MG: 10 TABLET ORAL at 21:28

## 2024-06-15 RX ADMIN — PIPERACILLIN AND TAZOBACTAM 3.38 G: 3; .375 INJECTION, POWDER, FOR SOLUTION INTRAVENOUS at 05:27

## 2024-06-15 RX ADMIN — METOPROLOL TARTRATE 12.5 MG: 25 TABLET, FILM COATED ORAL at 17:10

## 2024-06-15 ASSESSMENT — PAIN DESCRIPTION - PAIN TYPE
TYPE: ACUTE PAIN

## 2024-06-15 ASSESSMENT — FIBROSIS 4 INDEX: FIB4 SCORE: 1.19

## 2024-06-15 NOTE — PROGRESS NOTES
Hospital Medicine Daily Progress Note    Date of Service  6/15/2024    Chief Complaint  Jl Mueller is a 78 y.o. male admitted 5/29/2024 with altered mental status    Hospital Course  78-year-old with a history of BPH, previous hemorrhagic strokes, tobacco use, hypertension, coronary artery disease, dyslipidemia, chronic hypoxic respiratory failure dependent on 2 L of home oxygen therapy. He was admitted on May 29 due to neurologic changes, and was found to have a left cerebellar hemorrhage. CTA was negative for any vascular malformations. Patient did require hypertonic saline and was intubated for airway protection. He was successfully extubated on June 1 and weaned off of hypertonic saline. Hospital stay has been complicated by UTI which has grown back Enterococcus faecalis, and Enterobacter cloaca     Interval Problem Update  Patient was seen and examined at bedside.  No acute events overnight. Patient is resting comfortably in bed and in no acute distress.     PEG 06/14  Tube feeds  2D echo 06/12 - EG 40%  Check AM labs    I have discussed this patient's plan of care and discharge plan at IDT rounds today with Case Management, Nursing, Nursing leadership, and other members of the IDT team.    Consultants/Specialty  neurology and palliative care    Code Status  Full Code    Disposition  The patient is not medically cleared for discharge to home or a post-acute facility.  Anticipate discharge to: skilled nursing facility    I have placed the appropriate orders for post-discharge needs.    Review of Systems  Review of Systems   Unable to perform ROS: Mental status change        Physical Exam  Temp:  [36.4 °C (97.5 °F)-37 °C (98.6 °F)] 37 °C (98.6 °F)  Pulse:  [74-99] 74  Resp:  [16-25] 16  BP: (113-145)/() 120/77  SpO2:  [91 %-94 %] 94 %    Physical Exam  Constitutional:       General: He is not in acute distress.     Appearance: He is well-developed. He is not diaphoretic.   HENT:      Head:  Normocephalic and atraumatic.      Right Ear: External ear normal.      Left Ear: External ear normal.      Nose: No congestion.   Eyes:      General: No scleral icterus.     Conjunctiva/sclera: Conjunctivae normal.   Neck:      Vascular: No JVD.   Cardiovascular:      Rate and Rhythm: Normal rate.      Heart sounds: No murmur heard.  Pulmonary:      Effort: Pulmonary effort is normal.      Breath sounds: No wheezing.   Abdominal:      Palpations: Abdomen is soft.      Tenderness: There is no abdominal tenderness. There is no guarding or rebound.      Comments: PEG in place with abdominal binder   Musculoskeletal:      Right lower leg: No edema.      Left lower leg: No edema.   Skin:     General: Skin is warm and dry.      Findings: Bruising present. No rash.   Neurological:      Comments: Alert and attends to examiner         Fluids    Intake/Output Summary (Last 24 hours) at 6/15/2024 1412  Last data filed at 6/15/2024 1200  Gross per 24 hour   Intake 90 ml   Output --   Net 90 ml       Laboratory  Recent Labs     06/15/24  0317   WBC 9.9   RBC 3.95*   HEMOGLOBIN 11.0*   HEMATOCRIT 35.0*   MCV 88.6   MCH 27.8   MCHC 31.4*   RDW 47.7   PLATELETCT 374   MPV 11.5       Recent Labs     06/14/24  0009 06/15/24  0317   SODIUM 139 139   POTASSIUM 4.1 3.9   CHLORIDE 102 105   CO2 24 22   GLUCOSE 118* 97   BUN 17 18   CREATININE 0.82 0.85   CALCIUM 9.5 9.2                   Imaging  EC-ECHOCARDIOGRAM COMPLETE W/ CONT   Final Result      DX-CHEST-PORTABLE (1 VIEW)   Final Result      1.  Small left pleural effusion and associated atelectasis.   2.  Mild bilateral interstitial opacities, likely mild interstitial edema.         CT-HEAD W/O   Final Result         1.  Area of cerebellar hemorrhage, decreased since prior study.   2.  Nonspecific white matter changes, commonly associated with small vessel ischemic disease.  Associated mild cerebral atrophy is noted.   3.  Atherosclerosis.         CT-HEAD W/O   Final Result       1.  Intraparenchymal hemorrhage in the cerebellar vermis and left cerebellum. It demonstrates expected evolution and decreased density.   2.  Redemonstration of vasogenic edema surrounding the hematoma in the cerebellum with mild narrowing of the fourth ventricle. There is no hydrocephalus.         DX-CHEST-LIMITED (1 VIEW)   Final Result      Mild interstitial prominence could represent vascular congestion.      Cardiomegaly.      Removal of endotracheal tube.      [id number] VIEW   Final Result      Feeding tube tip projects in the second portion of the duodenum.      Nonspecific bowel gas pattern.      CT-HEAD W/O   Final Result      1.  Advanced cerebral atrophy.   2.  Stable ventriculomegaly with intraventricular hemorrhage.   3.  Advanced supratentorial white matter disease most consistent with microvascular ischemic change.   4.  Multiple old lacunar infarcts as described.   5.  Acute/recent subacute parenchymal hemorrhage in the posterior fossa involving the superior cerebellar vermis and left paramedian cerebellar hemisphere and left lateral cerebellar peduncle. No evidence of recurrent hemorrhage.   6.  Minimal focus of subarachnoid hemorrhage within a single sulcus in the left posterior temporal region. Probably unchanged from prior recent exam.         DX-ABDOMEN FOR TUBE PLACEMENT   Final Result      Enteric tube tip projects over the stomach.      EC-ECHOCARDIOGRAM COMPLETE W/ CONT   Final Result      DX-CHEST-PORTABLE (1 VIEW)   Final Result         1.  Left basilar atelectasis or subtle infiltrate, decreased since prior study   2.  Trace left pleural effusion   3.  Enlargement of the aortic knob suggesting thoracic aortic aneurysm   4.  Atherosclerosis      MR-BRAIN-WITH & W/O   Final Result      1.  Stable fossa of parenchymal hemorrhage centered in the LEFT cerebellum and extending into the RIGHT cerebellum exerting mass effect on the fourth ventricle which is not completely effaced   2.   Intraventricular blood redemonstrated   3.  Numerous areas of remote microhemorrhage in the supra and infratentorial brain. These could be related to amyloid angiopathy, numerous hypertensive microhemorrhages or less likely multiple cavernomas   4.  Moderate diffuse atrophy without compelling evidence of hydrocephalus   5.  Advanced white matter changes   6.  4 mm LEFT temporal meningioma   7.  Enhancing nodule in the RIGHT internal auditory canal suspicious for a vestibular schwannoma, less likely other extra-axial mass such as a meningioma, facial nerve schwannoma or metastasis. Consider posterior fossa protocol brain MRI without and with    contrast to further evaluate in when clinically appropriate.      MR-MRA HEAD-W/O   Final Result         MRA OF THE [place] OF [place] WITHIN NORMAL LIMITS.      DX-CHEST-PORTABLE (1 VIEW)   Final Result         1.  Hazy left lower lobe infiltrate   2.  Small left pleural effusion   3.  Enlargement of the aortic knob, appearance suggesting aortic aneurysm.      DX-CHEST-FOR LINE PLACEMENT Perform procedure in: Patient's Room   Final Result      1.  PICC line is in place with the tip projecting over the SVC in satisfactory position.   2.  Mildly enlarged cardiac silhouette with vascular congestion.   3.  Retrocardiac opacity is likely due to atelectasis.         IR-PICC LINE PLACEMENT W/ GUIDANCE > AGE 5   Final Result                  Ultrasound-guided PICC placement performed by qualified nursing staff as    above.          CT-HEAD W/O   Final Result         1.  Left cerebellar hemorrhage, similar compared to prior study.   2.  Minimal bilateral intraventricular hemorrhages, new since prior study.   3.  Nonspecific white matter changes, commonly associated with small vessel ischemic disease.  Associated mild cerebral atrophy is noted.   4.  Atherosclerosis.         DX-CHEST-PORTABLE (1 VIEW)   Final Result         1.  Left basilar atelectasis, no focal infiltrate   2.  Trace  left pleural effusion   3.  Atherosclerosis      CT-CTA NECK WITH & W/O-POST PROCESSING   Final Result         1.  Scattered atherosclerosis without significant stenosis or occlusion.   2.  4.2 cm proximal descending thoracic aortic aneurysm, radiographic follow-up and surveillance recommended as clinically appropriate.         CT-CTA HEAD WITH & W/O-POST PROCESS   Final Result         1.  No large vessel occlusion or aneurysm identified   2.  Large cerebellar hemorrhage predominantly in the left cerebellum      These findings were discussed with the patient's clinician, Nikki Alexander, on 5/29/2024 11:35 PM.      DX-CHEST-PORTABLE (1 VIEW)   Final Result      Tubes as above      Left pleural effusion      CHF/pulmonary edema pattern      See Brown MD   5/30/2024 8:41 AM         CT-HEAD W/O   Final Result      Acute intraparenchymal hemorrhage is noted involving the left cerebellar hemisphere, measuring 3.1 x 4.2 x 3.4 cm, with associated mass effect.      This finding was telephoned to the mentioned attending by on-call radiologist at the time of imaging         AAST Intracranial Hemorrhage Brain Injury Guidelines         Hemorrhage type  mBIG 1           mBIG 2                mBIG 3      Subdural             <4mm               5-7.9 mm             >8mm      Epidural                No                    No                  Yes      Intraparenc'mal   <4mm               5-7.9 mm         >8mm or multi   1 location       or 2 locations      >3 locations      Subarachnoid     <3 sulci       single hemisphere   bi-hemisphere   and <1 mm        or 1-3 mm            or > 3 mm      Intraventricular       No                  No                    Yes      Skull Fracture       None            Non-displaced       Displaced               DLP Reporting Thresholds for Incorrect/Repeated Exams - DLP in mGy*cm   Head/Neck:  0-year-old 3840, 1-year-old 5880, 5-year-old 8770, 10-year-old 07028 and adult 44075   Head:   0-year-old 4540, 1-year-old 7460, 5-year-old 23577, 10-year-old 43558 and adult 61942   Neck:  0-year-old 2940, 1-year-old 4160, 5-year-old 4550, 10-year-old 6320 and adult 8470   Chest:  0-year-old 550, 1-year-old 830, 5-year-old 1200, 10-year-old 3840 and adult 3570   Abd/pelvis:  0-year-old 440, 1-year-old 720, 5-year-old 1080, 10-year-old 3330 and adult 3330   Trunk(C/A/P):  0-year-old 490, 1-year-old 770, 5-year-old 1140, 10-year-old 3570 and adult 3330      OUTSIDE IMAGES-CT CERVICAL SPINE    (Results Pending)        Assessment/Plan  * Cerebellar hemorrhage (HCC)- (present on admission)  Assessment & Plan  ICH score=1  Thought due to amyloid  MRI brain: Stable fossa of parenchymal hemorrhage centered in the LEFT cerebellum and extending into the RIGHT cerebellum exerting mass effect on the fourth ventricle which is not completely effaced 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  HOB at 30 degrees  maintain pCO2 of 35-40; closer to 35  Pravastatin 40 mg qHS for possible neuroprotective effect  CT 6/7 unchanged    PEG placed today  No antiplatelet or anticoagulation therapy as thought due to Amyloid    UTI (urinary tract infection)  Assessment & Plan  Urine culture with E faecalis and E cloacae  Remove leung when able: doing a voiding trial  Zosyn x 7 days given sensitivities: complete tomorrow    Encephalopathy acute- (present on admission)  Assessment & Plan  Likely delirium in conjunction with his intracranial pathology  Keep patient awake during the day and avoid daytime naps. Remove all unnecessary lines (central lines, peripheral IVs, feeding tubes, leung catheters). Avoid polypharmacy, frequent re-orientation, maximize family time at  bedside, use glasses and hearing aids if needed, treat pain, encourage ambulation, minimize benzos/anticholinergic agents.   Ambulate, get out of restraints is much as possible    DC am resperidal   Cont qhs     Acute on chronic respiratory failure with hypoxia (HCC)- (present on admission)  Assessment & Plan  Intubated for airway protection  Extubated 6/1  Chest x-ray not impressive on 6/4/2024  Mobilization  Pulmonary hygiene  RT/O2 Protocols  Titrate supplemental FiO2 to maintain SpO2 >92%  Monitor vol status closely    History of stroke- (present on admission)  Assessment & Plan  Patient has had 2 prior hemorrhagic strokes; last in 2015  No noted deficits per wife    COPD (chronic obstructive pulmonary disease) (HCC)- (present on admission)  Assessment & Plan  Not currently in exacerbation  CXR with no acute process  Continue home nebulizers as ordered  Continue PRN nebulizers Q4h  Wean FiO2 as tolerated    Chronic systolic heart failure (HCC)  Assessment & Plan  EF 40%  ACE allergy  BB    Paroxysmal tachycardia (HCC)  Assessment & Plan  New Dx PAFib  Given multiple previous ICH's and likely etiology being amyloid, he is not now nor will he ever be a candidate for anticoagulation  Will DC amlodipine and start a BB for rate control and BP managementl  Cont Tele      Hyperchloremic metabolic acidosis  Assessment & Plan  Improved    Hyperglycemia- (present on admission)  Assessment & Plan  Goal blood glucose 140-180  A1c 5.6  Off insulin  hypoglycemia protocol    Hyperlipidemia- (present on admission)  Assessment & Plan  Per wife, patient's home medication was discontinued due to muscle cramps  Pravastatin 40mg QHS  Lipid panel reviewed  Counseling on lifestyle/dietary modifications    BPH with urinary obstruction- (present on admission)  Assessment & Plan  S/p prostate surgery  Per wife, patient has suffered from urinary retention and incontinence since his surgery  Continue flomax  Park removed 6/12: thus far  has not had any retention    Benign essential hypertension- (present on admission)  Assessment & Plan  Given new Dx of PAFib will DC amlodipine and start Guxtluwkzn19.5mg BID  Cont to monitor    Abdominal aortic aneurysm (AAA) without rupture (HCC)- (present on admission)  Assessment & Plan  S/p EVAR w/ bypass graft stent  Maintain normotension  Holding ASA at this time; per wife he takes ASA 3x/week         VTE prophylaxis:   SCDs/TEDs      I have performed a physical exam and reviewed and updated ROS and Plan today (6/15/2024). In review of yesterday's note (6/14/2024), there are no changes except as documented above.    Greater than 43 minutes spent prepping to see patient (e.g. review of tests) obtaining and/or reviewing separately obtained history. Performing a medically appropriate examination and/ evaluation.  Counseling and educating the patient/family/caregiver.  Ordering medications, tests, or procedures.  Referring and communicating with other health care professionals.  Documenting clinical information in EPIC.  Independently interpreting results and communicating results to patient/family/caregiver.  Care coordination.

## 2024-06-15 NOTE — CARE PLAN
The patient is Watcher - Medium risk of patient condition declining or worsening    Shift Goals  Clinical Goals: IV ABX, safety, Qshift neuro checks  Patient Goals: GUZMAN  Family Goals: updates if there are changes    Progress made toward(s) clinical / shift goals:    Problem: Fall Risk  Goal: Patient will remain free from falls  Description: Target End Date:  6/15/24    1.  Assess for fall risk factors  2.  Implement fall precautions  Outcome: Progressing       Patient is not progressing towards the following goals:      Problem: Safety - Medical Restraint  Goal: Remains free of injury from restraints (Restraint for Interference with Medical Device)  Description: INTERVENTIONS:  1. Determine that other, less restrictive measures have been tried or would not be effective before applying the restraint  2. Evaluate the patient's condition at the time of restraint application  3. Educate patient/family regarding the reason for restraint  4. Q2H: Monitor safety, psychosocial status, comfort, circulation, respiratory status, LOC, nutrition and hydration  Outcome: Not Progressing  Goal: Free from restraint(s) (Restraint for Interference with Medical Device)  Description: INTERVENTIONS:  1.  ONCE/SHIFT or MINIMUM Q12H: Assess and document the continuing need for restraints  2.  Q24H: Continued use of restraint requires LIP to perform face to face examination and written order  3.  Identify and implement measures to help patient regain control  4.  Educate patient/family on discontinuation criteria   5.  Assess patient's understanding and retention of education provided  6.  Assess readiness for release & initiate progressive release per protocol  7.  Identify and document criteria for restraints  Outcome: Not Progressing

## 2024-06-16 PROBLEM — E83.39 HYPOPHOSPHATEMIA: Status: ACTIVE | Noted: 2024-06-16

## 2024-06-16 PROBLEM — E87.6 HYPOKALEMIA: Status: ACTIVE | Noted: 2024-06-16

## 2024-06-16 LAB
ALBUMIN SERPL BCP-MCNC: 3.7 G/DL (ref 3.2–4.9)
BASOPHILS # BLD AUTO: 0.7 % (ref 0–1.8)
BASOPHILS # BLD: 0.06 K/UL (ref 0–0.12)
BUN SERPL-MCNC: 21 MG/DL (ref 8–22)
CALCIUM ALBUM COR SERPL-MCNC: 9.3 MG/DL (ref 8.5–10.5)
CALCIUM SERPL-MCNC: 9.1 MG/DL (ref 8.5–10.5)
CHLORIDE SERPL-SCNC: 106 MMOL/L (ref 96–112)
CO2 SERPL-SCNC: 22 MMOL/L (ref 20–33)
CREAT SERPL-MCNC: 0.7 MG/DL (ref 0.5–1.4)
EOSINOPHIL # BLD AUTO: 0.25 K/UL (ref 0–0.51)
EOSINOPHIL NFR BLD: 2.7 % (ref 0–6.9)
ERYTHROCYTE [DISTWIDTH] IN BLOOD BY AUTOMATED COUNT: 47.5 FL (ref 35.9–50)
GFR SERPLBLD CREATININE-BSD FMLA CKD-EPI: 94 ML/MIN/1.73 M 2
GLUCOSE SERPL-MCNC: 131 MG/DL (ref 65–99)
HCT VFR BLD AUTO: 33.5 % (ref 42–52)
HGB BLD-MCNC: 10.5 G/DL (ref 14–18)
IMM GRANULOCYTES # BLD AUTO: 0.05 K/UL (ref 0–0.11)
IMM GRANULOCYTES NFR BLD AUTO: 0.5 % (ref 0–0.9)
LYMPHOCYTES # BLD AUTO: 0.55 K/UL (ref 1–4.8)
LYMPHOCYTES NFR BLD: 6 % (ref 22–41)
MAGNESIUM SERPL-MCNC: 2.3 MG/DL (ref 1.5–2.5)
MCH RBC QN AUTO: 27.6 PG (ref 27–33)
MCHC RBC AUTO-ENTMCNC: 31.3 G/DL (ref 32.3–36.5)
MCV RBC AUTO: 88.2 FL (ref 81.4–97.8)
MONOCYTES # BLD AUTO: 0.86 K/UL (ref 0–0.85)
MONOCYTES NFR BLD AUTO: 9.4 % (ref 0–13.4)
NEUTROPHILS # BLD AUTO: 7.41 K/UL (ref 1.82–7.42)
NEUTROPHILS NFR BLD: 80.7 % (ref 44–72)
NRBC # BLD AUTO: 0 K/UL
NRBC BLD-RTO: 0 /100 WBC (ref 0–0.2)
PHOSPHATE SERPL-MCNC: 2.4 MG/DL (ref 2.5–4.5)
PLATELET # BLD AUTO: 396 K/UL (ref 164–446)
PMV BLD AUTO: 11.6 FL (ref 9–12.9)
POTASSIUM SERPL-SCNC: 3.4 MMOL/L (ref 3.6–5.5)
RBC # BLD AUTO: 3.8 M/UL (ref 4.7–6.1)
SODIUM SERPL-SCNC: 139 MMOL/L (ref 135–145)
WBC # BLD AUTO: 9.2 K/UL (ref 4.8–10.8)

## 2024-06-16 PROCEDURE — A9270 NON-COVERED ITEM OR SERVICE: HCPCS | Performed by: PHYSICAL MEDICINE & REHABILITATION

## 2024-06-16 PROCEDURE — 700102 HCHG RX REV CODE 250 W/ 637 OVERRIDE(OP): Performed by: INTERNAL MEDICINE

## 2024-06-16 PROCEDURE — 99232 SBSQ HOSP IP/OBS MODERATE 35: CPT | Performed by: INTERNAL MEDICINE

## 2024-06-16 PROCEDURE — 85025 COMPLETE CBC W/AUTO DIFF WBC: CPT

## 2024-06-16 PROCEDURE — 700102 HCHG RX REV CODE 250 W/ 637 OVERRIDE(OP): Performed by: PHYSICAL MEDICINE & REHABILITATION

## 2024-06-16 PROCEDURE — A9270 NON-COVERED ITEM OR SERVICE: HCPCS | Performed by: HOSPITALIST

## 2024-06-16 PROCEDURE — 700105 HCHG RX REV CODE 258: Performed by: INTERNAL MEDICINE

## 2024-06-16 PROCEDURE — A9270 NON-COVERED ITEM OR SERVICE: HCPCS | Performed by: INTERNAL MEDICINE

## 2024-06-16 PROCEDURE — 700102 HCHG RX REV CODE 250 W/ 637 OVERRIDE(OP): Performed by: HOSPITALIST

## 2024-06-16 PROCEDURE — 770001 HCHG ROOM/CARE - MED/SURG/GYN PRIV*

## 2024-06-16 PROCEDURE — 700101 HCHG RX REV CODE 250: Performed by: INTERNAL MEDICINE

## 2024-06-16 PROCEDURE — 80069 RENAL FUNCTION PANEL: CPT

## 2024-06-16 PROCEDURE — 36415 COLL VENOUS BLD VENIPUNCTURE: CPT

## 2024-06-16 PROCEDURE — 83735 ASSAY OF MAGNESIUM: CPT

## 2024-06-16 RX ADMIN — POTASSIUM PHOSPHATE, MONOBASIC AND POTASSIUM PHOSPHATE, DIBASIC 30 MMOL: 224; 236 INJECTION, SOLUTION, CONCENTRATE INTRAVENOUS at 08:38

## 2024-06-16 RX ADMIN — METOPROLOL TARTRATE 12.5 MG: 25 TABLET, FILM COATED ORAL at 05:25

## 2024-06-16 RX ADMIN — OXYCODONE HYDROCHLORIDE 5 MG: 5 TABLET ORAL at 14:25

## 2024-06-16 RX ADMIN — RISPERIDONE 2 MG: 1 TABLET, FILM COATED ORAL at 18:25

## 2024-06-16 RX ADMIN — Medication 5 MG: at 21:26

## 2024-06-16 RX ADMIN — PRAVASTATIN SODIUM 40 MG: 20 TABLET ORAL at 18:25

## 2024-06-16 RX ADMIN — Medication: at 05:25

## 2024-06-16 RX ADMIN — METOPROLOL TARTRATE 12.5 MG: 25 TABLET, FILM COATED ORAL at 18:26

## 2024-06-16 RX ADMIN — Medication: at 18:25

## 2024-06-16 RX ADMIN — LANSOPRAZOLE 30 MG: 30 TABLET, ORALLY DISINTEGRATING ORAL at 05:25

## 2024-06-16 RX ADMIN — OXYCODONE HYDROCHLORIDE 10 MG: 10 TABLET ORAL at 22:57

## 2024-06-16 ASSESSMENT — PAIN DESCRIPTION - PAIN TYPE
TYPE: ACUTE PAIN

## 2024-06-16 ASSESSMENT — FIBROSIS 4 INDEX: FIB4 SCORE: 1.19

## 2024-06-16 NOTE — CARE PLAN
The patient is Stable - Low risk of patient condition declining or worsening    Shift Goals  Clinical Goals: Stable neuro exam, prevent skin injury, rest throughout the night  Patient Goals: GUZMAN  Family Goals: updates if there are changes    Progress made toward(s) clinical / shift goals:        Problem: Safety - Medical Restraint  Goal: Remains free of injury from restraints (Restraint for Interference with Medical Device)  Outcome: Progressing     Problem: Pain - Standard  Goal: Alleviation of pain or a reduction in pain to the patient’s comfort goal  Outcome: Progressing     Problem: Skin Integrity  Goal: Skin integrity is maintained or improved  Outcome: Progressing

## 2024-06-16 NOTE — PROGRESS NOTES
Hospital Medicine Daily Progress Note    Date of Service  6/16/2024    Chief Complaint  Jl Mueller is a 78 y.o. male admitted 5/29/2024 with altered mental status    Hospital Course  78-year-old with a history of BPH, previous hemorrhagic strokes, tobacco use, hypertension, coronary artery disease, dyslipidemia, chronic hypoxic respiratory failure dependent on 2 L of home oxygen therapy. He was admitted on May 29 due to neurologic changes, and was found to have a left cerebellar hemorrhage. CTA was negative for any vascular malformations. Patient did require hypertonic saline and was intubated for airway protection. He was successfully extubated on June 1 and weaned off of hypertonic saline. Hospital stay has been complicated by UTI which has grown back Enterococcus faecalis, and Enterobacter cloaca     Interval Problem Update  Patient was seen and examined at bedside.  No acute events overnight. Patient is resting comfortably in bed and in no acute distress. Wife updated at bedside.     PEG 06/14  Tube feeds  2D echo 06/12 - EG 40%  AM labs reviewed    I have discussed this patient's plan of care and discharge plan at IDT rounds today with Case Management, Nursing, Nursing leadership, and other members of the IDT team.    Consultants/Specialty  neurology and palliative care    Code Status  Full Code    Disposition  Medically Cleared  I have placed the appropriate orders for post-discharge needs.    Review of Systems  Review of Systems   Unable to perform ROS: Mental status change        Physical Exam  Temp:  [36.6 °C (97.9 °F)-37.2 °C (99 °F)] 36.6 °C (97.9 °F)  Pulse:  [73-96] 85  Resp:  [16-19] 19  BP: (110-146)/(63-76) 131/75  SpO2:  [93 %-97 %] 97 %    Physical Exam  Constitutional:       General: He is not in acute distress.     Appearance: He is well-developed. He is not diaphoretic.      Comments: Somnolent  Briefly arouses   HENT:      Head: Normocephalic and atraumatic.      Right Ear: External ear  normal.      Left Ear: External ear normal.      Nose: No congestion.   Eyes:      General: No scleral icterus.     Conjunctiva/sclera: Conjunctivae normal.   Neck:      Vascular: No JVD.   Cardiovascular:      Rate and Rhythm: Normal rate.      Heart sounds: No murmur heard.  Pulmonary:      Effort: Pulmonary effort is normal.      Breath sounds: No wheezing.   Abdominal:      Palpations: Abdomen is soft.      Tenderness: There is no abdominal tenderness. There is no guarding or rebound.      Comments: PEG in place with abdominal binder   Musculoskeletal:      Right lower leg: No edema.      Left lower leg: No edema.   Skin:     General: Skin is warm and dry.      Findings: Bruising present. No rash.   Neurological:      Comments: Alert and attends to examiner         Fluids    Intake/Output Summary (Last 24 hours) at 6/16/2024 1315  Last data filed at 6/16/2024 1200  Gross per 24 hour   Intake 800 ml   Output 350 ml   Net 450 ml       Laboratory  Recent Labs     06/15/24  0317 06/16/24  0401   WBC 9.9 9.2   RBC 3.95* 3.80*   HEMOGLOBIN 11.0* 10.5*   HEMATOCRIT 35.0* 33.5*   MCV 88.6 88.2   MCH 27.8 27.6   MCHC 31.4* 31.3*   RDW 47.7 47.5   PLATELETCT 374 396   MPV 11.5 11.6       Recent Labs     06/14/24  0009 06/15/24  0317 06/16/24  0401   SODIUM 139 139 139   POTASSIUM 4.1 3.9 3.4*   CHLORIDE 102 105 106   CO2 24 22 22   GLUCOSE 118* 97 131*   BUN 17 18 21   CREATININE 0.82 0.85 0.70   CALCIUM 9.5 9.2 9.1                   Imaging  EC-ECHOCARDIOGRAM COMPLETE W/ CONT   Final Result      DX-CHEST-PORTABLE (1 VIEW)   Final Result      1.  Small left pleural effusion and associated atelectasis.   2.  Mild bilateral interstitial opacities, likely mild interstitial edema.         CT-HEAD W/O   Final Result         1.  Area of cerebellar hemorrhage, decreased since prior study.   2.  Nonspecific white matter changes, commonly associated with small vessel ischemic disease.  Associated mild cerebral atrophy is noted.    3.  Atherosclerosis.         CT-HEAD W/O   Final Result      1.  Intraparenchymal hemorrhage in the cerebellar vermis and left cerebellum. It demonstrates expected evolution and decreased density.   2.  Redemonstration of vasogenic edema surrounding the hematoma in the cerebellum with mild narrowing of the fourth ventricle. There is no hydrocephalus.         DX-CHEST-LIMITED (1 VIEW)   Final Result      Mild interstitial prominence could represent vascular congestion.      Cardiomegaly.      Removal of endotracheal tube.      OF-ROYWRJR-4 VIEW   Final Result      Feeding tube tip projects in the second portion of the duodenum.      Nonspecific bowel gas pattern.      CT-HEAD W/O   Final Result      1.  Advanced cerebral atrophy.   2.  Stable ventriculomegaly with intraventricular hemorrhage.   3.  Advanced supratentorial white matter disease most consistent with microvascular ischemic change.   4.  Multiple old lacunar infarcts as described.   5.  Acute/recent subacute parenchymal hemorrhage in the posterior fossa involving the superior cerebellar vermis and left paramedian cerebellar hemisphere and left lateral cerebellar peduncle. No evidence of recurrent hemorrhage.   6.  Minimal focus of subarachnoid hemorrhage within a single sulcus in the left posterior temporal region. Probably unchanged from prior recent exam.         DX-ABDOMEN FOR TUBE PLACEMENT   Final Result      Enteric tube tip projects over the stomach.      EC-ECHOCARDIOGRAM COMPLETE W/ CONT   Final Result      DX-CHEST-PORTABLE (1 VIEW)   Final Result         1.  Left basilar atelectasis or subtle infiltrate, decreased since prior study   2.  Trace left pleural effusion   3.  Enlargement of the aortic knob suggesting thoracic aortic aneurysm   4.  Atherosclerosis      MR-BRAIN-WITH & W/O   Final Result      1.  Stable fossa of parenchymal hemorrhage centered in the LEFT cerebellum and extending into the RIGHT cerebellum exerting mass effect on the  fourth ventricle which is not completely effaced   2.  Intraventricular blood redemonstrated   3.  Numerous areas of remote microhemorrhage in the supra and infratentorial brain. These could be related to amyloid angiopathy, numerous hypertensive microhemorrhages or less likely multiple cavernomas   4.  Moderate diffuse atrophy without compelling evidence of hydrocephalus   5.  Advanced white matter changes   6.  4 mm LEFT temporal meningioma   7.  Enhancing nodule in the RIGHT internal auditory canal suspicious for a vestibular schwannoma, less likely other extra-axial mass such as a meningioma, facial nerve schwannoma or metastasis. Consider posterior fossa protocol brain MRI without and with    contrast to further evaluate in when clinically appropriate.      MR-MRA HEAD-W/O   Final Result         MRA OF THE [place] WITHIN NORMAL LIMITS.      DX-CHEST-PORTABLE (1 VIEW)   Final Result         1.  Hazy left lower lobe infiltrate   2.  Small left pleural effusion   3.  Enlargement of the aortic knob, appearance suggesting aortic aneurysm.      DX-CHEST-FOR LINE PLACEMENT Perform procedure in: Patient's Room   Final Result      1.  PICC line is in place with the tip projecting over the SVC in satisfactory position.   2.  Mildly enlarged cardiac silhouette with vascular congestion.   3.  Retrocardiac opacity is likely due to atelectasis.         IR-PICC LINE PLACEMENT W/ GUIDANCE > AGE 5   Final Result                  Ultrasound-guided PICC placement performed by qualified nursing staff as    above.          CT-HEAD W/O   Final Result         1.  Left cerebellar hemorrhage, similar compared to prior study.   2.  Minimal bilateral intraventricular hemorrhages, new since prior study.   3.  Nonspecific white matter changes, commonly associated with small vessel ischemic disease.  Associated mild cerebral atrophy is noted.   4.  Atherosclerosis.         DX-CHEST-PORTABLE (1 VIEW)   Final Result         1.  Left  basilar atelectasis, no focal infiltrate   2.  Trace left pleural effusion   3.  Atherosclerosis      CT-CTA NECK WITH & W/O-POST PROCESSING   Final Result         1.  Scattered atherosclerosis without significant stenosis or occlusion.   2.  4.2 cm proximal descending thoracic aortic aneurysm, radiographic follow-up and surveillance recommended as clinically appropriate.         CT-CTA HEAD WITH & W/O-POST PROCESS   Final Result         1.  No large vessel occlusion or aneurysm identified   2.  Large cerebellar hemorrhage predominantly in the left cerebellum      These findings were discussed with the patient's clinician, Nikki Alexander, on 5/29/2024 11:35 PM.      DX-CHEST-PORTABLE (1 VIEW)   Final Result      Tubes as above      Left pleural effusion      CHF/pulmonary edema pattern      See Brown MD   5/30/2024 8:41 AM         CT-HEAD W/O   Final Result      Acute intraparenchymal hemorrhage is noted involving the left cerebellar hemisphere, measuring 3.1 x 4.2 x 3.4 cm, with associated mass effect.      This finding was telephoned to the mentioned attending by on-call radiologist at the time of imaging         AAST Intracranial Hemorrhage Brain Injury Guidelines         Hemorrhage type  mBIG 1           mBIG 2                mBIG 3      Subdural             <4mm               5-7.9 mm             >8mm      Epidural                No                    No                  Yes      Intraparenc'mal   <4mm               5-7.9 mm         >8mm or multi   1 location       or 2 locations      >3 locations      Subarachnoid     <3 sulci       single hemisphere   bi-hemisphere   and <1 mm        or 1-3 mm            or > 3 mm      Intraventricular       No                  No                    Yes      Skull Fracture       None            Non-displaced       Displaced               DLP Reporting Thresholds for Incorrect/Repeated Exams - DLP in mGy*cm   Head/Neck:  0-year-old 3840, 1-year-old 5880, 5-year-old  8770, 10-year-old 30989 and adult 15711   Head:  0-year-old 4540, 1-year-old 7460, 5-year-old 93692, 10-year-old 04008 and adult 03839   Neck:  0-year-old 2940, 1-year-old 4160, 5-year-old 4550, 10-year-old 6320 and adult 8470   Chest:  0-year-old 550, 1-year-old 830, 5-year-old 1200, 10-year-old 3840 and adult 3570   Abd/pelvis:  0-year-old 440, 1-year-old 720, 5-year-old 1080, 10-year-old 3330 and adult 3330   Trunk(C/A/P):  0-year-old 490, 1-year-old 770, 5-year-old 1140, 10-year-old 3570 and adult 3330      OUTSIDE IMAGES-CT CERVICAL SPINE    (Results Pending)        Assessment/Plan  * Cerebellar hemorrhage (HCC)- (present on admission)  Assessment & Plan  ICH score=1  Thought due to amyloid  MRI brain: Stable fossa of parenchymal hemorrhage centered in the LEFT cerebellum and extending into the RIGHT cerebellum exerting mass effect on the fourth ventricle which is not completely effaced 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  HOB at 30 degrees  maintain pCO2 of 35-40; closer to 35  Pravastatin 40 mg qHS for possible neuroprotective effect  CT 6/7 unchanged    PEG placed today  No antiplatelet or anticoagulation therapy as thought due to Amyloid    UTI (urinary tract infection)  Assessment & Plan  Urine culture with E faecalis and E cloacae  Remove leung when able: doing a voiding trial  Zosyn x 7 days given sensitivities: complete tomorrow    Encephalopathy acute- (present on admission)  Assessment & Plan  Likely delirium in conjunction with his intracranial pathology  Keep patient awake during the day and avoid daytime naps. Remove all unnecessary lines (central lines, peripheral IVs, feeding tubes, leung catheters). Avoid polypharmacy,  frequent re-orientation, maximize family time at bedside, use glasses and hearing aids if needed, treat pain, encourage ambulation, minimize benzos/anticholinergic agents.   Ambulate, get out of restraints is much as possible    DC am resperidal   Cont qhs     Acute on chronic respiratory failure with hypoxia (HCC)- (present on admission)  Assessment & Plan  Intubated for airway protection  Extubated 6/1  Chest x-ray not impressive on 6/4/2024  Mobilization  Pulmonary hygiene  RT/O2 Protocols  Titrate supplemental FiO2 to maintain SpO2 >92%  Monitor vol status closely    History of stroke- (present on admission)  Assessment & Plan  Patient has had 2 prior hemorrhagic strokes; last in 2015  No noted deficits per wife    COPD (chronic obstructive pulmonary disease) (HCC)- (present on admission)  Assessment & Plan  Not currently in exacerbation  CXR with no acute process  Continue home nebulizers as ordered  Continue PRN nebulizers Q4h  Wean FiO2 as tolerated    Hypophosphatemia  Assessment & Plan  Monitor and replace    Hypokalemia  Assessment & Plan  Monitor and repalce    Chronic systolic heart failure (HCC)  Assessment & Plan  EF 40%  ACE allergy  BB    Paroxysmal tachycardia (HCC)  Assessment & Plan  New Dx PAFib  Given multiple previous ICH's and likely etiology being amyloid, he is not now nor will he ever be a candidate for anticoagulation  Will DC amlodipine and start a BB for rate control and BP managementl  Cont Tele      Hyperchloremic metabolic acidosis  Assessment & Plan  Improved    Hyperglycemia- (present on admission)  Assessment & Plan  Goal blood glucose 140-180  A1c 5.6  Off insulin  hypoglycemia protocol    Hyperlipidemia- (present on admission)  Assessment & Plan  Per wife, patient's home medication was discontinued due to muscle cramps  Pravastatin 40mg QHS  Lipid panel reviewed  Counseling on lifestyle/dietary modifications    BPH with urinary obstruction- (present on admission)  Assessment &  Plan  S/p prostate surgery  Per wife, patient has suffered from urinary retention and incontinence since his surgery  Continue flomax  [place] removed 6/12: thus far has not had any retention    Benign essential hypertension- (present on admission)  Assessment & Plan  Given new Dx of PAFib will DC amlodipine and start [id number] BID  Cont to monitor    Abdominal aortic aneurysm (AAA) without rupture (HCC)- (present on admission)  Assessment & Plan  S/p EVAR w/ bypass graft stent  Maintain normotension  Holding ASA at this time; per wife he takes ASA 3x/week         VTE prophylaxis:   SCDs/TEDs      I have performed a physical exam and reviewed and updated ROS and Plan today (6/16/2024). In review of yesterday's note (6/15/2024), there are no changes except as documented above.    Greater than 44 minutes spent prepping to see patient (e.g. review of tests) obtaining and/or reviewing separately obtained history. Performing a medically appropriate examination and/ evaluation.  Counseling and educating the patient/family/caregiver.  Ordering medications, tests, or procedures.  Referring and communicating with other health care professionals.  Documenting clinical information in EPIC.  Independently interpreting results and communicating results to patient/family/caregiver.  Care coordination.

## 2024-06-16 NOTE — CARE PLAN
The patient is Stable - Low risk of patient condition declining or worsening    Shift Goals  Clinical Goals: safety  Patient Goals: GUZMAN  Family Goals: updates if there are changes    Progress made toward(s) clinical / shift goals:    Problem: Safety - Medical Restraint  Goal: Remains free of injury from restraints (Restraint for Interference with Medical Device)  Outcome: Progressing  Note: Patient on Q2 turns throughout the shift, patients wrist were examined and they are free from redness, range of motion performed by staff and family member, and patient is staying in restraints due to continuing to pull at equipment.      Problem: Pain - Standard  Goal: Alleviation of pain or a reduction in pain to the patient’s comfort goal  Outcome: Progressing  Note: Patient assessed for pain throughout shift with nonverbal descriptors, patient appeared to be grimacing and restless towards the end of shift so medication was provided, pain reassessed and patient is sleeping and calm.

## 2024-06-17 LAB
ALBUMIN SERPL BCP-MCNC: 3.9 G/DL (ref 3.2–4.9)
ALBUMIN/GLOB SERPL: 1.3 G/DL
ALP SERPL-CCNC: 55 U/L (ref 30–99)
ALT SERPL-CCNC: 33 U/L (ref 2–50)
ANION GAP SERPL CALC-SCNC: 12 MMOL/L (ref 7–16)
AST SERPL-CCNC: 22 U/L (ref 12–45)
BILIRUB SERPL-MCNC: 0.2 MG/DL (ref 0.1–1.5)
BUN SERPL-MCNC: 20 MG/DL (ref 8–22)
CALCIUM ALBUM COR SERPL-MCNC: 9.9 MG/DL (ref 8.5–10.5)
CALCIUM SERPL-MCNC: 9.8 MG/DL (ref 8.5–10.5)
CHLORIDE SERPL-SCNC: 101 MMOL/L (ref 96–112)
CO2 SERPL-SCNC: 24 MMOL/L (ref 20–33)
CREAT SERPL-MCNC: 0.76 MG/DL (ref 0.5–1.4)
CRP SERPL HS-MCNC: 0.99 MG/DL (ref 0–0.75)
GFR SERPLBLD CREATININE-BSD FMLA CKD-EPI: 92 ML/MIN/1.73 M 2
GLOBULIN SER CALC-MCNC: 3.1 G/DL (ref 1.9–3.5)
GLUCOSE SERPL-MCNC: 116 MG/DL (ref 65–99)
POTASSIUM SERPL-SCNC: 4 MMOL/L (ref 3.6–5.5)
PREALB SERPL-MCNC: 23.2 MG/DL (ref 18–38)
PROT SERPL-MCNC: 7 G/DL (ref 6–8.2)
SODIUM SERPL-SCNC: 137 MMOL/L (ref 135–145)

## 2024-06-17 PROCEDURE — 700111 HCHG RX REV CODE 636 W/ 250 OVERRIDE (IP): Mod: JZ | Performed by: HOSPITALIST

## 2024-06-17 PROCEDURE — 700102 HCHG RX REV CODE 250 W/ 637 OVERRIDE(OP): Performed by: INTERNAL MEDICINE

## 2024-06-17 PROCEDURE — 770001 HCHG ROOM/CARE - MED/SURG/GYN PRIV*

## 2024-06-17 PROCEDURE — 84134 ASSAY OF PREALBUMIN: CPT

## 2024-06-17 PROCEDURE — A9270 NON-COVERED ITEM OR SERVICE: HCPCS | Performed by: HOSPITALIST

## 2024-06-17 PROCEDURE — 86140 C-REACTIVE PROTEIN: CPT

## 2024-06-17 PROCEDURE — 700111 HCHG RX REV CODE 636 W/ 250 OVERRIDE (IP): Mod: JZ | Performed by: NURSE PRACTITIONER

## 2024-06-17 PROCEDURE — 80053 COMPREHEN METABOLIC PANEL: CPT

## 2024-06-17 PROCEDURE — 99232 SBSQ HOSP IP/OBS MODERATE 35: CPT | Performed by: INTERNAL MEDICINE

## 2024-06-17 PROCEDURE — 700105 HCHG RX REV CODE 258: Performed by: NURSE PRACTITIONER

## 2024-06-17 PROCEDURE — A9270 NON-COVERED ITEM OR SERVICE: HCPCS | Performed by: INTERNAL MEDICINE

## 2024-06-17 PROCEDURE — 700102 HCHG RX REV CODE 250 W/ 637 OVERRIDE(OP): Performed by: HOSPITALIST

## 2024-06-17 PROCEDURE — 97530 THERAPEUTIC ACTIVITIES: CPT | Mod: CQ

## 2024-06-17 PROCEDURE — 36415 COLL VENOUS BLD VENIPUNCTURE: CPT

## 2024-06-17 RX ORDER — HALOPERIDOL 2 MG/1
1 TABLET ORAL 3 TIMES DAILY
Status: DISCONTINUED | OUTPATIENT
Start: 2024-06-17 | End: 2024-06-17

## 2024-06-17 RX ORDER — HALOPERIDOL 2 MG/1
1 TABLET ORAL 3 TIMES DAILY
Status: DISCONTINUED | OUTPATIENT
Start: 2024-06-17 | End: 2024-06-18

## 2024-06-17 RX ORDER — SODIUM CHLORIDE 9 MG/ML
INJECTION, SOLUTION INTRAVENOUS CONTINUOUS
Status: ACTIVE | OUTPATIENT
Start: 2024-06-17 | End: 2024-06-18

## 2024-06-17 RX ADMIN — HALOPERIDOL LACTATE 2 MG: 5 INJECTION, SOLUTION INTRAMUSCULAR at 09:29

## 2024-06-17 RX ADMIN — HALOPERIDOL LACTATE 2 MG: 5 INJECTION, SOLUTION INTRAMUSCULAR at 23:30

## 2024-06-17 RX ADMIN — LANSOPRAZOLE 30 MG: 30 TABLET, ORALLY DISINTEGRATING ORAL at 06:09

## 2024-06-17 RX ADMIN — SODIUM CHLORIDE: 9 INJECTION, SOLUTION INTRAVENOUS at 22:29

## 2024-06-17 RX ADMIN — HALOPERIDOL 1 MG: 2 TABLET ORAL at 17:37

## 2024-06-17 RX ADMIN — PRAVASTATIN SODIUM 40 MG: 20 TABLET ORAL at 17:36

## 2024-06-17 RX ADMIN — Medication: at 06:10

## 2024-06-17 RX ADMIN — LOPERAMIDE HYDROCHLORIDE 2 MG: 2 CAPSULE ORAL at 17:37

## 2024-06-17 RX ADMIN — RISPERIDONE 2 MG: 1 TABLET, FILM COATED ORAL at 17:36

## 2024-06-17 RX ADMIN — METOPROLOL TARTRATE 12.5 MG: 25 TABLET, FILM COATED ORAL at 06:10

## 2024-06-17 RX ADMIN — Medication: at 18:00

## 2024-06-17 RX ADMIN — METOPROLOL TARTRATE 12.5 MG: 25 TABLET, FILM COATED ORAL at 17:36

## 2024-06-17 RX ADMIN — HALOPERIDOL 1 MG: 2 TABLET ORAL at 12:56

## 2024-06-17 RX ADMIN — LOPERAMIDE HYDROCHLORIDE 2 MG: 2 CAPSULE ORAL at 15:45

## 2024-06-17 RX ADMIN — FENTANYL CITRATE 25 MCG: 50 INJECTION, SOLUTION INTRAMUSCULAR; INTRAVENOUS at 22:48

## 2024-06-17 ASSESSMENT — COGNITIVE AND FUNCTIONAL STATUS - GENERAL
MOBILITY SCORE: 11
TURNING FROM BACK TO SIDE WHILE IN FLAT BAD: A LOT
WALKING IN HOSPITAL ROOM: A LOT
STANDING UP FROM CHAIR USING ARMS: A LOT
MOVING FROM LYING ON BACK TO SITTING ON SIDE OF FLAT BED: A LOT
MOVING TO AND FROM BED TO CHAIR: A LOT
CLIMB 3 TO 5 STEPS WITH RAILING: TOTAL
SUGGESTED CMS G CODE MODIFIER MOBILITY: CL

## 2024-06-17 ASSESSMENT — PAIN DESCRIPTION - PAIN TYPE
TYPE: ACUTE PAIN

## 2024-06-17 ASSESSMENT — GAIT ASSESSMENTS: GAIT LEVEL OF ASSIST: UNABLE TO PARTICIPATE

## 2024-06-17 NOTE — THERAPY
Physical Therapy   Daily Treatment     Patient Name: Jl Mueller  Age:  78 y.o., Sex:  male  Medical Record #: [id number]  Today's Date: 6/17/2024     Precautions  Precautions: Fall Risk;PEG Tube  Comments: (P) baseline 2L O2. h/o CVA w/Lft side deficits. Aphasia. [place] + restraints    Assessment    The pt is found in bed w/bilat wrist retraints, was up earlier in the chair. The pt did show some improvements w/his functional mobility tasks from previous PT session. Sup->sit transition w/mod assist and Sit->supine CCGA<->Min assist. Once seated, improvement w/static sitting balance. The pt could hold wo/any lateral lean, was reaching forward outside his TOMÁS but did respond to correcting. Improvement w/STS, now mod assist. PT will assess transfers next PT session, he was BTB from being up in the chair.   PT to cont to follow, initiating w/c transfers.     Plan    Treatment Plan Status: (P) Continue Current Treatment Plan  Type of Treatment: Bed Mobility, Equipment, Neuro Re-Education / Balance, Self Care / Home Evaluation, Stair Training, Therapeutic Activities, Therapeutic Exercise, Gait Training, Family / Caregiver Training  Treatment Frequency: 4 Times per Week  Treatment Duration: Until Therapy Goals Met    DC Equipment Recommendations: (P) Unable to determine at this time  Discharge Recommendations: (P) Recommend post-acute placement for additional physical therapy services prior to discharge home     Objective       06/17/24 1512   Charge Group   PT Therapeutic Activities (Units) 3   Total Time Spent   PT Therapeutic Activities Time Spent (Mins) 38   PT Total Time Spent (Calculated) 38   Precautions   Precautions Fall Risk;PEG Tube   Comments baseline 2L O2. h/o CVA w/Lft side deficits. Aphasia. [place] + restraints   Pain 0 - 10 Group   Pain Rating Scale (NPRS) 0   Therapist Pain Assessment During Activity   Other Treatments   Other Treatments Provided EOB x 10 mins working on static sitting and STS for pericare.    Balance   Sitting Balance (Static) Fair -   Sitting Balance (Dynamic) Poor   Standing Balance (Static) Poor +   Standing Balance (Dynamic) Poor -   Weight Shift Sitting Poor   Weight Shift Standing Poor   Skilled Intervention Verbal Cuing;Postural Facilitation   Comments standing w/mod assist   Bed Mobility    Supine to Sit Moderate Assist  (HOB partially elevated)   Sit to Supine Minimal Assist  (HOB flat)   Scooting Moderate Assist  (seated)   Skilled Intervention Verbal Cuing;Postural Facilitation;Compensatory Strategies   Gait Analysis   Gait Level Of Assist Unable to Participate   Comments PT to work in // bars next tx session for pre-gait mobility.   Functional Mobility   Sit to Stand Moderate Assist  (from EOB x 2, holding each stand for 30 seconds for pericare.)   Bed, Chair, Wheelchair Transfer   (Pt just BTB from being seated in chair. PT to assess transfers next tx session.)   Skilled Intervention Verbal Cuing;Postural Facilitation   Comments Pt could hold static stand w/mod assist. PT to trial standing in // bars next tx session.   6 Clicks Assessment - How much HELP from from another person do you currently need... (If the patient hasn't done an activity recently, how much help from another person do you think he/she would need if he/she tried?)   Turning from your back to your side while in a flat bed without using bedrails? 2   Moving from lying on your back to sitting on the side of a flat bed without using bedrails? 2   Moving to and from a bed to a chair (including a wheelchair)? 2   Standing up from a chair using your arms (e.g., wheelchair, or bedside chair)? 2   Walking in hospital room? 2   Climbing 3-5 steps with a railing? 1   6 clicks Mobility Score 11   Short Term Goals    Short Term Goal # 1 Pt will follow 100% of function based commands within 6 visits to ensure participation in PT sessions.   Goal Outcome # 1 goal not met   Short Term Goal # 2 Pt will perform supine<>sit from flat  HOB/no railing with min A within 6 visits to ensure independent mobility at home.   Goal Outcome # 2 Goal not met   Short Term Goal # 3 Pt will perform squat pivot transfer with min A within 6 visits to ilia [facility] timing.   Goal Outcome # 3 Goal not met   Short Term Goal # 4 Pt will ambulate x 15ft in // bars with min A within 6 visits to progress ambulation tolerance.   Goal Outcome # 4 Goal not met   Education Group   Role of Physical Therapist Patient Response Patient;Acceptance;Explanation;Reinforcement Needed   Physical Therapy Treatment Plan   Physical Therapy Treatment Plan Continue Current Treatment Plan   Anticipated Discharge Equipment and Recommendations   DC Equipment Recommendations Unable to determine at this time   Discharge Recommendations Recommend post-acute placement for additional physical therapy services prior to discharge home   Interdisciplinary Plan of Care Collaboration   IDT Collaboration with  Nursing   Patient Position at End of Therapy In Bed;Bed Alarm On;Call Light within Reach;Family / Friend in Room;Wrist Restraints Applied   Collaboration Comments Nrsg notified of pts tx efforts   Session Information   Date / Session Number  6/17--4 (1/4, 6/22) PTA/1   Supervising Physical Therapist (PTA Treatments Only)   Supervising Physical Therapist Meenakshi Crabtree

## 2024-06-17 NOTE — CARE PLAN
The patient is Unstable - High likelihood or risk of patient condition declining or worsening    Shift Goals  Clinical Goals: Neuro status, restraint checks  Patient Goals: GUZMAN  Family Goals: Rest and care    Progress made toward(s) clinical / shift goals:  pt in bed with bilateral wrist restraints and wife at bedside.    Patient is not progressing towards the following goals:

## 2024-06-17 NOTE — DIETARY
"Nutrition support weekly update:  Day 19 of admit.  Jl Mueller is a 78 y.o. male with admitting DX of hemorrhagic stroke, intraparenchymal cerebellar hemorrhage.      Tube feeding initiated on 5/31. Current TF via PEG tube is Glucerna 1.2 with goal rate 60 ml/hr which provides 1728 kcals, 86 gm protein, and 1159 ml free water per day. No current FWF ordered.     Assessment:  Weight 6/16: 64.4 kg via bed scale. Weight is stable. Weight is overall stable from admit.   Height: 5'3\" (160 cm), BMI: 25.15 (overweight)    Calculation/Equation: MSJ x 1.2 = 1512 kcals  Calories/day: 1500 - 1800 (23 - 28 kcals/kg)  Grams Protein/day: 64 - 77 (1.0 - 1.2 gm/kg)    Evaluation:   Pt is receiving TF at goal rate observed at bedside, discussed with RN and pt is tolerating.  Pt is on 3 L NC.   Pt had EGD with PEG tube placement on 6/14.   Per MD, pt is off of restraints.   Skin: L abdomen incision, no edema noted.   Labs: glucose 116, Phos 2.4 on 6/16, last A1C is 5.6  Meds: haldol, prevacid, melatonin, aquafor, pravastatin, anti-emetics prn, miralax prn  Last BM: 6/17 Type 7   Transition to bolus feeds indicated and stable blood sugars with standard formula indicated.     Malnutrition risk: No new risk identified.     Recommendations/Plan:  Transition to bolus feeds. Start with 1/2 cartons of Isosource 1.5 and advance by 1/2 carton per feed until goal is achieved. Goal is 4 cartons per day which provides 1500 kcals, 68 gm protein, and 764 ml free water per day. Discussed with RN.   Fluids per MD.   Monitor Phosphorus lab trend.   Diet upgrades per SLP/MD.     RD following.              "

## 2024-06-17 NOTE — CARE PLAN
The patient is Stable - Low risk of patient condition declining or worsening    Shift Goals  Clinical Goals: Stable neuro exam, prevent skin injury, rest throughout the night  Patient Goals: GUZMAN  Family Goals: updates if there are changes    Progress made toward(s) clinical / shift goals:    Problem: Skin Integrity  Goal: Skin integrity is maintained or improved  Outcome: Progressing  Note: Patient groin and bottom appear less red than the day prior, patient keeping on condom catheter so he is wet less frequently, q2 turns with the TAPs system in place, and barrier paste and wipes being used.      Problem: Mobility  Goal: Risk for activity intolerance will decrease  Outcome: Progressing  Note: Patient up to the chair for two hours today x2 pivot, patient tolerated sitting up and staying in the seated position.

## 2024-06-17 NOTE — PROGRESS NOTES
Hospital Medicine Daily Progress Note    Date of Service  6/17/2024    Chief Complaint  Jl Mueller is a 78 y.o. male admitted 5/29/2024 with altered mental status    Hospital Course  78-year-old with a history of BPH, previous hemorrhagic strokes, tobacco use, hypertension, coronary artery disease, dyslipidemia, chronic hypoxic respiratory failure dependent on 2 L of home oxygen therapy. He was admitted on May 29 due to neurologic changes, and was found to have a left cerebellar hemorrhage. CTA was negative for any vascular malformations. Patient did require hypertonic saline and was intubated for airway protection. He was successfully extubated on June 1 and weaned off of hypertonic saline. Hospital stay has been complicated by UTI which has grown back Enterococcus faecalis, and Enterobacter cloaca     Interval Problem Update  Patient was seen and examined at bedside.  No acute events overnight. Patient is resting comfortably in bed and in no acute distress. Wife updated at bedside.     PEG 06/14  Tube feeds  2D echo 06/12 - EG 40%  Started oral haldol 1mg q 8 hours  Restrains discontinued this AM    Consultants/Specialty  neurology and palliative care    Code Status  Full Code    Disposition  The patient is not medically cleared for discharge to home or a post-acute facility.      I have placed the appropriate orders for post-discharge needs.    Review of Systems  Review of Systems   Unable to perform ROS: Mental status change        Physical Exam  Temp:  [36.7 °C (98.1 °F)-36.8 °C (98.2 °F)] 36.8 °C (98.2 °F)  Pulse:  [69-91] 69  Resp:  [18-20] 19  BP: (109-134)/(61-75) 129/66  SpO2:  [91 %-96 %] 96 %    Physical Exam  Constitutional:       General: He is not in acute distress.     Appearance: He is well-developed. He is not diaphoretic.      Comments: Somnolent  Briefly arouses   HENT:      Head: Normocephalic and atraumatic.      Right Ear: External ear normal.      Left Ear: External ear normal.       Nose: No congestion.   Eyes:      General: No scleral icterus.     Conjunctiva/sclera: Conjunctivae normal.   Neck:      Vascular: No JVD.   Cardiovascular:      Rate and Rhythm: Normal rate.      Heart sounds: No murmur heard.  Pulmonary:      Effort: Pulmonary effort is normal.      Breath sounds: No wheezing.   Abdominal:      Palpations: Abdomen is soft.      Tenderness: There is no abdominal tenderness. There is no guarding or rebound.      Comments: PEG in place with abdominal binder   Musculoskeletal:      Right lower leg: No edema.      Left lower leg: No edema.   Skin:     General: Skin is warm and dry.      Findings: Bruising present. No rash.   Neurological:      Comments: Alert and attends to examiner         Fluids    Intake/Output Summary (Last 24 hours) at 6/17/2024 1404  Last data filed at 6/17/2024 0600  Gross per 24 hour   Intake 912 ml   Output 450 ml   Net 462 ml       Laboratory  Recent Labs     06/15/24  0317 06/16/24  0401   WBC 9.9 9.2   RBC 3.95* 3.80*   HEMOGLOBIN 11.0* 10.5*   HEMATOCRIT 35.0* 33.5*   MCV 88.6 88.2   MCH 27.8 27.6   MCHC 31.4* 31.3*   RDW 47.7 47.5   PLATELETCT 374 396   MPV 11.5 11.6       Recent Labs     06/15/24  0317 06/16/24  0401 06/17/24  0407   SODIUM 139 139 137   POTASSIUM 3.9 3.4* 4.0   CHLORIDE 105 106 101   CO2 22 22 24   GLUCOSE 97 131* 116*   BUN 18 21 20   CREATININE 0.85 0.70 0.76   CALCIUM 9.2 9.1 9.8                   Imaging  EC-ECHOCARDIOGRAM COMPLETE W/ CONT   Final Result      DX-CHEST-PORTABLE (1 VIEW)   Final Result      1.  Small left pleural effusion and associated atelectasis.   2.  Mild bilateral interstitial opacities, likely mild interstitial edema.         CT-HEAD W/O   Final Result         1.  Area of cerebellar hemorrhage, decreased since prior study.   2.  Nonspecific white matter changes, commonly associated with small vessel ischemic disease.  Associated mild cerebral atrophy is noted.   3.  Atherosclerosis.         CT-HEAD W/O   Final  Result      1.  Intraparenchymal hemorrhage in the cerebellar vermis and left cerebellum. It demonstrates expected evolution and decreased density.   2.  Redemonstration of vasogenic edema surrounding the hematoma in the cerebellum with mild narrowing of the fourth ventricle. There is no hydrocephalus.         DX-CHEST-LIMITED (1 VIEW)   Final Result      Mild interstitial prominence could represent vascular congestion.      Cardiomegaly.      Removal of endotracheal tube.      [id number] VIEW   Final Result      Feeding tube tip projects in the second portion of the duodenum.      Nonspecific bowel gas pattern.      CT-HEAD W/O   Final Result      1.  Advanced cerebral atrophy.   2.  Stable ventriculomegaly with intraventricular hemorrhage.   3.  Advanced supratentorial white matter disease most consistent with microvascular ischemic change.   4.  Multiple old lacunar infarcts as described.   5.  Acute/recent subacute parenchymal hemorrhage in the posterior fossa involving the superior cerebellar vermis and left paramedian cerebellar hemisphere and left lateral cerebellar peduncle. No evidence of recurrent hemorrhage.   6.  Minimal focus of subarachnoid hemorrhage within a single sulcus in the left posterior temporal region. Probably unchanged from prior recent exam.         DX-ABDOMEN FOR TUBE PLACEMENT   Final Result      Enteric tube tip projects over the stomach.      EC-ECHOCARDIOGRAM COMPLETE W/ CONT   Final Result      DX-CHEST-PORTABLE (1 VIEW)   Final Result         1.  Left basilar atelectasis or subtle infiltrate, decreased since prior study   2.  Trace left pleural effusion   3.  Enlargement of the aortic knob suggesting thoracic aortic aneurysm   4.  Atherosclerosis      MR-BRAIN-WITH & W/O   Final Result      1.  Stable fossa of parenchymal hemorrhage centered in the LEFT cerebellum and extending into the RIGHT cerebellum exerting mass effect on the fourth ventricle which is not completely effaced    2.  Intraventricular blood redemonstrated   3.  Numerous areas of remote microhemorrhage in the supra and infratentorial brain. These could be related to amyloid angiopathy, numerous hypertensive microhemorrhages or less likely multiple cavernomas   4.  Moderate diffuse atrophy without compelling evidence of hydrocephalus   5.  Advanced white matter changes   6.  4 mm LEFT temporal meningioma   7.  Enhancing nodule in the RIGHT internal auditory canal suspicious for a vestibular schwannoma, less likely other extra-axial mass such as a meningioma, facial nerve schwannoma or metastasis. Consider posterior fossa protocol brain MRI without and with    contrast to further evaluate in when clinically appropriate.      MR-MRA HEAD-W/O   Final Result         MRA OF THE [place] OF [place] WITHIN NORMAL LIMITS.      DX-CHEST-PORTABLE (1 VIEW)   Final Result         1.  Hazy left lower lobe infiltrate   2.  Small left pleural effusion   3.  Enlargement of the aortic knob, appearance suggesting aortic aneurysm.      DX-CHEST-FOR LINE PLACEMENT Perform procedure in: Patient's Room   Final Result      1.  PICC line is in place with the tip projecting over the SVC in satisfactory position.   2.  Mildly enlarged cardiac silhouette with vascular congestion.   3.  Retrocardiac opacity is likely due to atelectasis.         IR-PICC LINE PLACEMENT W/ GUIDANCE > AGE 5   Final Result                  Ultrasound-guided PICC placement performed by qualified nursing staff as    above.          CT-HEAD W/O   Final Result         1.  Left cerebellar hemorrhage, similar compared to prior study.   2.  Minimal bilateral intraventricular hemorrhages, new since prior study.   3.  Nonspecific white matter changes, commonly associated with small vessel ischemic disease.  Associated mild cerebral atrophy is noted.   4.  Atherosclerosis.         DX-CHEST-PORTABLE (1 VIEW)   Final Result         1.  Left basilar atelectasis, no focal infiltrate   2.   Trace left pleural effusion   3.  Atherosclerosis      CT-CTA NECK WITH & W/O-POST PROCESSING   Final Result         1.  Scattered atherosclerosis without significant stenosis or occlusion.   2.  4.2 cm proximal descending thoracic aortic aneurysm, radiographic follow-up and surveillance recommended as clinically appropriate.         CT-CTA HEAD WITH & W/O-POST PROCESS   Final Result         1.  No large vessel occlusion or aneurysm identified   2.  Large cerebellar hemorrhage predominantly in the left cerebellum      These findings were discussed with the patient's clinician, Nikki Alexander, on 5/29/2024 11:35 PM.      DX-CHEST-PORTABLE (1 VIEW)   Final Result      Tubes as above      Left pleural effusion      CHF/pulmonary edema pattern      See Brown MD   5/30/2024 8:41 AM         CT-HEAD W/O   Final Result      Acute intraparenchymal hemorrhage is noted involving the left cerebellar hemisphere, measuring 3.1 x 4.2 x 3.4 cm, with associated mass effect.      This finding was telephoned to the mentioned attending by on-call radiologist at the time of imaging         AAST Intracranial Hemorrhage Brain Injury Guidelines         Hemorrhage type  mBIG 1           mBIG 2                mBIG 3      Subdural             <4mm               5-7.9 mm             >8mm      Epidural                No                    No                  Yes      Intraparenc'mal   <4mm               5-7.9 mm         >8mm or multi   1 location       or 2 locations      >3 locations      Subarachnoid     <3 sulci       single hemisphere   bi-hemisphere   and <1 mm        or 1-3 mm            or > 3 mm      Intraventricular       No                  No                    Yes      Skull Fracture       None            Non-displaced       Displaced               DLP Reporting Thresholds for Incorrect/Repeated Exams - DLP in mGy*cm   Head/Neck:  0-year-old 3840, 1-year-old 5880, 5-year-old 8770, 10-year-old 48115 and adult 14596   Head:   0-year-old 4540, 1-year-old 7460, 5-year-old 11192, 10-year-old 69859 and adult 80645   Neck:  0-year-old 2940, 1-year-old 4160, 5-year-old 4550, 10-year-old 6320 and adult 8470   Chest:  0-year-old 550, 1-year-old 830, 5-year-old 1200, 10-year-old 3840 and adult 3570   Abd/pelvis:  0-year-old 440, 1-year-old 720, 5-year-old 1080, 10-year-old 3330 and adult 3330   Trunk(C/A/P):  0-year-old 490, 1-year-old 770, 5-year-old 1140, 10-year-old 3570 and adult 3330      OUTSIDE IMAGES-CT CERVICAL SPINE    (Results Pending)        Assessment/Plan  * Cerebellar hemorrhage (HCC)- (present on admission)  Assessment & Plan  ICH score=1  Thought due to amyloid  MRI brain: Stable fossa of parenchymal hemorrhage centered in the LEFT cerebellum and extending into the RIGHT cerebellum exerting mass effect on the fourth ventricle which is not completely effaced 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  HOB at 30 degrees  maintain pCO2 of 35-40; closer to 35  Pravastatin 40 mg qHS for possible neuroprotective effect  CT 6/7 unchanged    PEG placed today  No antiplatelet or anticoagulation therapy as thought due to Amyloid    UTI (urinary tract infection)  Assessment & Plan  Urine culture with E faecalis and E cloacae  Remove leung when able: doing a voiding trial  Zosyn x 7 days given sensitivities: complete tomorrow    Encephalopathy acute- (present on admission)  Assessment & Plan  Likely delirium in conjunction with his intracranial pathology  Keep patient awake during the day and avoid daytime naps. Remove all unnecessary lines (central lines, peripheral IVs, feeding tubes, leung catheters). Avoid polypharmacy, frequent re-orientation, maximize family time at  bedside, use glasses and hearing aids if needed, treat pain, encourage ambulation, minimize benzos/anticholinergic agents.   Ambulate, get out of restraints is much as possible    DC am resperidal   Cont qhs     Acute on chronic respiratory failure with hypoxia (HCC)- (present on admission)  Assessment & Plan  Intubated for airway protection  Extubated 6/1  Chest x-ray not impressive on 6/4/2024  Mobilization  Pulmonary hygiene  RT/O2 Protocols  Titrate supplemental FiO2 to maintain SpO2 >92%  Monitor vol status closely    History of stroke- (present on admission)  Assessment & Plan  Patient has had 2 prior hemorrhagic strokes; last in 2015  No noted deficits per wife    COPD (chronic obstructive pulmonary disease) (HCC)- (present on admission)  Assessment & Plan  Not currently in exacerbation  CXR with no acute process  Continue home nebulizers as ordered  Continue PRN nebulizers Q4h  Wean FiO2 as tolerated    Hypophosphatemia  Assessment & Plan  Monitor and replace    Hypokalemia  Assessment & Plan  Monitor and repalce    Chronic systolic heart failure (HCC)  Assessment & Plan  EF 40%  ACE allergy  BB    Paroxysmal tachycardia (HCC)  Assessment & Plan  New Dx PAFib  Given multiple previous ICH's and likely etiology being amyloid, he is not now nor will he ever be a candidate for anticoagulation  Will DC amlodipine and start a BB for rate control and BP managementl  Cont Tele      Hyperchloremic metabolic acidosis  Assessment & Plan  Improved    Hyperglycemia- (present on admission)  Assessment & Plan  Goal blood glucose 140-180  A1c 5.6  Off insulin  hypoglycemia protocol    Hyperlipidemia- (present on admission)  Assessment & Plan  Per wife, patient's home medication was discontinued due to muscle cramps  Pravastatin 40mg QHS  Lipid panel reviewed  Counseling on lifestyle/dietary modifications    BPH with urinary obstruction- (present on admission)  Assessment & Plan  S/p prostate surgery  Per wife, patient has  suffered from urinary retention and incontinence since his surgery  Continue flomax  Park removed 6/12: thus far has not had any retention    Benign essential hypertension- (present on admission)  Assessment & Plan  Given new Dx of PAFib will DC amlodipine and start Djedlczeqq96.5mg BID  Cont to monitor    Abdominal aortic aneurysm (AAA) without rupture (HCC)- (present on admission)  Assessment & Plan  S/p EVAR w/ bypass graft stent  Maintain normotension  Holding ASA at this time; per wife he takes ASA 3x/week         VTE prophylaxis:   SCDs/TEDs      I have performed a physical exam and reviewed and updated ROS and Plan today (6/17/2024). In review of yesterday's note (6/16/2024), there are no changes except as documented above

## 2024-06-17 NOTE — CARE PLAN
The patient is Stable - Low risk of patient condition declining or worsening    Shift Goals  Clinical Goals: Rest/sleep, Q2 restraint checks  Patient Goals: GUZMAN  Family Goals: Patient sleep    Progress made toward(s) clinical / shift goals: Restraint checks in place. Hourly rounding. Pain treated per MAR. Fall precautions in place.      Problem: Safety - Medical Restraint  Goal: Remains free of injury from restraints (Restraint for Interference with Medical Device)  Outcome: Progressing     Problem: Pain - Standard  Goal: Alleviation of pain or a reduction in pain to the patient’s comfort goal  Outcome: Progressing     Problem: Fall Risk  Goal: Patient will remain free from falls  Outcome: Progressing

## 2024-06-17 NOTE — HOSPITAL COURSE
78-year-old with a history of BPH, previous hemorrhagic strokes, tobacco use, hypertension, coronary artery disease, dyslipidemia, chronic hypoxic respiratory failure dependent on 2 L of home oxygen therapy. He was admitted on May 29 due to neurologic changes, and was found to have a left cerebellar hemorrhage.     CTA was negative for any vascular malformations. Given multiple previous ICH's and likely etiology being amyloid, he is not a candidate for anticoagulation. NSG - nonoperative.  PEG Tube was placed. Patient did require hypertonic saline and was intubated for airway protection. He was successfully extubated on June 1 and weaned off of hypertonic saline.     Hospital stay has been complicated by agitation management. He also developed UTI which has grown back Enterococcus faecalis, and Enterobacter cloaca.

## 2024-06-17 NOTE — DISCHARGE PLANNING
0821  Agency/Facility Name: [facility]  Outcome: DPA LVM inquiring follow up on pending SNF.  DPA will follow up again soon    1408  Agency/Facility Name: [facility]  Outcome: DPA attempted to follow up on pending SNF referral.   DPA previously LVM no answer.     1545  Agency/Facility Name: [facility]  Spoke To: Brittni  Outcome: DPA inquired follow up on pending SNF, per [facility] admissions is with a Pt at the moment and will contact me soon.   Advised RN CM via teams    3999  DPA HARD FAXED REFERRAL :  Agency/Facility Name: [facility]  Fax#: [phone]  Time: 0483

## 2024-06-17 NOTE — DISCHARGE PLANNING
Case Management Discharge Planning    Admission Date: 5/29/2024  GMLOS: 4.6  ALOS: 19    6-Clicks ADL Score: 14  6-Clicks Mobility Score: 11  PT and/or OT Eval ordered: Yes  Post-acute Referrals Ordered: Yes  Post-acute Choice Obtained: Yes  Has referral(s) been sent to post-acute provider:  Yes      Anticipated Discharge Dispo: Discharge Disposition: D/T to SNF with Medicare cert in anticipation of skilled care (03)    DME Needed: No    Action(s) Taken: Updated Provider/Nurse on Discharge Plan  RN CM attended IDT rounds with the team, Per dr. Arrieta, Pt is not medically cleared. Per bedside nurse, restraints if off started this morning. Pending SNF acceptance with [facility]. DPA to call [facility] to follow up acceptance.     Escalations Completed: Provider    Medically Clear: No    Next Steps: Follow up SNF acceptance.    Barriers to Discharge: Medical clearance and Pending Placement    Is the patient up for discharge tomorrow: No

## 2024-06-17 NOTE — PROGRESS NOTES
Pt in chair attempting trial release of soft wrist restraints. Pt resting arouses easy has no change in assessment at this time.

## 2024-06-18 ENCOUNTER — APPOINTMENT (OUTPATIENT)
Dept: RADIOLOGY | Facility: MEDICAL CENTER | Age: 79
End: 2024-06-18
Attending: HOSPITALIST
Payer: MEDICARE

## 2024-06-18 LAB — HGB BLD-MCNC: 10.6 G/DL (ref 14–18)

## 2024-06-18 PROCEDURE — 700111 HCHG RX REV CODE 636 W/ 250 OVERRIDE (IP): Performed by: HOSPITALIST

## 2024-06-18 PROCEDURE — 700111 HCHG RX REV CODE 636 W/ 250 OVERRIDE (IP): Performed by: RADIOLOGY

## 2024-06-18 PROCEDURE — C9113 INJ PANTOPRAZOLE SODIUM, VIA: HCPCS | Performed by: HOSPITALIST

## 2024-06-18 PROCEDURE — 85018 HEMOGLOBIN: CPT

## 2024-06-18 PROCEDURE — 700117 HCHG RX CONTRAST REV CODE 255: Performed by: RADIOLOGY

## 2024-06-18 PROCEDURE — 99232 SBSQ HOSP IP/OBS MODERATE 35: CPT | Performed by: HOSPITALIST

## 2024-06-18 PROCEDURE — 96105 ASSESSMENT OF APHASIA: CPT

## 2024-06-18 PROCEDURE — 49450 REPLACE G/C TUBE PERC: CPT

## 2024-06-18 PROCEDURE — 80053 COMPREHEN METABOLIC PANEL: CPT

## 2024-06-18 PROCEDURE — 85027 COMPLETE CBC AUTOMATED: CPT

## 2024-06-18 PROCEDURE — 84100 ASSAY OF PHOSPHORUS: CPT

## 2024-06-18 PROCEDURE — 700111 HCHG RX REV CODE 636 W/ 250 OVERRIDE (IP)

## 2024-06-18 PROCEDURE — 770020 HCHG ROOM/CARE - TELE (206)

## 2024-06-18 PROCEDURE — 0D20XUZ CHANGE FEEDING DEVICE IN UPPER INTESTINAL TRACT, EXTERNAL APPROACH: ICD-10-PCS | Performed by: RADIOLOGY

## 2024-06-18 PROCEDURE — 36415 COLL VENOUS BLD VENIPUNCTURE: CPT

## 2024-06-18 PROCEDURE — 700111 HCHG RX REV CODE 636 W/ 250 OVERRIDE (IP): Mod: JZ | Performed by: NURSE PRACTITIONER

## 2024-06-18 PROCEDURE — 700105 HCHG RX REV CODE 258: Performed by: HOSPITALIST

## 2024-06-18 PROCEDURE — 83735 ASSAY OF MAGNESIUM: CPT

## 2024-06-18 RX ORDER — PANTOPRAZOLE SODIUM 40 MG/10ML
40 INJECTION, POWDER, LYOPHILIZED, FOR SOLUTION INTRAVENOUS 2 TIMES DAILY
Status: DISCONTINUED | OUTPATIENT
Start: 2024-06-18 | End: 2024-06-20

## 2024-06-18 RX ORDER — HYDROMORPHONE HYDROCHLORIDE 1 MG/ML
0.25 INJECTION, SOLUTION INTRAMUSCULAR; INTRAVENOUS; SUBCUTANEOUS EVERY 4 HOURS PRN
Status: DISCONTINUED | OUTPATIENT
Start: 2024-06-18 | End: 2024-06-19

## 2024-06-18 RX ORDER — SODIUM CHLORIDE 9 MG/ML
500 INJECTION, SOLUTION INTRAVENOUS
Status: DISCONTINUED | OUTPATIENT
Start: 2024-06-18 | End: 2024-06-18

## 2024-06-18 RX ORDER — MIDAZOLAM HYDROCHLORIDE 1 MG/ML
INJECTION INTRAMUSCULAR; INTRAVENOUS
Status: COMPLETED
Start: 2024-06-18 | End: 2024-06-18

## 2024-06-18 RX ORDER — SODIUM CHLORIDE, SODIUM LACTATE, POTASSIUM CHLORIDE, CALCIUM CHLORIDE 600; 310; 30; 20 MG/100ML; MG/100ML; MG/100ML; MG/100ML
INJECTION, SOLUTION INTRAVENOUS CONTINUOUS
Status: DISCONTINUED | OUTPATIENT
Start: 2024-06-18 | End: 2024-06-19

## 2024-06-18 RX ORDER — MIDAZOLAM HYDROCHLORIDE 1 MG/ML
.5-2 INJECTION INTRAMUSCULAR; INTRAVENOUS PRN
Status: DISCONTINUED | OUTPATIENT
Start: 2024-06-18 | End: 2024-06-18

## 2024-06-18 RX ORDER — ONDANSETRON 2 MG/ML
4 INJECTION INTRAMUSCULAR; INTRAVENOUS EVERY 6 HOURS PRN
Status: DISCONTINUED | OUTPATIENT
Start: 2024-06-18 | End: 2024-06-18

## 2024-06-18 RX ADMIN — FENTANYL CITRATE 25 MCG: 50 INJECTION, SOLUTION INTRAMUSCULAR; INTRAVENOUS at 16:24

## 2024-06-18 RX ADMIN — MIDAZOLAM HYDROCHLORIDE 1 MG: 1 INJECTION, SOLUTION INTRAMUSCULAR; INTRAVENOUS at 16:22

## 2024-06-18 RX ADMIN — FENTANYL CITRATE 50 MCG: 50 INJECTION, SOLUTION INTRAMUSCULAR; INTRAVENOUS at 16:22

## 2024-06-18 RX ADMIN — Medication: at 17:49

## 2024-06-18 RX ADMIN — Medication: at 06:00

## 2024-06-18 RX ADMIN — PANTOPRAZOLE SODIUM 40 MG: 40 INJECTION, POWDER, FOR SOLUTION INTRAVENOUS at 17:49

## 2024-06-18 RX ADMIN — SODIUM CHLORIDE, POTASSIUM CHLORIDE, SODIUM LACTATE AND CALCIUM CHLORIDE: 600; 310; 30; 20 INJECTION, SOLUTION INTRAVENOUS at 17:25

## 2024-06-18 RX ADMIN — FENTANYL CITRATE 50 MCG: 50 INJECTION, SOLUTION INTRAMUSCULAR; INTRAVENOUS at 04:16

## 2024-06-18 RX ADMIN — MIDAZOLAM HYDROCHLORIDE 1 MG: 2 INJECTION, SOLUTION INTRAMUSCULAR; INTRAVENOUS at 16:22

## 2024-06-18 RX ADMIN — HYDROMORPHONE HYDROCHLORIDE 0.25 MG: 1 INJECTION, SOLUTION INTRAMUSCULAR; INTRAVENOUS; SUBCUTANEOUS at 18:07

## 2024-06-18 RX ADMIN — MIDAZOLAM HYDROCHLORIDE 1 MG: 2 INJECTION, SOLUTION INTRAMUSCULAR; INTRAVENOUS at 16:24

## 2024-06-18 RX ADMIN — HYDROMORPHONE HYDROCHLORIDE 0.25 MG: 1 INJECTION, SOLUTION INTRAMUSCULAR; INTRAVENOUS; SUBCUTANEOUS at 22:25

## 2024-06-18 RX ADMIN — IOHEXOL 30 ML: 300 INJECTION, SOLUTION INTRAVENOUS at 16:35

## 2024-06-18 ASSESSMENT — PAIN DESCRIPTION - PAIN TYPE
TYPE: ACUTE PAIN

## 2024-06-18 NOTE — PROGRESS NOTES
Pt awake and continues to pull at lines and tubes, 4 condom caths have been placed over course of the morning. Pt placed back in soft wrist restraints.

## 2024-06-18 NOTE — CARE PLAN
The patient is Stable - Low risk of patient condition declining or worsening    Shift Goals  Clinical Goals: Neuro status, restraint checks  Patient Goals: GUZMAN  Family Goals: Rest and care    Progress made toward(s) clinical / shift goals:      Problem: Safety - Medical Restraint  Goal: Remains free of injury from restraints (Restraint for Interference with Medical Device)  Outcome: Progressing  Goal: Free from restraint(s) (Restraint for Interference with Medical Device)  Outcome: Progressing     Problem: Knowledge Deficit - Standard  Goal: Patient and family/care givers will demonstrate understanding of plan of care, disease process/condition, diagnostic tests and medications  Outcome: Progressing     Problem: Pain - Standard  Goal: Alleviation of pain or a reduction in pain to the patient’s comfort goal  Outcome: Progressing     Problem: Skin Integrity  Goal: Skin integrity is maintained or improved  Outcome: Progressing     Problem: Urinary - Renal Perfusion  Goal: Ability to achieve and maintain adequate renal perfusion and functioning will improve  Outcome: Progressing     Problem: Neuro Status  Goal: Neuro status will remain stable or improve  Outcome: Progressing     Problem: Risk for Aspiration  Goal: Patient's risk for aspiration will be absent or decrease  Outcome: Progressing       Patient is not progressing towards the following goals:

## 2024-06-18 NOTE — THERAPY
Physical Therapy Contact Note    Patient Name: Jl Mueller  Age:  78 y.o., Sex:  male  Medical Record #: [id number]  Today's Date: 6/18/2024 06/18/24 1210   Treatment Variance   Reason For Missed Therapy Medical - Patient on Hold from Therapy   Other Treatments   Other Treatments Provided The pt on hold from PT services today, he pulled his PEG last night. Waiting for sx. PT will cont to follow.   Session Information   Date / Session Number  6/17--5 (1/4, 6/22) PTA/1 Hold PT 6/18   Supervising Physical Therapist (PTA Treatments Only)   Supervising Physical Therapist Meenakshi Crabtree

## 2024-06-18 NOTE — THERAPY
Speech Language Pathology   Communication Evaluation     Patient Name: Jl Mueller  AGE:  78 y.o., SEX:  male  Medical Record #: [id number]  Date of Service: 2024      History of Present Illness  78M admitted on 24 with AMS, found to have L cerebellar hemorrhage. Intubated for airway protection -. S/p PEG on . PMH notable for hemorrhagic stroke, AAA s/p bypass graft stenting, MI, COPD, hyperglycemia, aortic regurgitation.     Imagin/1/24 MR Brain:   1.  Stable fossa of parenchymal hemorrhage centered in the LEFT cerebellum and extending into the RIGHT cerebellum exerting mass effect on the fourth ventricle which is not completely effaced  2.  Intraventricular blood redemonstrated  3.  Numerous areas of remote microhemorrhage in the supra and infratentorial brain. These could be related to amyloid angiopathy, numerous hypertensive microhemorrhages or less likely multiple cavernomas  4.  Moderate diffuse atrophy without compelling evidence of hydrocephalus  5.  Advanced white matter changes  6.  4 mm LEFT temporal meningioma  7.  Enhancing nodule in the RIGHT internal auditory canal suspicious for a vestibular schwannoma, less likely other extra-axial mass such as a meningioma, facial nerve schwannoma or metastasis. Consider posterior fossa protocol brain MRI without and with   contrast to further evaluate in when clinically appropriate.      General Information  Vitals  O2 (LPM): 2  O2 Delivery Device: Silicone Nasal Cannula  Level of Consciousness: Alert  Patient Behaviors: Confused, Restless  Orientation: Self  Follows Directives: No      Prior Living Situation & Level of Function  Prior Services: Continuous (24 Hour) Care Giving Family, Skilled Home Health Services  Housing / Facility: 2 Story House  Lives with - Patient's Self Care Capacity: Spouse  Comments: wife provides assist as needed for ADLs and uses a 4WW in the house and a WC in the community  Communication: Hx of CVA- spouse  "declined working w/ SLP for cognition previously        Subjective  RN reports patient continues to have minimal verbalizations - consistent with entirety of time on floor. Patient's wife at bedside; helpful and supportive. She reported patient has single word verbalizations, 50% of which she cannot understand.      Communication Domain(s)  Expressive Language: Profound  Receptive Language: Profound  Cognitive-Linguistic: Profound    Assessment  The patient was seen this date for a language evaluation. NOTE: Given lesion location, a language evaluation is the primary assessment to rule out language impairment which could skew scores on a cognitive evaluation. A cognitive evaluation is contraindicated at this time related to above language deficits.       Expressive Language: Patient with minimal verbalizations. Patient did state \"Anam Ray\" in frustrated/mocking tone while clinician addressing him. Otherwise, minimal unintelligible verbalizations. Patient did not identify objects in field of two - suspect contribution of cognitive deficits as well.     Receptive Language: Removed patient's R hand from mitt and loosened wrist restraint (returned both after task) to allow for nonverbal communication. Patient answered yes/no questions with 0% accuracy (cued verbal and nonverbal). Patient follow single step, body-related movements with 0% accuracy despite model/cues.     Cognition: Poor joint attention with target objects. Suspect severe attention deficits contribute and will plan to target informally during language therapy. Oriented to self only vs unintelligible otherwise.        Of note, dysphagia management not addressed this date as patient is NPO for PEG replacement after pulling last night.       Clinical Impressions  Patients with severe global aphasia as well as severe cognitive deficits across all areas. Family support is a relative strength. Will initiation language therapy. Continue to follow for " "dysphagia management as well.       NOTE: It is not within the scope of practice of Speech-Language Pathologists to determine patient capacity. Please defer to the physician or psych to complete this assessment.       Recommendations  Supervision Needs Upons Discharge: Direct assistance with IADLs (see below)  IADLs: Medication management, Financial management, Appointment management, Household chores, Cooking         SLP Treatment Plan  Treatment Plan: Dysphagia Treatment, Speech-Language Treatment  SLP Frequency: 3x Per Week  Estimated Duration: Until Therapy Goals Met      Anticipated Discharge Needs  Discharge Recommendations: Recommend post-acute placement for additional speech therapy services prior to discharge home  Therapy Recommendations Upon DC: Dysphagia Training, Comprehension Training, Expression Training, Reading Training, Writing Training, Cognitive-Linguistic Training, Patient / Family / Caregiver Education      Patient / Family Goals  Patient / Family Goal #1: \"for him to eat\" per spouse  Goal #1 Outcome: Progressing slower than expected  Short Term Goal # 1: Pt will participate in pre-feeding trials to determine approp. for participation in instrumental swallow study vs oral diet initation.  Goal Outcome # 1: Progressing as expected  Short Term Goal # 2: Pt will participate in a FEES to further assess swallow function  Goal Outcome # 2 : Goal met  Short Term Goal # 3: Patient will answer egocentric yes/no questions wtih 60% accuracy provided maxA.  Short Term Goal # 4: Patient will utilize multimodal communication to express wants/needs x5 in a session.  Short Term Goal # 5: Patient will follow one step commands with 60% accuracy provided maxA.      Camille Siddiqui, SLP  "

## 2024-06-18 NOTE — PROGRESS NOTES
Pt presents to IR 1. Mr. Mueller was consented by his wife with MD at bedside, confirmed by this RN and consent at bedside. Pt transferred to IR 1 table in supine position. Patient underwent a Gastrostomy tube replacement by Dr. Olsen. Procedure site was marked by MD and verified using imaging guidance. Pt placed on monitor, prepped and draped in a sterile fashion. Vitals were taken every 5 minutes and remained stable during procedure (see doc flow sheet for results). CO2 waveform capnography was monitored and remained WNL throughout procedure. Report called to SALBADOR Watson. Pt transported by stretcher with RN to S197-2.     Avanos bolus gastrostomy feeding tube 18F  REF: [id number]  LOT: [id number]  EXP: 2026-08-15

## 2024-06-18 NOTE — OR SURGEON
Immediate Post- Operative Note        Findings: leung in stomach, balloon not inflated      Procedure(s): g tube replacement      Estimated Blood Loss: Less than 5 ml        Complications: None            6/18/2024     4:27 PM     Willem Olsen M.D.

## 2024-06-18 NOTE — THERAPY
Occupational Therapy Contact Note    Patient Name: Jl Mueller  Age:  78 y.o., Sex:  male  Medical Record #: [id number]  Today's Date: 6/18/2024    Per EMR review, pt on hold today awaiting surgery to replace PEG tube that he pulled last night. Will hold and follow up as able/appropriate.      Sai Canela OTD, OTR/L

## 2024-06-18 NOTE — PROGRESS NOTES
Hospital Medicine Daily Progress Note    Date of Service  6/18/2024    Chief Complaint  Jl Mueller is a 78 y.o. male admitted 5/29/2024 with altered mental status    Hospital Course  78-year-old with a history of BPH, previous hemorrhagic strokes, tobacco use, hypertension, coronary artery disease, dyslipidemia, chronic hypoxic respiratory failure dependent on 2 L of home oxygen therapy. He was admitted on May 29 due to neurologic changes, and was found to have a left cerebellar hemorrhage. CTA was negative for any vascular malformations. Patient did require hypertonic saline and was intubated for airway protection. He was successfully extubated on June 1 and weaned off of hypertonic saline. Hospital stay has been complicated by UTI which has grown back Enterococcus faecalis, and Enterobacter cloaca     Interval Problem Update      Patient agitated overnight pulled PEG tube  I discussed with IR and ordered PEG tube replacement  Reviewed hospitalization records and discussed with wife at bedside and nursing staff  I ordered repeat hemoglobin and IV Protonix  Will keep patient n.p.o. at this time with close clinical monitoring and reevaluate resuming tube feed in a.m.    I certify that the patient requires continued medically necessary hospital services for the treatment of hemorrhagic stroke delirium and will remain in the hospital for 3 days days.  Discharge plan is anticipated to be SNF.       Consultants/Specialty  neurology and palliative care    Code Status  Full Code    Disposition  The patient is not medically cleared for discharge to home or a post-acute facility.      I have placed the appropriate orders for post-discharge needs.    Review of Systems  Review of Systems   Unable to perform ROS: Mental status change        Physical Exam  Temp:  [36.4 °C (97.6 °F)-37.2 °C (99 °F)] 36.8 °C (98.2 °F)  Pulse:  [] 107  Resp:  [16-20] 19  BP: (126-163)/(70-91) 132/74  SpO2:  [95 %-98 %] 98 %    Physical  Exam  Vitals and nursing note reviewed.   Constitutional:       Appearance: He is well-developed. He is not diaphoretic.      Comments: Patient is somnolent   HENT:      Head: Normocephalic and atraumatic.      Mouth/Throat:      Pharynx: No oropharyngeal exudate.   Eyes:      General:         Right eye: No discharge.         Left eye: No discharge.   Neck:      Vascular: No JVD.      Trachea: No tracheal deviation.   Cardiovascular:      Rate and Rhythm: Normal rate and regular rhythm.      Heart sounds: No murmur heard.     No friction rub. No gallop.   Pulmonary:      Effort: Pulmonary effort is normal. No respiratory distress.      Breath sounds: No stridor. Rhonchi present. No wheezing.   Chest:      Chest wall: No tenderness.   Abdominal:      General: There is no distension.      Palpations: Abdomen is soft.      Tenderness: There is no abdominal tenderness. There is no rebound.      Comments: Abdominal binder in place small amount as drainage from G-tube site   Musculoskeletal:         General: Swelling present. No tenderness.      Cervical back: Neck supple.   Skin:     General: Skin is warm and dry.      Nails: There is no clubbing.   Neurological:      Motor: No abnormal muscle tone.   Psychiatric:         Cognition and Memory: Cognition is impaired.         Fluids    Intake/Output Summary (Last 24 hours) at 6/18/2024 0946  Last data filed at 6/18/2024 0600  Gross per 24 hour   Intake 525 ml   Output 925 ml   Net -400 ml       Laboratory  Recent Labs     06/16/24  0401   WBC 9.2   RBC 3.80*   HEMOGLOBIN 10.5*   HEMATOCRIT 33.5*   MCV 88.2   MCH 27.6   MCHC 31.3*   RDW 47.5   PLATELETCT 396   MPV 11.6       Recent Labs     06/16/24  0401 06/17/24  0407   SODIUM 139 137   POTASSIUM 3.4* 4.0   CHLORIDE 106 101   CO2 22 24   GLUCOSE 131* 116*   BUN 21 20   CREATININE 0.70 0.76   CALCIUM 9.1 9.8                   Imaging  EC-ECHOCARDIOGRAM COMPLETE W/ CONT   Final Result      DX-CHEST-PORTABLE (1 VIEW)   Final  Result      1.  Small left pleural effusion and associated atelectasis.   2.  Mild bilateral interstitial opacities, likely mild interstitial edema.         CT-HEAD W/O   Final Result         1.  Area of cerebellar hemorrhage, decreased since prior study.   2.  Nonspecific white matter changes, commonly associated with small vessel ischemic disease.  Associated mild cerebral atrophy is noted.   3.  Atherosclerosis.         CT-HEAD W/O   Final Result      1.  Intraparenchymal hemorrhage in the cerebellar vermis and left cerebellum. It demonstrates expected evolution and decreased density.   2.  Redemonstration of vasogenic edema surrounding the hematoma in the cerebellum with mild narrowing of the fourth ventricle. There is no hydrocephalus.         DX-CHEST-LIMITED (1 VIEW)   Final Result      Mild interstitial prominence could represent vascular congestion.      Cardiomegaly.      Removal of endotracheal tube.      [id number] VIEW   Final Result      Feeding tube tip projects in the second portion of the duodenum.      Nonspecific bowel gas pattern.      CT-HEAD W/O   Final Result      1.  Advanced cerebral atrophy.   2.  Stable ventriculomegaly with intraventricular hemorrhage.   3.  Advanced supratentorial white matter disease most consistent with microvascular ischemic change.   4.  Multiple old lacunar infarcts as described.   5.  Acute/recent subacute parenchymal hemorrhage in the posterior fossa involving the superior cerebellar vermis and left paramedian cerebellar hemisphere and left lateral cerebellar peduncle. No evidence of recurrent hemorrhage.   6.  Minimal focus of subarachnoid hemorrhage within a single sulcus in the left posterior temporal region. Probably unchanged from prior recent exam.         DX-ABDOMEN FOR TUBE PLACEMENT   Final Result      Enteric tube tip projects over the stomach.      EC-ECHOCARDIOGRAM COMPLETE W/ CONT   Final Result      DX-CHEST-PORTABLE (1 VIEW)   Final Result          1.  Left basilar atelectasis or subtle infiltrate, decreased since prior study   2.  Trace left pleural effusion   3.  Enlargement of the aortic knob suggesting thoracic aortic aneurysm   4.  Atherosclerosis      MR-BRAIN-WITH & W/O   Final Result      1.  Stable fossa of parenchymal hemorrhage centered in the LEFT cerebellum and extending into the RIGHT cerebellum exerting mass effect on the fourth ventricle which is not completely effaced   2.  Intraventricular blood redemonstrated   3.  Numerous areas of remote microhemorrhage in the supra and infratentorial brain. These could be related to amyloid angiopathy, numerous hypertensive microhemorrhages or less likely multiple cavernomas   4.  Moderate diffuse atrophy without compelling evidence of hydrocephalus   5.  Advanced white matter changes   6.  4 mm LEFT temporal meningioma   7.  Enhancing nodule in the RIGHT internal auditory canal suspicious for a vestibular schwannoma, less likely other extra-axial mass such as a meningioma, facial nerve schwannoma or metastasis. Consider posterior fossa protocol brain MRI without and with    contrast to further evaluate in when clinically appropriate.      MR-MRA HEAD-W/O   Final Result         MRA OF THE [place] WITHIN NORMAL LIMITS.      DX-CHEST-PORTABLE (1 VIEW)   Final Result         1.  Hazy left lower lobe infiltrate   2.  Small left pleural effusion   3.  Enlargement of the aortic knob, appearance suggesting aortic aneurysm.      DX-CHEST-FOR LINE PLACEMENT Perform procedure in: Patient's Room   Final Result      1.  PICC line is in place with the tip projecting over the SVC in satisfactory position.   2.  Mildly enlarged cardiac silhouette with vascular congestion.   3.  Retrocardiac opacity is likely due to atelectasis.         IR-PICC LINE PLACEMENT W/ GUIDANCE > AGE 5   Final Result                  Ultrasound-guided PICC placement performed by qualified nursing staff as    above.          CT-HEAD  W/O   Final Result         1.  Left cerebellar hemorrhage, similar compared to prior study.   2.  Minimal bilateral intraventricular hemorrhages, new since prior study.   3.  Nonspecific white matter changes, commonly associated with small vessel ischemic disease.  Associated mild cerebral atrophy is noted.   4.  Atherosclerosis.         DX-CHEST-PORTABLE (1 VIEW)   Final Result         1.  Left basilar atelectasis, no focal infiltrate   2.  Trace left pleural effusion   3.  Atherosclerosis      CT-CTA NECK WITH & W/O-POST PROCESSING   Final Result         1.  Scattered atherosclerosis without significant stenosis or occlusion.   2.  4.2 cm proximal descending thoracic aortic aneurysm, radiographic follow-up and surveillance recommended as clinically appropriate.         CT-CTA HEAD WITH & W/O-POST PROCESS   Final Result         1.  No large vessel occlusion or aneurysm identified   2.  Large cerebellar hemorrhage predominantly in the left cerebellum      These findings were discussed with the patient's clinician, Nikki Alexander, on 5/29/2024 11:35 PM.      DX-CHEST-PORTABLE (1 VIEW)   Final Result      Tubes as above      Left pleural effusion      CHF/pulmonary edema pattern      See Brown MD   5/30/2024 8:41 AM         CT-HEAD W/O   Final Result      Acute intraparenchymal hemorrhage is noted involving the left cerebellar hemisphere, measuring 3.1 x 4.2 x 3.4 cm, with associated mass effect.      This finding was telephoned to the mentioned attending by on-call radiologist at the time of imaging         AAST Intracranial Hemorrhage Brain Injury Guidelines         Hemorrhage type  mBIG 1           mBIG 2                mBIG 3      Subdural             <4mm               5-7.9 mm             >8mm      Epidural                No                    No                  Yes      Intraparenc'mal   <4mm               5-7.9 mm         >8mm or multi   1 location       or 2 locations      >3 locations       Subarachnoid     <3 sulci       single hemisphere   bi-hemisphere   and <1 mm        or 1-3 mm            or > 3 mm      Intraventricular       No                  No                    Yes      Skull Fracture       None            Non-displaced       Displaced               DLP Reporting Thresholds for Incorrect/Repeated Exams - DLP in mGy*cm   Head/Neck:  0-year-old 3840, 1-year-old 5880, 5-year-old 8770, 10-year-old 93840 and adult 57125   Head:  0-year-old 4540, 1-year-old 7460, 5-year-old 48251, 10-year-old 15612 and adult 47366   Neck:  0-year-old 2940, 1-year-old 4160, 5-year-old 4550, 10-year-old 6320 and adult 8470   Chest:  0-year-old 550, 1-year-old 830, 5-year-old 1200, 10-year-old 3840 and adult 3570   Abd/pelvis:  0-year-old 440, 1-year-old 720, 5-year-old 1080, 10-year-old 3330 and adult 3330   Trunk(C/A/P):  0-year-old 490, 1-year-old 770, 5-year-old 1140, 10-year-old 3570 and adult 3330      OUTSIDE IMAGES-CT CERVICAL SPINE    (Results Pending)        Assessment/Plan  * Cerebellar hemorrhage (HCC)- (present on admission)  Assessment & Plan  ICH score=1  Thought due to amyloid  MRI brain: Stable fossa of parenchymal hemorrhage centered in the LEFT cerebellum and extending into the RIGHT cerebellum exerting mass effect on the fourth ventricle which is not completely effaced 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  HOB at 30 degrees  maintain pCO2 of 35-40; closer to 35  Pravastatin 40 mg qHS for possible neuroprotective effect  CT 6/7 unchanged    PEG replaced  06/18  No antiplatelet or anticoagulation therapy as thought due to Amyloid    Hypophosphatemia  Assessment & Plan  Monitor and replace    Hypokalemia  Assessment &  Plan  Monitor and repalce    Chronic systolic heart failure (HCC)  Assessment & Plan  EF 40%  ACE allergy  BB  Monitor volume status  Gentle hydration while n.p.o.    Paroxysmal tachycardia (HCC)  Assessment & Plan  New Dx PAFib  Given multiple previous ICH's and likely etiology being amyloid, he is not a candidate for anticoagulation   Beta-blocker with holding parameters    UTI (urinary tract infection)  Assessment & Plan  Urine culture with E faecalis and E cloacae  Completed course of Zosyn    Encephalopathy acute- (present on admission)  Assessment & Plan  Likely delirium in conjunction with his intracranial pathology  Keep patient awake during the day and avoid daytime naps. Remove all unnecessary lines (central lines, peripheral IVs, feeding tubes, leung catheters). Avoid polypharmacy, frequent re-orientation, maximize family time at bedside, use glasses and hearing aids if needed, treat pain, encourage ambulation, minimize benzos/anticholinergic agents.   Ambulate, get out of restraints is much as possible    Continue Risperdal and as needed Haldol  Will consider low-dose Depakote if still agitated    Hyperchloremic metabolic acidosis  Assessment & Plan  Improved    Hyperglycemia- (present on admission)  Assessment & Plan  Goal blood glucose 140-180  A1c 5.6  Off insulin  hypoglycemia protocol    Acute on chronic respiratory failure with hypoxia (HCC)- (present on admission)  Assessment & Plan  Intubated for airway protection  Extubated 6/1  Chest x-ray not impressive on 6/4/2024  Mobilization  Pulmonary hygiene  RT/O2 Protocols  Titrate supplemental FiO2 to maintain SpO2 >92%  Monitor vol status closely    Hyperlipidemia- (present on admission)  Assessment & Plan  Per wife, patient's home medication was discontinued due to muscle cramps  Pravastatin 40mg QHS  Lipid panel reviewed  Counseling on lifestyle/dietary modifications    History of stroke- (present on admission)  Assessment & Plan  Patient has had 2  prior hemorrhagic strokes; last in 2015       BPH with urinary obstruction- (present on admission)  Assessment & Plan  S/p prostate surgery  Per wife, patient has suffered from urinary retention and incontinence since his surgery  Continue flomax  Park removed 6/12:      Benign essential hypertension- (present on admission)  Assessment & Plan  Given new Dx of PAFib     Amlodipine transition to metoprolol  Monitor blood pressure and adjust medical therapy accordingly    Abdominal aortic aneurysm (AAA) without rupture (HCC)- (present on admission)  Assessment & Plan  S/p EVAR w/ bypass graft stent  Maintain normotension  Holding ASA at this time; per wife he takes ASA 3x/week    COPD (chronic obstructive pulmonary disease) (HCC)- (present on admission)  Assessment & Plan  Not currently in exacerbation  CXR with no acute process  Continue home nebulizers as ordered  Continue PRN nebulizers Q4h  Wean FiO2 as tolerated         VTE prophylaxis:   SCDs/TEDs      I have performed a physical exam and reviewed and updated ROS and Plan today (6/18/2024). In review of yesterday's note (6/17/2024), there are no changes except as documented above

## 2024-06-18 NOTE — PLAN OF CARE (IOPOC)
Last night patient pulled out PEG tube. Pt was very confused, restless, and agitated. Unable to give meds via enteral access. On call MDs aware. Park catheter inserted at peg tube site to keep open. Pt is currently restrained in upper restraints and hand mitts.

## 2024-06-19 ENCOUNTER — APPOINTMENT (OUTPATIENT)
Dept: RADIOLOGY | Facility: MEDICAL CENTER | Age: 79
End: 2024-06-19
Attending: HOSPITALIST
Payer: MEDICARE

## 2024-06-19 LAB
ALBUMIN SERPL BCP-MCNC: 3.5 G/DL (ref 3.2–4.9)
ALBUMIN/GLOB SERPL: 1.3 G/DL
ALP SERPL-CCNC: 44 U/L (ref 30–99)
ALT SERPL-CCNC: 31 U/L (ref 2–50)
ANION GAP SERPL CALC-SCNC: 11 MMOL/L (ref 7–16)
AST SERPL-CCNC: 34 U/L (ref 12–45)
BILIRUB SERPL-MCNC: 0.7 MG/DL (ref 0.1–1.5)
BUN SERPL-MCNC: 21 MG/DL (ref 8–22)
CALCIUM ALBUM COR SERPL-MCNC: 9.1 MG/DL (ref 8.5–10.5)
CALCIUM SERPL-MCNC: 8.7 MG/DL (ref 8.5–10.5)
CHLORIDE SERPL-SCNC: 105 MMOL/L (ref 96–112)
CO2 SERPL-SCNC: 22 MMOL/L (ref 20–33)
CREAT SERPL-MCNC: 0.79 MG/DL (ref 0.5–1.4)
ERYTHROCYTE [DISTWIDTH] IN BLOOD BY AUTOMATED COUNT: 50.1 FL (ref 35.9–50)
GFR SERPLBLD CREATININE-BSD FMLA CKD-EPI: 91 ML/MIN/1.73 M 2
GLOBULIN SER CALC-MCNC: 2.8 G/DL (ref 1.9–3.5)
GLUCOSE SERPL-MCNC: 117 MG/DL (ref 65–99)
HCT VFR BLD AUTO: 31.4 % (ref 42–52)
HGB BLD-MCNC: 9.9 G/DL (ref 14–18)
MAGNESIUM SERPL-MCNC: 2 MG/DL (ref 1.5–2.5)
MCH RBC QN AUTO: 27.9 PG (ref 27–33)
MCHC RBC AUTO-ENTMCNC: 31.5 G/DL (ref 32.3–36.5)
MCV RBC AUTO: 88.5 FL (ref 81.4–97.8)
PHOSPHATE SERPL-MCNC: 1.8 MG/DL (ref 2.5–4.5)
PLATELET # BLD AUTO: 374 K/UL (ref 164–446)
PMV BLD AUTO: 11.8 FL (ref 9–12.9)
POTASSIUM SERPL-SCNC: 4 MMOL/L (ref 3.6–5.5)
PROT SERPL-MCNC: 6.3 G/DL (ref 6–8.2)
RBC # BLD AUTO: 3.55 M/UL (ref 4.7–6.1)
SODIUM SERPL-SCNC: 138 MMOL/L (ref 135–145)
WBC # BLD AUTO: 13.8 K/UL (ref 4.8–10.8)

## 2024-06-19 PROCEDURE — 99232 SBSQ HOSP IP/OBS MODERATE 35: CPT | Performed by: HOSPITALIST

## 2024-06-19 PROCEDURE — A9270 NON-COVERED ITEM OR SERVICE: HCPCS | Performed by: HOSPITALIST

## 2024-06-19 PROCEDURE — 770020 HCHG ROOM/CARE - TELE (206)

## 2024-06-19 PROCEDURE — C9113 INJ PANTOPRAZOLE SODIUM, VIA: HCPCS | Performed by: HOSPITALIST

## 2024-06-19 PROCEDURE — A9270 NON-COVERED ITEM OR SERVICE: HCPCS | Performed by: INTERNAL MEDICINE

## 2024-06-19 PROCEDURE — 700102 HCHG RX REV CODE 250 W/ 637 OVERRIDE(OP): Performed by: INTERNAL MEDICINE

## 2024-06-19 PROCEDURE — 700102 HCHG RX REV CODE 250 W/ 637 OVERRIDE(OP): Performed by: HOSPITALIST

## 2024-06-19 PROCEDURE — 700111 HCHG RX REV CODE 636 W/ 250 OVERRIDE (IP): Performed by: HOSPITALIST

## 2024-06-19 PROCEDURE — 700105 HCHG RX REV CODE 258: Performed by: HOSPITALIST

## 2024-06-19 PROCEDURE — 700102 HCHG RX REV CODE 250 W/ 637 OVERRIDE(OP): Performed by: PHYSICAL MEDICINE & REHABILITATION

## 2024-06-19 PROCEDURE — 71045 X-RAY EXAM CHEST 1 VIEW: CPT

## 2024-06-19 PROCEDURE — A9270 NON-COVERED ITEM OR SERVICE: HCPCS | Performed by: PHYSICAL MEDICINE & REHABILITATION

## 2024-06-19 PROCEDURE — 700101 HCHG RX REV CODE 250: Performed by: HOSPITALIST

## 2024-06-19 PROCEDURE — 94640 AIRWAY INHALATION TREATMENT: CPT

## 2024-06-19 RX ORDER — ACETAMINOPHEN 500 MG
1000 TABLET ORAL 4 TIMES DAILY
Status: DISCONTINUED | OUTPATIENT
Start: 2024-06-19 | End: 2024-06-26 | Stop reason: HOSPADM

## 2024-06-19 RX ORDER — VALPROIC ACID 250 MG/5ML
250 SOLUTION ORAL EVERY 12 HOURS
Status: DISCONTINUED | OUTPATIENT
Start: 2024-06-19 | End: 2024-06-20

## 2024-06-19 RX ORDER — VALPROIC ACID 250 MG/1
250 CAPSULE, LIQUID FILLED ORAL EVERY 12 HOURS
Status: DISCONTINUED | OUTPATIENT
Start: 2024-06-19 | End: 2024-06-19

## 2024-06-19 RX ORDER — ACETAMINOPHEN 500 MG
1000 TABLET ORAL 4 TIMES DAILY
Status: DISCONTINUED | OUTPATIENT
Start: 2024-06-19 | End: 2024-06-19

## 2024-06-19 RX ADMIN — ACETAMINOPHEN 1000 MG: 500 TABLET, FILM COATED ORAL at 14:48

## 2024-06-19 RX ADMIN — Medication: at 06:00

## 2024-06-19 RX ADMIN — PRAVASTATIN SODIUM 40 MG: 20 TABLET ORAL at 16:30

## 2024-06-19 RX ADMIN — POTASSIUM PHOSPHATE, MONOBASIC AND POTASSIUM PHOSPHATE, DIBASIC 30 MMOL: 224; 236 INJECTION, SOLUTION, CONCENTRATE INTRAVENOUS at 09:19

## 2024-06-19 RX ADMIN — OXYCODONE HYDROCHLORIDE 10 MG: 10 TABLET ORAL at 23:43

## 2024-06-19 RX ADMIN — METOPROLOL TARTRATE 12.5 MG: 25 TABLET, FILM COATED ORAL at 05:46

## 2024-06-19 RX ADMIN — PANTOPRAZOLE SODIUM 40 MG: 40 INJECTION, POWDER, FOR SOLUTION INTRAVENOUS at 05:50

## 2024-06-19 RX ADMIN — Medication 5 MG: at 21:45

## 2024-06-19 RX ADMIN — SODIUM CHLORIDE, POTASSIUM CHLORIDE, SODIUM LACTATE AND CALCIUM CHLORIDE: 600; 310; 30; 20 INJECTION, SOLUTION INTRAVENOUS at 06:04

## 2024-06-19 RX ADMIN — HYDROMORPHONE HYDROCHLORIDE 0.25 MG: 1 INJECTION, SOLUTION INTRAMUSCULAR; INTRAVENOUS; SUBCUTANEOUS at 03:18

## 2024-06-19 RX ADMIN — OXYCODONE HYDROCHLORIDE 10 MG: 10 TABLET ORAL at 08:29

## 2024-06-19 RX ADMIN — Medication: at 16:30

## 2024-06-19 RX ADMIN — METOPROLOL TARTRATE 12.5 MG: 25 TABLET, FILM COATED ORAL at 16:30

## 2024-06-19 RX ADMIN — PANTOPRAZOLE SODIUM 40 MG: 40 INJECTION, POWDER, FOR SOLUTION INTRAVENOUS at 16:29

## 2024-06-19 RX ADMIN — ALBUTEROL SULFATE 2.5 MG: 2.5 SOLUTION RESPIRATORY (INHALATION) at 17:07

## 2024-06-19 RX ADMIN — RISPERIDONE 2 MG: 1 TABLET, FILM COATED ORAL at 16:30

## 2024-06-19 RX ADMIN — ACETAMINOPHEN 1000 MG: 500 TABLET, FILM COATED ORAL at 21:44

## 2024-06-19 RX ADMIN — OXYCODONE HYDROCHLORIDE 10 MG: 10 TABLET ORAL at 16:30

## 2024-06-19 RX ADMIN — ALBUTEROL SULFATE 2.5 MG: 2.5 SOLUTION RESPIRATORY (INHALATION) at 22:43

## 2024-06-19 RX ADMIN — VALPROIC ACID 250 MG: 250 SOLUTION ORAL at 18:47

## 2024-06-19 ASSESSMENT — PAIN DESCRIPTION - PAIN TYPE
TYPE: ACUTE PAIN
TYPE: ACUTE PAIN;SURGICAL PAIN
TYPE: ACUTE PAIN

## 2024-06-19 ASSESSMENT — FIBROSIS 4 INDEX: FIB4 SCORE: 1.27

## 2024-06-19 NOTE — CARE PLAN
The patient is Stable - Low risk of patient condition declining or worsening    Shift Goals  Clinical Goals: Stable neuro status, PEG replacement  Patient Goals: GUZMAN  Family Goals: Peg replacement    Progress made toward(s) clinical / shift goals:    Problem: Safety - Medical Restraint  Goal: Remains free of injury from restraints (Restraint for Interference with Medical Device)  Outcome: Progressing  Flowsheets (Taken 6/10/2024 1752 by Chery Peña RAyeN.)  Addressed this shift: Remains free of injury from restraints (restraint for interference with medical device):   Determine that other, less restrictive measures have been tried or would not be effective before applying the restraint   Evaluate the patient's condition at the time of restraint application   Inform patient/family regarding the reason for restraint   Every 2 hours: Monitor safety, psychosocial status, comfort, nutrition and hydration     Problem: Neuro Status  Goal: Neuro status will remain stable or improve  6/18/2024 1830 by Garima Lara R.N.  Note: Neuro status remains stable.   6/18/2024 1828 by Garima Lara, R.N.  Outcome: Progressing  Note: Neuro status remains stable.        Patient is not progressing towards the following goals:      Problem: Knowledge Deficit - Stroke Education  Goal: Patient's knowledge of stroke and risk factors will improve  Outcome: Not Progressing  Note: Pt unable to demonstrate any understanding of plan of care.

## 2024-06-19 NOTE — DIETARY
Nutrition Services: Brief Update    Pt had PEG tube replaced by IR on 6/18. Discussed with RN today, pt received half carton of Isosource 1.5 this am and will increase to full carton at next feeds. Pt also received meds through new tube and appears to be tolerating per RN. Monitoring glucose on standard formula. Phos 1.8, KPhos on MAR.     RD following, monitor for progression of TF back to goal via bolus feeds.

## 2024-06-19 NOTE — DISCHARGE PLANNING
1150  DPA resent [facility] referrals    1153  DPA HARD FAXED REFERRAL :  Agency/Facility Name: [facility]  Fax#: [phone]  Time: 1154    1229  Agency/Facility Name: [facility]  Outcome: DPA LVM inquiring follow up on pending SNF referral.   Advised RN CM via teams

## 2024-06-19 NOTE — DISCHARGE PLANNING
Case Management Discharge Planning    Admission Date: 5/29/2024  GMLOS: 4.6  ALOS: 21    6-Clicks ADL Score: 14  6-Clicks Mobility Score: 11  PT and/or OT Eval ordered: Yes  Post-acute Referrals Ordered: Yes  Post-acute Choice Obtained: Yes  Has referral(s) been sent to post-acute provider:  Yes      Anticipated Discharge Dispo: Discharge Disposition: D/T to SNF with Medicare cert in anticipation of skilled care (03)    DME Needed: No    Action(s) Taken: Updated Provider/Nurse on Discharge Plan  RN CM attended IDT rounds with the team, chart reviewed. Pt still on restraints. Pt not medical cleared. Pt has G-tube replacement yesterday. Pending SNF acceptance due to restraints. [facility] considering.    Escalations Completed: Provider    Medically Clear: No    Next Steps: follow up SNF acceptance once out of restraints    Barriers to Discharge: Medical clearance and Pending Placement    Is the patient up for discharge tomorrow: No

## 2024-06-19 NOTE — PROGRESS NOTES
Monitor Summary: SR-ST , LA .0.16, QRS .0.10, QT .0.35 with occasional-frequent PVCs per strip from monitor room

## 2024-06-20 ENCOUNTER — APPOINTMENT (OUTPATIENT)
Dept: RADIOLOGY | Facility: MEDICAL CENTER | Age: 79
End: 2024-06-20
Attending: HOSPITALIST
Payer: MEDICARE

## 2024-06-20 PROBLEM — D62 ANEMIA ASSOCIATED WITH ACUTE BLOOD LOSS: Status: ACTIVE | Noted: 2024-06-20

## 2024-06-20 LAB
ANION GAP SERPL CALC-SCNC: 8 MMOL/L (ref 7–16)
BUN SERPL-MCNC: 24 MG/DL (ref 8–22)
CALCIUM SERPL-MCNC: 8.9 MG/DL (ref 8.5–10.5)
CHLORIDE SERPL-SCNC: 106 MMOL/L (ref 96–112)
CO2 SERPL-SCNC: 25 MMOL/L (ref 20–33)
CREAT SERPL-MCNC: 0.73 MG/DL (ref 0.5–1.4)
ERYTHROCYTE [DISTWIDTH] IN BLOOD BY AUTOMATED COUNT: 51.6 FL (ref 35.9–50)
GFR SERPLBLD CREATININE-BSD FMLA CKD-EPI: 93 ML/MIN/1.73 M 2
GLUCOSE SERPL-MCNC: 139 MG/DL (ref 65–99)
HCT VFR BLD AUTO: 29 % (ref 42–52)
HGB BLD-MCNC: 9 G/DL (ref 14–18)
MAGNESIUM SERPL-MCNC: 2.2 MG/DL (ref 1.5–2.5)
MCH RBC QN AUTO: 28.3 PG (ref 27–33)
MCHC RBC AUTO-ENTMCNC: 31 G/DL (ref 32.3–36.5)
MCV RBC AUTO: 91.2 FL (ref 81.4–97.8)
PHOSPHATE SERPL-MCNC: 2 MG/DL (ref 2.5–4.5)
PLATELET # BLD AUTO: 325 K/UL (ref 164–446)
PMV BLD AUTO: 11.1 FL (ref 9–12.9)
POTASSIUM SERPL-SCNC: 4.6 MMOL/L (ref 3.6–5.5)
RBC # BLD AUTO: 3.18 M/UL (ref 4.7–6.1)
SODIUM SERPL-SCNC: 139 MMOL/L (ref 135–145)
WBC # BLD AUTO: 10.9 K/UL (ref 4.8–10.8)

## 2024-06-20 PROCEDURE — 97530 THERAPEUTIC ACTIVITIES: CPT

## 2024-06-20 PROCEDURE — 700111 HCHG RX REV CODE 636 W/ 250 OVERRIDE (IP): Mod: JZ | Performed by: HOSPITALIST

## 2024-06-20 PROCEDURE — 306565 RIGID MIT RESTRAINT(PAIR): Performed by: HOSPITALIST

## 2024-06-20 PROCEDURE — 700102 HCHG RX REV CODE 250 W/ 637 OVERRIDE(OP): Performed by: HOSPITALIST

## 2024-06-20 PROCEDURE — A9270 NON-COVERED ITEM OR SERVICE: HCPCS | Performed by: HOSPITALIST

## 2024-06-20 PROCEDURE — 74018 RADEX ABDOMEN 1 VIEW: CPT

## 2024-06-20 PROCEDURE — A9270 NON-COVERED ITEM OR SERVICE: HCPCS | Performed by: PHYSICAL MEDICINE & REHABILITATION

## 2024-06-20 PROCEDURE — 700101 HCHG RX REV CODE 250: Performed by: HOSPITALIST

## 2024-06-20 PROCEDURE — C9113 INJ PANTOPRAZOLE SODIUM, VIA: HCPCS | Performed by: HOSPITALIST

## 2024-06-20 PROCEDURE — 84100 ASSAY OF PHOSPHORUS: CPT

## 2024-06-20 PROCEDURE — 700102 HCHG RX REV CODE 250 W/ 637 OVERRIDE(OP): Performed by: PHYSICAL MEDICINE & REHABILITATION

## 2024-06-20 PROCEDURE — 94640 AIRWAY INHALATION TREATMENT: CPT

## 2024-06-20 PROCEDURE — 770020 HCHG ROOM/CARE - TELE (206)

## 2024-06-20 PROCEDURE — 700111 HCHG RX REV CODE 636 W/ 250 OVERRIDE (IP): Performed by: HOSPITALIST

## 2024-06-20 PROCEDURE — 97530 THERAPEUTIC ACTIVITIES: CPT | Mod: CQ

## 2024-06-20 PROCEDURE — 36415 COLL VENOUS BLD VENIPUNCTURE: CPT

## 2024-06-20 PROCEDURE — 700102 HCHG RX REV CODE 250 W/ 637 OVERRIDE(OP): Performed by: INTERNAL MEDICINE

## 2024-06-20 PROCEDURE — A9270 NON-COVERED ITEM OR SERVICE: HCPCS | Performed by: INTERNAL MEDICINE

## 2024-06-20 PROCEDURE — 700105 HCHG RX REV CODE 258: Performed by: HOSPITALIST

## 2024-06-20 PROCEDURE — 80048 BASIC METABOLIC PNL TOTAL CA: CPT

## 2024-06-20 PROCEDURE — 99232 SBSQ HOSP IP/OBS MODERATE 35: CPT | Performed by: HOSPITALIST

## 2024-06-20 PROCEDURE — 97535 SELF CARE MNGMENT TRAINING: CPT

## 2024-06-20 PROCEDURE — 83735 ASSAY OF MAGNESIUM: CPT

## 2024-06-20 PROCEDURE — 85027 COMPLETE CBC AUTOMATED: CPT

## 2024-06-20 RX ORDER — DOXAZOSIN MESYLATE 1 MG/1
1 TABLET ORAL
Status: DISCONTINUED | OUTPATIENT
Start: 2024-06-20 | End: 2024-06-26 | Stop reason: HOSPADM

## 2024-06-20 RX ORDER — LANSOPRAZOLE 30 MG/1
30 TABLET, ORALLY DISINTEGRATING, DELAYED RELEASE ORAL 2 TIMES DAILY
Status: DISCONTINUED | OUTPATIENT
Start: 2024-06-20 | End: 2024-06-26 | Stop reason: HOSPADM

## 2024-06-20 RX ORDER — VALPROIC ACID 250 MG/5ML
250 SOLUTION ORAL
Status: DISCONTINUED | OUTPATIENT
Start: 2024-06-20 | End: 2024-06-21

## 2024-06-20 RX ADMIN — RISPERIDONE 2 MG: 1 TABLET, FILM COATED ORAL at 17:06

## 2024-06-20 RX ADMIN — Medication 5 MG: at 20:19

## 2024-06-20 RX ADMIN — ACETAMINOPHEN 1000 MG: 500 TABLET, FILM COATED ORAL at 15:17

## 2024-06-20 RX ADMIN — DOXAZOSIN MESYLATE 1 MG: 1 TABLET ORAL at 20:19

## 2024-06-20 RX ADMIN — OXYCODONE HYDROCHLORIDE 10 MG: 10 TABLET ORAL at 20:19

## 2024-06-20 RX ADMIN — METOPROLOL TARTRATE 12.5 MG: 25 TABLET, FILM COATED ORAL at 17:06

## 2024-06-20 RX ADMIN — PRAVASTATIN SODIUM 40 MG: 20 TABLET ORAL at 17:05

## 2024-06-20 RX ADMIN — ALBUTEROL SULFATE 2.5 MG: 2.5 SOLUTION RESPIRATORY (INHALATION) at 16:47

## 2024-06-20 RX ADMIN — VALPROIC ACID 250 MG: 250 SOLUTION ORAL at 20:19

## 2024-06-20 RX ADMIN — Medication: at 06:00

## 2024-06-20 RX ADMIN — VALPROIC ACID 250 MG: 250 SOLUTION ORAL at 04:22

## 2024-06-20 RX ADMIN — PANTOPRAZOLE SODIUM 40 MG: 40 INJECTION, POWDER, FOR SOLUTION INTRAVENOUS at 04:22

## 2024-06-20 RX ADMIN — Medication: at 17:06

## 2024-06-20 RX ADMIN — HALOPERIDOL LACTATE 2 MG: 5 INJECTION, SOLUTION INTRAMUSCULAR at 22:40

## 2024-06-20 RX ADMIN — ACETAMINOPHEN 1000 MG: 500 TABLET, FILM COATED ORAL at 04:22

## 2024-06-20 RX ADMIN — ACETAMINOPHEN 1000 MG: 500 TABLET, FILM COATED ORAL at 07:59

## 2024-06-20 RX ADMIN — ALBUTEROL SULFATE 2.5 MG: 2.5 SOLUTION RESPIRATORY (INHALATION) at 04:48

## 2024-06-20 RX ADMIN — PANTOPRAZOLE SODIUM 40 MG: 40 INJECTION, POWDER, FOR SOLUTION INTRAVENOUS at 17:06

## 2024-06-20 RX ADMIN — SODIUM PHOSPHATE, MONOBASIC, MONOHYDRATE AND SODIUM PHOSPHATE, DIBASIC, ANHYDROUS 30 MMOL: 142; 276 INJECTION, SOLUTION INTRAVENOUS at 15:18

## 2024-06-20 RX ADMIN — METOPROLOL TARTRATE 12.5 MG: 25 TABLET, FILM COATED ORAL at 04:21

## 2024-06-20 ASSESSMENT — COGNITIVE AND FUNCTIONAL STATUS - GENERAL
SUGGESTED CMS G CODE MODIFIER DAILY ACTIVITY: CK
STANDING UP FROM CHAIR USING ARMS: A LOT
DRESSING REGULAR UPPER BODY CLOTHING: A LOT
TOILETING: A LOT
WALKING IN HOSPITAL ROOM: A LOT
MOVING TO AND FROM BED TO CHAIR: A LOT
TURNING FROM BACK TO SIDE WHILE IN FLAT BAD: A LOT
PERSONAL GROOMING: A LITTLE
HELP NEEDED FOR BATHING: A LOT
DAILY ACTIVITIY SCORE: 14
MOVING FROM LYING ON BACK TO SITTING ON SIDE OF FLAT BED: A LOT
DRESSING REGULAR LOWER BODY CLOTHING: A LOT
CLIMB 3 TO 5 STEPS WITH RAILING: TOTAL
EATING MEALS: A LITTLE
SUGGESTED CMS G CODE MODIFIER MOBILITY: CL
MOBILITY SCORE: 11

## 2024-06-20 ASSESSMENT — PAIN DESCRIPTION - PAIN TYPE
TYPE: ACUTE PAIN;SURGICAL PAIN
TYPE: ACUTE PAIN

## 2024-06-20 ASSESSMENT — GAIT ASSESSMENTS: GAIT LEVEL OF ASSIST: UNABLE TO PARTICIPATE

## 2024-06-20 ASSESSMENT — FIBROSIS 4 INDEX: FIB4 SCORE: 1.27

## 2024-06-20 NOTE — CARE PLAN
The patient is Stable - Low risk of patient condition declining or worsening    Shift Goals  Clinical Goals: pain control  Patient Goals: caroline  Family Goals: comfort    Progress made toward(s) clinical / shift goals:    Problem: Safety - Medical Restraint  Goal: Remains free of injury from restraints (Restraint for Interference with Medical Device)  Outcome: Progressing  Note: Patient had Q2 hour turns in place throughout the shift, patient up to chair for a few hours during shift, and range of motion completed when awake.      Problem: Pain - Standard  Goal: Alleviation of pain or a reduction in pain to the patient’s comfort goal  Outcome: Progressing  Note: Patients pain assessed throughout the shift, patient medicated multiple times throughout the shift, nonverbal descriptors used such as noisy breathing, restlessness, and tachypnea. Pain medication seemed to work as patient was calm and asleep after given.

## 2024-06-20 NOTE — PROGRESS NOTES
Monitor summary: SR/ST , ND 0.17, QRS 0.07, QT 0.32,with rare PVC'S  per strip from monitor room.

## 2024-06-20 NOTE — THERAPY
Physical Therapy   Daily Treatment     Patient Name: Jl Mueller  Age:  78 y.o., Sex:  male  Medical Record #: [id number]  Today's Date: 6/20/2024     Precautions  Precautions: Fall Risk;Swallow Precautions  Comments: carin ALEXs. Out of restraints.    Assessment    The pt is unrestrained and in mitts, eyes closed. Pt conts to require max assist for sup<->sit transitions, STS, and transfers. The pt was able to eventually hold static sit w/trunk facilitation w/assist from max->CGA. The pt not following commands throughout tx session.   PT will cont to follow.     Plan    Treatment Plan Status: (P) Continue Current Treatment Plan  Type of Treatment: Bed Mobility, Equipment, Neuro Re-Education / Balance, Self Care / Home Evaluation, Stair Training, Therapeutic Activities, Therapeutic Exercise, Gait Training, Family / Caregiver Training  Treatment Frequency: 4 Times per Week  Treatment Duration: Until Therapy Goals Met    DC Equipment Recommendations: (P) Unable to determine at this time  Discharge Recommendations: (P) Recommend post-acute placement for additional physical therapy services prior to discharge home     Objective       06/20/24 1231   Charge Group   PT Therapeutic Activities (Units) 3   Total Time Spent   PT Therapeutic Activities Time Spent (Mins) 39   PT Total Time Spent (Calculated) 39   Precautions   Precautions Fall Risk;Swallow Precautions   Comments carin ALEXs. Out of restraints.   Pain 0 - 10 Group   Pain Rating Scale (NPRS) 0   Therapist Pain Assessment During Activity   Other Treatments   Other Treatments Provided Pt requiring pericare 2*loose BM. Nrsg took stool sample to r/o blood. EOB x 8 mins initially w/max assist progressing to SBA w/trunk facilitation.   Balance   Sitting Balance (Static) Fair -   Sitting Balance (Dynamic) Poor +   Standing Balance (Static) Poor -   Standing Balance (Dynamic) Trace +   Weight Shift Sitting Poor   Weight Shift Standing Poor   Bed Mobility     Supine to Sit Maximal Assist  (HOB flat)   Sit to Supine   (pt left seated in the chair)   Scooting Maximal Assist  (seated)   Rolling Maximal Assist to Rt.;Maximum Assist to Lt.   Gait Analysis   Gait Level Of Assist Unable to Participate   Comments The pt is more approp for pre-gait in the // bars   Functional Mobility   Sit to Stand Moderate Assist  (from EOB)   Bed, Chair, Wheelchair Transfer Maximal Assist  (bed->recliner chair)   Transfer Method Stand Step   Skilled Intervention Verbal Cuing;Postural Facilitation;Compensatory Strategies   Comments The pt does take wt through his LE's to assist w/STS and transfers. Did get some wt shifting to illicit a step during his transfer.   6 Clicks Assessment - How much HELP from from another person do you currently need... (If the patient hasn't done an activity recently, how much help from another person do you think he/she would need if he/she tried?)   Turning from your back to your side while in a flat bed without using bedrails? 2   Moving from lying on your back to sitting on the side of a flat bed without using bedrails? 2   Moving to and from a bed to a chair (including a wheelchair)? 2   Standing up from a chair using your arms (e.g., wheelchair, or bedside chair)? 2   Walking in hospital room? 2   Climbing 3-5 steps with a railing? 1   6 clicks Mobility Score 11   Short Term Goals    Short Term Goal # 1 Pt will follow 100% of function based commands within 6 visits to ensure participation in PT sessions.   Goal Outcome # 1 goal not met   Short Term Goal # 2 Pt will perform supine<>sit from flat HOB/no railing with min A within 6 visits to ensure independent mobility at home.   Goal Outcome # 2 Goal not met   Short Term Goal # 3 Pt will perform squat pivot transfer with min A within 6 visits to ilia OORI timing.   Goal Outcome # 3 Goal not met   Short Term Goal # 4 Pt will ambulate x 15ft in // bars with min A within 6 visits to progress ambulation  tolerance.   Goal Outcome # 4 Goal not met   Education Group   Role of Physical Therapist Patient Response Patient;Acceptance;Explanation;Reinforcement Needed   Physical Therapy Treatment Plan   Physical Therapy Treatment Plan Continue Current Treatment Plan   Anticipated Discharge Equipment and Recommendations   DC Equipment Recommendations Unable to determine at this time   Discharge Recommendations Recommend post-acute placement for additional physical therapy services prior to discharge home   Interdisciplinary Plan of Care Collaboration   IDT Collaboration with  Nursing;Family / Caregiver   Patient Position at End of Therapy Seated;Chair Alarm On;Call Light within Reach;Family / Friend in Room;Other (Comments)  (carin asencio)   Collaboration Comments Nrsg notified of pts tx efforts   Session Information   Date / Session Number  6/20--6 (2/4, 6/22) PTA/1   Supervising Physical Therapist (PTA Treatments Only)   Supervising Physical Therapist Meenakshi Crabtree

## 2024-06-20 NOTE — CARE PLAN
The patient is Stable - Low risk of patient condition declining or worsening    Shift Goals  Clinical Goals: pain control  Patient Goals: caroline  Family Goals: comfort    Progress made toward(s) clinical / shift goals:    Problem: Skin Integrity  Goal: Skin integrity is maintained or improved  Outcome: Progressing     Problem: Risk for Aspiration  Goal: Patient's risk for aspiration will be absent or decrease  Outcome: Progressing       Patient is not progressing towards the following goals:

## 2024-06-20 NOTE — PROGRESS NOTES
Noticed patient with rhinchi sounds; bilateral upper lobe; notified respiratory therapist for suction and nebulizer , patient unable to expectorate secretions.     Notified oncall hospitalist patient increased in lung sounds , abdomen distended and firm. Writer addressed concern for aspiration, no vomiting noted. Per oncall to monitor patient if he shows evidence of aspiration then can explore a bit more. Oncall aware HS isosource not given .Will endorse to dayshift receiving nurse.

## 2024-06-20 NOTE — THERAPY
Occupational Therapy  Daily Treatment     Patient Name: Jl Mueller  Age:  78 y.o., Sex:  male  Medical Record #: [id number]  Today's Date: 6/20/2024     Precautions  Precautions: Fall Risk, Swallow Precautions  Comments: carin ALEX. Out of restraints.    Assessment    Pt seen for OT session. Pt completed UB dressing  Mod A, LB dressing Mod A, grooming while seated SPV. Pt unable to come to a full stand, pt txf from chair to bed using shari steady. Pt able to recognize toothbrush and use it correctly. Pt demos poor cognition, balance, endurance and strength at this time. Pt would continue to benefit from skilled OT services while admitted and recommend post acute placement.    Plan    Treatment Plan Status: Continue Current Treatment Plan  Type of Treatment: Self Care / Activities of Daily Living, Therapeutic Activity  Treatment Frequency: 3 Times per Week  Treatment Duration: Until Therapy Goals Met    DC Equipment Recommendations: Unable to determine at this time  Discharge Recommendations: Recommend post-acute placement for additional occupational therapy services prior to discharge home       Objective       06/20/24 1501   Precautions   Precautions Fall Risk;Swallow Precautions   Comments carin ALEXs. Out of restraints.   Vitals   Pulse Oximetry 95 %   O2 (LPM) 3   O2 Delivery Device Silicone Nasal Cannula   Cognition    Cognition / Consciousness X   Speech/ Communication Delayed Responses;Dysarthric;Expressive Aphasia   Orientation Level Not Oriented to Reason;Not Oriented to Place;Not Oriented to Time   Level of Consciousness Alert   Ability To Follow Commands Unable to Follow 1 Step Commands   Safety Awareness Impaired;Impulsive   New Learning Impaired   Attention Impaired   Sequencing Impaired   Initiation Impaired   Comments pt unable to follow verbal cues to keep his eyes open   Balance   Sitting Balance (Static) Fair -   Sitting Balance (Dynamic) Poor +   Weight Shift Sitting Poor   Weight  Shift Standing Poor   Skilled Intervention Verbal Cuing;Tactile Cuing;Facilitation   Bed Mobility    Sit to Supine Maximal Assist   Scooting Maximal Assist   Skilled Intervention Verbal Cuing;Tactile Cuing;Postural Facilitation   Comments HOB flat   Activities of Daily Living   Grooming Supervision;Seated   Upper Body Dressing Moderate Assist   Lower Body Dressing Moderate Assist  (pt able to don 1 sock with extra time)   Skilled Intervention Verbal Cuing;Tactile Cuing   How much help from another person does the patient currently need...   Putting on and taking off regular lower body clothing? 2   Bathing (including washing, rinsing, and drying)? 2   Toileting, which includes using a toilet, bedpan, or urinal? 2   Putting on and taking off regular upper body clothing? 2   Taking care of personal grooming such as brushing teeth? 3   Eating meals? 3   6 Clicks Daily Activity Score 14   Modified Lane (mRS)   Modified Lane Score 4   Functional Mobility   Sit to Stand Maximal Assist   Bed, Chair, Wheelchair Transfer Maximal Assist   Transfer Method Stand Step   Mobility received in chair, txf using shari steady to bed end of session   Skilled Intervention Verbal Cuing;Tactile Cuing;Postural Facilitation;Facilitation   Comments shari steady from chair to bed, pt unable to fully stand with 2 therapists   Activity Tolerance   Sitting in Chair 5 mins during UB dressing   Sitting Edge of Bed 5 mins during grooming   Comments lathergic   Patient / Family Goals   Patient / Family Goal #1 none stated   Short Term Goals   Short Term Goal # 1 SBA with grooming tasks   Goal Outcome # 1 Progressing as expected   Short Term Goal # 2 min A with UB dressing   Goal Outcome # 2 Progressing as expected   Short Term Goal # 3 mod A with LB dressing   Goal Outcome # 3 Goal met, new goal added   Short Term Goal # 3 B pt will complete LB dressing Min A   Education Group   Education Provided Role of Occupational Therapist;Activities of Daily  Living;Use of Call Light   Role of Occupational Therapist Patient Response Patient;Acceptance;Explanation   ADL Patient Response Patient;Acceptance;Explanation   Use of Call Light Patient Response Patient;Acceptance;Explanation   Occupational Therapy Treatment Plan    O.T. Treatment Plan Continue Current Treatment Plan   Duration Until Therapy Goals Met   Anticipated Discharge Equipment and Recommendations   DC Equipment Recommendations Unable to determine at this time   Discharge Recommendations Recommend post-acute placement for additional occupational therapy services prior to discharge home   Interdisciplinary Plan of Care Collaboration   IDT Collaboration with  Nursing;Family / Caregiver   Patient Position at End of Therapy In Bed;Bed Alarm On;Call Light within Reach;Tray Table within Reach;Phone within Reach;Family / Friend in Room   Collaboration Comments RN updated

## 2024-06-20 NOTE — PROGRESS NOTES
Hospital Medicine Daily Progress Note    Date of Service  6/19/2024    Chief Complaint  lJ Mueller is a 78 y.o. male admitted 5/29/2024 with altered mental status    Hospital Course  78-year-old with a history of BPH, previous hemorrhagic strokes, tobacco use, hypertension, coronary artery disease, dyslipidemia, chronic hypoxic respiratory failure dependent on 2 L of home oxygen therapy. He was admitted on May 29 due to neurologic changes, and was found to have a left cerebellar hemorrhage. CTA was negative for any vascular malformations. Patient did require hypertonic saline and was intubated for airway protection. He was successfully extubated on June 1 and weaned off of hypertonic saline. Hospital stay has been complicated by UTI which has grown back Enterococcus faecalis, and Enterobacter cloaca     Interval Problem Update      PEG tube replaced  Reviewed CBC hemoglobin 9.9  I reviewed chemistry panel phosphorus 1.8 I ordered IV repletion  Patient remains confused and impulsive requiring restraints discussed with wife at bedside will try low-dose Depakote  Patient with rhonchi on exam I ordered chest x-ray and reviewed it      Consultants/Specialty  neurology and palliative care    Code Status  Full Code    Disposition  Medically Cleared  I have placed the appropriate orders for post-discharge needs.    Review of Systems  Review of Systems   Unable to perform ROS: Mental status change        Physical Exam  Temp:  [36.5 °C (97.7 °F)-37.2 °C (99 °F)] 36.5 °C (97.7 °F)  Pulse:  [] 91  Resp:  [18-40] 18  BP: (109-152)/(67-88) 152/87  SpO2:  [92 %-100 %] 100 %    Physical Exam  Vitals and nursing note reviewed.   Constitutional:       Appearance: He is well-developed. He is not diaphoretic.   HENT:      Head: Normocephalic and atraumatic.      Mouth/Throat:      Pharynx: No oropharyngeal exudate.   Eyes:      General: No scleral icterus.        Right eye: No discharge.         Left eye: No discharge.       Conjunctiva/sclera: Conjunctivae normal.   Neck:      Vascular: No JVD.      Trachea: No tracheal deviation.   Cardiovascular:      Rate and Rhythm: Normal rate and regular rhythm.      Heart sounds: No murmur heard.     No friction rub. No gallop.   Pulmonary:      Effort: No respiratory distress.      Breath sounds: No stridor. Rhonchi and rales present. No wheezing.   Chest:      Chest wall: No tenderness.   Abdominal:      General: Bowel sounds are normal. There is no distension.      Palpations: Abdomen is soft.      Tenderness: There is no abdominal tenderness. There is no rebound.      Comments: PEG tube in place with abdominal binder   Musculoskeletal:         General: Swelling present. No tenderness.      Cervical back: Neck supple.   Skin:     General: Skin is warm and dry.      Nails: There is no clubbing.   Neurological:      Mental Status: He is alert.      Cranial Nerves: No cranial nerve deficit.      Motor: Weakness present. No abnormal muscle tone.   Psychiatric:         Cognition and Memory: Cognition is impaired.         Judgment: Judgment is impulsive.         Fluids    Intake/Output Summary (Last 24 hours) at 6/19/2024 1750  Last data filed at 6/19/2024 1327  Gross per 24 hour   Intake 460 ml   Output 625 ml   Net -165 ml       Laboratory  Recent Labs     06/18/24  1704 06/18/24  2350   WBC  --  13.8*   RBC  --  3.55*   HEMOGLOBIN 10.6* 9.9*   HEMATOCRIT  --  31.4*   MCV  --  88.5   MCH  --  27.9   MCHC  --  31.5*   RDW  --  50.1*   PLATELETCT  --  374   MPV  --  11.8       Recent Labs     06/17/24  0407 06/18/24  2350   SODIUM 137 138   POTASSIUM 4.0 4.0   CHLORIDE 101 105   CO2 24 22   GLUCOSE 116* 117*   BUN 20 21   CREATININE 0.76 0.79   CALCIUM 9.8 8.7                   Imaging  DX-CHEST-LIMITED (1 VIEW)   Final Result         1. Improving small left pleural effusion. Resolution of left lower lobe atelectasis.   2. The remainder is stable.      IR-GASTROSTOMY PLACEMENT   Final Result       Successful image guided gastrostomy tube replacement.      Plan: [place] drainage for 10 hours. The tube should not be flushed with sterile saline. If this can be completed without incident the tube may be used.      EC-ECHOCARDIOGRAM COMPLETE W/ CONT   Final Result      DX-CHEST-PORTABLE (1 VIEW)   Final Result      1.  Small left pleural effusion and associated atelectasis.   2.  Mild bilateral interstitial opacities, likely mild interstitial edema.         CT-HEAD W/O   Final Result         1.  Area of cerebellar hemorrhage, decreased since prior study.   2.  Nonspecific white matter changes, commonly associated with small vessel ischemic disease.  Associated mild cerebral atrophy is noted.   3.  Atherosclerosis.         CT-HEAD W/O   Final Result      1.  Intraparenchymal hemorrhage in the cerebellar vermis and left cerebellum. It demonstrates expected evolution and decreased density.   2.  Redemonstration of vasogenic edema surrounding the hematoma in the cerebellum with mild narrowing of the fourth ventricle. There is no hydrocephalus.         DX-CHEST-LIMITED (1 VIEW)   Final Result      Mild interstitial prominence could represent vascular congestion.      Cardiomegaly.      Removal of endotracheal tube.      [id number] VIEW   Final Result      Feeding tube tip projects in the second portion of the duodenum.      Nonspecific bowel gas pattern.      CT-HEAD W/O   Final Result      1.  Advanced cerebral atrophy.   2.  Stable ventriculomegaly with intraventricular hemorrhage.   3.  Advanced supratentorial white matter disease most consistent with microvascular ischemic change.   4.  Multiple old lacunar infarcts as described.   5.  Acute/recent subacute parenchymal hemorrhage in the posterior fossa involving the superior cerebellar vermis and left paramedian cerebellar hemisphere and left lateral cerebellar peduncle. No evidence of recurrent hemorrhage.   6.  Minimal focus of subarachnoid hemorrhage within  a single sulcus in the left posterior temporal region. Probably unchanged from prior recent exam.         DX-ABDOMEN FOR TUBE PLACEMENT   Final Result      Enteric tube tip projects over the stomach.      EC-ECHOCARDIOGRAM COMPLETE W/ CONT   Final Result      DX-CHEST-PORTABLE (1 VIEW)   Final Result         1.  Left basilar atelectasis or subtle infiltrate, decreased since prior study   2.  Trace left pleural effusion   3.  Enlargement of the aortic knob suggesting thoracic aortic aneurysm   4.  Atherosclerosis      MR-BRAIN-WITH & W/O   Final Result      1.  Stable fossa of parenchymal hemorrhage centered in the LEFT cerebellum and extending into the RIGHT cerebellum exerting mass effect on the fourth ventricle which is not completely effaced   2.  Intraventricular blood redemonstrated   3.  Numerous areas of remote microhemorrhage in the supra and infratentorial brain. These could be related to amyloid angiopathy, numerous hypertensive microhemorrhages or less likely multiple cavernomas   4.  Moderate diffuse atrophy without compelling evidence of hydrocephalus   5.  Advanced white matter changes   6.  4 mm LEFT temporal meningioma   7.  Enhancing nodule in the RIGHT internal auditory canal suspicious for a vestibular schwannoma, less likely other extra-axial mass such as a meningioma, facial nerve schwannoma or metastasis. Consider posterior fossa protocol brain MRI without and with    contrast to further evaluate in when clinically appropriate.      MR-MRA HEAD-W/O   Final Result         MRA OF THE [place] WITHIN NORMAL LIMITS.      DX-CHEST-PORTABLE (1 VIEW)   Final Result         1.  Hazy left lower lobe infiltrate   2.  Small left pleural effusion   3.  Enlargement of the aortic knob, appearance suggesting aortic aneurysm.      DX-CHEST-FOR LINE PLACEMENT Perform procedure in: Patient's Room   Final Result      1.  PICC line is in place with the tip projecting over the SVC in satisfactory position.    2.  Mildly enlarged cardiac silhouette with vascular congestion.   3.  Retrocardiac opacity is likely due to atelectasis.         IR-PICC LINE PLACEMENT W/ GUIDANCE > AGE 5   Final Result                  Ultrasound-guided PICC placement performed by qualified nursing staff as    above.          CT-HEAD W/O   Final Result         1.  Left cerebellar hemorrhage, similar compared to prior study.   2.  Minimal bilateral intraventricular hemorrhages, new since prior study.   3.  Nonspecific white matter changes, commonly associated with small vessel ischemic disease.  Associated mild cerebral atrophy is noted.   4.  Atherosclerosis.         DX-CHEST-PORTABLE (1 VIEW)   Final Result         1.  Left basilar atelectasis, no focal infiltrate   2.  Trace left pleural effusion   3.  Atherosclerosis      CT-CTA NECK WITH & W/O-POST PROCESSING   Final Result         1.  Scattered atherosclerosis without significant stenosis or occlusion.   2.  4.2 cm proximal descending thoracic aortic aneurysm, radiographic follow-up and surveillance recommended as clinically appropriate.         CT-CTA HEAD WITH & W/O-POST PROCESS   Final Result         1.  No large vessel occlusion or aneurysm identified   2.  Large cerebellar hemorrhage predominantly in the left cerebellum      These findings were discussed with the patient's clinician, Nikki Alexander, on 5/29/2024 11:35 PM.      DX-CHEST-PORTABLE (1 VIEW)   Final Result      Tubes as above      Left pleural effusion      CHF/pulmonary edema pattern      See Brown MD   5/30/2024 8:41 AM         CT-HEAD W/O   Final Result      Acute intraparenchymal hemorrhage is noted involving the left cerebellar hemisphere, measuring 3.1 x 4.2 x 3.4 cm, with associated mass effect.      This finding was telephoned to the mentioned attending by on-call radiologist at the time of imaging         AAST Intracranial Hemorrhage Brain Injury Guidelines         Hemorrhage type  mBIG 1           mBIG 2                 mBIG 3      Subdural             <4mm               5-7.9 mm             >8mm      Epidural                No                    No                  Yes      Intraparenc'mal   <4mm               5-7.9 mm         >8mm or multi   1 location       or 2 locations      >3 locations      Subarachnoid     <3 sulci       single hemisphere   bi-hemisphere   and <1 mm        or 1-3 mm            or > 3 mm      Intraventricular       No                  No                    Yes      Skull Fracture       None            Non-displaced       Displaced               DLP Reporting Thresholds for Incorrect/Repeated Exams - DLP in mGy*cm   Head/Neck:  0-year-old 3840, 1-year-old 5880, 5-year-old 8770, 10-year-old 38348 and adult 37937   Head:  0-year-old 4540, 1-year-old 7460, 5-year-old 63384, 10-year-old 19409 and adult 96674   Neck:  0-year-old 2940, 1-year-old 4160, 5-year-old 4550, 10-year-old 6320 and adult 8470   Chest:  0-year-old 550, 1-year-old 830, 5-year-old 1200, 10-year-old 3840 and adult 3570   Abd/pelvis:  0-year-old 440, 1-year-old 720, 5-year-old 1080, 10-year-old 3330 and adult 3330   Trunk(C/A/P):  0-year-old 490, 1-year-old 770, 5-year-old 1140, 10-year-old 3570 and adult 3330      OUTSIDE IMAGES-CT CERVICAL SPINE    (Results Pending)        Assessment/Plan  * Cerebellar hemorrhage (HCC)- (present on admission)  Assessment & Plan  ICH score=1  Thought due to amyloid  MRI brain: Stable fossa of parenchymal hemorrhage centered in the LEFT cerebellum and extending into the RIGHT cerebellum exerting mass effect on the fourth ventricle which is not completely effaced 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  HOB at 30 degrees  maintain  pCO2 of 35-40; closer to 35  Pravastatin 40 mg qHS for possible neuroprotective effect  CT 6/7 unchanged    PEG replaced  06/18  No antiplatelet or anticoagulation therapy as thought due to Amyloid    Hypophosphatemia  Assessment & Plan  Monitor and replace    Hypokalemia  Assessment & Plan  Monitor and repalce    Chronic systolic heart failure (HCC)  Assessment & Plan  EF 40%  ACE allergy  BB  Monitor volume status  DC IV fluids and monitor volume status    Paroxysmal tachycardia (HCC)  Assessment & Plan  New Dx PAFib  Given multiple previous ICH's and likely etiology being amyloid, he is not a candidate for anticoagulation   Beta-blocker with holding parameters    UTI (urinary tract infection)  Assessment & Plan  Urine culture with E faecalis and E cloacae  Completed course of Zosyn    Encephalopathy acute- (present on admission)  Assessment & Plan  Likely delirium in conjunction with his intracranial pathology  Keep patient awake during the day and avoid daytime naps. Remove all unnecessary lines (central lines, peripheral IVs, feeding tubes, leung catheters). Avoid polypharmacy, frequent re-orientation, maximize family time at bedside, use glasses and hearing aids if needed, treat pain, encourage ambulation, minimize benzos/anticholinergic agents.   Ambulate, get out of restraints is much as possible    Continue Risperdal and as needed Haldol  Start low-dose Depakote     Hyperchloremic metabolic acidosis  Assessment & Plan  Improved    Hyperglycemia- (present on admission)  Assessment & Plan  Goal blood glucose 140-180  A1c 5.6  Off insulin  hypoglycemia protocol    Acute on chronic respiratory failure with hypoxia (HCC)- (present on admission)  Assessment & Plan  Intubated for airway protection  Extubated 6/1  Chest x-ray not impressive on 6/4/2024  Mobilization  Pulmonary hygiene  RT/O2 Protocols  Titrate supplemental FiO2 to maintain SpO2 >92%  Monitor vol status closely    Hyperlipidemia- (present on  admission)  Assessment & Plan  Per wife, patient's home medication was discontinued due to muscle cramps  Pravastatin 40mg QHS  Lipid panel reviewed  Counseling on lifestyle/dietary modifications    History of stroke- (present on admission)  Assessment & Plan  Patient has had 2 prior hemorrhagic strokes; last in 2015       BPH with urinary obstruction- (present on admission)  Assessment & Plan  S/p prostate surgery  Per wife, patient has suffered from urinary retention and incontinence since his surgery  Continue flomax  Park removed 6/12:      Benign essential hypertension- (present on admission)  Assessment & Plan  Given new Dx of PAFib     Amlodipine transition to metoprolol  Monitor blood pressure and adjust medical therapy accordingly    Abdominal aortic aneurysm (AAA) without rupture (HCC)- (present on admission)  Assessment & Plan  S/p EVAR w/ bypass graft stent  Maintain normotension  Holding ASA at this time; per wife he takes ASA 3x/week    COPD (chronic obstructive pulmonary disease) (HCC)- (present on admission)  Assessment & Plan  Not currently in exacerbation  CXR with no consolidation     Continue PRN nebulizers Q4h  Wean FiO2 as tolerated         VTE prophylaxis:   SCDs/TEDs      I have performed a physical exam and reviewed and updated ROS and Plan today (6/19/2024). In review of yesterday's note (6/18/2024), there are no changes except as documented above

## 2024-06-20 NOTE — CARE PLAN
The patient is Stable - Low risk of patient condition declining or worsening    Shift Goals  Clinical Goals: pain control  Patient Goals: caroline  Family Goals: comfort    Progress made toward(s) clinical / shift goals:    Problem: Neuro Status  Goal: Neuro status will remain stable or improve  Outcome: Progressing  Note: Patient has been CAROLINE for most of the shift, but patient was able to state his name when asked later in the day, patient corneal and cough present.      Problem: Mobility  Goal: Risk for activity intolerance will decrease  Outcome: Progressing  Note: Patient able to work with PT and OT today andpatient in the chair for several hours throughout the day.

## 2024-06-21 LAB
ALBUMIN SERPL BCP-MCNC: 3 G/DL (ref 3.2–4.9)
ALBUMIN/GLOB SERPL: 1.2 G/DL
ALP SERPL-CCNC: 48 U/L (ref 30–99)
ALT SERPL-CCNC: 22 U/L (ref 2–50)
ANION GAP SERPL CALC-SCNC: 10 MMOL/L (ref 7–16)
AST SERPL-CCNC: 20 U/L (ref 12–45)
BILIRUB SERPL-MCNC: 0.3 MG/DL (ref 0.1–1.5)
BUN SERPL-MCNC: 25 MG/DL (ref 8–22)
CALCIUM ALBUM COR SERPL-MCNC: 9.5 MG/DL (ref 8.5–10.5)
CALCIUM SERPL-MCNC: 8.7 MG/DL (ref 8.5–10.5)
CHLORIDE SERPL-SCNC: 105 MMOL/L (ref 96–112)
CO2 SERPL-SCNC: 24 MMOL/L (ref 20–33)
CREAT SERPL-MCNC: 0.71 MG/DL (ref 0.5–1.4)
ERYTHROCYTE [DISTWIDTH] IN BLOOD BY AUTOMATED COUNT: 50.2 FL (ref 35.9–50)
FERRITIN SERPL-MCNC: 143 NG/ML (ref 22–322)
GFR SERPLBLD CREATININE-BSD FMLA CKD-EPI: 94 ML/MIN/1.73 M 2
GLOBULIN SER CALC-MCNC: 2.6 G/DL (ref 1.9–3.5)
GLUCOSE SERPL-MCNC: 152 MG/DL (ref 65–99)
HCT VFR BLD AUTO: 26.2 % (ref 42–52)
HGB BLD-MCNC: 8.1 G/DL (ref 14–18)
IRON SATN MFR SERPL: 7 % (ref 15–55)
IRON SERPL-MCNC: 14 UG/DL (ref 50–180)
MAGNESIUM SERPL-MCNC: 2.1 MG/DL (ref 1.5–2.5)
MCH RBC QN AUTO: 28 PG (ref 27–33)
MCHC RBC AUTO-ENTMCNC: 30.9 G/DL (ref 32.3–36.5)
MCV RBC AUTO: 90.7 FL (ref 81.4–97.8)
PHOSPHATE SERPL-MCNC: 2.7 MG/DL (ref 2.5–4.5)
PLATELET # BLD AUTO: 163 K/UL (ref 164–446)
PMV BLD AUTO: 12 FL (ref 9–12.9)
POTASSIUM SERPL-SCNC: 4.4 MMOL/L (ref 3.6–5.5)
PROT SERPL-MCNC: 5.6 G/DL (ref 6–8.2)
RBC # BLD AUTO: 2.89 M/UL (ref 4.7–6.1)
SODIUM SERPL-SCNC: 139 MMOL/L (ref 135–145)
TIBC SERPL-MCNC: 208 UG/DL (ref 250–450)
UIBC SERPL-MCNC: 194 UG/DL (ref 110–370)
WBC # BLD AUTO: 7.9 K/UL (ref 4.8–10.8)

## 2024-06-21 PROCEDURE — 84100 ASSAY OF PHOSPHORUS: CPT

## 2024-06-21 PROCEDURE — A9270 NON-COVERED ITEM OR SERVICE: HCPCS | Performed by: PHYSICAL MEDICINE & REHABILITATION

## 2024-06-21 PROCEDURE — A9270 NON-COVERED ITEM OR SERVICE: HCPCS | Performed by: HOSPITALIST

## 2024-06-21 PROCEDURE — 700102 HCHG RX REV CODE 250 W/ 637 OVERRIDE(OP)

## 2024-06-21 PROCEDURE — 36415 COLL VENOUS BLD VENIPUNCTURE: CPT

## 2024-06-21 PROCEDURE — A9270 NON-COVERED ITEM OR SERVICE: HCPCS | Performed by: INTERNAL MEDICINE

## 2024-06-21 PROCEDURE — 83550 IRON BINDING TEST: CPT

## 2024-06-21 PROCEDURE — A9270 NON-COVERED ITEM OR SERVICE: HCPCS

## 2024-06-21 PROCEDURE — 82728 ASSAY OF FERRITIN: CPT

## 2024-06-21 PROCEDURE — 92526 ORAL FUNCTION THERAPY: CPT

## 2024-06-21 PROCEDURE — 700111 HCHG RX REV CODE 636 W/ 250 OVERRIDE (IP): Mod: JZ | Performed by: HOSPITALIST

## 2024-06-21 PROCEDURE — 99232 SBSQ HOSP IP/OBS MODERATE 35: CPT | Performed by: HOSPITALIST

## 2024-06-21 PROCEDURE — 85027 COMPLETE CBC AUTOMATED: CPT

## 2024-06-21 PROCEDURE — 80053 COMPREHEN METABOLIC PANEL: CPT

## 2024-06-21 PROCEDURE — 700102 HCHG RX REV CODE 250 W/ 637 OVERRIDE(OP): Performed by: HOSPITALIST

## 2024-06-21 PROCEDURE — 770020 HCHG ROOM/CARE - TELE (206)

## 2024-06-21 PROCEDURE — 700102 HCHG RX REV CODE 250 W/ 637 OVERRIDE(OP): Performed by: INTERNAL MEDICINE

## 2024-06-21 PROCEDURE — 83735 ASSAY OF MAGNESIUM: CPT

## 2024-06-21 PROCEDURE — 700102 HCHG RX REV CODE 250 W/ 637 OVERRIDE(OP): Performed by: PHYSICAL MEDICINE & REHABILITATION

## 2024-06-21 PROCEDURE — 83540 ASSAY OF IRON: CPT

## 2024-06-21 RX ORDER — HYDROXYZINE HYDROCHLORIDE 25 MG/1
25 TABLET, FILM COATED ORAL ONCE
Status: COMPLETED | OUTPATIENT
Start: 2024-06-21 | End: 2024-06-21

## 2024-06-21 RX ORDER — VALPROIC ACID 250 MG/5ML
500 SOLUTION ORAL
Status: DISCONTINUED | OUTPATIENT
Start: 2024-06-21 | End: 2024-06-26 | Stop reason: HOSPADM

## 2024-06-21 RX ADMIN — Medication: at 06:25

## 2024-06-21 RX ADMIN — ACETAMINOPHEN 1000 MG: 500 TABLET, FILM COATED ORAL at 01:12

## 2024-06-21 RX ADMIN — LOPERAMIDE HYDROCHLORIDE 2 MG: 2 CAPSULE ORAL at 06:24

## 2024-06-21 RX ADMIN — ACETAMINOPHEN 1000 MG: 500 TABLET, FILM COATED ORAL at 13:17

## 2024-06-21 RX ADMIN — METOPROLOL TARTRATE 12.5 MG: 25 TABLET, FILM COATED ORAL at 18:02

## 2024-06-21 RX ADMIN — LANSOPRAZOLE 30 MG: 30 TABLET, ORALLY DISINTEGRATING ORAL at 18:02

## 2024-06-21 RX ADMIN — ACETAMINOPHEN 1000 MG: 500 TABLET, FILM COATED ORAL at 08:42

## 2024-06-21 RX ADMIN — RISPERIDONE 2 MG: 1 TABLET, FILM COATED ORAL at 18:01

## 2024-06-21 RX ADMIN — ACETAMINOPHEN 1000 MG: 500 TABLET, FILM COATED ORAL at 22:05

## 2024-06-21 RX ADMIN — OXYCODONE HYDROCHLORIDE 10 MG: 10 TABLET ORAL at 15:46

## 2024-06-21 RX ADMIN — HYDROXYZINE HYDROCHLORIDE 25 MG: 25 TABLET ORAL at 01:12

## 2024-06-21 RX ADMIN — LANSOPRAZOLE 30 MG: 30 TABLET, ORALLY DISINTEGRATING ORAL at 06:25

## 2024-06-21 RX ADMIN — HALOPERIDOL LACTATE 2 MG: 5 INJECTION, SOLUTION INTRAMUSCULAR at 20:49

## 2024-06-21 RX ADMIN — PRAVASTATIN SODIUM 40 MG: 20 TABLET ORAL at 18:01

## 2024-06-21 RX ADMIN — VALPROIC ACID 500 MG: 250 SOLUTION ORAL at 22:05

## 2024-06-21 RX ADMIN — Medication: at 18:02

## 2024-06-21 RX ADMIN — Medication 5 MG: at 22:05

## 2024-06-21 RX ADMIN — DOXAZOSIN MESYLATE 1 MG: 1 TABLET ORAL at 22:06

## 2024-06-21 ASSESSMENT — PAIN DESCRIPTION - PAIN TYPE
TYPE: ACUTE PAIN

## 2024-06-21 ASSESSMENT — FIBROSIS 4 INDEX: FIB4 SCORE: 2.04

## 2024-06-21 NOTE — DISCHARGE PLANNING
Complex Case Management    Order received for Complex Case Management. Patient's chart has been reviewed.     Complex case management following? No    No: No placement will accept the patient in restraints. Discharge planning recommendations are to attempt to mitigate patient's behaviors to prevent restraint. Consult will be cancelled. Please re-consult as needed.    NOTE: There may be cases that are NOT assigned to a CCM but will be followed for progression of care by leaders of the Complex Discharge Committee.

## 2024-06-21 NOTE — PROGRESS NOTES
Monitor summary: SR 75-82, SC 0.17, QRS 0.11, QT 0.33, with PVC, trig, coup, 4 beats of VT per strip from monitor room.

## 2024-06-21 NOTE — THERAPY
Speech Language Pathology   Daily Treatment     Patient Name: Jl Mueller  AGE:  78 y.o., SEX:  male  Medical Record #: [id number]  Date of Service: 6/21/2024      Precautions:  Precautions: Fall Risk, Swallow Precautions         Subjective  Patient received sitting upright in chair, spouse at bedside. Pt kept eyes closed for majority of session. Spouse reported she has not been able to get pt to keep his eyes open.       Assessment  Pt seen this date for dysphagia management. Of note, language therapy not targeted as pt with little to no cooperative with SLP tasks this date.     PO of thin liquids and puree presented. Pt required max encouragement from spouse for PO acceptance. Pt noted to bolus hold all presentations; with no obvious swallow trigger appreciated via palpation. Material eventually removed from oral cavity via [place] by SLP. Attempted further trials of thin liquids via tsp; however, pt orally averse and turning away from spoon. Discussed importance of ice chips/small sips of water as pt is alert and awake/participatory; pt's spouse stated understanding.      Clinical Impressions  Per FEES completed 6/13, pt presents with a moderate-severe oropharyngeal dysphagia. Pt will likely benefit from a repeat diagnostic swallow study prior to initiation of an oral diet; however, pt is not appropriate for study at this time given limited participation in PO. Recommend continuation of NPO with non-oral source of nutrition, except for ice chips and small sips of water following diligent oral care for comfort, oral hydration, reduced risk of disuse atrophy, and pharyngeal secretion management. SLP to follow for continued swallow rehabilitation and language therapy as pt is appropriate.         Recommendations  Diet Consistency: NPO with non-oral source of nutrition  Instrumentation: Instrumental swallow study pending clinical progress  Medication: Non Oral  Oral Care: Q2h    Supervision Needs Upons Discharge:  "Direct assistance with IADLs (see below)  IADLs: Medication management, Financial management, Appointment management, Household chores, Cooking         SLP Treatment Plan  Treatment Plan: Dysphagia Treatment, Speech-Language Treatment  SLP Frequency: 4x Per Week  Estimated Duration: Until Therapy Goals Met      Anticipated Discharge Needs  Discharge Recommendations: Recommend post-acute placement for additional speech therapy services prior to discharge home  Therapy Recommendations Upon DC: Dysphagia Training, Comprehension Training, Expression Training, Reading Training, Writing Training, Cognitive-Linguistic Training, Community Re-Integration, Patient / Family / Caregiver Education      Patient / Family Goals  Patient / Family Goal #1: \"for him to eat\" per spouse  Goal #1 Outcome: Progressing slower than expected  Short Term Goals  Short Term Goal # 1: Pt will participate in pre-feeding trials to determine approp. for participation in instrumental swallow study vs oral diet initation.  Goal Outcome # 1: Progressing as expected  Short Term Goal # 2: Pt will participate in a FEES to further assess swallow function  Goal Outcome # 2 : Goal met  Short Term Goal # 3: Patient will answer egocentric yes/no questions wtih 60% accuracy provided maxA.  Short Term Goal # 4: Patient will utilize multimodal communication to express wants/needs x5 in a session.  Short Term Goal # 5: Patient will follow one step commands with 60% accuracy provided maxA.      Staci Cochran, SLP  "

## 2024-06-21 NOTE — CARE PLAN
The patient is Stable - Low risk of patient condition declining or worsening    Shift Goals  Clinical Goals: Monitor Neuro Status, Pain Management  Patient Goals: GUZMAN  Family Goals: GUZMAN    Progress made toward(s) clinical / shift goals:      Problem: Pain - Standard  Goal: Alleviation of pain or a reduction in pain to the patient’s comfort goal  Outcome: Progressing  Frequent pain assessments throughout shift. PRN pain medications provided per MAR and per Nonverbal Descriptors. Pharmacological and non-pharmacological interventions utilized.        Problem: Neuro Status  Goal: Neuro status will remain stable or improve  Outcome: Progressing   Q4H neuro checks in place. Pt's neurological remains unchanged throughout shift. Bed alarm is on, call light within reach.     Patient is not progressing towards the following goals:

## 2024-06-21 NOTE — PROGRESS NOTES
Hospital Medicine Daily Progress Note    Date of Service  6/20/2024    Chief Complaint  Jl Mueller is a 78 y.o. male admitted 5/29/2024 with altered mental status    Hospital Course  78-year-old with a history of BPH, previous hemorrhagic strokes, tobacco use, hypertension, coronary artery disease, dyslipidemia, chronic hypoxic respiratory failure dependent on 2 L of home oxygen therapy. He was admitted on May 29 due to neurologic changes, and was found to have a left cerebellar hemorrhage. CTA was negative for any vascular malformations. Patient did require hypertonic saline and was intubated for airway protection. He was successfully extubated on June 1 and weaned off of hypertonic saline. Hospital stay has been complicated by UTI which has grown back Enterococcus faecalis, and Enterobacter cloaca     Interval Problem Update    Patient is afebrile on 3 L nasal cannula  Abdominal x-ray reviewed no signs of ileus or obstruction patient restarted on tube feeding  He remains confused  Wife at bedside updated her on test results and plan of care  I reviewed chemistry panel phosphorus 2.0 I ordered IV sodium Phos  I reviewed CBC hemoglobin 9.0      Consultants/Specialty  neurology and palliative care    Code Status  Full Code    Disposition  Medically Cleared  I have placed the appropriate orders for post-discharge needs.    Review of Systems  Review of Systems   Unable to perform ROS: Mental status change        Physical Exam  Temp:  [36.6 °C (97.9 °F)-37 °C (98.6 °F)] 37 °C (98.6 °F)  Pulse:  [] 88  Resp:  [18-22] 22  BP: (107-136)/(57-80) 135/70  SpO2:  [91 %-100 %] 96 %    Physical Exam  Vitals and nursing note reviewed.   Constitutional:       Appearance: He is well-developed. He is not diaphoretic.      Comments: Patient is somnolent he is arousable   HENT:      Head: Normocephalic and atraumatic.      Mouth/Throat:      Pharynx: No oropharyngeal exudate.   Eyes:      General: No scleral icterus.         Right eye: No discharge.         Left eye: No discharge.   Neck:      Vascular: No JVD.      Trachea: No tracheal deviation.   Cardiovascular:      Rate and Rhythm: Normal rate and regular rhythm.      Heart sounds: No murmur heard.     No friction rub. No gallop.   Pulmonary:      Effort: Pulmonary effort is normal. No respiratory distress.      Breath sounds: No stridor. Rhonchi present. No wheezing.   Chest:      Chest wall: No tenderness.   Abdominal:      General: Bowel sounds are normal. There is no distension.      Palpations: Abdomen is soft.      Tenderness: There is no abdominal tenderness. There is no rebound.      Comments: G-tube in place   Musculoskeletal:         General: Swelling present. No tenderness.      Cervical back: Neck supple.   Skin:     General: Skin is warm and dry.      Nails: There is no clubbing.   Neurological:      Cranial Nerves: No cranial nerve deficit.      Motor: Weakness present. No abnormal muscle tone.   Psychiatric:         Cognition and Memory: Cognition is impaired.         Fluids    Intake/Output Summary (Last 24 hours) at 6/20/2024 1708  Last data filed at 6/20/2024 1300  Gross per 24 hour   Intake 280 ml   Output 400 ml   Net -120 ml       Laboratory  Recent Labs     06/18/24  1704 06/18/24  2350 06/20/24  0839   WBC  --  13.8* 10.9*   RBC  --  3.55* 3.18*   HEMOGLOBIN 10.6* 9.9* 9.0*   HEMATOCRIT  --  31.4* 29.0*   MCV  --  88.5 91.2   MCH  --  27.9 28.3   MCHC  --  31.5* 31.0*   RDW  --  50.1* 51.6*   PLATELETCT  --  374 325   MPV  --  11.8 11.1       Recent Labs     06/18/24  2350 06/20/24  0839   SODIUM 138 139   POTASSIUM 4.0 4.6   CHLORIDE 105 106   CO2 22 25   GLUCOSE 117* 139*   BUN 21 24*   CREATININE 0.79 0.73   CALCIUM 8.7 8.9                   Imaging  [id number] VIEW   Final Result      1.  No dilated bowel identified         2. Presence a gastrostomy tube and contrast within the colon      3. Aortic stent graft present      DX-CHEST-LIMITED (1 VIEW)    Final Result         1. Improving small left pleural effusion. Resolution of left lower lobe atelectasis.   2. The remainder is stable.      IR-GASTROSTOMY PLACEMENT   Final Result      Successful image guided gastrostomy tube replacement.      Plan: Clio drainage for 10 hours. The tube should not be flushed with sterile saline. If this can be completed without incident the tube may be used.      EC-ECHOCARDIOGRAM COMPLETE W/ CONT   Final Result      DX-CHEST-PORTABLE (1 VIEW)   Final Result      1.  Small left pleural effusion and associated atelectasis.   2.  Mild bilateral interstitial opacities, likely mild interstitial edema.         CT-HEAD W/O   Final Result         1.  Area of cerebellar hemorrhage, decreased since prior study.   2.  Nonspecific white matter changes, commonly associated with small vessel ischemic disease.  Associated mild cerebral atrophy is noted.   3.  Atherosclerosis.         CT-HEAD W/O   Final Result      1.  Intraparenchymal hemorrhage in the cerebellar vermis and left cerebellum. It demonstrates expected evolution and decreased density.   2.  Redemonstration of vasogenic edema surrounding the hematoma in the cerebellum with mild narrowing of the fourth ventricle. There is no hydrocephalus.         DX-CHEST-LIMITED (1 VIEW)   Final Result      Mild interstitial prominence could represent vascular congestion.      Cardiomegaly.      Removal of endotracheal tube.      [id number] VIEW   Final Result      Feeding tube tip projects in the second portion of the duodenum.      Nonspecific bowel gas pattern.      CT-HEAD W/O   Final Result      1.  Advanced cerebral atrophy.   2.  Stable ventriculomegaly with intraventricular hemorrhage.   3.  Advanced supratentorial white matter disease most consistent with microvascular ischemic change.   4.  Multiple old lacunar infarcts as described.   5.  Acute/recent subacute parenchymal hemorrhage in the posterior fossa involving the superior  cerebellar vermis and left paramedian cerebellar hemisphere and left lateral cerebellar peduncle. No evidence of recurrent hemorrhage.   6.  Minimal focus of subarachnoid hemorrhage within a single sulcus in the left posterior temporal region. Probably unchanged from prior recent exam.         DX-ABDOMEN FOR TUBE PLACEMENT   Final Result      Enteric tube tip projects over the stomach.      EC-ECHOCARDIOGRAM COMPLETE W/ CONT   Final Result      DX-CHEST-PORTABLE (1 VIEW)   Final Result         1.  Left basilar atelectasis or subtle infiltrate, decreased since prior study   2.  Trace left pleural effusion   3.  Enlargement of the aortic knob suggesting thoracic aortic aneurysm   4.  Atherosclerosis      MR-BRAIN-WITH & W/O   Final Result      1.  Stable fossa of parenchymal hemorrhage centered in the LEFT cerebellum and extending into the RIGHT cerebellum exerting mass effect on the fourth ventricle which is not completely effaced   2.  Intraventricular blood redemonstrated   3.  Numerous areas of remote microhemorrhage in the supra and infratentorial brain. These could be related to amyloid angiopathy, numerous hypertensive microhemorrhages or less likely multiple cavernomas   4.  Moderate diffuse atrophy without compelling evidence of hydrocephalus   5.  Advanced white matter changes   6.  4 mm LEFT temporal meningioma   7.  Enhancing nodule in the RIGHT internal auditory canal suspicious for a vestibular schwannoma, less likely other extra-axial mass such as a meningioma, facial nerve schwannoma or metastasis. Consider posterior fossa protocol brain MRI without and with    contrast to further evaluate in when clinically appropriate.      MR-MRA HEAD-W/O   Final Result         MRA OF THE [place] WITHIN NORMAL LIMITS.      DX-CHEST-PORTABLE (1 VIEW)   Final Result         1.  Hazy left lower lobe infiltrate   2.  Small left pleural effusion   3.  Enlargement of the aortic knob, appearance suggesting aortic  aneurysm.      DX-CHEST-FOR LINE PLACEMENT Perform procedure in: Patient's Room   Final Result      1.  PICC line is in place with the tip projecting over the SVC in satisfactory position.   2.  Mildly enlarged cardiac silhouette with vascular congestion.   3.  Retrocardiac opacity is likely due to atelectasis.         IR-PICC LINE PLACEMENT W/ GUIDANCE > AGE 5   Final Result                  Ultrasound-guided PICC placement performed by qualified nursing staff as    above.          CT-HEAD W/O   Final Result         1.  Left cerebellar hemorrhage, similar compared to prior study.   2.  Minimal bilateral intraventricular hemorrhages, new since prior study.   3.  Nonspecific white matter changes, commonly associated with small vessel ischemic disease.  Associated mild cerebral atrophy is noted.   4.  Atherosclerosis.         DX-CHEST-PORTABLE (1 VIEW)   Final Result         1.  Left basilar atelectasis, no focal infiltrate   2.  Trace left pleural effusion   3.  Atherosclerosis      CT-CTA NECK WITH & W/O-POST PROCESSING   Final Result         1.  Scattered atherosclerosis without significant stenosis or occlusion.   2.  4.2 cm proximal descending thoracic aortic aneurysm, radiographic follow-up and surveillance recommended as clinically appropriate.         CT-CTA HEAD WITH & W/O-POST PROCESS   Final Result         1.  No large vessel occlusion or aneurysm identified   2.  Large cerebellar hemorrhage predominantly in the left cerebellum      These findings were discussed with the patient's clinician, Nikki Alexander, on 5/29/2024 11:35 PM.      DX-CHEST-PORTABLE (1 VIEW)   Final Result      Tubes as above      Left pleural effusion      CHF/pulmonary edema pattern      See Brown MD   5/30/2024 8:41 AM         CT-HEAD W/O   Final Result      Acute intraparenchymal hemorrhage is noted involving the left cerebellar hemisphere, measuring 3.1 x 4.2 x 3.4 cm, with associated mass effect.      This finding was  telephoned to the mentioned attending by on-call radiologist at the time of imaging         AAST Intracranial Hemorrhage Brain Injury Guidelines         Hemorrhage type  mBIG 1           mBIG 2                mBIG 3      Subdural             <4mm               5-7.9 mm             >8mm      Epidural                No                    No                  Yes      Intraparenc'mal   <4mm               5-7.9 mm         >8mm or multi   1 location       or 2 locations      >3 locations      Subarachnoid     <3 sulci       single hemisphere   bi-hemisphere   and <1 mm        or 1-3 mm            or > 3 mm      Intraventricular       No                  No                    Yes      Skull Fracture       None            Non-displaced       Displaced               DLP Reporting Thresholds for Incorrect/Repeated Exams - DLP in mGy*cm   Head/Neck:  0-year-old 3840, 1-year-old 5880, 5-year-old 8770, 10-year-old 35714 and adult 02020   Head:  0-year-old 4540, 1-year-old 7460, 5-year-old 98795, 10-year-old 65189 and adult 98148   Neck:  0-year-old 2940, 1-year-old 4160, 5-year-old 4550, 10-year-old 6320 and adult 8470   Chest:  0-year-old 550, 1-year-old 830, 5-year-old 1200, 10-year-old 3840 and adult 3570   Abd/pelvis:  0-year-old 440, 1-year-old 720, 5-year-old 1080, 10-year-old 3330 and adult 3330   Trunk(C/A/P):  0-year-old 490, 1-year-old 770, 5-year-old 1140, 10-year-old 3570 and adult 3330      OUTSIDE IMAGES-CT CERVICAL SPINE    (Results Pending)        Assessment/Plan  * Cerebellar hemorrhage (HCC)- (present on admission)  Assessment & Plan  ICH score=1  Thought due to amyloid  MRI brain: Stable fossa of parenchymal hemorrhage centered in the LEFT cerebellum and extending into the RIGHT cerebellum exerting mass effect on the fourth ventricle which is not completely effaced Likely CAA changes  Keep -160,   3% completed, on FWF now  Neurosurgery non operative at this time  No coagulation   Hold all antithrombotic  therapy  SCDs only for DVT ppx  Keep euvolemic, euthermic, and normoglycemic (140-180)  Maintain bowel regimen to avoid Valsalva and increased intracranial pressure.  HOB at 30 degrees  maintain pCO2 of 35-40; closer to 35  Pravastatin 40 mg qHS for possible neuroprotective effect  CT 6/7 unchanged    PEG replaced  06/18  No antiplatelet or anticoagulation therapy as thought due to Amyloid    Anemia associated with acute blood loss  Assessment & Plan  Secondary to upper GI bleed after patient pulled G-tube  Switch IV Protonix to lansoprazole twice daily  Monitor hemoglobin and transfuse if less than 7    Hypophosphatemia  Assessment & Plan  Monitor and replace    Hypokalemia  Assessment & Plan  Monitor and repalce    Chronic systolic heart failure (HCC)  Assessment & Plan  EF 40%  ACE allergy  BB  Monitor volume status      Paroxysmal tachycardia (HCC)  Assessment & Plan  New Dx PAFib  Given multiple previous ICH's and likely etiology being amyloid, he is not a candidate for anticoagulation   Beta-blocker with holding parameters    UTI (urinary tract infection)  Assessment & Plan  Urine culture with E faecalis and E cloacae  Completed course of Zosyn    Encephalopathy acute- (present on admission)  Assessment & Plan  Likely delirium in conjunction with his intracranial pathology  Keep patient awake during the day and avoid daytime naps. Remove all unnecessary lines (central lines, peripheral IVs, feeding tubes, leung catheters). Avoid polypharmacy, frequent re-orientation, maximize family time at bedside, use glasses and hearing aids if needed, treat pain, encourage ambulation, minimize benzos/anticholinergic agents.   Ambulate, get out of restraints is much as possible    Continue Risperdal and as needed Haldol  Given lethargy will change Depakote to nightly    Hyperchloremic metabolic acidosis  Assessment & Plan  Improved    Hyperglycemia- (present on admission)  Assessment & Plan  Goal blood glucose 140-180  A1c  5.6  Off insulin  hypoglycemia protocol    Acute on chronic respiratory failure with hypoxia (HCC)- (present on admission)  Assessment & Plan  Intubated for airway protection  Extubated 6/1    Mobilization  Pulmonary hygiene  RT/O2 Protocols  Titrate supplemental FiO2 to maintain SpO2 >92%  Monitor vol status closely    Hyperlipidemia- (present on admission)  Assessment & Plan  Per wife, patient's home medication was discontinued due to muscle cramps  Pravastatin 40mg QHS  Lipid panel reviewed  Counseling on lifestyle/dietary modifications    History of stroke- (present on admission)  Assessment & Plan  Patient has had 2 prior hemorrhagic strokes; last in 2015       BPH with urinary obstruction- (present on admission)  Assessment & Plan  S/p prostate surgery  Per wife, patient has suffered from urinary retention and incontinence since his surgery  cardura  Park removed 6/12:      Benign essential hypertension- (present on admission)  Assessment & Plan  Given new Dx of PAFib     Amlodipine transition to metoprolol  Monitor blood pressure and adjust medical therapy accordingly    Abdominal aortic aneurysm (AAA) without rupture (HCC)- (present on admission)  Assessment & Plan  S/p EVAR w/ bypass graft stent  Maintain normotension  Holding ASA at this time; per wife he takes ASA 3x/week    COPD (chronic obstructive pulmonary disease) (HCC)- (present on admission)  Assessment & Plan  Not currently in exacerbation  CXR with no consolidation     Continue PRN nebulizers Q4h  Wean FiO2 as tolerated         VTE prophylaxis:   SCDs/TEDs      I have performed a physical exam and reviewed and updated ROS and Plan today (6/20/2024). In review of yesterday's note (6/19/2024), there are no changes except as documented above

## 2024-06-21 NOTE — DISCHARGE PLANNING
Case Management Discharge Planning    Admission Date: 5/29/2024  GMLOS: 4.6  ALOS: 23    6-Clicks ADL Score: 14  6-Clicks Mobility Score: 11  PT and/or OT Eval ordered: Yes  Post-acute Referrals Ordered: Yes  Post-acute Choice Obtained: Yes  Has referral(s) been sent to post-acute provider:  Yes      Anticipated Discharge Dispo: Discharge Disposition: D/T to SNF with Medicare cert in anticipation of skilled care (03)    DME Needed: No    Action(s) Taken: Updated Provider/Nurse on Discharge Plan  RN ANABELA attended IDT rounds with the team, chart reviewed. Pt is medically cleared but no accepting facilities due to Pt still on restraints.     Escalations Completed: Leadership    Medically Clear: Yes    Next Steps: Follow up SNF acceptance and coordinate with complex dc team.     Barriers to Discharge: Pending Placement    Is the patient up for discharge tomorrow: No

## 2024-06-21 NOTE — ASSESSMENT & PLAN NOTE
Secondary to upper GI bleed after patient pulled G-tube  6/22 Hgb:8.8  Continue lansoprazole twice daily  Monitor hemoglobin and transfuse if less than 7  IV iron ordered 6/21 but cancelled for unknown reason.

## 2024-06-22 LAB
ANION GAP SERPL CALC-SCNC: 9 MMOL/L (ref 7–16)
BUN SERPL-MCNC: 17 MG/DL (ref 8–22)
CALCIUM SERPL-MCNC: 9.1 MG/DL (ref 8.5–10.5)
CHLORIDE SERPL-SCNC: 102 MMOL/L (ref 96–112)
CO2 SERPL-SCNC: 28 MMOL/L (ref 20–33)
CREAT SERPL-MCNC: 0.6 MG/DL (ref 0.5–1.4)
ERYTHROCYTE [DISTWIDTH] IN BLOOD BY AUTOMATED COUNT: 49.7 FL (ref 35.9–50)
GFR SERPLBLD CREATININE-BSD FMLA CKD-EPI: 98 ML/MIN/1.73 M 2
GLUCOSE SERPL-MCNC: 151 MG/DL (ref 65–99)
HCT VFR BLD AUTO: 28.4 % (ref 42–52)
HGB BLD-MCNC: 8.8 G/DL (ref 14–18)
MAGNESIUM SERPL-MCNC: 2.1 MG/DL (ref 1.5–2.5)
MCH RBC QN AUTO: 28.1 PG (ref 27–33)
MCHC RBC AUTO-ENTMCNC: 31 G/DL (ref 32.3–36.5)
MCV RBC AUTO: 90.7 FL (ref 81.4–97.8)
PHOSPHATE SERPL-MCNC: 2.3 MG/DL (ref 2.5–4.5)
PLATELET # BLD AUTO: 338 K/UL (ref 164–446)
PMV BLD AUTO: 11.8 FL (ref 9–12.9)
POTASSIUM SERPL-SCNC: 4.6 MMOL/L (ref 3.6–5.5)
RBC # BLD AUTO: 3.13 M/UL (ref 4.7–6.1)
SODIUM SERPL-SCNC: 139 MMOL/L (ref 135–145)
WBC # BLD AUTO: 8.7 K/UL (ref 4.8–10.8)

## 2024-06-22 PROCEDURE — 84100 ASSAY OF PHOSPHORUS: CPT

## 2024-06-22 PROCEDURE — 80048 BASIC METABOLIC PNL TOTAL CA: CPT

## 2024-06-22 PROCEDURE — 99232 SBSQ HOSP IP/OBS MODERATE 35: CPT | Performed by: HOSPITALIST

## 2024-06-22 PROCEDURE — 700102 HCHG RX REV CODE 250 W/ 637 OVERRIDE(OP): Performed by: HOSPITALIST

## 2024-06-22 PROCEDURE — 83735 ASSAY OF MAGNESIUM: CPT

## 2024-06-22 PROCEDURE — 700102 HCHG RX REV CODE 250 W/ 637 OVERRIDE(OP): Performed by: INTERNAL MEDICINE

## 2024-06-22 PROCEDURE — 700102 HCHG RX REV CODE 250 W/ 637 OVERRIDE(OP): Performed by: PHYSICAL MEDICINE & REHABILITATION

## 2024-06-22 PROCEDURE — A9270 NON-COVERED ITEM OR SERVICE: HCPCS | Performed by: HOSPITALIST

## 2024-06-22 PROCEDURE — 36415 COLL VENOUS BLD VENIPUNCTURE: CPT

## 2024-06-22 PROCEDURE — A9270 NON-COVERED ITEM OR SERVICE: HCPCS | Performed by: INTERNAL MEDICINE

## 2024-06-22 PROCEDURE — A9270 NON-COVERED ITEM OR SERVICE: HCPCS | Performed by: PHYSICAL MEDICINE & REHABILITATION

## 2024-06-22 PROCEDURE — 770020 HCHG ROOM/CARE - TELE (206)

## 2024-06-22 PROCEDURE — 85027 COMPLETE CBC AUTOMATED: CPT

## 2024-06-22 RX ADMIN — LANSOPRAZOLE 30 MG: 30 TABLET, ORALLY DISINTEGRATING ORAL at 18:31

## 2024-06-22 RX ADMIN — RISPERIDONE 2 MG: 1 TABLET, FILM COATED ORAL at 18:32

## 2024-06-22 RX ADMIN — DOXAZOSIN MESYLATE 1 MG: 1 TABLET ORAL at 20:42

## 2024-06-22 RX ADMIN — Medication: at 18:31

## 2024-06-22 RX ADMIN — ACETAMINOPHEN 1000 MG: 500 TABLET, FILM COATED ORAL at 02:01

## 2024-06-22 RX ADMIN — ACETAMINOPHEN 1000 MG: 500 TABLET, FILM COATED ORAL at 20:42

## 2024-06-22 RX ADMIN — OXYCODONE HYDROCHLORIDE 10 MG: 10 TABLET ORAL at 10:24

## 2024-06-22 RX ADMIN — ACETAMINOPHEN 1000 MG: 500 TABLET, FILM COATED ORAL at 15:39

## 2024-06-22 RX ADMIN — LANSOPRAZOLE 30 MG: 30 TABLET, ORALLY DISINTEGRATING ORAL at 06:22

## 2024-06-22 RX ADMIN — OXYCODONE HYDROCHLORIDE 10 MG: 10 TABLET ORAL at 02:01

## 2024-06-22 RX ADMIN — PRAVASTATIN SODIUM 40 MG: 20 TABLET ORAL at 18:32

## 2024-06-22 RX ADMIN — Medication 5 MG: at 20:42

## 2024-06-22 RX ADMIN — METOPROLOL TARTRATE 12.5 MG: 25 TABLET, FILM COATED ORAL at 18:32

## 2024-06-22 RX ADMIN — VALPROIC ACID 500 MG: 250 SOLUTION ORAL at 20:42

## 2024-06-22 RX ADMIN — Medication: at 06:23

## 2024-06-22 ASSESSMENT — PAIN DESCRIPTION - PAIN TYPE
TYPE: ACUTE PAIN

## 2024-06-22 ASSESSMENT — FIBROSIS 4 INDEX: FIB4 SCORE: 2.04

## 2024-06-22 NOTE — PROGRESS NOTES
Monitor Summary: SR/ST  RI 0.16 QRS 0.08 QT 0.38 with frequent PVCs, rare couplets, rare trigem per strip from monitor room.             4

## 2024-06-22 NOTE — PROGRESS NOTES
Hospital Medicine Daily Progress Note    Date of Service  6/21/2024    Chief Complaint  Jl Mueller is a 78 y.o. male admitted 5/29/2024 with altered mental status    Hospital Course  78-year-old with a history of BPH, previous hemorrhagic strokes, tobacco use, hypertension, coronary artery disease, dyslipidemia, chronic hypoxic respiratory failure dependent on 2 L of home oxygen therapy. He was admitted on May 29 due to neurologic changes, and was found to have a left cerebellar hemorrhage. CTA was negative for any vascular malformations. Patient did require hypertonic saline and was intubated for airway protection. He was successfully extubated on June 1 and weaned off of hypertonic saline. Hospital stay has been complicated by UTI which has grown back Enterococcus faecalis, and Enterobacter cloaca     Interval Problem Update    Patient is afebrile 3 L nasal cannula  He is more alert on my evaluation oriented to self only  Pulled his IV last night required IV Haldol  I discussed with patient's wife will increase evening dose of Depakote  I reviewed chemistry panel electrolytes stable BUN 25 creatinine 0.7  Reviewed CBC hemoglobin 8.1 iron saturation 7 I ordered IV iron  He is tolerating his tube feedings      Consultants/Specialty  neurology and palliative care    Code Status  Full Code    Disposition  Medically Cleared  I have placed the appropriate orders for post-discharge needs.    Review of Systems  Review of Systems   Unable to perform ROS: Mental status change        Physical Exam  Temp:  [36.6 °C (97.9 °F)-37.1 °C (98.8 °F)] 36.8 °C (98.2 °F)  Pulse:  [85-98] 98  Resp:  [17-20] 20  BP: ()/(38-74) 119/74  SpO2:  [90 %-98 %] 98 %    Physical Exam  Vitals and nursing note reviewed.   Constitutional:       Appearance: He is well-developed. He is not diaphoretic.   HENT:      Head: Normocephalic and atraumatic.      Mouth/Throat:      Pharynx: No oropharyngeal exudate.   Eyes:      General: No  scleral icterus.        Right eye: No discharge.         Left eye: No discharge.   Neck:      Vascular: No JVD.      Trachea: No tracheal deviation.   Cardiovascular:      Rate and Rhythm: Normal rate and regular rhythm.      Heart sounds: No murmur heard.     No friction rub. No gallop.   Pulmonary:      Effort: Pulmonary effort is normal. No respiratory distress.      Breath sounds: No stridor. Decreased breath sounds and rhonchi present. No wheezing.   Chest:      Chest wall: No tenderness.   Abdominal:      General: There is no distension.      Palpations: Abdomen is soft.      Tenderness: There is no abdominal tenderness. There is no rebound.      Comments: G-tube in place with binder   Musculoskeletal:         General: Swelling present. No tenderness.      Cervical back: Neck supple.   Skin:     General: Skin is warm and dry.      Nails: There is no clubbing.   Neurological:      Mental Status: He is alert.      Cranial Nerves: No cranial nerve deficit.      Motor: Weakness present. No abnormal muscle tone.      Comments: Oriented to self   Psychiatric:         Behavior: Behavior normal.         Fluids    Intake/Output Summary (Last 24 hours) at 6/21/2024 1724  Last data filed at 6/21/2024 1200  Gross per 24 hour   Intake 1290 ml   Output 400 ml   Net 890 ml       Laboratory  Recent Labs     06/18/24  2350 06/20/24  0839 06/21/24  0244   WBC 13.8* 10.9* 7.9   RBC 3.55* 3.18* 2.89*   HEMOGLOBIN 9.9* 9.0* 8.1*   HEMATOCRIT 31.4* 29.0* 26.2*   MCV 88.5 91.2 90.7   MCH 27.9 28.3 28.0   MCHC 31.5* 31.0* 30.9*   RDW 50.1* 51.6* 50.2*   PLATELETCT 374 325 163*   MPV 11.8 11.1 12.0       Recent Labs     06/18/24  2350 06/20/24  0839 06/21/24  0244   SODIUM 138 139 139   POTASSIUM 4.0 4.6 4.4   CHLORIDE 105 106 105   CO2 22 25 24   GLUCOSE 117* 139* 152*   BUN 21 24* 25*   CREATININE 0.79 0.73 0.71   CALCIUM 8.7 8.9 8.7                   Imaging  [id number] VIEW   Final Result      1.  No dilated bowel identified          2. Presence a gastrostomy tube and contrast within the colon      3. Aortic stent graft present      DX-CHEST-LIMITED (1 VIEW)   Final Result         1. Improving small left pleural effusion. Resolution of left lower lobe atelectasis.   2. The remainder is stable.      IR-GASTROSTOMY PLACEMENT   Final Result      Successful image guided gastrostomy tube replacement.      Plan: Crook drainage for 10 hours. The tube should not be flushed with sterile saline. If this can be completed without incident the tube may be used.      EC-ECHOCARDIOGRAM COMPLETE W/ CONT   Final Result      DX-CHEST-PORTABLE (1 VIEW)   Final Result      1.  Small left pleural effusion and associated atelectasis.   2.  Mild bilateral interstitial opacities, likely mild interstitial edema.         CT-HEAD W/O   Final Result         1.  Area of cerebellar hemorrhage, decreased since prior study.   2.  Nonspecific white matter changes, commonly associated with small vessel ischemic disease.  Associated mild cerebral atrophy is noted.   3.  Atherosclerosis.         CT-HEAD W/O   Final Result      1.  Intraparenchymal hemorrhage in the cerebellar vermis and left cerebellum. It demonstrates expected evolution and decreased density.   2.  Redemonstration of vasogenic edema surrounding the hematoma in the cerebellum with mild narrowing of the fourth ventricle. There is no hydrocephalus.         DX-CHEST-LIMITED (1 VIEW)   Final Result      Mild interstitial prominence could represent vascular congestion.      Cardiomegaly.      Removal of endotracheal tube.      [id number] VIEW   Final Result      Feeding tube tip projects in the second portion of the duodenum.      Nonspecific bowel gas pattern.      CT-HEAD W/O   Final Result      1.  Advanced cerebral atrophy.   2.  Stable ventriculomegaly with intraventricular hemorrhage.   3.  Advanced supratentorial white matter disease most consistent with microvascular ischemic change.   4.  Multiple old  lacunar infarcts as described.   5.  Acute/recent subacute parenchymal hemorrhage in the posterior fossa involving the superior cerebellar vermis and left paramedian cerebellar hemisphere and left lateral cerebellar peduncle. No evidence of recurrent hemorrhage.   6.  Minimal focus of subarachnoid hemorrhage within a single sulcus in the left posterior temporal region. Probably unchanged from prior recent exam.         DX-ABDOMEN FOR TUBE PLACEMENT   Final Result      Enteric tube tip projects over the stomach.      EC-ECHOCARDIOGRAM COMPLETE W/ CONT   Final Result      DX-CHEST-PORTABLE (1 VIEW)   Final Result         1.  Left basilar atelectasis or subtle infiltrate, decreased since prior study   2.  Trace left pleural effusion   3.  Enlargement of the aortic knob suggesting thoracic aortic aneurysm   4.  Atherosclerosis      MR-BRAIN-WITH & W/O   Final Result      1.  Stable fossa of parenchymal hemorrhage centered in the LEFT cerebellum and extending into the RIGHT cerebellum exerting mass effect on the fourth ventricle which is not completely effaced   2.  Intraventricular blood redemonstrated   3.  Numerous areas of remote microhemorrhage in the supra and infratentorial brain. These could be related to amyloid angiopathy, numerous hypertensive microhemorrhages or less likely multiple cavernomas   4.  Moderate diffuse atrophy without compelling evidence of hydrocephalus   5.  Advanced white matter changes   6.  4 mm LEFT temporal meningioma   7.  Enhancing nodule in the RIGHT internal auditory canal suspicious for a vestibular schwannoma, less likely other extra-axial mass such as a meningioma, facial nerve schwannoma or metastasis. Consider posterior fossa protocol brain MRI without and with    contrast to further evaluate in when clinically appropriate.      MR-MRA HEAD-W/O   Final Result         MRA OF THE [place] OF [place] WITHIN NORMAL LIMITS.      DX-CHEST-PORTABLE (1 VIEW)   Final Result         1.  Hazy  left lower lobe infiltrate   2.  Small left pleural effusion   3.  Enlargement of the aortic knob, appearance suggesting aortic aneurysm.      DX-CHEST-FOR LINE PLACEMENT Perform procedure in: Patient's Room   Final Result      1.  PICC line is in place with the tip projecting over the SVC in satisfactory position.   2.  Mildly enlarged cardiac silhouette with vascular congestion.   3.  Retrocardiac opacity is likely due to atelectasis.         IR-PICC LINE PLACEMENT W/ GUIDANCE > AGE 5   Final Result                  Ultrasound-guided PICC placement performed by qualified nursing staff as    above.          CT-HEAD W/O   Final Result         1.  Left cerebellar hemorrhage, similar compared to prior study.   2.  Minimal bilateral intraventricular hemorrhages, new since prior study.   3.  Nonspecific white matter changes, commonly associated with small vessel ischemic disease.  Associated mild cerebral atrophy is noted.   4.  Atherosclerosis.         DX-CHEST-PORTABLE (1 VIEW)   Final Result         1.  Left basilar atelectasis, no focal infiltrate   2.  Trace left pleural effusion   3.  Atherosclerosis      CT-CTA NECK WITH & W/O-POST PROCESSING   Final Result         1.  Scattered atherosclerosis without significant stenosis or occlusion.   2.  4.2 cm proximal descending thoracic aortic aneurysm, radiographic follow-up and surveillance recommended as clinically appropriate.         CT-CTA HEAD WITH & W/O-POST PROCESS   Final Result         1.  No large vessel occlusion or aneurysm identified   2.  Large cerebellar hemorrhage predominantly in the left cerebellum      These findings were discussed with the patient's clinician, Nikki Alexander, on 5/29/2024 11:35 PM.      DX-CHEST-PORTABLE (1 VIEW)   Final Result      Tubes as above      Left pleural effusion      CHF/pulmonary edema pattern      See Brown MD   5/30/2024 8:41 AM         CT-HEAD W/O   Final Result      Acute intraparenchymal hemorrhage is noted  involving the left cerebellar hemisphere, measuring 3.1 x 4.2 x 3.4 cm, with associated mass effect.      This finding was telephoned to the mentioned attending by on-call radiologist at the time of imaging         AAST Intracranial Hemorrhage Brain Injury Guidelines         Hemorrhage type  mBIG 1           mBIG 2                mBIG 3      Subdural             <4mm               5-7.9 mm             >8mm      Epidural                No                    No                  Yes      Intraparenc'mal   <4mm               5-7.9 mm         >8mm or multi   1 location       or 2 locations      >3 locations      Subarachnoid     <3 sulci       single hemisphere   bi-hemisphere   and <1 mm        or 1-3 mm            or > 3 mm      Intraventricular       No                  No                    Yes      Skull Fracture       None            Non-displaced       Displaced               DLP Reporting Thresholds for Incorrect/Repeated Exams - DLP in mGy*cm   Head/Neck:  0-year-old 3840, 1-year-old 5880, 5-year-old 8770, 10-year-old 94024 and adult 08011   Head:  0-year-old 4540, 1-year-old 7460, 5-year-old 00352, 10-year-old 45527 and adult 93958   Neck:  0-year-old 2940, 1-year-old 4160, 5-year-old 4550, 10-year-old 6320 and adult 8470   Chest:  0-year-old 550, 1-year-old 830, 5-year-old 1200, 10-year-old 3840 and adult 3570   Abd/pelvis:  0-year-old 440, 1-year-old 720, 5-year-old 1080, 10-year-old 3330 and adult 3330   Trunk(C/A/P):  0-year-old 490, 1-year-old 770, 5-year-old 1140, 10-year-old 3570 and adult 3330      OUTSIDE IMAGES-CT CERVICAL SPINE    (Results Pending)        Assessment/Plan  * Cerebellar hemorrhage (HCC)- (present on admission)  Assessment & Plan  ICH score=1  Thought due to amyloid  MRI brain: Stable fossa of parenchymal hemorrhage centered in the LEFT cerebellum and extending into the RIGHT cerebellum exerting mass effect on the fourth ventricle which is not completely effaced 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  HOB at 30 degrees  maintain pCO2 of 35-40; closer to 35  Pravastatin 40 mg qHS for possible neuroprotective effect  CT 6/7 unchanged    PEG replaced  06/18  No antiplatelet or anticoagulation therapy as thought due to Amyloid    Anemia associated with acute blood loss  Assessment & Plan  Secondary to upper GI bleed after patient pulled G-tube  Continue lansoprazole twice daily  Monitor hemoglobin and transfuse if less than 7  IV iron    Hypophosphatemia  Assessment & Plan  Monitor and replace    Hypokalemia  Assessment & Plan  Monitor and repalce    Chronic systolic heart failure (HCC)  Assessment & Plan  EF 40%  ACE allergy  BB  Monitor volume status      Paroxysmal tachycardia (HCC)  Assessment & Plan  New Dx PAFib  Given multiple previous ICH's and likely etiology being amyloid, he is not a candidate for anticoagulation   Beta-blocker with holding parameters    UTI (urinary tract infection)  Assessment & Plan  Urine culture with E faecalis and E cloacae  Completed course of Zosyn    Encephalopathy acute- (present on admission)  Assessment & Plan  Likely delirium in conjunction with his intracranial pathology  Keep patient awake during the day and avoid daytime naps. Remove all unnecessary lines (central lines, peripheral IVs, feeding tubes, leung catheters). Avoid polypharmacy, frequent re-orientation, maximize family time at bedside, use glasses and hearing aids if needed, treat pain, encourage ambulation, minimize benzos/anticholinergic agents.   Ambulate, get out of restraints is much as possible    Continue Risperdal and as needed Haldol  Increase evening dose of Depakote      Hyperchloremic metabolic acidosis  Assessment &  Plan  Improved    Hyperglycemia- (present on admission)  Assessment & Plan  Goal blood glucose 140-180  A1c 5.6  Off insulin  hypoglycemia protocol    Acute on chronic respiratory failure with hypoxia (HCC)- (present on admission)  Assessment & Plan  Intubated for airway protection  Extubated 6/1    Mobilization  Pulmonary hygiene  RT/O2 Protocols  Titrate supplemental FiO2 to maintain SpO2 >92%  Monitor vol status closely    Hyperlipidemia- (present on admission)  Assessment & Plan  Per wife, patient's home medication was discontinued due to muscle cramps  Pravastatin 40mg QHS  Lipid panel reviewed  Counseling on lifestyle/dietary modifications    History of stroke- (present on admission)  Assessment & Plan  Patient has had 2 prior hemorrhagic strokes; last in 2015       BPH with urinary obstruction- (present on admission)  Assessment & Plan  S/p prostate surgery  Per wife, patient has suffered from urinary retention and incontinence since his surgery  cardura  Park removed 6/12:      Benign essential hypertension- (present on admission)  Assessment & Plan  Given new Dx of PAFib     Amlodipine transition to metoprolol  Monitor blood pressure and adjust medical therapy accordingly    Abdominal aortic aneurysm (AAA) without rupture (HCC)- (present on admission)  Assessment & Plan  S/p EVAR w/ bypass graft stent  Maintain normotension  Holding ASA at this time; per wife he takes ASA 3x/week    COPD (chronic obstructive pulmonary disease) (HCC)- (present on admission)  Assessment & Plan  Not currently in exacerbation  CXR with no consolidation     Continue PRN nebulizers Q4h  Wean FiO2 as tolerated         VTE prophylaxis:   SCDs/TEDs      I have performed a physical exam and reviewed and updated ROS and Plan today (6/21/2024). In review of yesterday's note (6/20/2024), there are no changes except as documented above

## 2024-06-22 NOTE — CARE PLAN
The patient is Stable - Low risk of patient condition declining or worsening    Shift Goals  Clinical Goals: Monitor Neuro Status  Patient Goals: GUMZAN  Family Goals: GUZMAN    Progress made toward(s) clinical / shift goals:      Problem: Pain - Standard  Goal: Alleviation of pain or a reduction in pain to the patient’s comfort goal  Outcome: Progressing  Frequent pain assessments throughout shift. PRN pain medications provided per MAR and pt request. Pharmacological and non-pharmacological interventions utilized.      Problem: Neuro Status  Goal: Neuro status will remain stable or improve  Outcome: Progressing   Q4H neuro checks in place. Pt's neurological status remains unchanged throughout shift. Bed alarm is on, call light within reach.    Patient is not progressing towards the following goals:

## 2024-06-22 NOTE — PROGRESS NOTES
Monitor Summary: SR-ST , NE .14, QRS .09, QT .29 with frequent PVC,rare couplet per strip from monitor room

## 2024-06-22 NOTE — PROGRESS NOTES
Monitor summary: SR 75-98, AR 0.15, QRS 0.10, QT 0.37, with rare PVCs per strip from monitor room.

## 2024-06-22 NOTE — PROGRESS NOTES
Hospital Medicine Daily Progress Note    Date of Service  6/22/2024    Chief Complaint  Altered mentation    Hospital Course  78-year-old with a history of BPH, previous hemorrhagic strokes, tobacco use, hypertension, coronary artery disease, dyslipidemia, chronic hypoxic respiratory failure dependent on 2 L of home oxygen therapy. He was admitted on May 29 due to neurologic changes, and was found to have a left cerebellar hemorrhage. CTA was negative for any vascular malformations. Patient did require hypertonic saline and was intubated for airway protection. He was successfully extubated on June 1 and weaned off of hypertonic saline. Hospital stay has been complicated by UTI which has grown back Enterococcus faecalis, and Enterobacter cloaca.     Interval Problem Update  6/22: Wife at bedside.  Patient asleep.  Baseline is hard of hearing but he is hard to arouse this am. Wife states that is normal for him.  Remains out of restraints this am.      I have discussed this patient's plan of care and discharge plan at IDT rounds today with Case Management, Nursing, Nursing leadership, and other members of the IDT team.    Consultants/Specialty  GI, neurology, neurosurgery, and palliative care    Code Status  Full Code    Disposition  The patient is not medically cleared for discharge to home or a post-acute facility.  Anticipate discharge to: skilled nursing facility    I have placed the appropriate orders for post-discharge needs.    Review of Systems  Review of Systems   Unable to perform ROS: Mental acuity        Physical Exam  Temp:  [36.4 °C (97.5 °F)-36.6 °C (97.9 °F)] 36.5 °C (97.7 °F)  Pulse:  [87-97] 94  Resp:  [16-28] 16  BP: ()/(58-99) 133/73  SpO2:  [95 %-100 %] 97 %    Physical Exam  Vitals reviewed.   Constitutional:       Appearance: He is obese.   HENT:      Head: Normocephalic and atraumatic.      Nose: Nose normal.   Eyes:      General:         Right eye: No discharge.         Left eye: No  discharge.   Cardiovascular:      Rate and Rhythm: Normal rate.      Heart sounds: No murmur heard.  Pulmonary:      Effort: Pulmonary effort is normal. No respiratory distress.   Abdominal:      General: There is no distension.      Palpations: Abdomen is soft.   Musculoskeletal:      Right lower leg: No edema.      Left lower leg: No edema.   Skin:     General: Skin is dry.   Neurological:      General: No focal deficit present.         Fluids    Intake/Output Summary (Last 24 hours) at 6/22/2024 1936  Last data filed at 6/22/2024 1800  Gross per 24 hour   Intake 910 ml   Output 775 ml   Net 135 ml       Laboratory  Recent Labs     06/20/24  0839 06/21/24  0244 06/22/24  0335   WBC 10.9* 7.9 8.7   RBC 3.18* 2.89* 3.13*   HEMOGLOBIN 9.0* 8.1* 8.8*   HEMATOCRIT 29.0* 26.2* 28.4*   MCV 91.2 90.7 90.7   MCH 28.3 28.0 28.1   MCHC 31.0* 30.9* 31.0*   RDW 51.6* 50.2* 49.7   PLATELETCT 325 163* 338   MPV 11.1 12.0 11.8     Recent Labs     06/20/24  0839 06/21/24  0244 06/22/24  0335   SODIUM 139 139 139   POTASSIUM 4.6 4.4 4.6   CHLORIDE 106 105 102   CO2 25 24 28   GLUCOSE 139* 152* 151*   BUN 24* 25* 17   CREATININE 0.73 0.71 0.60   CALCIUM 8.9 8.7 9.1                   Imaging  [id number] VIEW   Final Result      1.  No dilated bowel identified         2. Presence a gastrostomy tube and contrast within the colon      3. Aortic stent graft present      DX-CHEST-LIMITED (1 VIEW)   Final Result         1. Improving small left pleural effusion. Resolution of left lower lobe atelectasis.   2. The remainder is stable.      IR-GASTROSTOMY PLACEMENT   Final Result      Successful image guided gastrostomy tube replacement.      Plan: [place] drainage for 10 hours. The tube should not be flushed with sterile saline. If this can be completed without incident the tube may be used.      EC-ECHOCARDIOGRAM COMPLETE W/ CONT   Final Result      DX-CHEST-PORTABLE (1 VIEW)   Final Result      1.  Small left pleural effusion and  associated atelectasis.   2.  Mild bilateral interstitial opacities, likely mild interstitial edema.         CT-HEAD W/O   Final Result         1.  Area of cerebellar hemorrhage, decreased since prior study.   2.  Nonspecific white matter changes, commonly associated with small vessel ischemic disease.  Associated mild cerebral atrophy is noted.   3.  Atherosclerosis.         CT-HEAD W/O   Final Result      1.  Intraparenchymal hemorrhage in the cerebellar vermis and left cerebellum. It demonstrates expected evolution and decreased density.   2.  Redemonstration of vasogenic edema surrounding the hematoma in the cerebellum with mild narrowing of the fourth ventricle. There is no hydrocephalus.         DX-CHEST-LIMITED (1 VIEW)   Final Result      Mild interstitial prominence could represent vascular congestion.      Cardiomegaly.      Removal of endotracheal tube.      [id number] VIEW   Final Result      Feeding tube tip projects in the second portion of the duodenum.      Nonspecific bowel gas pattern.      CT-HEAD W/O   Final Result      1.  Advanced cerebral atrophy.   2.  Stable ventriculomegaly with intraventricular hemorrhage.   3.  Advanced supratentorial white matter disease most consistent with microvascular ischemic change.   4.  Multiple old lacunar infarcts as described.   5.  Acute/recent subacute parenchymal hemorrhage in the posterior fossa involving the superior cerebellar vermis and left paramedian cerebellar hemisphere and left lateral cerebellar peduncle. No evidence of recurrent hemorrhage.   6.  Minimal focus of subarachnoid hemorrhage within a single sulcus in the left posterior temporal region. Probably unchanged from prior recent exam.         DX-ABDOMEN FOR TUBE PLACEMENT   Final Result      Enteric tube tip projects over the stomach.      EC-ECHOCARDIOGRAM COMPLETE W/ CONT   Final Result      DX-CHEST-PORTABLE (1 VIEW)   Final Result         1.  Left basilar atelectasis or subtle  infiltrate, decreased since prior study   2.  Trace left pleural effusion   3.  Enlargement of the aortic knob suggesting thoracic aortic aneurysm   4.  Atherosclerosis      MR-BRAIN-WITH & W/O   Final Result      1.  Stable fossa of parenchymal hemorrhage centered in the LEFT cerebellum and extending into the RIGHT cerebellum exerting mass effect on the fourth ventricle which is not completely effaced   2.  Intraventricular blood redemonstrated   3.  Numerous areas of remote microhemorrhage in the supra and infratentorial brain. These could be related to amyloid angiopathy, numerous hypertensive microhemorrhages or less likely multiple cavernomas   4.  Moderate diffuse atrophy without compelling evidence of hydrocephalus   5.  Advanced white matter changes   6.  4 mm LEFT temporal meningioma   7.  Enhancing nodule in the RIGHT internal auditory canal suspicious for a vestibular schwannoma, less likely other extra-axial mass such as a meningioma, facial nerve schwannoma or metastasis. Consider posterior fossa protocol brain MRI without and with    contrast to further evaluate in when clinically appropriate.      MR-MRA HEAD-W/O   Final Result         MRA OF THE [place] OF [place] WITHIN NORMAL LIMITS.      DX-CHEST-PORTABLE (1 VIEW)   Final Result         1.  Hazy left lower lobe infiltrate   2.  Small left pleural effusion   3.  Enlargement of the aortic knob, appearance suggesting aortic aneurysm.      DX-CHEST-FOR LINE PLACEMENT Perform procedure in: Patient's Room   Final Result      1.  PICC line is in place with the tip projecting over the SVC in satisfactory position.   2.  Mildly enlarged cardiac silhouette with vascular congestion.   3.  Retrocardiac opacity is likely due to atelectasis.         IR-PICC LINE PLACEMENT W/ GUIDANCE > AGE 5   Final Result                  Ultrasound-guided PICC placement performed by qualified nursing staff as    above.          CT-HEAD W/O   Final Result         1.  Left  cerebellar hemorrhage, similar compared to prior study.   2.  Minimal bilateral intraventricular hemorrhages, new since prior study.   3.  Nonspecific white matter changes, commonly associated with small vessel ischemic disease.  Associated mild cerebral atrophy is noted.   4.  Atherosclerosis.         DX-CHEST-PORTABLE (1 VIEW)   Final Result         1.  Left basilar atelectasis, no focal infiltrate   2.  Trace left pleural effusion   3.  Atherosclerosis      CT-CTA NECK WITH & W/O-POST PROCESSING   Final Result         1.  Scattered atherosclerosis without significant stenosis or occlusion.   2.  4.2 cm proximal descending thoracic aortic aneurysm, radiographic follow-up and surveillance recommended as clinically appropriate.         CT-CTA HEAD WITH & W/O-POST PROCESS   Final Result         1.  No large vessel occlusion or aneurysm identified   2.  Large cerebellar hemorrhage predominantly in the left cerebellum      These findings were discussed with the patient's clinician, Nikki Alexander, on 5/29/2024 11:35 PM.      DX-CHEST-PORTABLE (1 VIEW)   Final Result      Tubes as above      Left pleural effusion      CHF/pulmonary edema pattern      See Brown MD   5/30/2024 8:41 AM         CT-HEAD W/O   Final Result      Acute intraparenchymal hemorrhage is noted involving the left cerebellar hemisphere, measuring 3.1 x 4.2 x 3.4 cm, with associated mass effect.      This finding was telephoned to the mentioned attending by on-call radiologist at the time of imaging         AAST Intracranial Hemorrhage Brain Injury Guidelines         Hemorrhage type  mBIG 1           mBIG 2                mBIG 3      Subdural             <4mm               5-7.9 mm             >8mm      Epidural                No                    No                  Yes      Intraparenc'mal   <4mm               5-7.9 mm         >8mm or multi   1 location       or 2 locations      >3 locations      Subarachnoid     <3 sulci       single  hemisphere   bi-hemisphere   and <1 mm        or 1-3 mm            or > 3 mm      Intraventricular       No                  No                    Yes      Skull Fracture       None            Non-displaced       Displaced               DLP Reporting Thresholds for Incorrect/Repeated Exams - DLP in mGy*cm   Head/Neck:  0-year-old 3840, 1-year-old 5880, 5-year-old 8770, 10-year-old 23167 and adult 56223   Head:  0-year-old 4540, 1-year-old 7460, 5-year-old 57492, 10-year-old 68903 and adult 44061   Neck:  0-year-old 2940, 1-year-old 4160, 5-year-old 4550, 10-year-old 6320 and adult 8470   Chest:  0-year-old 550, 1-year-old 830, 5-year-old 1200, 10-year-old 3840 and adult 3570   Abd/pelvis:  0-year-old 440, 1-year-old 720, 5-year-old 1080, 10-year-old 3330 and adult 3330   Trunk(C/A/P):  0-year-old 490, 1-year-old 770, 5-year-old 1140, 10-year-old 3570 and adult 3330      OUTSIDE IMAGES-CT CERVICAL SPINE    (Results Pending)        Assessment/Plan  * Cerebellar hemorrhage (HCC)- (present on admission)  Assessment & Plan  ICH score=1  Thought due to amyloid  MRI brain: Stable fossa of parenchymal hemorrhage centered in the LEFT cerebellum and extending into the RIGHT cerebellum exerting mass effect on the fourth ventricle which is not completely effaced 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  HOB at 30 degrees  maintain pCO2 of 35-40; closer to 35  Pravastatin 40 mg qHS for possible neuroprotective effect  CT 6/7 unchanged  PEG replaced  06/18  No antiplatelet or anticoagulation therapy as thought due to Amyloid    Anemia associated with acute blood loss  Assessment & Plan  Secondary to upper GI bleed after patient pulled G-tube  6/22  Hgb:8.8  Continue lansoprazole twice daily  Monitor hemoglobin and transfuse if less than 7  IV iron ordered 6/21 but cancelled for unknown reason.    Hypophosphatemia  Assessment & Plan  Monitor and replace    Hypokalemia  Assessment & Plan  Monitor and repalce    Chronic systolic heart failure (HCC)  Assessment & Plan  EF 40%  ACE allergy  BB  Monitor volume status  As of 6/22 no sign of exacerbation      Paroxysmal tachycardia (HCC)  Assessment & Plan  New Dx PAFib  Given multiple previous ICH's and likely etiology being amyloid, he is not a candidate for anticoagulation   Beta-blocker with holding parameters    UTI (urinary tract infection)  Assessment & Plan  Urine culture with E faecalis and E cloacae  Completed course of Zosyn    Encephalopathy acute- (present on admission)  Assessment & Plan  Likely delirium in conjunction with his intracranial pathology  Keep patient awake during the day and avoid daytime naps. Remove all unnecessary lines (central lines, peripheral IVs, feeding tubes, leung catheters). Avoid polypharmacy, frequent re-orientation, maximize family time at bedside, use glasses and hearing aids if needed, treat pain, encourage ambulation, minimize benzos/anticholinergic agents.   Ambulate, continue to try to keep him out of restraints as much as possible  Continue Risperdal and as needed Haldol  Depakote      Hyperchloremic metabolic acidosis  Assessment & Plan  Improved    Hyperglycemia- (present on admission)  Assessment & Plan  Goal blood glucose 140-180  A1c 5.6  Off insulin  hypoglycemia protocol    Acute on chronic respiratory failure with hypoxia (HCC)- (present on admission)  Assessment & Plan  Intubated for airway protection  Extubated 6/1  Aspiration precautions  Mobilization as able  Pulmonary hygiene and encourage deep inspiratory efforts.  RT/O2 Protocols  Titrate supplemental FiO2 to maintain SpO2 >92%    Hyperlipidemia- (present on admission)  Assessment & Plan  Per wife,  patient's home medication was discontinued due to muscle cramps  Pravastatin 40mg QHS  Lipid panel reviewed  Counseling on lifestyle/dietary modifications    History of stroke- (present on admission)  Assessment & Plan  Patient has had 2 prior hemorrhagic strokes; last in 2015    BPH with urinary obstruction- (present on admission)  Assessment & Plan  S/p prostate surgery  Per wife, patient has suffered from urinary retention and incontinence since his surgery  Cardura  Monitor renal function and I/O's  [place] removed 6/12:      Benign essential hypertension- (present on admission)  Assessment & Plan  Given new Dx of PAFib   Amlodipine transition to metoprolol  Monitor blood pressure and adjust medical therapy accordingly  Anticoagulation is contraindicated with cerebellar hemorrhage at this time.    Abdominal aortic aneurysm (AAA) without rupture (HCC)- (present on admission)  Assessment & Plan  S/p EVAR w/ bypass graft stent  Maintain normotension  Holding ASA at this time; per wife he takes ASA 3x/week  BP control    COPD (chronic obstructive pulmonary disease) (HCC)- (present on admission)  Assessment & Plan  Not currently in exacerbation  Continue PRN nebulizers Q4h  Wean FiO2 as tolerated  RT per protocol prn         VTE prophylaxis:   SCDs/TEDs      I have performed a physical exam and reviewed and updated ROS and Plan today (6/22/2024). In review of yesterday's note (6/21/2024), there are no changes except as documented above.

## 2024-06-22 NOTE — CARE PLAN
The patient is Stable - Low risk of patient condition declining or worsening    Shift Goals  Clinical Goals: Monitor Neuro Status, Pain Management  Patient Goals: GUZMAN  Family Goals: GUZMAN    Progress made toward(s) clinical / shift goals:    Problem: Pain - Standard  Goal: Alleviation of pain or a reduction in pain to the patient’s comfort goal  Outcome: Progressing  Note: Nonverbal pain descriptor used for patient, patient was restless, tachycardic, and had noisy breathing even after scheduled tylenol was given and patient was put back to bed, more pain medication given towards end of shift, patient still appears to be restless and tachycardic.      Problem: Mobility  Goal: Risk for activity intolerance will decrease  Outcome: Progressing  Note: Patient up to the chair x2 pivot, patient tolerated sitting in the chair for 6 hours today.

## 2024-06-23 PROCEDURE — 770020 HCHG ROOM/CARE - TELE (206)

## 2024-06-23 PROCEDURE — 770001 HCHG ROOM/CARE - MED/SURG/GYN PRIV*

## 2024-06-23 PROCEDURE — A9270 NON-COVERED ITEM OR SERVICE: HCPCS | Performed by: HOSPITALIST

## 2024-06-23 PROCEDURE — 700111 HCHG RX REV CODE 636 W/ 250 OVERRIDE (IP): Mod: JZ | Performed by: HOSPITALIST

## 2024-06-23 PROCEDURE — 700102 HCHG RX REV CODE 250 W/ 637 OVERRIDE(OP): Performed by: HOSPITALIST

## 2024-06-23 PROCEDURE — 700102 HCHG RX REV CODE 250 W/ 637 OVERRIDE(OP)

## 2024-06-23 PROCEDURE — A9270 NON-COVERED ITEM OR SERVICE: HCPCS | Performed by: INTERNAL MEDICINE

## 2024-06-23 PROCEDURE — A9270 NON-COVERED ITEM OR SERVICE: HCPCS

## 2024-06-23 PROCEDURE — 99232 SBSQ HOSP IP/OBS MODERATE 35: CPT | Performed by: HOSPITALIST

## 2024-06-23 PROCEDURE — 700102 HCHG RX REV CODE 250 W/ 637 OVERRIDE(OP): Performed by: INTERNAL MEDICINE

## 2024-06-23 RX ORDER — RISPERIDONE 0.5 MG/1
1 TABLET ORAL EVERY EVENING
Status: DISCONTINUED | OUTPATIENT
Start: 2024-06-23 | End: 2024-06-24

## 2024-06-23 RX ORDER — HYDROXYZINE HYDROCHLORIDE 25 MG/1
25 TABLET, FILM COATED ORAL 3 TIMES DAILY PRN
Status: DISCONTINUED | OUTPATIENT
Start: 2024-06-23 | End: 2024-06-24

## 2024-06-23 RX ADMIN — HALOPERIDOL LACTATE 2 MG: 5 INJECTION, SOLUTION INTRAMUSCULAR at 00:42

## 2024-06-23 RX ADMIN — VALPROIC ACID 500 MG: 250 SOLUTION ORAL at 20:07

## 2024-06-23 RX ADMIN — RISPERIDONE 1 MG: 0.5 TABLET, FILM COATED ORAL at 18:06

## 2024-06-23 RX ADMIN — HYDROXYZINE HYDROCHLORIDE 25 MG: 25 TABLET, FILM COATED ORAL at 20:07

## 2024-06-23 RX ADMIN — LANSOPRAZOLE 30 MG: 30 TABLET, ORALLY DISINTEGRATING ORAL at 18:11

## 2024-06-23 RX ADMIN — LANSOPRAZOLE 30 MG: 30 TABLET, ORALLY DISINTEGRATING ORAL at 04:40

## 2024-06-23 RX ADMIN — OXYCODONE HYDROCHLORIDE 10 MG: 10 TABLET ORAL at 02:56

## 2024-06-23 RX ADMIN — METOPROLOL TARTRATE 12.5 MG: 25 TABLET, FILM COATED ORAL at 18:06

## 2024-06-23 RX ADMIN — ACETAMINOPHEN 1000 MG: 500 TABLET, FILM COATED ORAL at 08:57

## 2024-06-23 RX ADMIN — DOXAZOSIN MESYLATE 1 MG: 1 TABLET ORAL at 20:07

## 2024-06-23 RX ADMIN — MOMETASONE FUROATE AND FORMOTEROL FUMARATE DIHYDRATE 2 PUFF: 200; 5 AEROSOL RESPIRATORY (INHALATION) at 18:19

## 2024-06-23 RX ADMIN — ACETAMINOPHEN 1000 MG: 500 TABLET, FILM COATED ORAL at 14:48

## 2024-06-23 RX ADMIN — Medication: at 18:07

## 2024-06-23 RX ADMIN — OXYCODONE HYDROCHLORIDE 5 MG: 5 TABLET ORAL at 18:06

## 2024-06-23 RX ADMIN — ACETAMINOPHEN 1000 MG: 500 TABLET, FILM COATED ORAL at 20:07

## 2024-06-23 RX ADMIN — ACETAMINOPHEN 1000 MG: 500 TABLET, FILM COATED ORAL at 02:03

## 2024-06-23 RX ADMIN — LOPERAMIDE HYDROCHLORIDE 2 MG: 2 CAPSULE ORAL at 20:12

## 2024-06-23 RX ADMIN — METOPROLOL TARTRATE 12.5 MG: 25 TABLET, FILM COATED ORAL at 04:37

## 2024-06-23 RX ADMIN — Medication: at 04:37

## 2024-06-23 RX ADMIN — MOMETASONE FUROATE AND FORMOTEROL FUMARATE DIHYDRATE 2 PUFF: 200; 5 AEROSOL RESPIRATORY (INHALATION) at 04:37

## 2024-06-23 RX ADMIN — HALOPERIDOL LACTATE 2 MG: 5 INJECTION, SOLUTION INTRAMUSCULAR at 21:55

## 2024-06-23 RX ADMIN — PRAVASTATIN SODIUM 40 MG: 20 TABLET ORAL at 18:06

## 2024-06-23 ASSESSMENT — PAIN DESCRIPTION - PAIN TYPE
TYPE: ACUTE PAIN

## 2024-06-23 NOTE — CARE PLAN
The patient is Stable - Low risk of patient condition declining or worsening    Shift Goals  Clinical Goals: Monitor neuro status, participate in care  Patient Goals: GUZMAN  Family Goals: Safety, up to chair    Progress made toward(s) clinical / shift goals:  Pt up to chair after breakfast feed this morning. Wife expressing concerns regarding black colored stool. Education provided. Pt continues to be drowsy with incomprehensible sounds for communication. Chair alarm active, telesitter in place for safety. Wife at bedside. Frequent rounding.       Problem: Safety - Medical Restraint  Goal: Free from restraint(s) (Restraint for Interference with Medical Device)  Outcome: Progressing     Problem: Pain - Standard  Goal: Alleviation of pain or a reduction in pain to the patient’s comfort goal  Outcome: Progressing     Problem: Skin Integrity  Goal: Skin integrity is maintained or improved  Outcome: Progressing     Problem: Fall Risk  Goal: Patient will remain free from falls  Outcome: Progressing     Problem: Hemodynamics  Goal: Patient's hemodynamics, fluid balance and neurologic status will be stable or improve  Outcome: Progressing       Patient is not progressing towards the following goals:

## 2024-06-23 NOTE — CARE PLAN
The patient is Watcher - Medium risk of patient condition declining or worsening    Shift Goals  Clinical Goals: Monitor Neuro Status, Pain Management  Patient Goals: GUZMAN  Family Goals: GUZMAN    Progress made toward(s) clinical / shift goals:        Problem: Knowledge Deficit - Standard  Goal: Patient and family/care givers will demonstrate understanding of plan of care, disease process/condition, diagnostic tests and medications  Outcome: Progressing     Problem: Pain - Standard  Goal: Alleviation of pain or a reduction in pain to the patient’s comfort goal  Outcome: Progressing     Problem: Skin Integrity  Goal: Skin integrity is maintained or improved  Outcome: Progressing     Problem: Fall Risk  Goal: Patient will remain free from falls  Outcome: Progressing

## 2024-06-23 NOTE — PROGRESS NOTES
Hospital Medicine Daily Progress Note    Date of Service  6/23/2024    Chief Complaint  Altered mentation    Hospital Course  78-year-old with a history of BPH, previous hemorrhagic strokes, tobacco use, hypertension, coronary artery disease, dyslipidemia, chronic hypoxic respiratory failure dependent on 2 L of home oxygen therapy. He was admitted on May 29 due to neurologic changes, and was found to have a left cerebellar hemorrhage. CTA was negative for any vascular malformations. Patient did require hypertonic saline and was intubated for airway protection. He was successfully extubated on June 1 and weaned off of hypertonic saline. Hospital stay has been complicated by UTI which has grown back Enterococcus faecalis, and Enterobacter cloaca.     Interval Problem Update  6/23: More awake but still hard to interact with.  Wife at his bedside.  Slow to respond.  Given haldol and oxycodone overnight.     6/22: Wife at bedside.  Patient asleep.  Baseline is hard of hearing but he is hard to arouse this am. Wife states that is normal for him.  Remains out of restraints this am.      I have discussed this patient's plan of care and discharge plan at IDT rounds today with Case Management, Nursing, Nursing leadership, and other members of the IDT team.    Consultants/Specialty  GI, neurology, neurosurgery, and palliative care    Code Status  Full Code    Disposition  Medically Cleared  I have placed the appropriate orders for post-discharge needs.    Review of Systems  Review of Systems   Unable to perform ROS: Mental acuity        Physical Exam  Temp:  [36.5 °C (97.7 °F)-36.7 °C (98.1 °F)] 36.6 °C (97.9 °F)  Pulse:  [75-94] 77  Resp:  [16-19] 16  BP: (101-133)/(71-85) 123/80  SpO2:  [94 %-97 %] 94 %    Physical Exam  Vitals reviewed.   Constitutional:       Appearance: He is obese.   HENT:      Head: Normocephalic and atraumatic.      Nose: Nose normal.   Eyes:      General:         Right eye: No discharge.         Left  eye: No discharge.      Conjunctiva/sclera: Conjunctivae normal.   Cardiovascular:      Rate and Rhythm: Normal rate.      Heart sounds: No murmur heard.  Pulmonary:      Effort: Pulmonary effort is normal. No respiratory distress.   Abdominal:      General: There is no distension.      Palpations: Abdomen is soft.      Comments: LUQ PEG tube site with old dried blood on gauze.     Musculoskeletal:      Right lower leg: No edema.      Left lower leg: No edema.   Skin:     General: Skin is dry.   Neurological:      General: No focal deficit present.         Fluids    Intake/Output Summary (Last 24 hours) at 6/23/2024 1404  Last data filed at 6/23/2024 0800  Gross per 24 hour   Intake 297 ml   Output 625 ml   Net -328 ml       Laboratory  Recent Labs     06/21/24  0244 06/22/24  0335   WBC 7.9 8.7   RBC 2.89* 3.13*   HEMOGLOBIN 8.1* 8.8*   HEMATOCRIT 26.2* 28.4*   MCV 90.7 90.7   MCH 28.0 28.1   MCHC 30.9* 31.0*   RDW 50.2* 49.7   PLATELETCT 163* 338   MPV 12.0 11.8     Recent Labs     06/21/24  0244 06/22/24  0335   SODIUM 139 139   POTASSIUM 4.4 4.6   CHLORIDE 105 102   CO2 24 28   GLUCOSE 152* 151*   BUN 25* 17   CREATININE 0.71 0.60   CALCIUM 8.7 9.1                   Imaging  [id number] VIEW   Final Result      1.  No dilated bowel identified         2. Presence a gastrostomy tube and contrast within the colon      3. Aortic stent graft present      DX-CHEST-LIMITED (1 VIEW)   Final Result         1. Improving small left pleural effusion. Resolution of left lower lobe atelectasis.   2. The remainder is stable.      IR-GASTROSTOMY PLACEMENT   Final Result      Successful image guided gastrostomy tube replacement.      Plan: Edwards drainage for 10 hours. The tube should not be flushed with sterile saline. If this can be completed without incident the tube may be used.      EC-ECHOCARDIOGRAM COMPLETE W/ CONT   Final Result      DX-CHEST-PORTABLE (1 VIEW)   Final Result      1.  Small left pleural effusion and  associated atelectasis.   2.  Mild bilateral interstitial opacities, likely mild interstitial edema.         CT-HEAD W/O   Final Result         1.  Area of cerebellar hemorrhage, decreased since prior study.   2.  Nonspecific white matter changes, commonly associated with small vessel ischemic disease.  Associated mild cerebral atrophy is noted.   3.  Atherosclerosis.         CT-HEAD W/O   Final Result      1.  Intraparenchymal hemorrhage in the cerebellar vermis and left cerebellum. It demonstrates expected evolution and decreased density.   2.  Redemonstration of vasogenic edema surrounding the hematoma in the cerebellum with mild narrowing of the fourth ventricle. There is no hydrocephalus.         DX-CHEST-LIMITED (1 VIEW)   Final Result      Mild interstitial prominence could represent vascular congestion.      Cardiomegaly.      Removal of endotracheal tube.      [id number] VIEW   Final Result      Feeding tube tip projects in the second portion of the duodenum.      Nonspecific bowel gas pattern.      CT-HEAD W/O   Final Result      1.  Advanced cerebral atrophy.   2.  Stable ventriculomegaly with intraventricular hemorrhage.   3.  Advanced supratentorial white matter disease most consistent with microvascular ischemic change.   4.  Multiple old lacunar infarcts as described.   5.  Acute/recent subacute parenchymal hemorrhage in the posterior fossa involving the superior cerebellar vermis and left paramedian cerebellar hemisphere and left lateral cerebellar peduncle. No evidence of recurrent hemorrhage.   6.  Minimal focus of subarachnoid hemorrhage within a single sulcus in the left posterior temporal region. Probably unchanged from prior recent exam.         DX-ABDOMEN FOR TUBE PLACEMENT   Final Result      Enteric tube tip projects over the stomach.      EC-ECHOCARDIOGRAM COMPLETE W/ CONT   Final Result      DX-CHEST-PORTABLE (1 VIEW)   Final Result         1.  Left basilar atelectasis or subtle  infiltrate, decreased since prior study   2.  Trace left pleural effusion   3.  Enlargement of the aortic knob suggesting thoracic aortic aneurysm   4.  Atherosclerosis      MR-BRAIN-WITH & W/O   Final Result      1.  Stable fossa of parenchymal hemorrhage centered in the LEFT cerebellum and extending into the RIGHT cerebellum exerting mass effect on the fourth ventricle which is not completely effaced   2.  Intraventricular blood redemonstrated   3.  Numerous areas of remote microhemorrhage in the supra and infratentorial brain. These could be related to amyloid angiopathy, numerous hypertensive microhemorrhages or less likely multiple cavernomas   4.  Moderate diffuse atrophy without compelling evidence of hydrocephalus   5.  Advanced white matter changes   6.  4 mm LEFT temporal meningioma   7.  Enhancing nodule in the RIGHT internal auditory canal suspicious for a vestibular schwannoma, less likely other extra-axial mass such as a meningioma, facial nerve schwannoma or metastasis. Consider posterior fossa protocol brain MRI without and with    contrast to further evaluate in when clinically appropriate.      MR-MRA HEAD-W/O   Final Result         MRA OF THE [place] OF [place] WITHIN NORMAL LIMITS.      DX-CHEST-PORTABLE (1 VIEW)   Final Result         1.  Hazy left lower lobe infiltrate   2.  Small left pleural effusion   3.  Enlargement of the aortic knob, appearance suggesting aortic aneurysm.      DX-CHEST-FOR LINE PLACEMENT Perform procedure in: Patient's Room   Final Result      1.  PICC line is in place with the tip projecting over the SVC in satisfactory position.   2.  Mildly enlarged cardiac silhouette with vascular congestion.   3.  Retrocardiac opacity is likely due to atelectasis.         IR-PICC LINE PLACEMENT W/ GUIDANCE > AGE 5   Final Result                  Ultrasound-guided PICC placement performed by qualified nursing staff as    above.          CT-HEAD W/O   Final Result         1.  Left  cerebellar hemorrhage, similar compared to prior study.   2.  Minimal bilateral intraventricular hemorrhages, new since prior study.   3.  Nonspecific white matter changes, commonly associated with small vessel ischemic disease.  Associated mild cerebral atrophy is noted.   4.  Atherosclerosis.         DX-CHEST-PORTABLE (1 VIEW)   Final Result         1.  Left basilar atelectasis, no focal infiltrate   2.  Trace left pleural effusion   3.  Atherosclerosis      CT-CTA NECK WITH & W/O-POST PROCESSING   Final Result         1.  Scattered atherosclerosis without significant stenosis or occlusion.   2.  4.2 cm proximal descending thoracic aortic aneurysm, radiographic follow-up and surveillance recommended as clinically appropriate.         CT-CTA HEAD WITH & W/O-POST PROCESS   Final Result         1.  No large vessel occlusion or aneurysm identified   2.  Large cerebellar hemorrhage predominantly in the left cerebellum      These findings were discussed with the patient's clinician, Nikki Alexander, on 5/29/2024 11:35 PM.      DX-CHEST-PORTABLE (1 VIEW)   Final Result      Tubes as above      Left pleural effusion      CHF/pulmonary edema pattern      See Brown MD   5/30/2024 8:41 AM         CT-HEAD W/O   Final Result      Acute intraparenchymal hemorrhage is noted involving the left cerebellar hemisphere, measuring 3.1 x 4.2 x 3.4 cm, with associated mass effect.      This finding was telephoned to the mentioned attending by on-call radiologist at the time of imaging         AAST Intracranial Hemorrhage Brain Injury Guidelines         Hemorrhage type  mBIG 1           mBIG 2                mBIG 3      Subdural             <4mm               5-7.9 mm             >8mm      Epidural                No                    No                  Yes      Intraparenc'mal   <4mm               5-7.9 mm         >8mm or multi   1 location       or 2 locations      >3 locations      Subarachnoid     <3 sulci       single  hemisphere   bi-hemisphere   and <1 mm        or 1-3 mm            or > 3 mm      Intraventricular       No                  No                    Yes      Skull Fracture       None            Non-displaced       Displaced               DLP Reporting Thresholds for Incorrect/Repeated Exams - DLP in mGy*cm   Head/Neck:  0-year-old 3840, 1-year-old 5880, 5-year-old 8770, 10-year-old 91050 and adult 71446   Head:  0-year-old 4540, 1-year-old 7460, 5-year-old 03619, 10-year-old 43327 and adult 43246   Neck:  0-year-old 2940, 1-year-old 4160, 5-year-old 4550, 10-year-old 6320 and adult 8470   Chest:  0-year-old 550, 1-year-old 830, 5-year-old 1200, 10-year-old 3840 and adult 3570   Abd/pelvis:  0-year-old 440, 1-year-old 720, 5-year-old 1080, 10-year-old 3330 and adult 3330   Trunk(C/A/P):  0-year-old 490, 1-year-old 770, 5-year-old 1140, 10-year-old 3570 and adult 3330      OUTSIDE IMAGES-CT CERVICAL SPINE    (Results Pending)        Assessment/Plan  * Cerebellar hemorrhage (HCC)- (present on admission)  Assessment & Plan  ICH score=1  Thought due to amyloid  MRI brain: Stable fossa of parenchymal hemorrhage centered in the LEFT cerebellum and extending into the RIGHT cerebellum exerting mass effect on the fourth ventricle which is not completely effaced 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  HOB at 30 degrees  maintain pCO2 of 35-40; closer to 35  Pravastatin 40 mg qHS for possible neuroprotective effect  CT 6/7 unchanged  PEG replaced  06/18  No antiplatelet or anticoagulation therapy as thought due to Amyloid    Anemia associated with acute blood loss  Assessment & Plan  Secondary to upper GI bleed after patient pulled G-tube  6/22  Hgb:8.8  Continue lansoprazole twice daily  Monitor hemoglobin and transfuse if less than 7  IV iron ordered 6/21 but cancelled for unknown reason.    Hypophosphatemia  Assessment & Plan  Monitor and replace    Hypokalemia  Assessment & Plan  Monitor and repalce    Chronic systolic heart failure (HCC)  Assessment & Plan  EF 40%  ACE allergy  BB  Monitor volume status  As of 6/22 no sign of exacerbation      Paroxysmal tachycardia (HCC)  Assessment & Plan  New Dx PAFib  Given multiple previous ICH's and likely etiology being amyloid, he is not a candidate for anticoagulation   Beta-blocker with holding parameters    UTI (urinary tract infection)  Assessment & Plan  Urine culture with E faecalis and E cloacae  Completed course of Zosyn    Encephalopathy acute- (present on admission)  Assessment & Plan  Likely delirium in conjunction with his intracranial pathology  Keep patient awake during the day and avoid daytime naps. Remove all unnecessary lines (central lines, peripheral IVs, feeding tubes, leung catheters). Avoid polypharmacy, frequent re-orientation, maximize family time at bedside, use glasses and hearing aids if needed, treat pain, encourage ambulation, minimize benzos/anticholinergic agents.   Ambulate, continue to try to keep him out of restraints as much as possible  Wean Risperdal to see if can improve his cognition.  Oxycodone only for pain  Minimize Haldol as able.  Depakote      Hyperchloremic metabolic acidosis  Assessment & Plan  Improved    Hyperglycemia- (present on admission)  Assessment & Plan  Goal blood glucose 140-180  A1c 5.6  Off insulin  hypoglycemia protocol    Acute on chronic respiratory failure with hypoxia (HCC)- (present on admission)  Assessment & Plan  Intubated for airway protection  Extubated 6/1  Aspiration precautions  Mobilization as able  Pulmonary hygiene and encourage deep inspiratory efforts.  RT/O2 Protocols  Titrate supplemental FiO2 to maintain SpO2  >92%    Hyperlipidemia- (present on admission)  Assessment & Plan  Per wife, patient's home medication was discontinued due to muscle cramps  Pravastatin 40mg QHS  Lipid panel reviewed  Counseling on lifestyle/dietary modifications    History of stroke- (present on admission)  Assessment & Plan  Patient has had 2 prior hemorrhagic strokes; last in 2015    BPH with urinary obstruction- (present on admission)  Assessment & Plan  S/p prostate surgery  Per wife, patient has suffered from urinary retention and incontinence since his surgery  Cardura  Monitor renal function and I/O's  Park removed 6/12:      Benign essential hypertension- (present on admission)  Assessment & Plan  Given new Dx of PAFib   Amlodipine transition to metoprolol  Monitor blood pressure and adjust medical therapy accordingly  Anticoagulation is contraindicated with cerebellar hemorrhage at this time.    Abdominal aortic aneurysm (AAA) without rupture (HCC)- (present on admission)  Assessment & Plan  S/p EVAR w/ bypass graft stent  Maintain normotension  Holding ASA at this time; per wife he takes ASA 3x/week  BP control    COPD (chronic obstructive pulmonary disease) (HCC)- (present on admission)  Assessment & Plan  Not currently in exacerbation  Continue PRN nebulizers Q4h  Wean FiO2 as tolerated  RT per protocol prn         VTE prophylaxis: VTE Selection    I have performed a physical exam and reviewed and updated ROS and Plan today (6/23/2024). In review of yesterday's note (6/22/2024), there are no changes except as documented above.

## 2024-06-23 NOTE — CARE PLAN
Problem: Pain - Standard  Goal: Alleviation of pain or a reduction in pain to the patient’s comfort goal  Description: Target End Date:  Prior to discharge or change in level of care    Document on Vitals flowsheet    1.  Document pain using the appropriate pain scale per order or unit policy  2.  Educate and implement non-pharmacologic comfort measures (i.e. relaxation, distraction, massage, cold/heat therapy, etc.)  3.  Pain management medications as ordered  4.  Reassess pain after pain med administration per policy  5.  If opiods administered assess patient's response to pain medication is appropriate per POSS sedation scale  6.  Follow pain management plan developed in collaboration with patient and interdisciplinary team (including palliative care or pain specialists if applicable)  Outcome: Progressing     Problem: Skin Integrity  Goal: Skin integrity is maintained or improved  Description: Target End Date:  Prior to discharge or change in level of care    Document interventions on Skin Risk/Barrera flowsheet groups and corresponding LDA    1.  Assess and monitor skin integrity, appearance and/or temperature  2.  Assess risk factors for impaired skin integrity and/or pressures ulcers  3.  Implement precautions to protect skin integrity in collaboration with interdisciplinary team  4.  Implement pressure ulcer prevention protocol if at risk for skin breakdown  5.  Confirm wound care consult if at risk for skin breakdown  6.  Ensure patient use of pressure relieving devices  (Low air loss bed, waffle overlay, heel protectors, ROHO cushion, etc)  Outcome: Progressing     Problem: Hemodynamics  Goal: Patient's hemodynamics, fluid balance and neurologic status will be stable or improve  Description: Target End Date:  Prior to discharge or change in level of care    Document on Assessment and I/O flowsheet templates    1.  Monitor vital signs, pulse oximetry and cardiac monitor per provider order and/or policy  2.   Maintain blood pressure per provider order  3.  Hemodynamic monitoring per provider order  4.  Manage IV fluids and IV infusions  5.  Monitor intake and output  6.  Daily weights per unit policy or provider order  7.  Assess peripheral pulses and capillary refill  8.  Assess color and body temperature  9.  Position patient for maximum circulation/cardiac output  10. Monitor for signs/symptoms of excessive bleeding  11. Assess mental status, restlessness and changes in level of consciousness  12. Monitor temperature and report fever or hypothermia to provider immediately. Consideration of targeted temperature management.  Outcome: Progressing     Problem: Neuro Status  Goal: Neuro status will remain stable or improve  Description: Target End Date:  Prior to discharge or change in level of care    Document on Neuro assessment in the Assessment flowsheet    1.  Assess and monitor neurologic status per provider order/protocol/unit policy  2.  Assess level of consciousness and orientation  3.  Assess for speech, dysarthria, dysphagia, facial symmetry  4.  Assess visual field, eye movements, gaze preference, pupil reaction and size  5.  Assess muscle strength and motor response in all four extremities  6.  Assess for sensation (numbness and tingling)  7.  Assess basic neuro reflexes (cough, gag, corneal)  8.  Identify changes in neuro status and report to provider for testing/treatment orders  Outcome: Progressing     The patient is Stable - Low risk of patient condition declining or worsening    Shift Goals  Clinical Goals: Monitor neuro status,safety  Patient Goals: caroline  Family Goals: safety    Progress made toward(s) clinical / shift goals:  Patient is alert but disoriented . Hemodynamically stable, oxygenation decrease to 1-2 liters. Turned every 2 hours . Pain medication given fr comfort . No acute change noted . Checked every hour .     Patient is not progressing towards the following goals:

## 2024-06-24 LAB
ALBUMIN SERPL BCP-MCNC: 3.2 G/DL (ref 3.2–4.9)
ALBUMIN/GLOB SERPL: 1 G/DL
ALP SERPL-CCNC: 59 U/L (ref 30–99)
ALT SERPL-CCNC: 36 U/L (ref 2–50)
ANION GAP SERPL CALC-SCNC: 10 MMOL/L (ref 7–16)
AST SERPL-CCNC: 31 U/L (ref 12–45)
BILIRUB SERPL-MCNC: 0.2 MG/DL (ref 0.1–1.5)
BUN SERPL-MCNC: 16 MG/DL (ref 8–22)
CALCIUM ALBUM COR SERPL-MCNC: 10 MG/DL (ref 8.5–10.5)
CALCIUM SERPL-MCNC: 9.4 MG/DL (ref 8.5–10.5)
CHLORIDE SERPL-SCNC: 101 MMOL/L (ref 96–112)
CO2 SERPL-SCNC: 26 MMOL/L (ref 20–33)
CREAT SERPL-MCNC: 0.66 MG/DL (ref 0.5–1.4)
CRP SERPL HS-MCNC: 3.64 MG/DL (ref 0–0.75)
ERYTHROCYTE [DISTWIDTH] IN BLOOD BY AUTOMATED COUNT: 48.8 FL (ref 35.9–50)
GFR SERPLBLD CREATININE-BSD FMLA CKD-EPI: 96 ML/MIN/1.73 M 2
GLOBULIN SER CALC-MCNC: 3.1 G/DL (ref 1.9–3.5)
GLUCOSE SERPL-MCNC: 119 MG/DL (ref 65–99)
HCT VFR BLD AUTO: 30.3 % (ref 42–52)
HGB BLD-MCNC: 9.4 G/DL (ref 14–18)
MCH RBC QN AUTO: 27.7 PG (ref 27–33)
MCHC RBC AUTO-ENTMCNC: 31 G/DL (ref 32.3–36.5)
MCV RBC AUTO: 89.4 FL (ref 81.4–97.8)
PLATELET # BLD AUTO: 341 K/UL (ref 164–446)
PMV BLD AUTO: 11.2 FL (ref 9–12.9)
POTASSIUM SERPL-SCNC: 4.6 MMOL/L (ref 3.6–5.5)
PREALB SERPL-MCNC: 15.9 MG/DL (ref 18–38)
PROT SERPL-MCNC: 6.3 G/DL (ref 6–8.2)
RBC # BLD AUTO: 3.39 M/UL (ref 4.7–6.1)
SODIUM SERPL-SCNC: 137 MMOL/L (ref 135–145)
WBC # BLD AUTO: 9.5 K/UL (ref 4.8–10.8)

## 2024-06-24 PROCEDURE — 99232 SBSQ HOSP IP/OBS MODERATE 35: CPT | Performed by: HOSPITALIST

## 2024-06-24 PROCEDURE — 36415 COLL VENOUS BLD VENIPUNCTURE: CPT

## 2024-06-24 PROCEDURE — 700102 HCHG RX REV CODE 250 W/ 637 OVERRIDE(OP): Performed by: HOSPITALIST

## 2024-06-24 PROCEDURE — A9270 NON-COVERED ITEM OR SERVICE: HCPCS | Performed by: HOSPITALIST

## 2024-06-24 PROCEDURE — 86140 C-REACTIVE PROTEIN: CPT

## 2024-06-24 PROCEDURE — 770001 HCHG ROOM/CARE - MED/SURG/GYN PRIV*

## 2024-06-24 PROCEDURE — 84134 ASSAY OF PREALBUMIN: CPT

## 2024-06-24 PROCEDURE — 85027 COMPLETE CBC AUTOMATED: CPT

## 2024-06-24 PROCEDURE — A9270 NON-COVERED ITEM OR SERVICE: HCPCS | Performed by: INTERNAL MEDICINE

## 2024-06-24 PROCEDURE — 700102 HCHG RX REV CODE 250 W/ 637 OVERRIDE(OP): Performed by: INTERNAL MEDICINE

## 2024-06-24 PROCEDURE — 80053 COMPREHEN METABOLIC PANEL: CPT

## 2024-06-24 RX ORDER — HYDROXYZINE HYDROCHLORIDE 25 MG/1
25 TABLET, FILM COATED ORAL 3 TIMES DAILY PRN
Status: DISCONTINUED | OUTPATIENT
Start: 2024-06-24 | End: 2024-06-26 | Stop reason: HOSPADM

## 2024-06-24 RX ORDER — RISPERIDONE 0.5 MG/1
0.5 TABLET ORAL EVERY EVENING
Status: DISCONTINUED | OUTPATIENT
Start: 2024-06-24 | End: 2024-06-26 | Stop reason: HOSPADM

## 2024-06-24 RX ADMIN — OXYCODONE HYDROCHLORIDE 5 MG: 5 TABLET ORAL at 17:50

## 2024-06-24 RX ADMIN — LANSOPRAZOLE 30 MG: 30 TABLET, ORALLY DISINTEGRATING ORAL at 05:28

## 2024-06-24 RX ADMIN — OXYCODONE HYDROCHLORIDE 5 MG: 5 TABLET ORAL at 00:02

## 2024-06-24 RX ADMIN — RISPERIDONE 0.5 MG: 0.5 TABLET, FILM COATED ORAL at 17:52

## 2024-06-24 RX ADMIN — PRAVASTATIN SODIUM 40 MG: 20 TABLET ORAL at 17:51

## 2024-06-24 RX ADMIN — MOMETASONE FUROATE AND FORMOTEROL FUMARATE DIHYDRATE 2 PUFF: 200; 5 AEROSOL RESPIRATORY (INHALATION) at 05:29

## 2024-06-24 RX ADMIN — VALPROIC ACID 500 MG: 250 SOLUTION ORAL at 21:17

## 2024-06-24 RX ADMIN — METOPROLOL TARTRATE 12.5 MG: 25 TABLET, FILM COATED ORAL at 17:51

## 2024-06-24 RX ADMIN — METOPROLOL TARTRATE 12.5 MG: 25 TABLET, FILM COATED ORAL at 05:28

## 2024-06-24 RX ADMIN — ACETAMINOPHEN 1000 MG: 500 TABLET, FILM COATED ORAL at 02:04

## 2024-06-24 RX ADMIN — DOXAZOSIN MESYLATE 1 MG: 1 TABLET ORAL at 21:17

## 2024-06-24 RX ADMIN — LANSOPRAZOLE 30 MG: 30 TABLET, ORALLY DISINTEGRATING ORAL at 17:52

## 2024-06-24 RX ADMIN — Medication: at 18:00

## 2024-06-24 RX ADMIN — Medication: at 05:29

## 2024-06-24 RX ADMIN — ACETAMINOPHEN 1000 MG: 500 TABLET, FILM COATED ORAL at 08:57

## 2024-06-24 RX ADMIN — ACETAMINOPHEN 1000 MG: 500 TABLET, FILM COATED ORAL at 15:41

## 2024-06-24 RX ADMIN — ACETAMINOPHEN 1000 MG: 500 TABLET, FILM COATED ORAL at 21:17

## 2024-06-24 RX ADMIN — HYDROXYZINE HYDROCHLORIDE 25 MG: 25 TABLET ORAL at 15:41

## 2024-06-24 ASSESSMENT — PAIN DESCRIPTION - PAIN TYPE
TYPE: ACUTE PAIN

## 2024-06-24 NOTE — PROGRESS NOTES
Patient keeps on pulling the telebox . Will discontinue it for now until his medicated . Hospitalist and charge nurse made known . Continued to monitor .   2020 Cardiac monitoring discontinued per hospitalist Jessee Valiente

## 2024-06-24 NOTE — PROGRESS NOTES
Hospital Medicine Daily Progress Note    Date of Service  6/24/2024    Chief Complaint  Altered mentation    Hospital Course  78-year-old with a history of BPH, previous hemorrhagic strokes, tobacco use, hypertension, coronary artery disease, dyslipidemia, chronic hypoxic respiratory failure dependent on 2 L of home oxygen therapy. He was admitted on May 29 due to neurologic changes, and was found to have a left cerebellar hemorrhage. CTA was negative for any vascular malformations. Patient did require hypertonic saline and was intubated for airway protection. He was successfully extubated on June 1 and weaned off of hypertonic saline. Hospital stay has been complicated by UTI which has grown back Enterococcus faecalis, and Enterobacter cloaca.     Interval Problem Update  6/24: Didn't sleep well last night per wife and RN.  Opens eyes but not as interactive.  Discussed with her the severity of his hemorrhagic stroke and unsure if he will have any significant recovery and it may take a long time to see if any small improvement.    6/23: More awake but still hard to interact with.  Wife at his bedside.  Slow to respond.  Given haldol and oxycodone overnight.     6/22: Wife at bedside.  Patient asleep.  Baseline is hard of hearing but he is hard to arouse this am. Wife states that is normal for him.  Remains out of restraints this am.      I have discussed this patient's plan of care and discharge plan at IDT rounds today with Case Management, Nursing, Nursing leadership, and other members of the IDT team.    Consultants/Specialty  GI, neurology, neurosurgery, and palliative care    Code Status  Full Code    Disposition  The patient is not medically cleared for discharge to home or a post-acute facility.  Anticipate discharge to: skilled nursing facility    I have placed the appropriate orders for post-discharge needs.    Review of Systems  Review of Systems   Unable to perform ROS: Mental acuity        Physical  Exam  Temp:  [36.5 °C (97.7 °F)-36.6 °C (97.9 °F)] 36.6 °C (97.9 °F)  Pulse:  [78-86] 78  Resp:  [16-18] 18  BP: (115-124)/(78-80) 120/78  SpO2:  [95 %-100 %] 96 %    Physical Exam  Vitals reviewed.   Constitutional:       Appearance: He is obese. He is ill-appearing.   HENT:      Head: Normocephalic and atraumatic.      Nose: Nose normal.   Eyes:      General:         Right eye: No discharge.         Left eye: No discharge.      Extraocular Movements: Extraocular movements intact.      Conjunctiva/sclera: Conjunctivae normal.   Cardiovascular:      Rate and Rhythm: Normal rate and regular rhythm.      Heart sounds: No murmur heard.  Pulmonary:      Effort: Pulmonary effort is normal. No respiratory distress.   Abdominal:      General: There is no distension.      Palpations: Abdomen is soft.      Comments: LUQ PEG tube    Musculoskeletal:      Cervical back: No rigidity.      Right lower leg: No edema.      Left lower leg: No edema.   Skin:     General: Skin is dry.   Neurological:      General: No focal deficit present.         Fluids    Intake/Output Summary (Last 24 hours) at 6/24/2024 2111  Last data filed at 6/24/2024 0918  Gross per 24 hour   Intake 250 ml   Output --   Net 250 ml       Laboratory  Recent Labs     06/22/24  0335 06/24/24  0243   WBC 8.7 9.5   RBC 3.13* 3.39*   HEMOGLOBIN 8.8* 9.4*   HEMATOCRIT 28.4* 30.3*   MCV 90.7 89.4   MCH 28.1 27.7   MCHC 31.0* 31.0*   RDW 49.7 48.8   PLATELETCT 338 341   MPV 11.8 11.2     Recent Labs     06/22/24  0335 06/24/24  0243   SODIUM 139 137   POTASSIUM 4.6 4.6   CHLORIDE 102 101   CO2 28 26   GLUCOSE 151* 119*   BUN 17 16   CREATININE 0.60 0.66   CALCIUM 9.1 9.4                   Imaging  [id number] VIEW   Final Result      1.  No dilated bowel identified         2. Presence a gastrostomy tube and contrast within the colon      3. Aortic stent graft present      DX-CHEST-LIMITED (1 VIEW)   Final Result         1. Improving small left pleural effusion.  Resolution of left lower lobe atelectasis.   2. The remainder is stable.      IR-GASTROSTOMY PLACEMENT   Final Result      Successful image guided gastrostomy tube replacement.      Plan: [place] drainage for 10 hours. The tube should not be flushed with sterile saline. If this can be completed without incident the tube may be used.      EC-ECHOCARDIOGRAM COMPLETE W/ CONT   Final Result      DX-CHEST-PORTABLE (1 VIEW)   Final Result      1.  Small left pleural effusion and associated atelectasis.   2.  Mild bilateral interstitial opacities, likely mild interstitial edema.         CT-HEAD W/O   Final Result         1.  Area of cerebellar hemorrhage, decreased since prior study.   2.  Nonspecific white matter changes, commonly associated with small vessel ischemic disease.  Associated mild cerebral atrophy is noted.   3.  Atherosclerosis.         CT-HEAD W/O   Final Result      1.  Intraparenchymal hemorrhage in the cerebellar vermis and left cerebellum. It demonstrates expected evolution and decreased density.   2.  Redemonstration of vasogenic edema surrounding the hematoma in the cerebellum with mild narrowing of the fourth ventricle. There is no hydrocephalus.         DX-CHEST-LIMITED (1 VIEW)   Final Result      Mild interstitial prominence could represent vascular congestion.      Cardiomegaly.      Removal of endotracheal tube.      YD-TOCUUSS-9 VIEW   Final Result      Feeding tube tip projects in the second portion of the duodenum.      Nonspecific bowel gas pattern.      CT-HEAD W/O   Final Result      1.  Advanced cerebral atrophy.   2.  Stable ventriculomegaly with intraventricular hemorrhage.   3.  Advanced supratentorial white matter disease most consistent with microvascular ischemic change.   4.  Multiple old lacunar infarcts as described.   5.  Acute/recent subacute parenchymal hemorrhage in the posterior fossa involving the superior cerebellar vermis and left paramedian cerebellar hemisphere and left  lateral cerebellar peduncle. No evidence of recurrent hemorrhage.   6.  Minimal focus of subarachnoid hemorrhage within a single sulcus in the left posterior temporal region. Probably unchanged from prior recent exam.         DX-ABDOMEN FOR TUBE PLACEMENT   Final Result      Enteric tube tip projects over the stomach.      EC-ECHOCARDIOGRAM COMPLETE W/ CONT   Final Result      DX-CHEST-PORTABLE (1 VIEW)   Final Result         1.  Left basilar atelectasis or subtle infiltrate, decreased since prior study   2.  Trace left pleural effusion   3.  Enlargement of the aortic knob suggesting thoracic aortic aneurysm   4.  Atherosclerosis      MR-BRAIN-WITH & W/O   Final Result      1.  Stable fossa of parenchymal hemorrhage centered in the LEFT cerebellum and extending into the RIGHT cerebellum exerting mass effect on the fourth ventricle which is not completely effaced   2.  Intraventricular blood redemonstrated   3.  Numerous areas of remote microhemorrhage in the supra and infratentorial brain. These could be related to amyloid angiopathy, numerous hypertensive microhemorrhages or less likely multiple cavernomas   4.  Moderate diffuse atrophy without compelling evidence of hydrocephalus   5.  Advanced white matter changes   6.  4 mm LEFT temporal meningioma   7.  Enhancing nodule in the RIGHT internal auditory canal suspicious for a vestibular schwannoma, less likely other extra-axial mass such as a meningioma, facial nerve schwannoma or metastasis. Consider posterior fossa protocol brain MRI without and with    contrast to further evaluate in when clinically appropriate.      MR-MRA HEAD-W/O   Final Result         MRA OF THE [place] OF [place] WITHIN NORMAL LIMITS.      DX-CHEST-PORTABLE (1 VIEW)   Final Result         1.  Hazy left lower lobe infiltrate   2.  Small left pleural effusion   3.  Enlargement of the aortic knob, appearance suggesting aortic aneurysm.      DX-CHEST-FOR LINE PLACEMENT Perform procedure in:  Patient's Room   Final Result      1.  PICC line is in place with the tip projecting over the SVC in satisfactory position.   2.  Mildly enlarged cardiac silhouette with vascular congestion.   3.  Retrocardiac opacity is likely due to atelectasis.         IR-PICC LINE PLACEMENT W/ GUIDANCE > AGE 5   Final Result                  Ultrasound-guided PICC placement performed by qualified nursing staff as    above.          CT-HEAD W/O   Final Result         1.  Left cerebellar hemorrhage, similar compared to prior study.   2.  Minimal bilateral intraventricular hemorrhages, new since prior study.   3.  Nonspecific white matter changes, commonly associated with small vessel ischemic disease.  Associated mild cerebral atrophy is noted.   4.  Atherosclerosis.         DX-CHEST-PORTABLE (1 VIEW)   Final Result         1.  Left basilar atelectasis, no focal infiltrate   2.  Trace left pleural effusion   3.  Atherosclerosis      CT-CTA NECK WITH & W/O-POST PROCESSING   Final Result         1.  Scattered atherosclerosis without significant stenosis or occlusion.   2.  4.2 cm proximal descending thoracic aortic aneurysm, radiographic follow-up and surveillance recommended as clinically appropriate.         CT-CTA HEAD WITH & W/O-POST PROCESS   Final Result         1.  No large vessel occlusion or aneurysm identified   2.  Large cerebellar hemorrhage predominantly in the left cerebellum      These findings were discussed with the patient's clinician, Nikki Alexander, on 5/29/2024 11:35 PM.      DX-CHEST-PORTABLE (1 VIEW)   Final Result      Tubes as above      Left pleural effusion      CHF/pulmonary edema pattern      See Brown MD   5/30/2024 8:41 AM         CT-HEAD W/O   Final Result      Acute intraparenchymal hemorrhage is noted involving the left cerebellar hemisphere, measuring 3.1 x 4.2 x 3.4 cm, with associated mass effect.      This finding was telephoned to the mentioned attending by on-call radiologist at the  time of imaging         AAST Intracranial Hemorrhage Brain Injury Guidelines         Hemorrhage type  mBIG 1           mBIG 2                mBIG 3      Subdural             <4mm               5-7.9 mm             >8mm      Epidural                No                    No                  Yes      Intraparenc'mal   <4mm               5-7.9 mm         >8mm or multi   1 location       or 2 locations      >3 locations      Subarachnoid     <3 sulci       single hemisphere   bi-hemisphere   and <1 mm        or 1-3 mm            or > 3 mm      Intraventricular       No                  No                    Yes      Skull Fracture       None            Non-displaced       Displaced               DLP Reporting Thresholds for Incorrect/Repeated Exams - DLP in mGy*cm   Head/Neck:  0-year-old 3840, 1-year-old 5880, 5-year-old 8770, 10-year-old 65811 and adult 88590   Head:  0-year-old 4540, 1-year-old 7460, 5-year-old 13050, 10-year-old 47185 and adult 82256   Neck:  0-year-old 2940, 1-year-old 4160, 5-year-old 4550, 10-year-old 6320 and adult 8470   Chest:  0-year-old 550, 1-year-old 830, 5-year-old 1200, 10-year-old 3840 and adult 3570   Abd/pelvis:  0-year-old 440, 1-year-old 720, 5-year-old 1080, 10-year-old 3330 and adult 3330   Trunk(C/A/P):  0-year-old 490, 1-year-old 770, 5-year-old 1140, 10-year-old 3570 and adult 3330      OUTSIDE IMAGES-CT CERVICAL SPINE    (Results Pending)        Assessment/Plan  * Cerebellar hemorrhage (HCC)- (present on admission)  Assessment & Plan  ICH score=1  Thought due to amyloid  MRI brain: Stable fossa of parenchymal hemorrhage centered in the LEFT cerebellum and extending into the RIGHT cerebellum exerting mass effect on the fourth ventricle which is not completely effaced Likely CAA changes  Keep -160,   3% completed, on free water flushes now  Neurosurgery non operative at this time  No coagulation   Hold all antithrombotic therapy  SCDs only for DVT ppx  Keep euvolemic,  euthermic, and normoglycemic (140-180)  Maintain bowel regimen to avoid Valsalva and increased intracranial pressure.  HOB at 30 degrees  maintain pCO2 of 35-40; closer to 35  Pravastatin 40 mg qHS for possible neuroprotective effect  CT 6/7 unchanged  PEG replaced  06/18  No antiplatelet or anticoagulation therapy as thought due to Amyloid  Will have palliative come meed with wife.    Anemia associated with acute blood loss  Assessment & Plan  Secondary to upper GI bleed after patient pulled G-tube  6/22 Hgb:8.8  Continue lansoprazole twice daily  Monitor hemoglobin and transfuse if less than 7  IV iron ordered 6/21 but cancelled for unknown reason.    Hypophosphatemia  Assessment & Plan  Monitor and replace    Hypokalemia  Assessment & Plan  Monitor and repalce    Chronic systolic heart failure (HCC)  Assessment & Plan  EF 40%  ACE allergy  BB  Monitor volume status  As of 6/22 no sign of exacerbation      Paroxysmal tachycardia (HCC)  Assessment & Plan  New Dx PAFib  Given multiple previous ICH's and likely etiology being amyloid, he is not a candidate for anticoagulation   Beta-blocker with holding parameters    UTI (urinary tract infection)  Assessment & Plan  Urine culture with E faecalis and E cloacae  Completed course of Zosyn    Encephalopathy acute- (present on admission)  Assessment & Plan  Likely delirium in conjunction with his intracranial pathology  Keep patient awake during the day and avoid daytime naps. Remove all unnecessary lines (central lines, peripheral IVs, feeding tubes, leung catheters). Avoid polypharmacy, frequent re-orientation, maximize family time at bedside, use glasses and hearing aids if needed, treat pain, encourage ambulation, minimize benzos/anticholinergic agents.   Ambulate, continue to try to keep him out of restraints as much as possible  Wean Risperdal to see if can improve his cognition.  Oxycodone only for pain  Minimize Haldol as able.  Depakote      Hyperchloremic  metabolic acidosis  Assessment & Plan  Improved    Hyperglycemia- (present on admission)  Assessment & Plan  Goal blood glucose 140-180  A1c 5.6  Off insulin  hypoglycemia protocol    Acute on chronic respiratory failure with hypoxia (HCC)- (present on admission)  Assessment & Plan  Intubated for airway protection  Extubated 6/1  Aspiration precautions  Mobilization as able  Pulmonary hygiene and encourage deep inspiratory efforts.  RT/O2 Protocols  Titrate supplemental FiO2 to maintain SpO2 >92%    Hyperlipidemia- (present on admission)  Assessment & Plan  Per wife, patient's home medication was discontinued due to muscle cramps  Pravastatin 40mg QHS  Lipid panel reviewed  Counseling on lifestyle/dietary modifications    History of stroke- (present on admission)  Assessment & Plan  Patient has had 2 prior hemorrhagic strokes; last in 2015    BPH with urinary obstruction- (present on admission)  Assessment & Plan  S/p prostate surgery  Per wife, patient has suffered from urinary retention and incontinence since his surgery  Cardura  Monitor renal function and I/O's  Park removed 6/12:      Benign essential hypertension- (present on admission)  Assessment & Plan  Given new Dx of PAFib   Amlodipine transition to metoprolol  Monitor blood pressure and adjust medical therapy accordingly  Anticoagulation is contraindicated with cerebellar hemorrhage at this time.    Abdominal aortic aneurysm (AAA) without rupture (HCC)- (present on admission)  Assessment & Plan  S/p EVAR w/ bypass graft stent  Maintain normotension  Holding ASA at this time; per wife he takes ASA 3x/week  BP control    COPD (chronic obstructive pulmonary disease) (HCC)- (present on admission)  Assessment & Plan  Not currently in exacerbation  Continue PRN nebulizers Q4h  Wean FiO2 as tolerated  RT per protocol prn         VTE prophylaxis:   SCDs/TEDs      I have performed a physical exam and reviewed and updated ROS and Plan today (6/24/2024). In review  of yesterday's note (6/23/2024), there are no changes except as documented above.

## 2024-06-24 NOTE — CARE PLAN
Problem: Skin Integrity  Goal: Skin integrity is maintained or improved  Description: Target End Date:  Prior to discharge or change in level of care    Document interventions on Skin Risk/[place]rera flowsheet groups and corresponding LDA    1.  Assess and monitor skin integrity, appearance and/or temperature  2.  Assess risk factors for impaired skin integrity and/or pressures ulcers  3.  Implement precautions to protect skin integrity in collaboration with interdisciplinary team  4.  Implement pressure ulcer prevention protocol if at risk for skin breakdown  5.  Confirm wound care consult if at risk for skin breakdown  6.  Ensure patient use of pressure relieving devices  (Low air loss bed, waffle overlay, heel protectors, ROHO cushion, etc)  Outcome: Progressing     Problem: Fall Risk  Goal: Patient will remain free from falls  Description: Target End Date:  Prior to discharge or change in level of care    Document interventions on the Fidelina Brower Fall Risk Assessment    1.  Assess for fall risk factors  2.  Implement fall precautions  Outcome: Progressing     The patient is Stable - Low risk of patient condition declining or worsening    Shift Goals  Clinical Goals: maintain skin integrity , safety  Patient Goals: caroline  Family Goals: safety    Progress made toward(s) clinical / shift goals:  Patient is alert, non purposeful movements and incomprehensible sounds displayed . He was able to point to me that he needed to be change at one time . Turned every 2 hours . Tele monitor maintained. Pain medications given for comfort . Hourly rounds to ensure safety and comfort  .     Patient is not progressing towards the following goals:

## 2024-06-24 NOTE — PROGRESS NOTES
Monitor summary: SR, HR 71-94, VA .16, QRS .09, QT .39 with rare and occasional pvc per strip from monitor room

## 2024-06-25 LAB
ALBUMIN SERPL BCP-MCNC: 3.4 G/DL (ref 3.2–4.9)
ALBUMIN/GLOB SERPL: 1.1 G/DL
ALP SERPL-CCNC: 66 U/L (ref 30–99)
ALT SERPL-CCNC: 43 U/L (ref 2–50)
ANION GAP SERPL CALC-SCNC: 12 MMOL/L (ref 7–16)
AST SERPL-CCNC: 32 U/L (ref 12–45)
BILIRUB SERPL-MCNC: 0.2 MG/DL (ref 0.1–1.5)
BUN SERPL-MCNC: 16 MG/DL (ref 8–22)
CALCIUM ALBUM COR SERPL-MCNC: 10 MG/DL (ref 8.5–10.5)
CALCIUM SERPL-MCNC: 9.5 MG/DL (ref 8.5–10.5)
CHLORIDE SERPL-SCNC: 102 MMOL/L (ref 96–112)
CO2 SERPL-SCNC: 24 MMOL/L (ref 20–33)
CREAT SERPL-MCNC: 0.61 MG/DL (ref 0.5–1.4)
ERYTHROCYTE [DISTWIDTH] IN BLOOD BY AUTOMATED COUNT: 48.5 FL (ref 35.9–50)
GFR SERPLBLD CREATININE-BSD FMLA CKD-EPI: 98 ML/MIN/1.73 M 2
GLOBULIN SER CALC-MCNC: 3.2 G/DL (ref 1.9–3.5)
GLUCOSE SERPL-MCNC: 158 MG/DL (ref 65–99)
HCT VFR BLD AUTO: 29.6 % (ref 42–52)
HGB BLD-MCNC: 9.4 G/DL (ref 14–18)
MCH RBC QN AUTO: 28.1 PG (ref 27–33)
MCHC RBC AUTO-ENTMCNC: 31.8 G/DL (ref 32.3–36.5)
MCV RBC AUTO: 88.6 FL (ref 81.4–97.8)
PLATELET # BLD AUTO: 343 K/UL (ref 164–446)
PMV BLD AUTO: 11.8 FL (ref 9–12.9)
POTASSIUM SERPL-SCNC: 4.3 MMOL/L (ref 3.6–5.5)
PROT SERPL-MCNC: 6.6 G/DL (ref 6–8.2)
RBC # BLD AUTO: 3.34 M/UL (ref 4.7–6.1)
SODIUM SERPL-SCNC: 138 MMOL/L (ref 135–145)
WBC # BLD AUTO: 8.4 K/UL (ref 4.8–10.8)

## 2024-06-25 PROCEDURE — 36415 COLL VENOUS BLD VENIPUNCTURE: CPT

## 2024-06-25 PROCEDURE — 700102 HCHG RX REV CODE 250 W/ 637 OVERRIDE(OP): Performed by: HOSPITALIST

## 2024-06-25 PROCEDURE — 85027 COMPLETE CBC AUTOMATED: CPT

## 2024-06-25 PROCEDURE — A9270 NON-COVERED ITEM OR SERVICE: HCPCS | Performed by: HOSPITALIST

## 2024-06-25 PROCEDURE — 97129 THER IVNTJ 1ST 15 MIN: CPT

## 2024-06-25 PROCEDURE — 700102 HCHG RX REV CODE 250 W/ 637 OVERRIDE(OP): Performed by: INTERNAL MEDICINE

## 2024-06-25 PROCEDURE — 770001 HCHG ROOM/CARE - MED/SURG/GYN PRIV*

## 2024-06-25 PROCEDURE — 97535 SELF CARE MNGMENT TRAINING: CPT

## 2024-06-25 PROCEDURE — A9270 NON-COVERED ITEM OR SERVICE: HCPCS | Performed by: INTERNAL MEDICINE

## 2024-06-25 PROCEDURE — 99233 SBSQ HOSP IP/OBS HIGH 50: CPT | Performed by: GENERAL PRACTICE

## 2024-06-25 PROCEDURE — 92526 ORAL FUNCTION THERAPY: CPT

## 2024-06-25 PROCEDURE — 80053 COMPREHEN METABOLIC PANEL: CPT

## 2024-06-25 PROCEDURE — 97530 THERAPEUTIC ACTIVITIES: CPT | Mod: CQ

## 2024-06-25 RX ORDER — METOPROLOL SUCCINATE 25 MG/1
25 TABLET, EXTENDED RELEASE ORAL DAILY
Status: ON HOLD
Start: 2024-06-25 | End: 2024-07-02

## 2024-06-25 RX ORDER — LANSOPRAZOLE 30 MG/1
30 TABLET, ORALLY DISINTEGRATING, DELAYED RELEASE ORAL 2 TIMES DAILY
Status: SHIPPED
Start: 2024-06-25 | End: 2024-06-30

## 2024-06-25 RX ORDER — OXYCODONE HYDROCHLORIDE 5 MG/1
5 TABLET ORAL EVERY 8 HOURS PRN
Qty: 15 TABLET | Refills: 0 | Status: ON HOLD | OUTPATIENT
Start: 2024-06-26 | End: 2024-07-02

## 2024-06-25 RX ORDER — DOXAZOSIN MESYLATE 1 MG/1
1 TABLET ORAL
Status: ON HOLD
Start: 2024-06-25 | End: 2024-07-02

## 2024-06-25 RX ORDER — PRAVASTATIN SODIUM 40 MG
40 TABLET ORAL EVERY EVENING
Status: SHIPPED
Start: 2024-06-25

## 2024-06-25 RX ORDER — VALPROIC ACID 250 MG/5ML
500 SOLUTION ORAL
Status: SHIPPED
Start: 2024-06-25

## 2024-06-25 RX ORDER — RISPERIDONE 0.5 MG/1
0.5 TABLET ORAL EVERY EVENING
Status: SHIPPED
Start: 2024-06-25 | End: 2024-06-30

## 2024-06-25 RX ADMIN — VALPROIC ACID 500 MG: 250 SOLUTION ORAL at 20:38

## 2024-06-25 RX ADMIN — MOMETASONE FUROATE AND FORMOTEROL FUMARATE DIHYDRATE 2 PUFF: 200; 5 AEROSOL RESPIRATORY (INHALATION) at 05:49

## 2024-06-25 RX ADMIN — Medication: at 18:00

## 2024-06-25 RX ADMIN — Medication: at 05:51

## 2024-06-25 RX ADMIN — LANSOPRAZOLE 30 MG: 30 TABLET, ORALLY DISINTEGRATING ORAL at 05:49

## 2024-06-25 RX ADMIN — LANSOPRAZOLE 30 MG: 30 TABLET, ORALLY DISINTEGRATING ORAL at 17:54

## 2024-06-25 RX ADMIN — ACETAMINOPHEN 1000 MG: 500 TABLET, FILM COATED ORAL at 17:53

## 2024-06-25 RX ADMIN — METOPROLOL TARTRATE 12.5 MG: 25 TABLET, FILM COATED ORAL at 17:53

## 2024-06-25 RX ADMIN — ACETAMINOPHEN 1000 MG: 500 TABLET, FILM COATED ORAL at 03:19

## 2024-06-25 RX ADMIN — PRAVASTATIN SODIUM 40 MG: 20 TABLET ORAL at 17:53

## 2024-06-25 RX ADMIN — METOPROLOL TARTRATE 12.5 MG: 25 TABLET, FILM COATED ORAL at 05:49

## 2024-06-25 RX ADMIN — DOXAZOSIN MESYLATE 1 MG: 1 TABLET ORAL at 20:39

## 2024-06-25 RX ADMIN — ACETAMINOPHEN 1000 MG: 500 TABLET, FILM COATED ORAL at 12:48

## 2024-06-25 RX ADMIN — ACETAMINOPHEN 1000 MG: 500 TABLET, FILM COATED ORAL at 20:39

## 2024-06-25 ASSESSMENT — COGNITIVE AND FUNCTIONAL STATUS - GENERAL
DRESSING REGULAR UPPER BODY CLOTHING: A LOT
CLIMB 3 TO 5 STEPS WITH RAILING: TOTAL
STANDING UP FROM CHAIR USING ARMS: A LOT
SUGGESTED CMS G CODE MODIFIER MOBILITY: CL
SUGGESTED CMS G CODE MODIFIER DAILY ACTIVITY: CL
PERSONAL GROOMING: A LOT
MOVING FROM LYING ON BACK TO SITTING ON SIDE OF FLAT BED: A LOT
MOBILITY SCORE: 11
HELP NEEDED FOR BATHING: A LOT
TURNING FROM BACK TO SIDE WHILE IN FLAT BAD: A LOT
WALKING IN HOSPITAL ROOM: A LOT
MOVING TO AND FROM BED TO CHAIR: A LOT
DRESSING REGULAR LOWER BODY CLOTHING: A LOT
TOILETING: TOTAL
EATING MEALS: TOTAL
DAILY ACTIVITIY SCORE: 10

## 2024-06-25 ASSESSMENT — GAIT ASSESSMENTS: GAIT LEVEL OF ASSIST: UNABLE TO PARTICIPATE

## 2024-06-25 ASSESSMENT — PAIN DESCRIPTION - PAIN TYPE: TYPE: ACUTE PAIN

## 2024-06-25 NOTE — PROGRESS NOTES
Medium ab binder dropped of in patient's room by sink. Please call traction if any additional assistance is needed.

## 2024-06-25 NOTE — CARE PLAN
The patient is Watcher - Medium risk of patient condition declining or worsening    Shift Goals  Clinical Goals: mobility and safety  Patient Goals: GUZMAN  Family Goals: discharge    Progress made toward(s) clinical / shift goals:    Problem: Neuro Status  Goal: Neuro status will remain stable or improve  Outcome: Progressing  Note: Patient still remains confused but does not require use of telesitter or PRN meds to keep calm. Patient does scratch at PEG site but abdominal binder is in place.     Problem: Mobility - Stroke  Goal: Patient's capacity to carry out activities will improve  Note: Patient up to chair for breakfast bolus. Patient also worked with PT to mobilize in wheelchair.        Patient is not progressing towards the following goals:

## 2024-06-25 NOTE — THERAPY
"Speech Language Pathology   Daily Treatment     Patient Name: Jl Mueller  AGE:  78 y.o., SEX:  male  Medical Record #: [id number]  Date of Service: 6/25/2024      Precautions:  Precautions: Fall Risk, PEG Tube, Swallow Precautions         Subjective  Pt in bed, eyes closed. Spouse reports pt was up in chair and/or working with PT for 3 hours today, incontinent of bowel, nursing and spouse assisting with clean up and clothing/bedding change.      Assessment  Pt able to follow commands for positioning in bed with MAX tactile and verbal cues. Pt repositioned close to 90 degrees. Oral care completed with use of suction toothbrush prior to initiation of ice chip trials to target timely swallow. Pt shaking head \"no\" when presented with each ice chip, with encouragement pt agreeable to accept and manipulate in mouth. MOD to MAX cues to complete swallow with pt independently completing swallow x5 this session. Pt accepted a total of 10 ice chips with consistent cueing to swallow. Pt able to elicit voicing on 1 occasion which sounded clear at the end of this session. Pt producing jargon/unintelligible speech.      Clinical Impressions  Pt continues to present with severe oral-pharyngeal dysphagia. Recommend continued trials of ice chips or 5mL thin liquids following thorough oral care. Pt benefits from multi-modal cueing to elicit timely swallow.      Recommendations  Treatment Completed: Dysphagia Treatment       Dysphagia Treatment  Diet Consistency: NPO with non-oral source of nutrition  Instrumentation: Instrumental swallow study pending clinical progress  Medication: Non Oral  Supervision: 1:1 feeding with constant supervision (ice chips, 5mL thins as appropriate)  Oral Care: Q2h         Cognitive Treatment  Supervision Needs Upons Discharge: Direct assistance with IADLs (see below)  IADLs: Medication management, Financial management, Appointment management, Household chores, Cooking           SLP Treatment " "Plan  Treatment Plan: Dysphagia Treatment, Speech-Language Treatment  SLP Frequency: 4x Per Week  Estimated Duration: Until Therapy Goals Met      Anticipated Discharge Needs  Discharge Recommendations: Recommend post-acute placement for additional speech therapy services prior to discharge home  Therapy Recommendations Upon DC: Dysphagia Training, Comprehension Training, Expression Training, Reading Training, Writing Training, Cognitive-Linguistic Training, Patient / Family / Caregiver Education      Patient / Family Goals  Patient / Family Goal #1: \"for him to eat\" per spouse  Goal #1 Outcome: Progressing slower than expected  Short Term Goals  Short Term Goal # 1: Pt will participate in pre-feeding trials to determine approp. for participation in instrumental swallow study vs oral diet initation.  Goal Outcome # 1: Progressing as expected  Short Term Goal # 3: Patient will answer egocentric yes/no questions wtih 60% accuracy provided maxA.  Goal Outcome  # 3:  (goal not addressed this session)  Short Term Goal # 4: Patient will utilize multimodal communication to express wants/needs x5 in a session.  Goal Outcome  # 4:  (goal not addressed this session)  Short Term Goal # 5: Patient will follow one step commands with 60% accuracy provided maxA.  Goal Outcome  # 5: Progressing as expected      Racquel Asher MS,CCC-SLP  "

## 2024-06-25 NOTE — CARE PLAN
The patient is Stable - Low risk of patient condition declining or worsening    Shift Goals  Clinical Goals: safety  Patient Goals: GUZMAN  Family Goals: safety    Progress made toward(s) clinical / shift goals:    Problem: Safety - Medical Restraint  Goal: Free from restraint(s) (Restraint for Interference with Medical Device)  Outcome: Progressing  Note: Patient placed across nursing station. Frequent rounding in place.      Problem: Fall Risk  Goal: Patient will remain free from falls  Outcome: Progressing  Note: Strip alarm and rails x 3 in use.      Problem: Skin Integrity  Goal: Skin integrity is maintained or improved  Outcome: Progressing  Note: Q 2 turns in place. Barrier cream in use.         Patient is not progressing towards the following goals: N/A

## 2024-06-25 NOTE — THERAPY
Occupational Therapy  Daily Treatment     Patient Name: Jl Mueller  Age:  78 y.o., Sex:  male  Medical Record #: [id number]  Today's Date: 6/25/2024     Precautions: Fall Risk, PEG Tube, Swallow Precautions  Comments: [place]    Assessment    Pt seen for OT tx. Received in WC with spouse present and supportive. Pt very lethargic this session; requiring frequent cueing to open eyes. Engaged pt in oral care task and UB dressing. Pt able to grasp suction toothbrush with non-dominant L hand, demo hand-to-mouth and brushing motion. Also participated in gown change. Attempted simple task of sorting playing cards in field of two (red & black suits). Pt able to hold and place cards, but unable to sort per instruction. (Spouse confirms pt is familiar with playing cards.) Pt is making slow progress towards OT goals. He continues to be limited by: cognition, balance, weakness, coordination. Will continue to follow; recommend post-acute placement.     Plan    Treatment Plan Status: Continue Current Treatment Plan  Type of Treatment: Self Care / Activities of Daily Living, Therapeutic Activity  Treatment Frequency: 3 Times per Week  Treatment Duration: Until Therapy Goals Met    DC Equipment Recommendations: Unable to determine at this time  Discharge Recommendations: Recommend post-acute placement for additional occupational therapy services prior to discharge home     Objective       06/25/24 1116   Treatment Charges   Charges Yes   OT Self Care / ADL (Units) 1   OT Cognitive Skill Development First 15 Minutes (Units) 1   Total Time Spent   OT Time Spent Yes   OT Self Care / ADL (Minutes) 12   OT Cognitive Skill Development (Minutes) 13   OT Total Time Spent (Calculated) 25    Services   Is patient using  services for this encounter? No   Precautions   Precautions Fall Risk;PEG Tube;Swallow Precautions   Comments [place]   Vitals   O2 (LPM) 3   O2 Delivery Device Nasal Cannula   Pain   Pain Scales 0 to 10  Scale    Pain 0 - 10 Group   Therapist Pain Assessment Post Activity Pain Same as Prior to Activity;Nurse Notified  (no specific c/o pain this session)   Non Verbal Descriptors   Non Verbal Scale  Calm;Unlabored Breathing   Cognition    Cognition / Consciousness X   Speech/ Communication Expressive Aphasia  (little to no verbal output this session)   Orientation Level   (unable to assess due to low arousal)   Level of Consciousness   (Lethargic)   Ability To Follow Commands Unable to Follow 1 Step Commands  (following <10% this session)   New Learning Impaired   Attention Impaired   Sequencing Impaired   Initiation Impaired   Comments Pt very low arousal this session   Passive ROM Upper Body   Passive ROM Upper Body X   Comments WNL with the exception of R shoulder which is limited to ~90 degrees   Active ROM Upper Body   Active ROM Upper Body  X   Dominant Hand Right   Comments pt moves BUE spontaneously, L more than R; unable to follow formal assessment, but moves in all planes   Strength Upper Body   Upper Body Strength  X   Comments unable to formally test but moves in all planes   Sensation Upper Body   Upper Extremity Sensation  Not Tested   Comments unable to follow   Upper Body Muscle Tone   Upper Body Muscle Tone  WDL   Neuro-Muscular Treatments   Neuro-Muscular Treatments Postural Changes   Comments Pt demos R lateral lean in WC; facilitated postural changes to neutral, but pt requires physical assist and max cueing   Other Treatments   Other Treatments Provided Attempted card sorting in field of 2 (red/black). Pt able to hold single cards in R hand and place on table, but unable to follow instruction for sorting.   Balance   Sitting Balance (Static) Fair -   Sitting Balance (Dynamic) Poor +   Weight Shift Sitting Poor   Skilled Intervention Verbal Cuing;Postural Facilitation;Compensatory Strategies   Comments seated in WC   Activities of Daily Living   Grooming Minimal Assist  (suction oral care, facial  hygiene)   Upper Body Dressing Moderate Assist  (gown change)   Skilled Intervention Verbal Cuing;Compensatory Strategies;Postural Facilitation;Sequencing   How much help from another person does the patient currently need...   Putting on and taking off regular lower body clothing? 2   Bathing (including washing, rinsing, and drying)? 2   Toileting, which includes using a toilet, bedpan, or urinal? 1   Putting on and taking off regular upper body clothing? 2   Taking care of personal grooming such as brushing teeth? 2   Eating meals? 1   6 Clicks Daily Activity Score 10   Activity Tolerance   Sitting in Chair >20 min; up pre and post   Comments very low arousal this session   Short Term Goals   Short Term Goal # 1 SBA with grooming tasks   Goal Outcome # 1 Progressing as expected   Short Term Goal # 2 min A with UB dressing   Goal Outcome # 2 Progressing as expected   Short Term Goal # 3 B pt will complete LB dressing Min A   Goal Outcome # 3 B   (NT this session)   Occupational Therapy Treatment Plan    O.T. Treatment Plan Continue Current Treatment Plan   Anticipated Discharge Equipment and Recommendations   DC Equipment Recommendations Unable to determine at this time   Discharge Recommendations Recommend post-acute placement for additional occupational therapy services prior to discharge home   Interdisciplinary Plan of Care Collaboration   IDT Collaboration with  Nursing   Patient Position at End of Therapy Seated;Chair Alarm On;Call Light within Reach;Tray Table within Reach;Family / Friend in Room   Collaboration Comments OT results and recs   Session Information   Date / Session Number  6/25 #6 (1/3, 6/30)

## 2024-06-25 NOTE — THERAPY
"Physical Therapy   Daily Treatment     Patient Name: Jl Mueller  Age:  78 y.o., Sex:  male  Medical Record #: [id number]  Today's Date: 6/25/2024     Precautions  Precautions: Fall Risk;Swallow Precautions;PEG Tube  Comments: (P) [place]. Lft cerebellar infarct    Assessment    The pt was found reclined in chair w/wife BS. The pt is no longer in restraints, self release belt only. The pt inconsistent w/opening of eyes and following commands. The pt nodding head \"yes\" to wanting to cont w/STS. Today the pt initiated STS in // bars w/initially assist max assist progressing to min assist. Once standing, attempted pre-gait. The pt's trunk ataxia made initiating steps difficult and demonstrating Lft lateral lean.   The pt more participatory today during her PT session  PT will cont to follow    Plan    Treatment Plan Status: (P) Continue Current Treatment Plan  Type of Treatment: Bed Mobility, Equipment, Neuro Re-Education / Balance, Self Care / Home Evaluation, Stair Training, Therapeutic Activities, Therapeutic Exercise, Gait Training, Family / Caregiver Training  Treatment Frequency: 4 Times per Week  Treatment Duration: Until Therapy Goals Met    DC Equipment Recommendations: (P) Unable to determine at this time  Discharge Recommendations: (P) Recommend post-acute placement for additional physical therapy services prior to discharge home     Objective       06/25/24 1024   Charge Group   PT Therapeutic Activities (Units) 3   Total Time Spent   PT Therapeutic Activities Time Spent (Mins) 40   PT Total Time Spent (Calculated) 40   Precautions   Precautions Fall Risk;Swallow Precautions;PEG Tube   Comments [place]. Lft cerebellar infarct   Pain 0 - 10 Group   Pain Rating Scale (NPRS) 0   Therapist Pain Assessment During Activity;Nurse Notified   Balance   Sitting Balance (Static) Fair -   Sitting Balance (Dynamic) Poor +   Standing Balance (Static) Poor   Standing Balance (Dynamic) Trace +   Weight Shift Sitting Poor "   Weight Shift Standing Poor   Skilled Intervention Verbal Cuing;Postural Facilitation   Comments standing in // bars   Bed Mobility    Comments Did not assess this tx session. The pt was found seated in the recliner chair and left seated in w/c.   Gait Analysis   Gait Level Of Assist Unable to Participate   Skilled Intervention Postural Facilitation   Comments Attempt pre-gait in // bars, pt demonstrating trunk ataxia and unable to initiate a step.   Functional Mobility   Sit to Stand Minimal Assist  (from w/c->// bars)   Bed, Chair, Wheelchair Transfer Moderate Assist  (recliner chair->w/c)   Skilled Intervention Verbal Cuing   Comments The pt tolerated 4 STS from w/c->// bars, able to hold each stand for 15 seconds w/trunk ataxia. The intial stand the pt required physical assist and his last 3 efforts he was min assist. Transfers mod assist.   6 Clicks Assessment - How much HELP from from another person do you currently need... (If the patient hasn't done an activity recently, how much help from another person do you think he/she would need if he/she tried?)   Turning from your back to your side while in a flat bed without using bedrails? 2   Moving from lying on your back to sitting on the side of a flat bed without using bedrails? 2   Moving to and from a bed to a chair (including a wheelchair)? 2   Standing up from a chair using your arms (e.g., wheelchair, or bedside chair)? 2   Walking in hospital room? 2   Climbing 3-5 steps with a railing? 1   6 clicks Mobility Score 11   Short Term Goals    Short Term Goal # 1 Pt will follow 100% of function based commands within 6 visits to ensure participation in PT sessions.   Goal Outcome # 1 goal not met   Short Term Goal # 2 Pt will perform supine<>sit from flat HOB/no railing with min A within 6 visits to ensure independent mobility at home.   Goal Outcome # 2 Goal not met   Short Term Goal # 3 Pt will perform squat pivot transfer with min A within 6 visits to  incresae OOB timing.   Goal Outcome # 3 Goal not met   Short Term Goal # 4 Pt will ambulate x 15ft in // bars with min A within 6 visits to progress ambulation tolerance.   Goal Outcome # 4 Goal not met   Education Group   Role of Physical Therapist Patient Response Patient;Acceptance;Explanation;Action Demonstration;Reinforcement Needed   Physical Therapy Treatment Plan   Physical Therapy Treatment Plan Continue Current Treatment Plan   Anticipated Discharge Equipment and Recommendations   DC Equipment Recommendations Unable to determine at this time   Discharge Recommendations Recommend post-acute placement for additional physical therapy services prior to discharge home   Interdisciplinary Plan of Care Collaboration   IDT Collaboration with  Nursing   Patient Position at End of Therapy Seated;Chair Alarm On;Self Releasing Lap Belt Applied;Family / Friend in Room   Collaboration Comments Nrsg notified of pts tx efforts   Session Information   Date / Session Number  6/25--7 (1/4, 6/29) PTA/1   Supervising Physical Therapist (PTA Treatments Only)   Supervising Physical Therapist Meenakshi Crabtree

## 2024-06-25 NOTE — PROGRESS NOTES
Hospital Medicine Daily Progress Note    Date of Service  6/25/2024    Chief Complaint  Jl Mueller is a 78 y.o. male admitted 5/29/2024 with AMS    Hospital Course  78-year-old with a history of BPH, previous hemorrhagic strokes, tobacco use, hypertension, coronary artery disease, dyslipidemia, chronic hypoxic respiratory failure dependent on 2 L of home oxygen therapy. He was admitted on May 29 due to neurologic changes, and was found to have a left cerebellar hemorrhage.     CTA was negative for any vascular malformations. Given multiple previous ICH's and likely etiology being amyloid, he is not a candidate for anticoagulation. NSG - nonoperative.  PEG Tube was placed. Patient did require hypertonic saline and was intubated for airway protection. He was successfully extubated on June 1 and weaned off of hypertonic saline.     Hospital stay has been complicated by agitation management. He also developed UTI which has grown back Enterococcus faecalis, and Enterobacter cloaca.     Interval Problem Update  Patient's mood has improved, no longer requiring restraints or as needed medications.    Pending skilled nursing facility.    I have discussed this patient's plan of care and discharge plan at IDT rounds today with Case Management, Nursing, Nursing leadership, and other members of the IDT team.    Consultants/Specialty  GI, neurology, neurosurgery, and palliative care    Code Status  Full Code    Disposition  The patient is medically cleared for discharge to home or a post-acute facility.  Anticipate discharge to: skilled nursing facility    I have placed the appropriate orders for post-discharge needs.    Review of Systems  Review of Systems   All other systems reviewed and are negative.       Physical Exam  Temp:  [36.4 °C (97.5 °F)-36.6 °C (97.9 °F)] 36.4 °C (97.5 °F)  Pulse:  [78-95] 78  Resp:  [18] 18  BP: (107-127)/(57-78) 110/57  SpO2:  [91 %-100 %] 96 %    Physical Exam  Vitals reviewed.    Constitutional:       Appearance: He is obese.   HENT:      Head: Normocephalic and atraumatic.      Nose: Nose normal.   Eyes:      General:         Right eye: No discharge.         Left eye: No discharge.      Extraocular Movements: Extraocular movements intact.      Conjunctiva/sclera: Conjunctivae normal.   Cardiovascular:      Rate and Rhythm: Normal rate and regular rhythm.      Heart sounds: No murmur heard.  Pulmonary:      Effort: Pulmonary effort is normal. No respiratory distress.   Abdominal:      General: There is no distension.      Palpations: Abdomen is soft.      Comments: LUQ PEG tube    Musculoskeletal:      Cervical back: No rigidity.      Right lower leg: No edema.      Left lower leg: No edema.   Skin:     General: Skin is dry.   Neurological:      General: No focal deficit present.         Fluids    Intake/Output Summary (Last 24 hours) at 6/25/2024 1443  Last data filed at 6/24/2024 2000  Gross per 24 hour   Intake 280 ml   Output --   Net 280 ml       Laboratory  Recent Labs     06/24/24  0243 06/25/24  0412   WBC 9.5 8.4   RBC 3.39* 3.34*   HEMOGLOBIN 9.4* 9.4*   HEMATOCRIT 30.3* 29.6*   MCV 89.4 88.6   MCH 27.7 28.1   MCHC 31.0* 31.8*   RDW 48.8 48.5   PLATELETCT 341 343   MPV 11.2 11.8     Recent Labs     06/24/24  0243 06/25/24  0412   SODIUM 137 138   POTASSIUM 4.6 4.3   CHLORIDE 101 102   CO2 26 24   GLUCOSE 119* 158*   BUN 16 16   CREATININE 0.66 0.61   CALCIUM 9.4 9.5                   Imaging  [id number] VIEW   Final Result      1.  No dilated bowel identified         2. Presence a gastrostomy tube and contrast within the colon      3. Aortic stent graft present      DX-CHEST-LIMITED (1 VIEW)   Final Result         1. Improving small left pleural effusion. Resolution of left lower lobe atelectasis.   2. The remainder is stable.      IR-GASTROSTOMY PLACEMENT   Final Result      Successful image guided gastrostomy tube replacement.      Plan: Golva drainage for 10 hours. The  tube should not be flushed with sterile saline. If this can be completed without incident the tube may be used.      EC-ECHOCARDIOGRAM COMPLETE W/ CONT   Final Result      DX-CHEST-PORTABLE (1 VIEW)   Final Result      1.  Small left pleural effusion and associated atelectasis.   2.  Mild bilateral interstitial opacities, likely mild interstitial edema.         CT-HEAD W/O   Final Result         1.  Area of cerebellar hemorrhage, decreased since prior study.   2.  Nonspecific white matter changes, commonly associated with small vessel ischemic disease.  Associated mild cerebral atrophy is noted.   3.  Atherosclerosis.         CT-HEAD W/O   Final Result      1.  Intraparenchymal hemorrhage in the cerebellar vermis and left cerebellum. It demonstrates expected evolution and decreased density.   2.  Redemonstration of vasogenic edema surrounding the hematoma in the cerebellum with mild narrowing of the fourth ventricle. There is no hydrocephalus.         DX-CHEST-LIMITED (1 VIEW)   Final Result      Mild interstitial prominence could represent vascular congestion.      Cardiomegaly.      Removal of endotracheal tube.      [id number] VIEW   Final Result      Feeding tube tip projects in the second portion of the duodenum.      Nonspecific bowel gas pattern.      CT-HEAD W/O   Final Result      1.  Advanced cerebral atrophy.   2.  Stable ventriculomegaly with intraventricular hemorrhage.   3.  Advanced supratentorial white matter disease most consistent with microvascular ischemic change.   4.  Multiple old lacunar infarcts as described.   5.  Acute/recent subacute parenchymal hemorrhage in the posterior fossa involving the superior cerebellar vermis and left paramedian cerebellar hemisphere and left lateral cerebellar peduncle. No evidence of recurrent hemorrhage.   6.  Minimal focus of subarachnoid hemorrhage within a single sulcus in the left posterior temporal region. Probably unchanged from prior recent exam.          DX-ABDOMEN FOR TUBE PLACEMENT   Final Result      Enteric tube tip projects over the stomach.      EC-ECHOCARDIOGRAM COMPLETE W/ CONT   Final Result      DX-CHEST-PORTABLE (1 VIEW)   Final Result         1.  Left basilar atelectasis or subtle infiltrate, decreased since prior study   2.  Trace left pleural effusion   3.  Enlargement of the aortic knob suggesting thoracic aortic aneurysm   4.  Atherosclerosis      MR-BRAIN-WITH & W/O   Final Result      1.  Stable fossa of parenchymal hemorrhage centered in the LEFT cerebellum and extending into the RIGHT cerebellum exerting mass effect on the fourth ventricle which is not completely effaced   2.  Intraventricular blood redemonstrated   3.  Numerous areas of remote microhemorrhage in the supra and infratentorial brain. These could be related to amyloid angiopathy, numerous hypertensive microhemorrhages or less likely multiple cavernomas   4.  Moderate diffuse atrophy without compelling evidence of hydrocephalus   5.  Advanced white matter changes   6.  4 mm LEFT temporal meningioma   7.  Enhancing nodule in the RIGHT internal auditory canal suspicious for a vestibular schwannoma, less likely other extra-axial mass such as a meningioma, facial nerve schwannoma or metastasis. Consider posterior fossa protocol brain MRI without and with    contrast to further evaluate in when clinically appropriate.      MR-MRA HEAD-W/O   Final Result         MRA OF THE [place] OF [place] WITHIN NORMAL LIMITS.      DX-CHEST-PORTABLE (1 VIEW)   Final Result         1.  Hazy left lower lobe infiltrate   2.  Small left pleural effusion   3.  Enlargement of the aortic knob, appearance suggesting aortic aneurysm.      DX-CHEST-FOR LINE PLACEMENT Perform procedure in: Patient's Room   Final Result      1.  PICC line is in place with the tip projecting over the SVC in satisfactory position.   2.  Mildly enlarged cardiac silhouette with vascular congestion.   3.  Retrocardiac opacity is  likely due to atelectasis.         IR-PICC LINE PLACEMENT W/ GUIDANCE > AGE 5   Final Result                  Ultrasound-guided PICC placement performed by qualified nursing staff as    above.          CT-HEAD W/O   Final Result         1.  Left cerebellar hemorrhage, similar compared to prior study.   2.  Minimal bilateral intraventricular hemorrhages, new since prior study.   3.  Nonspecific white matter changes, commonly associated with small vessel ischemic disease.  Associated mild cerebral atrophy is noted.   4.  Atherosclerosis.         DX-CHEST-PORTABLE (1 VIEW)   Final Result         1.  Left basilar atelectasis, no focal infiltrate   2.  Trace left pleural effusion   3.  Atherosclerosis      CT-CTA NECK WITH & W/O-POST PROCESSING   Final Result         1.  Scattered atherosclerosis without significant stenosis or occlusion.   2.  4.2 cm proximal descending thoracic aortic aneurysm, radiographic follow-up and surveillance recommended as clinically appropriate.         CT-CTA HEAD WITH & W/O-POST PROCESS   Final Result         1.  No large vessel occlusion or aneurysm identified   2.  Large cerebellar hemorrhage predominantly in the left cerebellum      These findings were discussed with the patient's clinician, Nikki Alexander, on 5/29/2024 11:35 PM.      DX-CHEST-PORTABLE (1 VIEW)   Final Result      Tubes as above      Left pleural effusion      CHF/pulmonary edema pattern      See Brown MD   5/30/2024 8:41 AM         CT-HEAD W/O   Final Result      Acute intraparenchymal hemorrhage is noted involving the left cerebellar hemisphere, measuring 3.1 x 4.2 x 3.4 cm, with associated mass effect.      This finding was telephoned to the mentioned attending by on-call radiologist at the time of imaging         AAST Intracranial Hemorrhage Brain Injury Guidelines         Hemorrhage type  mBIG 1           mBIG 2                mBIG 3      Subdural             <4mm               5-7.9 mm             >8mm       Epidural                No                    No                  Yes      Intraparenc'mal   <4mm               5-7.9 mm         >8mm or multi   1 location       or 2 locations      >3 locations      Subarachnoid     <3 sulci       single hemisphere   bi-hemisphere   and <1 mm        or 1-3 mm            or > 3 mm      Intraventricular       No                  No                    Yes      Skull Fracture       None            Non-displaced       Displaced               DLP Reporting Thresholds for Incorrect/Repeated Exams - DLP in mGy*cm   Head/Neck:  0-year-old 3840, 1-year-old 5880, 5-year-old 8770, 10-year-old 09821 and adult 35460   Head:  0-year-old 4540, 1-year-old 7460, 5-year-old 55991, 10-year-old 04262 and adult 73301   Neck:  0-year-old 2940, 1-year-old 4160, 5-year-old 4550, 10-year-old 6320 and adult 8470   Chest:  0-year-old 550, 1-year-old 830, 5-year-old 1200, 10-year-old 3840 and adult 3570   Abd/pelvis:  0-year-old 440, 1-year-old 720, 5-year-old 1080, 10-year-old 3330 and adult 3330   Trunk(C/A/P):  0-year-old 490, 1-year-old 770, 5-year-old 1140, 10-year-old 3570 and adult 3330      OUTSIDE IMAGES-CT CERVICAL SPINE    (Results Pending)        Assessment/Plan  * Cerebellar hemorrhage (HCC)- (present on admission)  Assessment & Plan  ICH score=1  Thought due to amyloid  MRI brain: Stable fossa of parenchymal hemorrhage centered in the LEFT cerebellum and extending into the RIGHT cerebellum exerting mass effect on the fourth ventricle which is not completely effaced 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  HOB at 30 degrees  maintain pCO2 of 35-40; closer to 35  Pravastatin 40 mg qHS for possible neuroprotective  effect  CT 6/7 unchanged  PEG replaced  06/18  No antiplatelet or anticoagulation therapy as thought due to Amyloid    Encephalopathy acute- (present on admission)  Assessment & Plan  Likely delirium in conjunction with his intracranial pathology  Keep patient awake during the day and avoid daytime naps. Remove all unnecessary lines (central lines, peripheral IVs, feeding tubes, leung catheters). Avoid polypharmacy, frequent re-orientation, maximize family time at bedside, use glasses and hearing aids if needed, treat pain, encourage ambulation, minimize benzos/anticholinergic agents.     Wean Risperdal to see if can improve his cognition.  Oxycodone only for pain    Anemia associated with acute blood loss  Assessment & Plan  Secondary to upper GI bleed after patient pulled G-tube  6/22 Hgb:8.8  Continue lansoprazole twice daily  Monitor hemoglobin and transfuse if less than 7  IV iron ordered 6/21 but cancelled for unknown reason.    Hypophosphatemia  Assessment & Plan  Monitor and replace    Hypokalemia  Assessment & Plan  Monitor and repalce    Chronic systolic heart failure (HCC)  Assessment & Plan  EF 40%  ACE allergy  BB  Monitor volume status  As of 6/22 no sign of exacerbation      Paroxysmal tachycardia (HCC)  Assessment & Plan  New Dx PAFib  Given multiple previous ICH's and likely etiology being amyloid, he is not a candidate for anticoagulation   Beta-blocker with holding parameters    UTI (urinary tract infection)  Assessment & Plan  Urine culture with E faecalis and E cloacae  Completed course of Zosyn    Hyperchloremic metabolic acidosis  Assessment & Plan  Improved    Hyperglycemia- (present on admission)  Assessment & Plan  Goal blood glucose 140-180  A1c 5.6  Off insulin  hypoglycemia protocol    Acute on chronic respiratory failure with hypoxia (HCC)- (present on admission)  Assessment & Plan  Intubated for airway protection  Extubated 6/1  Aspiration precautions  Mobilization as able  Pulmonary  hygiene and encourage deep inspiratory efforts.  RT/O2 Protocols  Titrate supplemental FiO2 to maintain SpO2 >92%    Hyperlipidemia- (present on admission)  Assessment & Plan  Per wife, patient's home medication was discontinued due to muscle cramps  Pravastatin 40mg QHS  Lipid panel reviewed  Counseling on lifestyle/dietary modifications    History of stroke- (present on admission)  Assessment & Plan  Patient has had 2 prior hemorrhagic strokes; last in 2015    BPH with urinary obstruction- (present on admission)  Assessment & Plan  S/p prostate surgery  Per wife, patient has suffered from urinary retention and incontinence since his surgery  Cardura  Monitor renal function and I/O's  Park removed 6/12:      Benign essential hypertension- (present on admission)  Assessment & Plan  Given new Dx of PAFib   Amlodipine transition to metoprolol  Monitor blood pressure and adjust medical therapy accordingly  Anticoagulation is contraindicated with cerebellar hemorrhage at this time.    Abdominal aortic aneurysm (AAA) without rupture (HCC)- (present on admission)  Assessment & Plan  S/p EVAR w/ bypass graft stent  Maintain normotension  Holding ASA at this time; per wife he takes ASA 3x/week  BP control    COPD (chronic obstructive pulmonary disease) (HCC)- (present on admission)  Assessment & Plan  Not currently in exacerbation  Continue PRN nebulizers Q4h  Wean FiO2 as tolerated  RT per protocol prn         VTE prophylaxis: SCDs    I have performed a physical exam and reviewed and updated ROS and Plan today (6/25/2024). In review of yesterday's note (6/24/2024), there are no changes except as documented above.      Greater than 51 minutes spent prepping to see patient (e.g. reviewing EMR) obtaining and/or reviewing separately obtained history. Performing a medically appropriate examination and evaluation. Independently interpreting results and communicating results to patient/family/caregiver. Counseling and educating  the patient/family/caregiver. Ordering medications, tests, and/or procedures. Referring and communicating with other health care professionals.  Documenting clinical information in EPIC. Care coordination.

## 2024-06-25 NOTE — DIETARY
"Nutrition support weekly update:  Day 27 of admit.  Jl Mueller is a 78 y.o. male with admitting DX of hemorrhagic stroke and cerebellar hemorrhage.      Tube feeding initiated on 5/31. Current TF via PEG tube is Isosource 1.5 with goal rate 4 cartons per day to provide 1500 kcals, 68 gm protein, and 764 ml free water per day. No current FWF ordered.     Assessment:  Weight 6/22: 74.9 kg via bed scale. Weight is increased during admit.   Height: 5'3\" (160 cm), BMI: 29.26 (overweight)    Evaluation:   Pt is receiving TF at goal cartons per day.   Pt failed FEES 6/13 and last SLP eval failed on 6/21.   Pt is pending SNF placement and is not medically cleared.   Skin: L abdominal incision, no edema noted.   Labs: Glucose 158  Meds: tylenol, prevacid, pravastatin, imodium prn, anti-emetics prn, miralax prn  Last BM: 6/24 Type 5  Standard formula remains indicated.     Malnutrition risk: No new risk identified.     Recommendations/Plan:  Continue TF with goal of 4 cartons of Isosource 1.5 per day.   Fluids per MD.   Diet upgrades per SLP.     RD following.              "

## 2024-06-25 NOTE — DISCHARGE PLANNING
0931  DPA resent SNF blanket [place].    0933  DPA HARD FAXED REFERRAL :  Agency/Facility Name: [facility]  Fax#: [phone]  Time: 0934

## 2024-06-26 VITALS
HEART RATE: 72 BPM | WEIGHT: 165.12 LBS | HEIGHT: 63 IN | TEMPERATURE: 97.9 F | RESPIRATION RATE: 16 BRPM | BODY MASS INDEX: 29.26 KG/M2 | SYSTOLIC BLOOD PRESSURE: 130 MMHG | DIASTOLIC BLOOD PRESSURE: 78 MMHG | OXYGEN SATURATION: 97 %

## 2024-06-26 PROCEDURE — A9270 NON-COVERED ITEM OR SERVICE: HCPCS | Performed by: HOSPITALIST

## 2024-06-26 PROCEDURE — 700102 HCHG RX REV CODE 250 W/ 637 OVERRIDE(OP): Performed by: HOSPITALIST

## 2024-06-26 PROCEDURE — 99239 HOSP IP/OBS DSCHRG MGMT >30: CPT | Performed by: GENERAL PRACTICE

## 2024-06-26 RX ADMIN — ACETAMINOPHEN 1000 MG: 500 TABLET, FILM COATED ORAL at 14:25

## 2024-06-26 RX ADMIN — METOPROLOL TARTRATE 12.5 MG: 25 TABLET, FILM COATED ORAL at 05:56

## 2024-06-26 RX ADMIN — ACETAMINOPHEN 1000 MG: 500 TABLET, FILM COATED ORAL at 05:56

## 2024-06-26 RX ADMIN — LANSOPRAZOLE 30 MG: 30 TABLET, ORALLY DISINTEGRATING ORAL at 05:56

## 2024-06-26 RX ADMIN — Medication: at 06:00

## 2024-06-26 ASSESSMENT — PAIN DESCRIPTION - PAIN TYPE: TYPE: ACUTE PAIN

## 2024-06-26 NOTE — CARE PLAN
The patient is Stable - Low risk of patient condition declining or worsening    Shift Goals  Clinical Goals: safety  Patient Goals: GUZMAN  Family Goals: discharge    Progress made toward(s) clinical / shift goals:    Problem: Safety - Medical Restraint  Goal: Free from restraint(s) (Restraint for Interference with Medical Device)  Outcome: Progressing  Note: Equipment disguised and patient placed near nursing station for close monitoring.      Problem: Fall Risk  Goal: Patient will remain free from falls  Outcome: Progressing  Note: Fall precautions in place. Bed locked and in the lowest position. Call light in reach. Rails up x 3.      Problem: Neuro Status  Goal: Neuro status will remain stable or improve  Outcome: Progressing  Note: Q 4 neuro checks in place. Patient A&O x 0 and does not respond to commands.        Patient is not progressing towards the following goals: N/A

## 2024-06-26 NOTE — DISCHARGE PLANNING
DC Transport Scheduled    Transport Company Scheduled:  [facility]  Spoke with [facility] at [facility] to schedule transport.    Scheduled Date: 6/26/2024  Scheduled Time: 1500    Destination: [facility]   Destination address: [address], [place] [address]    Notified care team of scheduled transport via [employer].     If there are any changes needed to the DC transportation scheduled, please contact [facility] at ext. [phone] between the hours of 9507-8684 Mon-Fri. If outside those hours, contact the ED Case Manager at ext. [phone].

## 2024-06-26 NOTE — DISCHARGE INSTRUCTIONS
"Please follow up with primary care physician.  Please follow up with neurology.    Hemorrhagic Stroke - Intracerebral Hemorrhage  Discharge Instructions    You experienced an Intracerebral Hemorrhage.  This kind of stroke is caused when a blood vessel inside the brain bursts. The bleeding causes brain cells to die and the part of the brain that is affected stops working correctly. Uncontrolled high blood pressure, also called hypertension, is the most common cause of this type of stroke.     Stroke Risk Factors    You are at increased risk of having another stroke event.  See your Patient Stroke Guide to help reduce your stroke risk. These are your specific risk factors:  Cardiovascular disease  Gender - Men are at a higher risk than women  High blood pressure  High Cholesterol and lipids     Get help right away if you have any signs of a stroke.  \"BE FAST\" is an easy way to remember the main warning signs of a stroke:  B - Balance. Dizziness, sudden trouble walking, or loss of balance.  E - Eyes. Trouble seeing or a change in how you see.  F - Face. Sudden weakness or loss of feeling in the face. The face or eyelid may droop on one side.  A - Arms. Weakness or loss of feeling in an arm. This happens all of a sudden and most often on one side of the body.  S - Speech. Sudden trouble speaking, slurred speech, or trouble understanding what people say.  T - Time. Time to call emergency services. Write down what time symptoms started.    "

## 2024-06-26 NOTE — DISCHARGE PLANNING
Case Management Discharge Planning    Admission Date: 5/29/2024  GMLOS: 4.6  ALOS: 28    6-Clicks ADL Score: 10  6-Clicks Mobility Score: 11  PT and/or OT Eval ordered: Yes  Post-acute Referrals Ordered: Yes  Post-acute Choice Obtained: Yes  Has referral(s) been sent to post-acute provider:  Yes      Anticipated Discharge Dispo: Discharge Disposition: D/T to SNF with Medicare cert in anticipation of skilled care (03)    DME Needed: No    Action(s) Taken: Updated Provider/Nurse on Discharge Plan    RN ANABELA attended IDT rounds with the team, per Dr. Mcfarland Pt is medically cleared to be discharged. RN ANABELA met with Pt's wife for SNF choice. Choice form obtained and completed. Received verbal approval for [facility].  Faxed to [facility] to save in media.    RN CM called Esther from [facility] and per Esther, they can take Pt today. Transport set up at 1500 via [facility]. Pt's wife updated and given an [place] and brochure.     COBRA packet completed, given to bedside nurse.     Escalations Completed: Provider and Bedside RN    Medically Clear: Yes    Next Steps: RN CM to continue to assist in Pt's discharge needs.     Barriers to Discharge: None    Is the patient up for discharge tomorrow: No  Pt is discharging today.

## 2024-06-26 NOTE — DISCHARGE SUMMARY
Discharge Summary    CHIEF COMPLAINT ON ADMISSION  Chief Complaint   Patient presents with    Possible Stroke     Pt transferred from [place] via Care flight for hemorrhagic CVA. LKW 2030. Pt was eating dinner, and had sudden headache and started vomiting. Pt originally arrived to [place] via EMS with N/V, GCS 11. Pt was intubated at [place]       Reason for Admission  Stroke    Admission Date  5/29/2024     CODE STATUS  Full Code    HPI & HOSPITAL COURSE  78-year-old with a history of BPH, previous hemorrhagic strokes, tobacco use, hypertension, coronary artery disease, dyslipidemia, chronic hypoxic respiratory failure dependent on 2 L of home oxygen therapy. He was admitted on May 29 due to neurologic changes, and was found to have a left cerebellar hemorrhage.     CTA was negative for any vascular malformations. Given multiple previous ICH's and likely etiology being amyloid, he is not a candidate for anticoagulation. NSG - nonoperative.  PEG Tube was placed. Patient did require hypertonic saline and was intubated for airway protection. He was successfully extubated on June 1 and weaned off of hypertonic saline.     Hospital stay has been complicated by agitation management. He also developed UTI which has grown back Enterococcus faecalis, and Enterobacter cloaca.     Therefore, he is discharged in good and stable condition to skilled nursing facility.    The patient met 2-midnight criteria for an inpatient stay at the time of discharge.      FOLLOW UP ITEMS POST DISCHARGE  Primary care physician  Neurology    DISCHARGE DIAGNOSES  Principal Problem:    Cerebellar hemorrhage (HCC) (POA: Yes)  Active Problems:    Encephalopathy acute (POA: Yes)    COPD (chronic obstructive pulmonary disease) (HCC) (POA: Yes)    Abdominal aortic aneurysm (AAA) without rupture (HCC) (POA: Yes)    Benign essential hypertension (POA: Yes)      Overview: Last Assessment & Plan:       Formatting of this note might be  different from the original.    BPH with urinary obstruction (POA: Yes)    History of stroke (POA: Yes)      Overview: Last Assessment & Plan:       Formatting of this note might be different from the original.      Significant R cerebellar hemorrhagic stroke (2015) related to accelerated       HTN. Clinically stable.    Hyperlipidemia (POA: Yes)      Overview: Formatting of this note might be different from the original.                  Last Assessment & Plan:       Formatting of this note might be different from the original.    Acute on chronic respiratory failure with hypoxia (HCC) (POA: Yes)    Hyperglycemia (POA: Yes)    Hyperchloremic metabolic acidosis (POA: No)    UTI (urinary tract infection) (POA: No)    Paroxysmal tachycardia (HCC) (POA: Unknown)    Chronic systolic heart failure (HCC) (POA: Unknown)    Hypokalemia (POA: Unknown)    Hypophosphatemia (POA: Unknown)    Anemia associated with acute blood loss (POA: Unknown)  Resolved Problems:    * No resolved hospital problems. *      FOLLOW UP  Ximena Callahan, N.P.  [address]  [facility] [address] [phone]    Follow up      [facility]  [address]  [place] [address]  [phone]  Follow up        MEDICATIONS ON DISCHARGE     Medication List        START taking these medications        Instructions   doxazosin 1 MG Tabs  Commonly known as: Cardura   1 Tablet by Enteral Tube route at bedtime.  Dose: 1 mg     lansoprazole 30 MG Tbdd  Commonly known as: Prevacid   1 Tablet by Enteral Tube route 2 times a day.  Dose: 30 mg     metoprolol SR 25 MG Tb24  Commonly known as: Toprol XL   Take 1 Tablet by mouth every day.  Dose: 25 mg     oxyCODONE immediate-release 5 MG Tabs  Commonly known as: Roxicodone   1 Tablet by Enteral Tube route every 8 hours as needed for Severe Pain for up to 5 days.  Dose: 5 mg     pravastatin 40 MG tablet  Commonly known as: Pravachol   1 Tablet by Enteral Tube route every evening.  Dose:  "40 mg     risperiDONE 0.5 MG Tabs  Commonly known as: RisperDAL   1 Tablet by Enteral Tube route every evening.  Dose: 0.5 mg     valproic acid 250 MG/5ML Soln  Commonly known as: Depakene   10 mL by Enteral Tube route at bedtime.  Dose: 500 mg            CONTINUE taking these medications        Instructions   * albuterol 2.5mg/0.5ml Nebu  Commonly known as: Proventil   Take 2.5 mg by nebulization every four hours as needed for Shortness of Breath.  Dose: 2.5 mg     * albuterol 108 (90 Base) MCG/ACT Aers inhalation aerosol   Inhale 2 Puffs every 6 hours as needed for Shortness of Breath.  Dose: 2 Puff     Breo Ellipta 200-25 MCG/ACT Aepb  Generic drug: fluticasone furoate-vilanterol   Inhale 1 Puff every day.  Dose: 1 Puff     Home Care Oxygen   Inhale 2 L/min continuous. DME [facility] in [place]  Dose: 2 L/min     OXYGEN-HELIUM INH   Inhale. 2 lt NC           * This list has 2 medication(s) that are the same as other medications prescribed for you. Read the directions carefully, and ask your doctor or other care provider to review them with you.                STOP taking these medications      aspirin EC 81 MG Tbec  Commonly known as: Ecotrin     azithromycin 250 MG Tabs  Commonly known as: Zithromax     B COMPLEX 1 PO     guaiFENesin  MG Tb12  Commonly known as: Mucinex     Livalo 2 MG Tabs  Generic drug: Pitavastatin Calcium     magnesium oxide 400 MG Tabs tablet  Commonly known as: Mag-Ox     pantoprazole 40 MG Tbec  Commonly known as: Protonix     predniSONE 20 MG Tabs  Commonly known as: Deltasone     PreserVision AREDS 2 Caps     tamsulosin 0.4 MG capsule  Commonly known as: Flomax              Allergies  Allergies   Allergen Reactions    Lisinopril      cough    Namenda [Memantine]      \"weakness\"    Rosuvastatin        DIET  Orders Placed This Encounter   Procedures    Diet: Diet Tube Feed; Formula: Bolus Only (Transition to bolus feeds. Start with 1/2 carton of Isosource 1.5 and advance by 1/2 " carton per feed until goal reached.); Select Bolus (If Indicated): Other (Specify in Comments) (Isosource 1.5); Bolus F...     Standing Status:   Standing     Number of Occurrences:   1     Order Specific Question:   Diet     Answer:   Diet Tube Feed [35]     Order Specific Question:   Formula:     Answer:   Bolus Only     Comments:   Transition to bolus feeds. Start with 1/2 carton of Isosource 1.5 and advance by 1/2 carton per feed until goal reached.     Order Specific Question:   Route     Answer:   Gtube/PEG     Order Specific Question:   Select Bolus (If Indicated):     Answer:   Other (Specify in Comments)     Comments:   Isosource 1.5     Order Specific Question:   Bolus Frequency     Answer:   QID     Comments:   Suggest 1 carton with breakasft, lunch, dinner times and HS     Order Specific Question:   Number of Cartons Per Day:     Answer:   Four    Diet NPO Restrict to: Strict     Standing Status:   Standing     Number of Occurrences:   1     Order Specific Question:   Diet NPO Restrict to:     Answer:   Strict [1]       ACTIVITY  As tolerated and directed by skilled nursing.  Weight bearing as tolerated    LINES, DRAINS, AND WOUNDS  This is an automated list. Peripheral IVs will be removed prior to discharge.  Peripheral IV 06/20/24 20 G Anterior;Proximal;Right Forearm (Active)   Site Assessment Clean;Dry;Intact 06/25/24 2000   Dressing Type Transparent 06/25/24 2000   Line Status Saline locked;Scrubbed the hub prior to access;Flushed 06/25/24 2000   Dressing Status Clean;Dry;Intact 06/25/24 2000   Dressing Intervention N/A 06/25/24 2000   Dressing Change Due 06/29/24 06/25/24 2000   Infiltration Grading ([facility], [facility]) 0 06/25/24 2000   Phlebitis Scale ([facility] Only) 0 06/25/24 2000       Wound 06/14/24 Incision Abdomen Left (Active)   Site Assessment Clean;Dry 06/25/24 2000   Periwound Assessment Clean;Dry;Intact 06/25/24 2000   Margins Attached edges 06/25/24 2000   Closure Adhesive bandage 06/25/24 2000    Drainage Amount Scant 06/25/24 2000   Drainage Description Serous 06/25/24 2000   Dressing Status Clean;Dry;Intact 06/25/24 2000   Dressing Changed Observed 06/25/24 2000   Dressing Options Split Gauze 06/25/24 2000       Peripheral IV 06/20/24 20 G Anterior;Proximal;Right Forearm (Active)   Site Assessment Clean;Dry;Intact 06/25/24 2000   Dressing Type Transparent 06/25/24 2000   Line Status Saline locked;Scrubbed the hub prior to access;Flushed 06/25/24 2000   Dressing Status Clean;Dry;Intact 06/25/24 2000   Dressing Intervention N/A 06/25/24 2000   Dressing Change Due 06/29/24 06/25/24 2000   Infiltration Grading ([facility], [facility]) 0 06/25/24 2000   Phlebitis Scale ([facility] Only) 0 06/25/24 2000               MENTAL STATUS ON TRANSFER             CONSULTATIONS  GI, neurology, neurosurgery, and palliative care     PROCEDURES  Intubation  PEG    LABORATORY  Lab Results   Component Value Date    SODIUM 138 06/25/2024    POTASSIUM 4.3 06/25/2024    CHLORIDE 102 06/25/2024    CO2 24 06/25/2024    GLUCOSE 158 (H) 06/25/2024    BUN 16 06/25/2024    CREATININE 0.61 06/25/2024    GLOMRATE 78 10/19/2023        Lab Results   Component Value Date    WBC 8.4 06/25/2024    HEMOGLOBIN 9.4 (L) 06/25/2024    HEMATOCRIT 29.6 (L) 06/25/2024    PLATELETCT 343 06/25/2024      [id number]-5 VIEW   Final Result      1.  No dilated bowel identified         2. Presence a gastrostomy tube and contrast within the colon      3. Aortic stent graft present      DX-CHEST-LIMITED (1 VIEW)   Final Result         1. Improving small left pleural effusion. Resolution of left lower lobe atelectasis.   2. The remainder is stable.      IR-GASTROSTOMY PLACEMENT   Final Result      Successful image guided gastrostomy tube replacement.      Plan: Moran drainage for 10 hours. The tube should not be flushed with sterile saline. If this can be completed without incident the tube may be used.      EC-ECHOCARDIOGRAM COMPLETE W/ CONT   Final Result       DX-CHEST-PORTABLE (1 VIEW)   Final Result      1.  Small left pleural effusion and associated atelectasis.   2.  Mild bilateral interstitial opacities, likely mild interstitial edema.         CT-HEAD W/O   Final Result         1.  Area of cerebellar hemorrhage, decreased since prior study.   2.  Nonspecific white matter changes, commonly associated with small vessel ischemic disease.  Associated mild cerebral atrophy is noted.   3.  Atherosclerosis.         CT-HEAD W/O   Final Result      1.  Intraparenchymal hemorrhage in the cerebellar vermis and left cerebellum. It demonstrates expected evolution and decreased density.   2.  Redemonstration of vasogenic edema surrounding the hematoma in the cerebellum with mild narrowing of the fourth ventricle. There is no hydrocephalus.         DX-CHEST-LIMITED (1 VIEW)   Final Result      Mild interstitial prominence could represent vascular congestion.      Cardiomegaly.      Removal of endotracheal tube.      [id number] VIEW   Final Result      Feeding tube tip projects in the second portion of the duodenum.      Nonspecific bowel gas pattern.      CT-HEAD W/O   Final Result      1.  Advanced cerebral atrophy.   2.  Stable ventriculomegaly with intraventricular hemorrhage.   3.  Advanced supratentorial white matter disease most consistent with microvascular ischemic change.   4.  Multiple old lacunar infarcts as described.   5.  Acute/recent subacute parenchymal hemorrhage in the posterior fossa involving the superior cerebellar vermis and left paramedian cerebellar hemisphere and left lateral cerebellar peduncle. No evidence of recurrent hemorrhage.   6.  Minimal focus of subarachnoid hemorrhage within a single sulcus in the left posterior temporal region. Probably unchanged from prior recent exam.         DX-ABDOMEN FOR TUBE PLACEMENT   Final Result      Enteric tube tip projects over the stomach.      EC-ECHOCARDIOGRAM COMPLETE W/ CONT   Final Result       DX-CHEST-PORTABLE (1 VIEW)   Final Result         1.  Left basilar atelectasis or subtle infiltrate, decreased since prior study   2.  Trace left pleural effusion   3.  Enlargement of the aortic knob suggesting thoracic aortic aneurysm   4.  Atherosclerosis      MR-BRAIN-WITH & W/O   Final Result      1.  Stable fossa of parenchymal hemorrhage centered in the LEFT cerebellum and extending into the RIGHT cerebellum exerting mass effect on the fourth ventricle which is not completely effaced   2.  Intraventricular blood redemonstrated   3.  Numerous areas of remote microhemorrhage in the supra and infratentorial brain. These could be related to amyloid angiopathy, numerous hypertensive microhemorrhages or less likely multiple cavernomas   4.  Moderate diffuse atrophy without compelling evidence of hydrocephalus   5.  Advanced white matter changes   6.  4 mm LEFT temporal meningioma   7.  Enhancing nodule in the RIGHT internal auditory canal suspicious for a vestibular schwannoma, less likely other extra-axial mass such as a meningioma, facial nerve schwannoma or metastasis. Consider posterior fossa protocol brain MRI without and with    contrast to further evaluate in when clinically appropriate.      MR-MRA HEAD-W/O   Final Result         MRA OF THE [place] OF [place] WITHIN NORMAL LIMITS.      DX-CHEST-PORTABLE (1 VIEW)   Final Result         1.  Hazy left lower lobe infiltrate   2.  Small left pleural effusion   3.  Enlargement of the aortic knob, appearance suggesting aortic aneurysm.      DX-CHEST-FOR LINE PLACEMENT Perform procedure in: Patient's Room   Final Result      1.  PICC line is in place with the tip projecting over the SVC in satisfactory position.   2.  Mildly enlarged cardiac silhouette with vascular congestion.   3.  Retrocardiac opacity is likely due to atelectasis.         IR-PICC LINE PLACEMENT W/ GUIDANCE > AGE 5   Final Result                  Ultrasound-guided PICC placement performed by  qualified nursing staff as    above.          CT-HEAD W/O   Final Result         1.  Left cerebellar hemorrhage, similar compared to prior study.   2.  Minimal bilateral intraventricular hemorrhages, new since prior study.   3.  Nonspecific white matter changes, commonly associated with small vessel ischemic disease.  Associated mild cerebral atrophy is noted.   4.  Atherosclerosis.         DX-CHEST-PORTABLE (1 VIEW)   Final Result         1.  Left basilar atelectasis, no focal infiltrate   2.  Trace left pleural effusion   3.  Atherosclerosis      CT-CTA NECK WITH & W/O-POST PROCESSING   Final Result         1.  Scattered atherosclerosis without significant stenosis or occlusion.   2.  4.2 cm proximal descending thoracic aortic aneurysm, radiographic follow-up and surveillance recommended as clinically appropriate.         CT-CTA HEAD WITH & W/O-POST PROCESS   Final Result         1.  No large vessel occlusion or aneurysm identified   2.  Large cerebellar hemorrhage predominantly in the left cerebellum      These findings were discussed with the patient's clinician, Nikki Alexander, on 5/29/2024 11:35 PM.      DX-CHEST-PORTABLE (1 VIEW)   Final Result      Tubes as above      Left pleural effusion      CHF/pulmonary edema pattern      See Brown MD   5/30/2024 8:41 AM         CT-HEAD W/O   Final Result      Acute intraparenchymal hemorrhage is noted involving the left cerebellar hemisphere, measuring 3.1 x 4.2 x 3.4 cm, with associated mass effect.      This finding was telephoned to the mentioned attending by on-call radiologist at the time of imaging         AAST Intracranial Hemorrhage Brain Injury Guidelines         Hemorrhage type  mBIG 1           mBIG 2                mBIG 3      Subdural             <4mm               5-7.9 mm             >8mm      Epidural                No                    No                  Yes      Intraparenc'mal   <4mm               5-7.9 mm         >8mm or multi   1 location        or 2 locations      >3 locations      Subarachnoid     <3 sulci       single hemisphere   bi-hemisphere   and <1 mm        or 1-3 mm            or > 3 mm      Intraventricular       No                  No                    Yes      Skull Fracture       None            Non-displaced       Displaced               DLP Reporting Thresholds for Incorrect/Repeated Exams - DLP in mGy*cm   Head/Neck:  0-year-old 3840, 1-year-old 5880, 5-year-old 8770, 10-year-old 08991 and adult 72250   Head:  0-year-old 4540, 1-year-old 7460, 5-year-old 38715, 10-year-old 06906 and adult 81069   Neck:  0-year-old 2940, 1-year-old 4160, 5-year-old 4550, 10-year-old 6320 and adult 8470   Chest:  0-year-old 550, 1-year-old 830, 5-year-old 1200, 10-year-old 3840 and adult 3570   Abd/pelvis:  0-year-old 440, 1-year-old 720, 5-year-old 1080, 10-year-old 3330 and adult 3330   Trunk(C/A/P):  0-year-old 490, 1-year-old 770, 5-year-old 1140, 10-year-old 3570 and adult 3330      OUTSIDE IMAGES-CT CERVICAL SPINE    (Results Pending)      Total time of the discharge process exceeds 45 minutes.

## 2024-06-26 NOTE — DISCHARGE PLANNING
1009  Agency/Facility Name: [facility]   Spoke To: Leah   Outcome: DPA inquired follow up on pending SNF, per Leah will look into referral and call back.   Advised RN CM via teams.    1045  Agency/Facility Name: [facility]  Spoke To: Deloris  Outcome: DPA attempted to follow up on pending SNF, DPA will call back again.    1211  Agency/Facility Name: [facility]  Spoke To: Esther  Outcome: DPA advised [facility] transportation has been confirmed for 1500

## 2024-06-30 ENCOUNTER — APPOINTMENT (OUTPATIENT)
Dept: RADIOLOGY | Facility: MEDICAL CENTER | Age: 79
DRG: 291 | End: 2024-06-30
Attending: EMERGENCY MEDICINE
Payer: MEDICARE

## 2024-06-30 ENCOUNTER — HOSPITAL ENCOUNTER (INPATIENT)
Facility: MEDICAL CENTER | Age: 79
DRG: 291 | End: 2024-06-30
Attending: EMERGENCY MEDICINE | Admitting: HOSPITALIST
Payer: MEDICARE

## 2024-06-30 VITALS
TEMPERATURE: 98.4 F | BODY MASS INDEX: 21.68 KG/M2 | DIASTOLIC BLOOD PRESSURE: 60 MMHG | RESPIRATION RATE: 18 BRPM | HEART RATE: 65 BPM | SYSTOLIC BLOOD PRESSURE: 105 MMHG | WEIGHT: 134.92 LBS | HEIGHT: 66 IN | OXYGEN SATURATION: 93 %

## 2024-06-30 DIAGNOSIS — I50.22 CHRONIC SYSTOLIC HEART FAILURE (HCC): ICD-10-CM

## 2024-06-30 DIAGNOSIS — I50.43 CHF (CONGESTIVE HEART FAILURE), NYHA CLASS III, ACUTE ON CHRONIC, COMBINED (HCC): ICD-10-CM

## 2024-06-30 DIAGNOSIS — I71.43 INFRARENAL ABDOMINAL AORTIC ANEURYSM (AAA) WITHOUT RUPTURE (HCC): ICD-10-CM

## 2024-06-30 DIAGNOSIS — I47.9 PAROXYSMAL TACHYCARDIA (HCC): ICD-10-CM

## 2024-06-30 DIAGNOSIS — I61.4 CEREBELLAR HEMORRHAGE (HCC): ICD-10-CM

## 2024-06-30 PROBLEM — R79.89 ELEVATED TROPONIN: Status: ACTIVE | Noted: 2024-06-30

## 2024-06-30 PROBLEM — R13.10 DYSPHAGIA: Status: ACTIVE | Noted: 2024-06-30

## 2024-06-30 PROBLEM — Z71.89 ADVANCED CARE PLANNING/COUNSELING DISCUSSION: Status: ACTIVE | Noted: 2024-06-30

## 2024-06-30 PROBLEM — D72.829 LEUKOCYTOSIS: Status: ACTIVE | Noted: 2024-06-30

## 2024-06-30 LAB
ALBUMIN SERPL BCP-MCNC: 4 G/DL (ref 3.2–4.9)
ALBUMIN/GLOB SERPL: 1.3 G/DL
ALP SERPL-CCNC: 59 U/L (ref 30–99)
ALT SERPL-CCNC: 26 U/L (ref 2–50)
ANION GAP SERPL CALC-SCNC: 11 MMOL/L (ref 7–16)
APPEARANCE UR: CLEAR
AST SERPL-CCNC: 25 U/L (ref 12–45)
BACTERIA #/AREA URNS HPF: NEGATIVE /HPF
BASOPHILS # BLD AUTO: 0.3 % (ref 0–1.8)
BASOPHILS # BLD: 0.06 K/UL (ref 0–0.12)
BILIRUB SERPL-MCNC: 0.5 MG/DL (ref 0.1–1.5)
BILIRUB UR QL STRIP.AUTO: NEGATIVE
BUN SERPL-MCNC: 27 MG/DL (ref 8–22)
CALCIUM ALBUM COR SERPL-MCNC: 9.3 MG/DL (ref 8.5–10.5)
CALCIUM SERPL-MCNC: 9.3 MG/DL (ref 8.5–10.5)
CHLORIDE SERPL-SCNC: 97 MMOL/L (ref 96–112)
CO2 SERPL-SCNC: 29 MMOL/L (ref 20–33)
COLOR UR: ABNORMAL
CREAT SERPL-MCNC: 0.75 MG/DL (ref 0.5–1.4)
CRP SERPL HS-MCNC: 0.67 MG/DL (ref 0–0.75)
EKG IMPRESSION: NORMAL
EOSINOPHIL # BLD AUTO: 0.01 K/UL (ref 0–0.51)
EOSINOPHIL NFR BLD: 0 % (ref 0–6.9)
EPI CELLS #/AREA URNS HPF: NEGATIVE /HPF
ERYTHROCYTE [DISTWIDTH] IN BLOOD BY AUTOMATED COUNT: 50.4 FL (ref 35.9–50)
ERYTHROCYTE [SEDIMENTATION RATE] IN BLOOD BY WESTERGREN METHOD: 58 MM/HOUR (ref 0–20)
FLUAV RNA SPEC QL NAA+PROBE: NEGATIVE
FLUBV RNA SPEC QL NAA+PROBE: NEGATIVE
GFR SERPLBLD CREATININE-BSD FMLA CKD-EPI: 92 ML/MIN/1.73 M 2
GLOBULIN SER CALC-MCNC: 3.1 G/DL (ref 1.9–3.5)
GLUCOSE SERPL-MCNC: 163 MG/DL (ref 65–99)
GLUCOSE UR STRIP.AUTO-MCNC: NEGATIVE MG/DL
HCT VFR BLD AUTO: 33 % (ref 42–52)
HGB BLD-MCNC: 10.2 G/DL (ref 14–18)
HYALINE CASTS #/AREA URNS LPF: ABNORMAL /LPF
IMM GRANULOCYTES # BLD AUTO: 0.18 K/UL (ref 0–0.11)
IMM GRANULOCYTES NFR BLD AUTO: 0.8 % (ref 0–0.9)
KETONES UR STRIP.AUTO-MCNC: ABNORMAL MG/DL
LACTATE SERPL-SCNC: 1.5 MMOL/L (ref 0.5–2)
LEUKOCYTE ESTERASE UR QL STRIP.AUTO: ABNORMAL
LIPASE SERPL-CCNC: 39 U/L (ref 11–82)
LYMPHOCYTES # BLD AUTO: 0.37 K/UL (ref 1–4.8)
LYMPHOCYTES NFR BLD: 1.7 % (ref 22–41)
MAGNESIUM SERPL-MCNC: 2.5 MG/DL (ref 1.5–2.5)
MCH RBC QN AUTO: 27.5 PG (ref 27–33)
MCHC RBC AUTO-ENTMCNC: 30.9 G/DL (ref 32.3–36.5)
MCV RBC AUTO: 88.9 FL (ref 81.4–97.8)
MICRO URNS: ABNORMAL
MONOCYTES # BLD AUTO: 0.8 K/UL (ref 0–0.85)
MONOCYTES NFR BLD AUTO: 3.6 % (ref 0–13.4)
NEUTROPHILS # BLD AUTO: 20.94 K/UL (ref 1.82–7.42)
NEUTROPHILS NFR BLD: 93.6 % (ref 44–72)
NITRITE UR QL STRIP.AUTO: NEGATIVE
NRBC # BLD AUTO: 0 K/UL
NRBC BLD-RTO: 0 /100 WBC (ref 0–0.2)
NT-PROBNP SERPL IA-MCNC: 1061 PG/ML (ref 0–125)
PH UR STRIP.AUTO: 7.5 [PH] (ref 5–8)
PLATELET # BLD AUTO: 298 K/UL (ref 164–446)
PMV BLD AUTO: 11 FL (ref 9–12.9)
POTASSIUM SERPL-SCNC: 4.2 MMOL/L (ref 3.6–5.5)
PROCALCITONIN SERPL-MCNC: 0.08 NG/ML
PROT SERPL-MCNC: 7.1 G/DL (ref 6–8.2)
PROT UR QL STRIP: 30 MG/DL
RBC # BLD AUTO: 3.71 M/UL (ref 4.7–6.1)
RBC # URNS HPF: >150 /HPF
RBC UR QL AUTO: ABNORMAL
RSV RNA SPEC QL NAA+PROBE: NEGATIVE
SARS-COV-2 RNA RESP QL NAA+PROBE: NOTDETECTED
SODIUM SERPL-SCNC: 137 MMOL/L (ref 135–145)
SP GR UR STRIP.AUTO: 1.02
TROPONIN T SERPL-MCNC: 47 NG/L (ref 6–19)
TROPONIN T SERPL-MCNC: 49 NG/L (ref 6–19)
TROPONIN T SERPL-MCNC: 56 NG/L (ref 6–19)
UROBILINOGEN UR STRIP.AUTO-MCNC: 1 MG/DL
WBC # BLD AUTO: 22.4 K/UL (ref 4.8–10.8)
WBC #/AREA URNS HPF: ABNORMAL /HPF

## 2024-06-30 PROCEDURE — 80053 COMPREHEN METABOLIC PANEL: CPT

## 2024-06-30 PROCEDURE — 87040 BLOOD CULTURE FOR BACTERIA: CPT | Mod: 91

## 2024-06-30 PROCEDURE — 700111 HCHG RX REV CODE 636 W/ 250 OVERRIDE (IP): Mod: JZ | Performed by: HOSPITALIST

## 2024-06-30 PROCEDURE — 36415 COLL VENOUS BLD VENIPUNCTURE: CPT

## 2024-06-30 PROCEDURE — 87086 URINE CULTURE/COLONY COUNT: CPT

## 2024-06-30 PROCEDURE — 94640 AIRWAY INHALATION TREATMENT: CPT

## 2024-06-30 PROCEDURE — 96374 THER/PROPH/DIAG INJ IV PUSH: CPT

## 2024-06-30 PROCEDURE — 83690 ASSAY OF LIPASE: CPT

## 2024-06-30 PROCEDURE — 99285 EMERGENCY DEPT VISIT HI MDM: CPT

## 2024-06-30 PROCEDURE — 84145 PROCALCITONIN (PCT): CPT

## 2024-06-30 PROCEDURE — 83605 ASSAY OF LACTIC ACID: CPT

## 2024-06-30 PROCEDURE — 99497 ADVNCD CARE PLAN 30 MIN: CPT | Performed by: HOSPITALIST

## 2024-06-30 PROCEDURE — 99223 1ST HOSP IP/OBS HIGH 75: CPT | Mod: 25,AI | Performed by: HOSPITALIST

## 2024-06-30 PROCEDURE — 83735 ASSAY OF MAGNESIUM: CPT

## 2024-06-30 PROCEDURE — 770020 HCHG ROOM/CARE - TELE (206)

## 2024-06-30 PROCEDURE — 700117 HCHG RX CONTRAST REV CODE 255: Performed by: EMERGENCY MEDICINE

## 2024-06-30 PROCEDURE — 85652 RBC SED RATE AUTOMATED: CPT

## 2024-06-30 PROCEDURE — 71260 CT THORAX DX C+: CPT

## 2024-06-30 PROCEDURE — 0241U HCHG SARS-COV-2 COVID-19 NFCT DS RESP RNA 4 TRGT ED POC: CPT

## 2024-06-30 PROCEDURE — 81001 URINALYSIS AUTO W/SCOPE: CPT

## 2024-06-30 PROCEDURE — 93005 ELECTROCARDIOGRAM TRACING: CPT | Performed by: EMERGENCY MEDICINE

## 2024-06-30 PROCEDURE — 86140 C-REACTIVE PROTEIN: CPT

## 2024-06-30 PROCEDURE — A9270 NON-COVERED ITEM OR SERVICE: HCPCS | Performed by: HOSPITALIST

## 2024-06-30 PROCEDURE — 93005 ELECTROCARDIOGRAM TRACING: CPT

## 2024-06-30 PROCEDURE — 83880 ASSAY OF NATRIURETIC PEPTIDE: CPT

## 2024-06-30 PROCEDURE — 84484 ASSAY OF TROPONIN QUANT: CPT

## 2024-06-30 PROCEDURE — 700101 HCHG RX REV CODE 250: Performed by: HOSPITALIST

## 2024-06-30 PROCEDURE — 700102 HCHG RX REV CODE 250 W/ 637 OVERRIDE(OP): Performed by: HOSPITALIST

## 2024-06-30 PROCEDURE — 700105 HCHG RX REV CODE 258: Performed by: EMERGENCY MEDICINE

## 2024-06-30 PROCEDURE — 71045 X-RAY EXAM CHEST 1 VIEW: CPT

## 2024-06-30 PROCEDURE — 85025 COMPLETE CBC W/AUTO DIFF WBC: CPT

## 2024-06-30 RX ORDER — SODIUM CHLORIDE 9 MG/ML
1000 INJECTION, SOLUTION INTRAVENOUS ONCE
Status: COMPLETED | OUTPATIENT
Start: 2024-06-30 | End: 2024-06-30

## 2024-06-30 RX ORDER — POLYETHYLENE GLYCOL 3350 17 G/17G
1 POWDER, FOR SOLUTION ORAL
Status: DISCONTINUED | OUTPATIENT
Start: 2024-06-30 | End: 2024-07-02 | Stop reason: HOSPADM

## 2024-06-30 RX ORDER — ENOXAPARIN SODIUM 100 MG/ML
40 INJECTION SUBCUTANEOUS DAILY
Status: DISCONTINUED | OUTPATIENT
Start: 2024-06-30 | End: 2024-07-01

## 2024-06-30 RX ORDER — PRAVASTATIN SODIUM 20 MG
40 TABLET ORAL EVERY EVENING
Status: DISCONTINUED | OUTPATIENT
Start: 2024-06-30 | End: 2024-07-02 | Stop reason: HOSPADM

## 2024-06-30 RX ORDER — HYDROXYZINE HYDROCHLORIDE 25 MG/1
25 TABLET, FILM COATED ORAL EVERY 4 HOURS PRN
Status: DISCONTINUED | OUTPATIENT
Start: 2024-06-30 | End: 2024-07-02 | Stop reason: HOSPADM

## 2024-06-30 RX ORDER — ALBUTEROL SULFATE 90 UG/1
2 AEROSOL, METERED RESPIRATORY (INHALATION) EVERY 6 HOURS PRN
Status: DISCONTINUED | OUTPATIENT
Start: 2024-06-30 | End: 2024-06-30

## 2024-06-30 RX ORDER — FLUTICASONE FUROATE AND VILANTEROL 200; 25 UG/1; UG/1
1 POWDER RESPIRATORY (INHALATION) DAILY
Status: DISCONTINUED | OUTPATIENT
Start: 2024-06-30 | End: 2024-06-30

## 2024-06-30 RX ORDER — LANSOPRAZOLE 30 MG/1
30 TABLET, ORALLY DISINTEGRATING, DELAYED RELEASE ORAL 2 TIMES DAILY
Status: DISCONTINUED | OUTPATIENT
Start: 2024-06-30 | End: 2024-07-02 | Stop reason: HOSPADM

## 2024-06-30 RX ORDER — FUROSEMIDE 10 MG/ML
20 INJECTION INTRAMUSCULAR; INTRAVENOUS
Status: DISCONTINUED | OUTPATIENT
Start: 2024-06-30 | End: 2024-07-01

## 2024-06-30 RX ORDER — DOXAZOSIN 2 MG/1
1 TABLET ORAL
Status: DISCONTINUED | OUTPATIENT
Start: 2024-06-30 | End: 2024-07-01

## 2024-06-30 RX ORDER — VALPROIC ACID 250 MG/5ML
500 SOLUTION ORAL
Status: DISCONTINUED | OUTPATIENT
Start: 2024-06-30 | End: 2024-07-02 | Stop reason: HOSPADM

## 2024-06-30 RX ORDER — HYDROXYZINE HYDROCHLORIDE 25 MG/1
25 TABLET, FILM COATED ORAL EVERY 4 HOURS PRN
Status: ON HOLD | COMMUNITY
End: 2024-07-02

## 2024-06-30 RX ORDER — OXYCODONE HYDROCHLORIDE 5 MG/1
5 TABLET ORAL EVERY 8 HOURS PRN
Status: DISCONTINUED | OUTPATIENT
Start: 2024-06-30 | End: 2024-07-02 | Stop reason: HOSPADM

## 2024-06-30 RX ORDER — AMOXICILLIN 250 MG
2 CAPSULE ORAL EVERY EVENING
Status: DISCONTINUED | OUTPATIENT
Start: 2024-06-30 | End: 2024-07-02 | Stop reason: HOSPADM

## 2024-06-30 RX ADMIN — METOPROLOL TARTRATE 12.5 MG: 25 TABLET, FILM COATED ORAL at 13:49

## 2024-06-30 RX ADMIN — LANSOPRAZOLE 30 MG: 30 TABLET, ORALLY DISINTEGRATING ORAL at 18:11

## 2024-06-30 RX ADMIN — ALBUTEROL SULFATE 2.5 MG: 2.5 SOLUTION RESPIRATORY (INHALATION) at 14:01

## 2024-06-30 RX ADMIN — SENNOSIDES AND DOCUSATE SODIUM 2 TABLET: 50; 8.6 TABLET ORAL at 18:00

## 2024-06-30 RX ADMIN — FUROSEMIDE 20 MG: 10 INJECTION, SOLUTION INTRAVENOUS at 18:08

## 2024-06-30 RX ADMIN — OXYCODONE 5 MG: 5 TABLET ORAL at 20:37

## 2024-06-30 RX ADMIN — DOXAZOSIN 1 MG: 2 TABLET ORAL at 20:37

## 2024-06-30 RX ADMIN — VALPROIC ACID 500 MG: 250 SOLUTION ORAL at 20:37

## 2024-06-30 RX ADMIN — PRAVASTATIN SODIUM 40 MG: 20 TABLET ORAL at 18:19

## 2024-06-30 RX ADMIN — METOPROLOL TARTRATE 12.5 MG: 25 TABLET, FILM COATED ORAL at 18:18

## 2024-06-30 RX ADMIN — ALBUTEROL SULFATE 2.5 MG: 2.5 SOLUTION RESPIRATORY (INHALATION) at 19:27

## 2024-06-30 RX ADMIN — FUROSEMIDE 20 MG: 10 INJECTION, SOLUTION INTRAVENOUS at 11:37

## 2024-06-30 RX ADMIN — IOHEXOL 100 ML: 350 INJECTION, SOLUTION INTRAVENOUS at 09:00

## 2024-06-30 RX ADMIN — SODIUM CHLORIDE 1000 ML: 9 INJECTION, SOLUTION INTRAVENOUS at 09:31

## 2024-06-30 SDOH — ECONOMIC STABILITY: TRANSPORTATION INSECURITY
IN THE PAST 12 MONTHS, HAS LACK OF RELIABLE TRANSPORTATION KEPT YOU FROM MEDICAL APPOINTMENTS, MEETINGS, WORK OR FROM GETTING THINGS NEEDED FOR DAILY LIVING?: PATIENT UNABLE TO ANSWER

## 2024-06-30 SDOH — ECONOMIC STABILITY: TRANSPORTATION INSECURITY
IN THE PAST 12 MONTHS, HAS THE LACK OF TRANSPORTATION KEPT YOU FROM MEDICAL APPOINTMENTS OR FROM GETTING MEDICATIONS?: PATIENT UNABLE TO ANSWER

## 2024-06-30 ASSESSMENT — COGNITIVE AND FUNCTIONAL STATUS - GENERAL
TURNING FROM BACK TO SIDE WHILE IN FLAT BAD: A LOT
MOVING TO AND FROM BED TO CHAIR: A LOT
SUGGESTED CMS G CODE MODIFIER DAILY ACTIVITY: CL
DRESSING REGULAR UPPER BODY CLOTHING: A LOT
CLIMB 3 TO 5 STEPS WITH RAILING: A LOT
STANDING UP FROM CHAIR USING ARMS: A LOT
PERSONAL GROOMING: A LOT
WALKING IN HOSPITAL ROOM: A LOT
HELP NEEDED FOR BATHING: A LOT
MOBILITY SCORE: 12
EATING MEALS: A LOT
MOVING FROM LYING ON BACK TO SITTING ON SIDE OF FLAT BED: A LOT
SUGGESTED CMS G CODE MODIFIER MOBILITY: CL
DAILY ACTIVITIY SCORE: 12
DRESSING REGULAR LOWER BODY CLOTHING: A LOT
TOILETING: A LOT

## 2024-06-30 ASSESSMENT — ENCOUNTER SYMPTOMS
FEVER: 0
GASTROINTESTINAL NEGATIVE: 1
MUSCULOSKELETAL NEGATIVE: 1
PSYCHIATRIC NEGATIVE: 1
BRUISES/BLEEDS EASILY: 0
COUGH: 1
CARDIOVASCULAR NEGATIVE: 1
WEAKNESS: 1
EYES NEGATIVE: 1
CONSTITUTIONAL NEGATIVE: 1

## 2024-06-30 ASSESSMENT — PAIN SCALES - WONG BAKER
WONGBAKER_NUMERICALRESPONSE: HURTS JUST A LITTLE BIT
WONGBAKER_NUMERICALRESPONSE: HURTS JUST A LITTLE BIT

## 2024-06-30 ASSESSMENT — SOCIAL DETERMINANTS OF HEALTH (SDOH)
WITHIN THE PAST 12 MONTHS, YOU WORRIED THAT YOUR FOOD WOULD RUN OUT BEFORE YOU GOT THE MONEY TO BUY MORE: PATIENT UNABLE TO ANSWER
IN THE PAST 12 MONTHS, HAS THE ELECTRIC, GAS, OIL, OR WATER COMPANY THREATENED TO SHUT OFF SERVICE IN YOUR HOME?: PATIENT UNABLE TO ANSWER
WITHIN THE LAST YEAR, HAVE TO BEEN RAPED OR FORCED TO HAVE ANY KIND OF SEXUAL ACTIVITY BY YOUR PARTNER OR EX-PARTNER?: PATIENT UNABLE TO ANSWER
WITHIN THE LAST YEAR, HAVE YOU BEEN KICKED, HIT, SLAPPED, OR OTHERWISE PHYSICALLY HURT BY YOUR PARTNER OR EX-PARTNER?: PATIENT UNABLE TO ANSWER
WITHIN THE LAST YEAR, HAVE YOU BEEN AFRAID OF YOUR PARTNER OR EX-PARTNER?: PATIENT UNABLE TO ANSWER
WITHIN THE LAST YEAR, HAVE YOU BEEN HUMILIATED OR EMOTIONALLY ABUSED IN OTHER WAYS BY YOUR PARTNER OR EX-PARTNER?: PATIENT UNABLE TO ANSWER
WITHIN THE PAST 12 MONTHS, THE FOOD YOU BOUGHT JUST DIDN'T LAST AND YOU DIDN'T HAVE MONEY TO GET MORE: PATIENT UNABLE TO ANSWER

## 2024-06-30 ASSESSMENT — LIFESTYLE VARIABLES
HOW MANY TIMES IN THE PAST YEAR HAVE YOU HAD 5 OR MORE DRINKS IN A DAY: 0
CONSUMPTION TOTAL: NEGATIVE
ON A TYPICAL DAY WHEN YOU DRINK ALCOHOL HOW MANY DRINKS DO YOU HAVE: 0
AVERAGE NUMBER OF DAYS PER WEEK YOU HAVE A DRINK CONTAINING ALCOHOL: 0
HAVE PEOPLE ANNOYED YOU BY CRITICIZING YOUR DRINKING: NO
EVER HAD A DRINK FIRST THING IN THE MORNING TO STEADY YOUR NERVES TO GET RID OF A HANGOVER: NO
EVER FELT BAD OR GUILTY ABOUT YOUR DRINKING: NO
ALCOHOL_USE: NO
HAVE YOU EVER FELT YOU SHOULD CUT DOWN ON YOUR DRINKING: NO
TOTAL SCORE: 0

## 2024-06-30 ASSESSMENT — PAIN DESCRIPTION - PAIN TYPE
TYPE: ACUTE PAIN

## 2024-06-30 ASSESSMENT — FIBROSIS 4 INDEX
FIB4 SCORE: 1.28
FIB4 SCORE: 1.11

## 2024-06-30 ASSESSMENT — PATIENT HEALTH QUESTIONNAIRE - PHQ9
SUM OF ALL RESPONSES TO PHQ9 QUESTIONS 1 AND 2: 0
2. FEELING DOWN, DEPRESSED, IRRITABLE, OR HOPELESS: NOT AT ALL
1. LITTLE INTEREST OR PLEASURE IN DOING THINGS: NOT AT ALL

## 2024-06-30 NOTE — ED NOTES
Pt resting comfortably with even chest rise and fall, reports no needs at this time, discussed POC w/ pt. Call light available and in reach.

## 2024-06-30 NOTE — PROGRESS NOTES
4 Eyes Skin Assessment Completed by SALBADOR Sawant and SALBADOR Telles.    Head WDL  Ears Discoloration  Nose WDL  Mouth WDL  Neck Scab  Breast/Chest Scab  Shoulder Blades WDL  Spine WDL  (R) Arm/Elbow/Hand Bruising, Scab, and Discoloration  (L) Arm/Elbow/Hand Bruising, Scab, and Discoloration  Abdomen Scar and PEG tube  Groin Redness and Excoriation  Scrotum/Coccyx/Buttocks Blanching and Discoloration (sacrum)  (R) Leg Scab and Bruising  (L) Leg Scab and Bruising  (R) Heel/Foot/Toe Flaky  (L) Heel/Foot/Toe Flaky          Devices In Places Tele Box, Pulse Ox, Nasal Cannula, and G Tube      Interventions In Place Gray Ear Foams, NC W/Ear Foams, Pillows, Q2 Turns, Barrier Cream, Dri-Dameon Pads, Heels Loaded W/Pillows, and Pressure Redistribution Mattress    Possible Skin Injury Yes    Pictures Uploaded Into Epic Yes  Wound Consult Placed Yes  RN Wound Prevention Protocol Ordered Yes

## 2024-06-30 NOTE — PROGRESS NOTES
"45-62\" abdominal binder sent to tube station 803, RN notified.     Contact traction for any questions or concerns.   "

## 2024-06-30 NOTE — ED NOTES
Med Rec complete per MAR from [facility]   Allergies reviewed  Antibiotics in the past 30 days:no  Anticoagulant in past 14 days:no  Pharmacy patient utilizes:[facility]

## 2024-06-30 NOTE — ASSESSMENT & PLAN NOTE
25 minute conversation with pt's wife regarding prognosis, goals of care and code status, she would like to continue with full code   Palliative care consult

## 2024-06-30 NOTE — ASSESSMENT & PLAN NOTE
Non specific finding in lumen on CT, no associated abdominal pain, per [place] records does have history of endoleak  Following bp  Will check u/s of AAA  following

## 2024-06-30 NOTE — ED TRIAGE NOTES
Chief Complaint   Patient presents with    Abdominal Pain     Pt BIB [facility]. Pt is nonverbal and wife wanted EMS called because patient was pointing to abdomen and seemed like he was in pain. Pt was given a pain pill PTA but wife was worried because his vital signs were changing and wanted him sent to the hospital.

## 2024-06-30 NOTE — ASSESSMENT & PLAN NOTE
Clinically suspect acute on chronic chf exacerbation  Rales on exam, pulm edema on imagaing and increased bnp  Admit to tele  Will start iv lasix  Following  Serial I/0  Tele monitoring  Serial trops  Repeat echo

## 2024-06-30 NOTE — ED NOTES
Bedside report received from off going RN/tech: Shirley, assumed care of patient.  POC discussed with patient. Call light within reach, all needs addressed at this time.       Fall risk interventions in place: Move the patient closer to the nurse's station, Patient's personal possessions are with in their safe reach, Place socks on patient, Place fall risk sign on patient's door, Keep floor surfaces clean and dry, and Bed Alarm in use (all applicable per [place] Fall risk assessment)   Continuous monitoring: Cardiac Leads, Pulse Ox, or Blood Pressure  IVF/IV medications: Not Applicable   Oxygen: 2L NC  Bedside sitter: Not Applicable   Isolation: Not Applicable

## 2024-06-30 NOTE — ED PROVIDER NOTES
ED Provider Note    CHIEF COMPLAINT  Chief Complaint   Patient presents with    Abdominal Pain     Pt BIB [facility] for [facility]. Pt is nonverbal and wife wanted EMS called because patient was pointing to abdomen and seemed like he was in pain. Pt was given a pain pill PTA but wife was worried because his vital signs were changing and wanted him sent to the hospital.        EXTERNAL RECORDS REVIEWED  Reviewed the patient's previous echocardiogram on 6/12/2024 reveals evidence of a EF of 40%, akinesis of the inferior lateral wall, dyskinetic apex without thrombus, mild to moderate aortic insufficiency    HPI/ROS    OUTSIDE HISTORIAN(S):  The patient's wife is at bedside adding to history review of systems same the patient has had increased oxygen demand from 2 L to 4 L nasal cannula throughout the evening, he is also grimacing when she touches his abdomen she is concerned that he has an infection in his belly.  She is a retired RN.    This is a pleasant 70 gentleman who recently had a CVA resulting in left-sided weakness as well as aphasic behavior.  The patient is here with his wife and she stated that he had increasing work of breathing, decreased saturations of oxygen and had increased his's oxygenation from 2 L nasal cannula to 4 L nasal cannula.  The patient's wife states that she is a nurse and lives with the patient 70 increased work of breathing.  In addition, she believes the patient is having abdominal pain as he is pointing to his abdomen and when she pushed on it he grimaced slightly.  The patient is unable to communicate with words.  PAST MEDICAL HISTORY   has a past medical history of Chronic obstructive pulmonary disease (HCC), Hypertension, MI, old, Peptic ulcer, Prostate disorder, and Stroke (HCC).    SURGICAL HISTORY   has a past surgical history that includes aaa with stent graft and upper gi endoscopy,diagnosis (N/A, 6/14/2024).    FAMILY HISTORY  No family history on file.    SOCIAL  "HISTORY  Social History     Tobacco Use    Smoking status: Unknown    Smokeless tobacco: Not on file   Vaping Use    Vaping status: Never Used   Substance and Sexual Activity    Alcohol use: Not Currently    Drug use: Not Currently    Sexual activity: Not on file       CURRENT MEDICATIONS  Home Medications       Reviewed by Lance Leon (Pharmacy Tech) on 06/30/24 at 1043  Med List Status: Complete     Medication Last Dose Status   albuterol 108 (90 Base) MCG/ACT Aero Soln inhalation aerosol PRN Active   doxazosin (CARDURA) 1 MG Tab 6/29/2024 Active   fluticasone furoate-vilanterol (BREO ELLIPTA) 200-25 MCG/ACT AEROSOL POWDER, BREATH ACTIVATED 6/29/2024 Active   hydrOXYzine HCl (ATARAX) 25 MG Tab 6/30/2024 Active   lansoprazole (Prevacid) 3 mg/mL oral suspension 6/27/2024 Active   metoprolol SR (TOPROL XL) 25 MG TABLET SR 24 HR 6/27/2024 Active   oxyCODONE immediate-release (ROXICODONE) 5 MG Tab 6/30/2024 Active   pravastatin (PRAVACHOL) 40 MG tablet 6/29/2024 Active   valproic acid (DEPAKENE) 250 MG/5ML Solution 6/29/2024 Active                  Audit from Redirected Encounters    **Home medications have not yet been reviewed for this encounter**         ALLERGIES  Allergies   Allergen Reactions    Lisinopril      cough    Namenda [Memantine]      \"weakness\"    Rosuvastatin        PHYSICAL EXAM  VITAL SIGNS: /63   Pulse 96   Temp 36.7 °C (98.1 °F) (Temporal)   Resp 20   Ht 1.676 m (5' 6\")   Wt 79.4 kg (175 lb)   SpO2 92%   BMI 28.25 kg/m²      Nursing notes and vitals reviewed.  Constitutional: Well developed, Well nourished, No acute distress, Non-toxic appearance.   Eyes: PERRLA, EOMI, Conjunctiva normal, No discharge.   HENT: Normocephalic, Atraumatic, moist mucous membranes, no facial edema Cardiovascular: Tachycardic, normal rhythm, No murmurs, No rubs, No gallops.   Thorax & Lungs: No respiratory distress, No rales, No rhonchi, No wheezing, No chest tenderness.   Abdomen: Bowel sounds " normal, Soft, slight grimacing with palpation throughout, no guarding, No rebound, No masses, No pulsatile masses, J-tube intact left upper quadrant, no surrounding erythema or edema, no discharge  Skin: Warm, Dry, No erythema, No rash.   Extremities: No deformity, no edema, good range of motion range of motion upper lower extremes bilaterally  Neurologic: Alert & oriented x the patient is not communicating, he is not moving arms and legs, he is moving his head back-and-forth   Psychiatric: Unable to assess      EKG/LABS  Results for orders placed or performed during the hospital encounter of 06/30/24   Lactic Acid   Result Value Ref Range    Lactic Acid 1.5 0.5 - 2.0 mmol/L   CBC with Differential   Result Value Ref Range    WBC 22.4 (H) 4.8 - 10.8 K/uL    RBC 3.71 (L) 4.70 - 6.10 M/uL    Hemoglobin 10.2 (L) 14.0 - 18.0 g/dL    Hematocrit 33.0 (L) 42.0 - 52.0 %    MCV 88.9 81.4 - 97.8 fL    MCH 27.5 27.0 - 33.0 pg    MCHC 30.9 (L) 32.3 - 36.5 g/dL    RDW 50.4 (H) 35.9 - 50.0 fL    Platelet Count 298 164 - 446 K/uL    MPV 11.0 9.0 - 12.9 fL    Neutrophils-Polys 93.60 (H) 44.00 - 72.00 %    Lymphocytes 1.70 (L) 22.00 - 41.00 %    Monocytes 3.60 0.00 - 13.40 %    Eosinophils 0.00 0.00 - 6.90 %    Basophils 0.30 0.00 - 1.80 %    Immature Granulocytes 0.80 0.00 - 0.90 %    Nucleated RBC 0.00 0.00 - 0.20 /100 WBC    Neutrophils (Absolute) 20.94 (H) 1.82 - 7.42 K/uL    Lymphs (Absolute) 0.37 (L) 1.00 - 4.80 K/uL    Monos (Absolute) 0.80 0.00 - 0.85 K/uL    Eos (Absolute) 0.01 0.00 - 0.51 K/uL    Baso (Absolute) 0.06 0.00 - 0.12 K/uL    Immature Granulocytes (abs) 0.18 (H) 0.00 - 0.11 K/uL    NRBC (Absolute) 0.00 K/uL   Complete Metabolic Panel   Result Value Ref Range    Sodium 137 135 - 145 mmol/L    Potassium 4.2 3.6 - 5.5 mmol/L    Chloride 97 96 - 112 mmol/L    Co2 29 20 - 33 mmol/L    Anion Gap 11.0 7.0 - 16.0    Glucose 163 (H) 65 - 99 mg/dL    Bun 27 (H) 8 - 22 mg/dL    Creatinine 0.75 0.50 - 1.40 mg/dL    Calcium  9.3 8.5 - 10.5 mg/dL    Correct Calcium 9.3 8.5 - 10.5 mg/dL    AST(SGOT) 25 12 - 45 U/L    ALT(SGPT) 26 2 - 50 U/L    Alkaline Phosphatase 59 30 - 99 U/L    Total Bilirubin 0.5 0.1 - 1.5 mg/dL    Albumin 4.0 3.2 - 4.9 g/dL    Total Protein 7.1 6.0 - 8.2 g/dL    Globulin 3.1 1.9 - 3.5 g/dL    A-G Ratio 1.3 g/dL   Urinalysis    Specimen: Urine   Result Value Ref Range    Color DK Yellow     Character Clear     Specific Gravity 1.023 <1.035    Ph 7.5 5.0 - 8.0    Glucose Negative Negative mg/dL    Ketones Trace (A) Negative mg/dL    Protein 30 (A) Negative mg/dL    Bilirubin Negative Negative    Urobilinogen, Urine 1.0 Negative    Nitrite Negative Negative    Leukocyte Esterase Small (A) Negative    Occult Blood Large (A) Negative    Micro Urine Req Microscopic    Lipase   Result Value Ref Range    Lipase 39 11 - 82 U/L   Magnesium   Result Value Ref Range    Magnesium 2.5 1.5 - 2.5 mg/dL   Procalcitonin   Result Value Ref Range    Procalcitonin 0.08 <0.25 ng/mL   Sed Rate   Result Value Ref Range    Sed Rate Westergren 58 (H) 0 - 20 mm/hour   C Reactive Protein Quantitative (Non-Cardiac)   Result Value Ref Range    Stat C-Reactive Protein 0.67 0.00 - 0.75 mg/dL   Troponin   Result Value Ref Range    Troponin T 49 (H) 6 - 19 ng/L   proBrain Natriuretic Peptide, NT   Result Value Ref Range    NT-proBNP 1061 (H) 0 - 125 pg/mL   ESTIMATED GFR   Result Value Ref Range    GFR (CKD-EPI) 92 >60 mL/min/1.73 m 2   URINE MICROSCOPIC (W/UA)   Result Value Ref Range    WBC 2-5 (A) /hpf    RBC >150 (A) /hpf    Bacteria Negative None /hpf    Epithelial Cells Negative /hpf    Hyaline Cast 0-2 /lpf   TROPONIN   Result Value Ref Range    Troponin T 47 (H) 6 - 19 ng/L   EKG   Result Value Ref Range    Report       [facility] Emergency Dept.    Test Date:  2024-06-30  Pt Name:    JAMARI OLIVARES               Department: ER  MRN:        [id number]                      Room:        04  Gender:     Male                          Technician: [id number]  :        1945                   Requested By:ER TRIAGE PROTOCOL  Order #:    [id number]                    Reading MD: BENSON PACK,     Measurements  Intervals                                Axis  Rate:       101                          P:          41  OH:         171                          QRS:        -89  QRSD:       142                          T:          59  QT:         365  QTc:        474    Interpretive Statements  Sinus tachycardia  RBBB and LAFB  Inferior infarct, acute  Compared to ECG 2024 09:25:45  Left anterior fascicular block now present  Right bundle-branch block now present  Myocardial infarct finding still present  Electronically Signed On 2024 13:30:17 PDT by BENSON PACK DO     POC CoV-2, FLU A/B, RSV by PCR   Result Value Ref Range    POC Influenza A RNA, PCR Negative Negative    POC Influenza B RNA, PCR Negative Negative    POC RSV, by PCR Negative Negative    POC SARS-CoV-2, PCR NotDetected        I have independently interpreted this EKG    RADIOLOGY/PROCEDURES   I have independently interpreted the diagnostic imaging associated with this visit and am waiting the final reading from the radiologist.   My preliminary interpretation is as follows: CT scan chest abdomen pelvis reveals no evidence of infiltrate or perforation of the bowel.    Radiologist interpretation:  CT-CHEST,ABDOMEN,PELVIS WITH   Final Result      1.  No acute abnormality identified within the chest, abdomen, or pelvis      2.  Stented 6.6 x 6.3 cm abdominal aortic aneurysm. There is hyperdensity within the thrombosed lumen and this could be pre-existing clot versus endoleak and these cannot be differentiated on a contrast only study      3.  Descending thoracic aorta aneurysm measuring 3.9 x 3.8 cm      4.  Aortic and branch vessel atherosclerotic plaque      5.  Hiatal hernia with possible wall thickening the esophagus proximal to the hernia      6.   Incidental renal cyst      7.  Left colon diverticulosis      8.  Enlargement of the prostate gland      DX-CHEST-PORTABLE (1 VIEW)   Final Result         1.  Hazy left pulmonary infiltrates, similar to prior study.   2.  Small left pleural effusion, increased since prior study   3.  Cardiomegaly   4.  Atherosclerosis      EC-ECHOCARDIOGRAM COMPLETE W/O CONT    (Results Pending)   US-AORTA/ILIACS DUPLEX COMPLETE    (Results Pending)       COURSE & MEDICAL DECISION MAKING    ASSESSMENT, COURSE AND PLAN  Care Narrative: This is a pleasant 78-year-old gentleman presents here today with complaint per his wife of possible pneumonia and abdominal pain.  In lieu of the patient's increased oxygen demand I was concern for possible occult pneumonia therefore CT scan of the chest was completed that revealed no evidence of pneumonia, no evidence of perforation of the bowel, no obstruction, diverticulitis, cholecystitis, small bowel obstruction.  The patient does have evidence of aortic stent and there is possible endoleak versus pre-existing clot that will need further evaluation.  Clinically the patient does not presenting with aortic aneurysm or aortic dissection requiring emergent vascular surgery consultation or emergent imaging.  The patient does have a finding is suspicious of increased heart failure with a BNP of 1061 and troponin of 49.  He does have an ejection fraction of 40 percentile but never had a BNP that is this elevated.  In lieu of his hypoxia and increased oxygen demand I do believe is warranted the patient be hospitalized.  I discussed this with Dr. Chapman, who graciously excepts hospitalization of this patient.  She did add on an ultrasound aorta iliac duplex for the evaluation of the aorta.  ADDITIONAL PROBLEMS MANAGED  Due to CVA, I do not believe the patient warrants evaluation for this with CT scans of the brain or MRI.    DISPOSITION AND DISCUSSIONS  I have discussed management of the patient with the  following physicians and LIZZ's: Dr. Chapman with hospitalist medicine for hospitalization.      FINAL DIAGNOSIS  Congestive heart failure with acute exacerbation  Hypoxia  Abdominal pain       Electronically signed by: Christiano Khalil D.O., 6/30/2024 6:45 AM

## 2024-06-30 NOTE — ASSESSMENT & PLAN NOTE
Minimally elevated but above previous baseline  See ekg changes below  No evidence of chest pain  Likely related to chf exacerbation  Following  Tele  Serial trops

## 2024-06-30 NOTE — ASSESSMENT & PLAN NOTE
No obvious bacterial infection  Pro vianca negative  Will check viral respiratory panel  Suspect stress reaction  Following  B/c pending  Afebrile  Will hold off on abx at this time but will re consider if clinical changes  Cbc in am

## 2024-06-30 NOTE — ED NOTES
Pt placed on a bed alarm, EKG done. Wife at bedside. Pt on baseline 2L NC.  Pt is nonverbal and only A&O x1 to self. Cardiac monitor on.

## 2024-06-30 NOTE — PROGRESS NOTES
Bedside report received from off going RN/tech: assumed care of patient.     Fall Risk Score: HIGH RISK  Fall risk interventions in place: Place yellow fall risk ID band on patient, Provide patient/family education based on risk assessment, Educate patient/family to call staff for assistance when getting out of bed, Place fall precaution signage outside patient door, Place patient in room close to nursing station, Utilize bed/chair fall alarm, and Bed alarm connected correctly  Bed type: Regular (Barrera Score less than 17 interventions in place)  Patient on cardiac monitor: Yes  IVF/IV medications: Not Applicable   Oxygen: How many liters 2L  Bedside sitter: Not Applicable   Isolation: Not applicable

## 2024-06-30 NOTE — ASSESSMENT & PLAN NOTE
Has chronic copd, no evidence of acute copd exacerbation   Acute respiratory failure likely due to chf exacerbation  Will check viral respiratory panel  Following closely  RT following  Iv lasix

## 2024-06-30 NOTE — H&P
Hospital Medicine History & Physical Note    Date of Service  6/30/2024    Primary Care Physician  Ximena Callahan N.P.    Consultants      Code Status  Full Code    Chief Complaint  Chief Complaint   Patient presents with    Abdominal Pain     Pt BIB [facility] for [facility]. Pt is nonverbal and wife wanted EMS called because patient was pointing to abdomen and seemed like he was in pain. Pt was given a pain pill PTA but wife was worried because his vital signs were changing and wanted him sent to the hospital.        History of Presenting Illness  Jl Mueller is a 78 y.o. male with recurrent strokes, dysphagia (with G tube), COPD with chronic hypoxia (2L at baseline), HTN, CAD, Chronic systolic CHF (EF 40%)and PUD. He  presented to [facility] on 6/30/2024 from [facility] with increasing oxygen requirements. His wife is at bedside and she is providing the history. She reports patient has been essentially non verbal and unable to follow commands since his most recent stroke in May 2024. He does mumble occasionally and his communication ability is reportedly restricted to this. He has been at [facility] since he was discharged from [facility] following the stroke in May and staff there called her early this am telling her that his heart rate was elevated and that his oxygen requirements increased from 2-4L.     I discussed the plan of care with family, ERP    Review of Systems  Review of Systems   Unable to perform ROS: Medical condition   Constitutional: Negative.  Negative for fever.   HENT: Negative.     Eyes: Negative.    Respiratory:  Positive for cough.    Cardiovascular: Negative.    Gastrointestinal: Negative.    Genitourinary: Negative.    Musculoskeletal: Negative.    Skin: Negative.  Negative for rash.   Neurological:  Positive for weakness (chronic).   Endo/Heme/Allergies: Negative.  Does not bruise/bleed easily.   Psychiatric/Behavioral: Negative.     All other systems reviewed and are  "negative.      Past Medical History   has a past medical history of Chronic obstructive pulmonary disease (HCC), Hypertension, MI, old, Peptic ulcer, Prostate disorder, and Stroke (HCC).    Surgical History   has a past surgical history that includes aaa with stent graft and pr upper gi endoscopy,diagnosis (N/A, 2024).     Family History  family history is not on file.   Family history reviewed with patient. There is no family history that is pertinent to the chief complaint.     Social History   reports that he does not currently use alcohol. He reports that he does not currently use drugs.    Allergies  Allergies   Allergen Reactions    Lisinopril      cough    Namenda [Memantine]      \"weakness\"    Rosuvastatin        Medications  Prior to Admission Medications   Prescriptions Last Dose Informant Patient Reported? Taking?   Home Care Oxygen   Yes No   Sig: Inhale 2 L/min continuous. DME [facility] in [place]   OXYGEN-HELIUM INH   Yes No   Sig: Inhale. 2 lt NC   albuterol (PROVENTIL) 2.5mg/0.5ml Nebu Soln   Yes No   Sig: Take 2.5 mg by nebulization every four hours as needed for Shortness of Breath.   albuterol 108 (90 Base) MCG/ACT Aero Soln inhalation aerosol   No No   Sig: Inhale 2 Puffs every 6 hours as needed for Shortness of Breath.   doxazosin (CARDURA) 1 MG Tab   No No   Si Tablet by Enteral Tube route at bedtime.   fluticasone furoate-vilanterol (BREO ELLIPTA) 200-25 MCG/ACT AEROSOL POWDER, BREATH ACTIVATED   Yes No   Sig: Inhale 1 Puff every day.   lansoprazole (PREVACID) 30 MG Tablet Delayed Release Dispersible   No No   Si Tablet by Enteral Tube route 2 times a day.   metoprolol SR (TOPROL XL) 25 MG TABLET SR 24 HR   No No   Sig: Take 1 Tablet by mouth every day.   oxyCODONE immediate-release (ROXICODONE) 5 MG Tab   No No   Si Tablet by Enteral Tube route every 8 hours as needed for Severe Pain for up to 5 days.   pravastatin (PRAVACHOL) 40 MG tablet   No No   Si Tablet by Enteral " Tube route every evening.   risperiDONE (RISPERDAL) 0.5 MG Tab   No No   Si Tablet by Enteral Tube route every evening.   valproic acid (DEPAKENE) 250 MG/5ML Solution   No No   Sig: 10 mL by Enteral Tube route at bedtime.      Facility-Administered Medications: None       Physical Exam  Temp:  [36.7 °C (98.1 °F)] 36.7 °C (98.1 °F)  Pulse:  [] 99  Resp:  [20-29] 20  BP: (102-137)/(50-98) 130/60  SpO2:  [91 %-97 %] 91 %  Blood Pressure : 112/69   Temperature: 36.7 °C (98.1 °F)   Pulse: 92   Respiration: 18   Pulse Oximetry: 97 %       Physical Exam  Vitals and nursing note reviewed. Exam conducted with a chaperone present.   Constitutional:       General: He is not in acute distress.     Appearance: Normal appearance. He is not diaphoretic.      Comments: He mumbles occasionally but no meaningful speech, he does not follow commands which is baseline per his wife, He is resisting my exam squinting his eyes and clenching his jaw. He is moving all extremities, appears to be weaker on the L. Intermittent mild cough.    HENT:      Head: Normocephalic.      Nose: Nose normal.      Mouth/Throat:      Mouth: Mucous membranes are moist.   Eyes:      Pupils: Pupils are equal, round, and reactive to light.   Neck:      Vascular: No carotid bruit.   Cardiovascular:      Rate and Rhythm: Normal rate and regular rhythm.      Pulses: Normal pulses.      Heart sounds: Normal heart sounds.   Pulmonary:      Effort: Pulmonary effort is normal.      Breath sounds: Rales present.   Abdominal:      General: Abdomen is flat. Bowel sounds are normal. There is no distension.      Palpations: Abdomen is soft. There is no mass.      Tenderness: There is no abdominal tenderness. There is no right CVA tenderness, guarding or rebound.      Comments: G tube cdi   Musculoskeletal:         General: No swelling or deformity. Normal range of motion.      Cervical back: Neck supple. No rigidity or tenderness.   Lymphadenopathy:      Cervical:  No cervical adenopathy.   Skin:     General: Skin is warm and dry.      Capillary Refill: Capillary refill takes less than 2 seconds.      Coloration: Skin is not jaundiced.      Findings: No bruising, lesion or rash.   Neurological:      Mental Status: He is alert.      Cranial Nerves: No cranial nerve deficit.      Motor: Weakness present.      Comments: Will not cooperate with exam, appears to be weaker on L vs R which is baseline per his wife         Laboratory:  Recent Labs     06/30/24  0655   WBC 22.4*   RBC 3.71*   HEMOGLOBIN 10.2*   HEMATOCRIT 33.0*   MCV 88.9   MCH 27.5   MCHC 30.9*   RDW 50.4*   PLATELETCT 298   MPV 11.0     Recent Labs     06/30/24  0655   SODIUM 137   POTASSIUM 4.2   CHLORIDE 97   CO2 29   GLUCOSE 163*   BUN 27*   CREATININE 0.75   CALCIUM 9.3     Recent Labs     06/30/24  0655   ALTSGPT 26   ASTSGOT 25   ALKPHOSPHAT 59   TBILIRUBIN 0.5   LIPASE 39   GLUCOSE 163*         Recent Labs     06/30/24  0655   NTPROBNP 1061*         Recent Labs     06/30/24  0655 06/30/24  1141   TROPONINT 49* 47*       Imaging:  CT-CHEST,ABDOMEN,PELVIS WITH   Final Result      1.  No acute abnormality identified within the chest, abdomen, or pelvis      2.  Stented 6.6 x 6.3 cm abdominal aortic aneurysm. There is hyperdensity within the thrombosed lumen and this could be pre-existing clot versus endoleak and these cannot be differentiated on a contrast only study      3.  Descending thoracic aorta aneurysm measuring 3.9 x 3.8 cm      4.  Aortic and branch vessel atherosclerotic plaque      5.  Hiatal hernia with possible wall thickening the esophagus proximal to the hernia      6.  Incidental renal cyst      7.  Left colon diverticulosis      8.  Enlargement of the prostate gland      DX-CHEST-PORTABLE (1 VIEW)   Final Result         1.  Hazy left pulmonary infiltrates, similar to prior study.   2.  Small left pleural effusion, increased since prior study   3.  Cardiomegaly   4.  Atherosclerosis       EC-ECHOCARDIOGRAM COMPLETE W/O CONT    (Results Pending)   US-ABDOMINAL AORTA W/O DOPPLER    (Results Pending)       EKG:  I have personally reviewed the images and compared with prior images. New RBBB, LAFB still present    Assessment/Plan:  Justification for Admission Status  I anticipate this patient will require at least two midnights for appropriate medical management, necessitating inpatient admission because above    Patient will need a Telemetry bed on CARDIOLOGY service .  The need is secondary to above.    * CHF (congestive heart failure), NYHA class III, acute on chronic, combined (HCC)- (present on admission)  Assessment & Plan  Clinically suspect acute on chronic chf exacerbation  Rales on exam, pulm edema on imagaing and increased bnp  Admit to tele  Will start iv lasix  Following  Serial I/0  Tele monitoring  Serial trops  Repeat echo    Elevated troponin  Assessment & Plan  Minimally elevated but above previous baseline  See ekg changes below  No evidence of chest pain  Likely related to chf exacerbation  Following  Tele  Serial trops    Advanced care planning/counseling discussion  Assessment & Plan  25 minute conversation with pt's wife regarding prognosis, goals of care and code status, she would like to continue with full code   Palliative care consult    Leukocytosis  Assessment & Plan  No obvious bacterial infection  Pro vianca negative  Will check viral respiratory panel  Suspect stress reaction  Following  B/c pending  Afebrile  Will hold off on abx at this time but will re consider if clinical changes  Cbc in am     Dysphagia  Assessment & Plan  Chronic  Aspiration precautions  Continue tube feeds as tolerated    Acute on chronic respiratory failure with hypoxia (HCC)- (present on admission)  Assessment & Plan  Has chronic copd, no evidence of acute copd exacerbation   Acute respiratory failure likely due to chf exacerbation  Will check viral respiratory panel  Following closely  RT  following  Iv lasix      Abdominal aortic aneurysm (AAA) without rupture (HCC)- (present on admission)  Assessment & Plan  Non specific finding in lumen on CT, no associated abdominal pain, per [place] records does have history of endoleak  Following bp  Will check u/s of AAA  following    COPD (chronic obstructive pulmonary disease) (HCC)- (present on admission)  Assessment & Plan  No evidence of acute exacerbation  RT following        VTE prophylaxis: enoxaparin ppx

## 2024-07-01 ENCOUNTER — APPOINTMENT (OUTPATIENT)
Dept: RADIOLOGY | Facility: MEDICAL CENTER | Age: 79
DRG: 291 | End: 2024-07-01
Attending: HOSPITALIST
Payer: MEDICARE

## 2024-07-01 LAB
ALBUMIN SERPL BCP-MCNC: 3.7 G/DL (ref 3.2–4.9)
ALBUMIN/GLOB SERPL: 1.2 G/DL
ALP SERPL-CCNC: 55 U/L (ref 30–99)
ALT SERPL-CCNC: 24 U/L (ref 2–50)
ANION GAP SERPL CALC-SCNC: 11 MMOL/L (ref 7–16)
AST SERPL-CCNC: 20 U/L (ref 12–45)
BASOPHILS # BLD AUTO: 0.5 % (ref 0–1.8)
BASOPHILS # BLD: 0.08 K/UL (ref 0–0.12)
BILIRUB SERPL-MCNC: 0.4 MG/DL (ref 0.1–1.5)
BUN SERPL-MCNC: 21 MG/DL (ref 8–22)
CALCIUM ALBUM COR SERPL-MCNC: 9.1 MG/DL (ref 8.5–10.5)
CALCIUM SERPL-MCNC: 8.9 MG/DL (ref 8.5–10.5)
CHLORIDE SERPL-SCNC: 101 MMOL/L (ref 96–112)
CO2 SERPL-SCNC: 27 MMOL/L (ref 20–33)
CREAT SERPL-MCNC: 0.71 MG/DL (ref 0.5–1.4)
CRP SERPL HS-MCNC: 2.27 MG/DL (ref 0–0.75)
EOSINOPHIL # BLD AUTO: 0.29 K/UL (ref 0–0.51)
EOSINOPHIL NFR BLD: 1.7 % (ref 0–6.9)
ERYTHROCYTE [DISTWIDTH] IN BLOOD BY AUTOMATED COUNT: 51.5 FL (ref 35.9–50)
GFR SERPLBLD CREATININE-BSD FMLA CKD-EPI: 94 ML/MIN/1.73 M 2
GLOBULIN SER CALC-MCNC: 3.1 G/DL (ref 1.9–3.5)
GLUCOSE SERPL-MCNC: 105 MG/DL (ref 65–99)
HCT VFR BLD AUTO: 31.6 % (ref 42–52)
HGB BLD-MCNC: 9.8 G/DL (ref 14–18)
IMM GRANULOCYTES # BLD AUTO: 0.13 K/UL (ref 0–0.11)
IMM GRANULOCYTES NFR BLD AUTO: 0.8 % (ref 0–0.9)
LYMPHOCYTES # BLD AUTO: 1.14 K/UL (ref 1–4.8)
LYMPHOCYTES NFR BLD: 6.8 % (ref 22–41)
MAGNESIUM SERPL-MCNC: 2.3 MG/DL (ref 1.5–2.5)
MCH RBC QN AUTO: 27.8 PG (ref 27–33)
MCHC RBC AUTO-ENTMCNC: 31 G/DL (ref 32.3–36.5)
MCV RBC AUTO: 89.5 FL (ref 81.4–97.8)
MONOCYTES # BLD AUTO: 1.17 K/UL (ref 0–0.85)
MONOCYTES NFR BLD AUTO: 7 % (ref 0–13.4)
NEUTROPHILS # BLD AUTO: 13.91 K/UL (ref 1.82–7.42)
NEUTROPHILS NFR BLD: 83.2 % (ref 44–72)
NRBC # BLD AUTO: 0 K/UL
NRBC BLD-RTO: 0 /100 WBC (ref 0–0.2)
PLATELET # BLD AUTO: 288 K/UL (ref 164–446)
PMV BLD AUTO: 11.2 FL (ref 9–12.9)
POTASSIUM SERPL-SCNC: 3.6 MMOL/L (ref 3.6–5.5)
PREALB SERPL-MCNC: 22.3 MG/DL (ref 18–38)
PROCALCITONIN SERPL-MCNC: 0.08 NG/ML
PROT SERPL-MCNC: 6.8 G/DL (ref 6–8.2)
RBC # BLD AUTO: 3.53 M/UL (ref 4.7–6.1)
SODIUM SERPL-SCNC: 139 MMOL/L (ref 135–145)
WBC # BLD AUTO: 16.7 K/UL (ref 4.8–10.8)

## 2024-07-01 PROCEDURE — A9270 NON-COVERED ITEM OR SERVICE: HCPCS | Performed by: HOSPITALIST

## 2024-07-01 PROCEDURE — 97602 WOUND(S) CARE NON-SELECTIVE: CPT

## 2024-07-01 PROCEDURE — 94760 N-INVAS EAR/PLS OXIMETRY 1: CPT

## 2024-07-01 PROCEDURE — 700101 HCHG RX REV CODE 250: Performed by: HOSPITALIST

## 2024-07-01 PROCEDURE — 84145 PROCALCITONIN (PCT): CPT

## 2024-07-01 PROCEDURE — 36415 COLL VENOUS BLD VENIPUNCTURE: CPT

## 2024-07-01 PROCEDURE — 700102 HCHG RX REV CODE 250 W/ 637 OVERRIDE(OP): Performed by: HOSPITALIST

## 2024-07-01 PROCEDURE — 770020 HCHG ROOM/CARE - TELE (206)

## 2024-07-01 PROCEDURE — 99233 SBSQ HOSP IP/OBS HIGH 50: CPT | Performed by: STUDENT IN AN ORGANIZED HEALTH CARE EDUCATION/TRAINING PROGRAM

## 2024-07-01 PROCEDURE — 80053 COMPREHEN METABOLIC PANEL: CPT

## 2024-07-01 PROCEDURE — 84134 ASSAY OF PREALBUMIN: CPT

## 2024-07-01 PROCEDURE — 83735 ASSAY OF MAGNESIUM: CPT

## 2024-07-01 PROCEDURE — 700102 HCHG RX REV CODE 250 W/ 637 OVERRIDE(OP): Performed by: STUDENT IN AN ORGANIZED HEALTH CARE EDUCATION/TRAINING PROGRAM

## 2024-07-01 PROCEDURE — 94640 AIRWAY INHALATION TREATMENT: CPT

## 2024-07-01 PROCEDURE — 93978 VASCULAR STUDY: CPT | Mod: 26 | Performed by: INTERNAL MEDICINE

## 2024-07-01 PROCEDURE — 85025 COMPLETE CBC W/AUTO DIFF WBC: CPT

## 2024-07-01 PROCEDURE — A9270 NON-COVERED ITEM OR SERVICE: HCPCS | Performed by: STUDENT IN AN ORGANIZED HEALTH CARE EDUCATION/TRAINING PROGRAM

## 2024-07-01 PROCEDURE — 86140 C-REACTIVE PROTEIN: CPT

## 2024-07-01 PROCEDURE — 700111 HCHG RX REV CODE 636 W/ 250 OVERRIDE (IP): Mod: JZ | Performed by: HOSPITALIST

## 2024-07-01 PROCEDURE — 93978 VASCULAR STUDY: CPT

## 2024-07-01 RX ORDER — PETROLATUM 42 G/100G
OINTMENT TOPICAL PRN
Status: DISCONTINUED | OUTPATIENT
Start: 2024-07-01 | End: 2024-07-02 | Stop reason: HOSPADM

## 2024-07-01 RX ORDER — LOSARTAN POTASSIUM 25 MG/1
12.5 TABLET ORAL
Status: DISCONTINUED | OUTPATIENT
Start: 2024-07-02 | End: 2024-07-02 | Stop reason: HOSPADM

## 2024-07-01 RX ORDER — LOSARTAN POTASSIUM 25 MG/1
12.5 TABLET ORAL
Status: DISCONTINUED | OUTPATIENT
Start: 2024-07-01 | End: 2024-07-01

## 2024-07-01 RX ORDER — LABETALOL HYDROCHLORIDE 5 MG/ML
10 INJECTION, SOLUTION INTRAVENOUS EVERY 4 HOURS PRN
Status: DISCONTINUED | OUTPATIENT
Start: 2024-07-01 | End: 2024-07-02 | Stop reason: HOSPADM

## 2024-07-01 RX ADMIN — FUROSEMIDE 20 MG: 10 INJECTION, SOLUTION INTRAVENOUS at 04:28

## 2024-07-01 RX ADMIN — ALBUTEROL SULFATE 2.5 MG: 2.5 SOLUTION RESPIRATORY (INHALATION) at 10:50

## 2024-07-01 RX ADMIN — LANSOPRAZOLE 30 MG: 30 TABLET, ORALLY DISINTEGRATING ORAL at 04:28

## 2024-07-01 RX ADMIN — OXYCODONE 5 MG: 5 TABLET ORAL at 20:44

## 2024-07-01 RX ADMIN — PRAVASTATIN SODIUM 40 MG: 20 TABLET ORAL at 17:17

## 2024-07-01 RX ADMIN — METOPROLOL TARTRATE 12.5 MG: 25 TABLET, FILM COATED ORAL at 04:28

## 2024-07-01 RX ADMIN — SENNOSIDES AND DOCUSATE SODIUM 2 TABLET: 50; 8.6 TABLET ORAL at 17:17

## 2024-07-01 RX ADMIN — ALBUTEROL SULFATE 2.5 MG: 2.5 SOLUTION RESPIRATORY (INHALATION) at 19:15

## 2024-07-01 RX ADMIN — ALBUTEROL SULFATE 2.5 MG: 2.5 SOLUTION RESPIRATORY (INHALATION) at 07:04

## 2024-07-01 RX ADMIN — VALPROIC ACID 500 MG: 250 SOLUTION ORAL at 20:45

## 2024-07-01 RX ADMIN — LANSOPRAZOLE 30 MG: 30 TABLET, ORALLY DISINTEGRATING ORAL at 17:17

## 2024-07-01 RX ADMIN — ALBUTEROL SULFATE 2.5 MG: 2.5 SOLUTION RESPIRATORY (INHALATION) at 14:00

## 2024-07-01 ASSESSMENT — PAIN DESCRIPTION - PAIN TYPE: TYPE: ACUTE PAIN

## 2024-07-01 ASSESSMENT — FIBROSIS 4 INDEX: FIB4 SCORE: 1.11

## 2024-07-01 NOTE — DISCHARGE PLANNING
[phone]  Per RN CM, DPA sent SNF referrals to [facility].     [phone]  DPA left message for [facility], requesting a callback regarding referral acceptance.

## 2024-07-01 NOTE — PROGRESS NOTES
Bedside report received from off going RN/tech: Belen, assumed care of patient.     Fall Risk Score: HIGH RISK  Fall risk interventions in place: Place yellow fall risk ID band on patient, Provide patient/family education based on risk assessment, Educate patient/family to call staff for assistance when getting out of bed, Place fall precaution signage outside patient door, Place patient in room close to nursing station, Utilize bed/chair fall alarm, Notify charge of high risk for huddle, and Bed alarm connected correctly  Bed type: Regular (Barrera Score less than 17 interventions in place)  Patient on cardiac monitor: Yes  IVF/IV medications: Not Applicable   Oxygen: How many liters 2L  Bedside sitter: Not Applicable   Isolation: Not applicable

## 2024-07-01 NOTE — PROGRESS NOTES
Bedside report received from off going RN/tech: Savana, assumed care of patient.     Fall Risk Score: HIGH RISK  Fall risk interventions in place: Place yellow fall risk ID band on patient, Provide patient/family education based on risk assessment, Educate patient/family to call staff for assistance when getting out of bed, Place fall precaution signage outside patient door, Place patient in room close to nursing station, Utilize bed/chair fall alarm, Notify charge of high risk for huddle, and Bed alarm connected correctly  Bed type: Regular (Barrera Score less than 17 interventions in place)  Patient on cardiac monitor: Yes  IVF/IV medications: Not Applicable   Oxygen: How many liters 2L  Bedside sitter: Not Applicable   Isolation: Not applicable

## 2024-07-01 NOTE — DISCHARGE PLANNING
Case Management Discharge Planning    Admission Date: 6/30/2024  GMLOS: 4.2  ALOS: 1    6-Clicks ADL Score: 12  6-Clicks Mobility Score: 12  PT and/or OT Eval ordered: Yes  Post-acute Referrals Ordered: Yes  Post-acute Choice Obtained: Yes  Has referral(s) been sent to post-acute provider:  Yes      Anticipated Discharge Dispo: Discharge Disposition: D/T to SNF with Medicare cert in anticipation of skilled care (03)    DME Needed: No    Action(s) Taken: DC Assessment Complete (See below), Choice obtained, and Referral(s) sent    RN CM met with patient's spouse Avril at bedside to complete assessment and discuss discharge planning. Pt is nonverbal; information provided by spouse.     Per Avril, patient from [facility] but she is requesting that SNF referral is sent to [facility] since they are from [place].     Avril states she is okay with returning to [facility] but if she can get patient into the  SNF closer to it, it would help her tremendously as she has to drive 45 minutes to get here every day.     RN CM requested DPA send out referral to SNF in [facility] per spouse request.     Pt's PCP us Ximena Callahan NP. Insurance coverage via Medicare and AARP.     Escalations Completed: None    Medically Clear: No    Next Steps: F/U with SNF referrals. Pending hospital course. CM will follow and assist with DCP.     Barriers to Discharge: Medical clearance, Pending Placement, Pending PT Evaluation, and Transportation    Is the patient up for discharge tomorrow: No         Care Transition Team Assessment    Information Source  Orientation Level:  (Nonverbal)  Information Given By: Spouse  Informant's Name: Avril Olivares  Who is responsible for making decisions for patient? : Spouse  Name(s) of Primary Decision Maker: AVRIL OLIVARES    Readmission Evaluation  Is this a readmission?: Yes - unplanned readmission    Elopement Risk  Legal Hold: No  Ambulatory or Self Mobile in Wheelchair: No-Not an Elopement  Risk  Elopement Risk: Not at Risk for Elopement    Interdisciplinary Discharge Planning  Lives with - Patient's Self Care Capacity: Attendant / Paid Care Giver  Patient or legal guardian wants to designate a caregiver: No  Support Systems: Spouse / Significant Other  Housing / Facility: Skilled Nursing Facility  Name of Care Facility: [facility]    Discharge Preparedness  What is your plan after discharge?: Skilled nursing facility  What are your discharge supports?: Spouse  Prior Functional Level: Needs Assist with Activities of Daily Living, Needs Assist with Medication Management    Functional Assesment  Prior Functional Level: Needs Assist with Activities of Daily Living, Needs Assist with Medication Management    Finances  Financial Barriers to Discharge: No  Prescription Coverage: Yes    Vision / Hearing Impairment  Vision Impairment : No  Hearing Impairment: Both Ears, Patient Declines to Wear Hearing Device(s)  Does Pt Need Special Equipment for the Hearing Impaired?: No    Advance Directive  Advance Directive?: None    Domestic Abuse  Have you ever been the victim of abuse or violence?: No  Possible Abuse/Neglect Reported to:: Not Applicable    Psychological Assessment  History of Substance Abuse: None  History of Psychiatric Problems: No    Discharge Risks or Barriers  Discharge risks or barriers?: Post-acute placement / services, Complex medical needs  Patient risk factors: Cognitive / sensory / physical deficit, Complex medical needs    Anticipated Discharge Information  Discharge Disposition: D/T to SNF with Medicare cert in anticipation of skilled care (03)

## 2024-07-01 NOTE — CONSULTS
Inpatient Palliative Medicine Consultation      Jl Mueller  78 y.o.  [unfilled]  MRN [id number]  PCP Ximena Callahan N.P.  Referral Source: Dr. Yue Contreras, Admitting Physician    Reason for palliative medicine consultation and/or visit: Advance Care Planning    Assessment and Plan:    Summary: Remains FULL CODE, with no limitations identified at this time. Wife, Avril, is primary surrogate decision-maker, though there are no published Advance Directives. Patient has 2 daughters in [place] that Avril hopes to include in any further ACP discussion but declines offer for Palliative Medicine-facilitated family conference at this time.  -----------------------------------------------------------------------------------------------------------------------------------------    Mr. Jl Mueller is a 78 year old male with a past medical history of recurrent strokes, dysphagia s/p G-tube (6/14/2024), CAD with hx of inferiorlateral MI s/p PCI/JACKY, COPD with chronic hypoxia (2L @ baseline), AAA s/p endovascular repair, HTN, CAD, chronic systolic heart failure (EF 40% - 6/12/2024)), former smoker, PUD. His most recent Lt-cereballar hemorrhagic stroke (5/2024) has rendered him aphasic and unable to follow commands. He was recently admitted to [facility] just 4 days ago, subsequently exhibited discomfort and had increased oxygen requirement prompting transfer/admission to [facility] for acute on chronic hypoxemic respiratory failure and management of CHF exacerbation.    Palliative Medicine was consulted for ACP/Goals of Care conversation. Consult took place at Mr. Mueller's bedside, with his wife, Avril. He is unable to participate in the conversation and slept the majority of the time, waking only to my introducing myself and touching his hand.    Avril is familiar with Palliative (see prior hospitalization consult) and we had a nice long chat about her  and family, pepito, and his prior stated views on resuscitation, end-of-life  "care, etc. They have never explicitly discussed these things but per an experience at his sister's end-of-life following a stroke, he voiced that his family should have \"pursued everything\". It is for this reason that his wife is reluctant to place any limitations on LSTs. She also reports a  or similar came to their home and offered to complete AD documents at one time and that he became irritated and dismissed her, not wanting to broach the subject.    Mr. Mueller's wife Avril also states she has yet to have these conversations with her daughters but anticipates they will want to be involved and will plan to have a private family discussion at an unknown date in the future. I did suggest that should she consider any limitations down the line, perhaps if his heart were to stop, if he were to die a natural death, then a DNAR order might prevent any further extensive treatments, suffering, etc. She was receptive to our consultation and I anticipate/hope she will bring her daughters into the loop so that the burden of medical decision-making on behalf of Mr. Mueller will be less heavy on her. I sense she is carrying much of the weight herself and encouraged her to engage in self-care (she has spent the last month at the [facility], away from home, and feels a financial strain as well).  Avril proceeded to ask me more about hospice, what, how, when...Also inquired about presence in [place].  It would certainly warrant re-discussion down the line with another acute medical crisis, particularly in light of recurrent stroke and cardiac disease.     Primary diagnosis: (including treatment/procedures to date) Acute on chronic hypoxemic respiratory; CHF exacerbation    Prognosis:   Months-years at current rate of decline; with any acute medical crisis or should the patient's GoC change, the prognosis may be altered.    PPS 20% - has been able to tolerate ice chips per wife; was reportedly planning to have supervised " "advancement at SNF prior to admission. G-tube in place.    Physical aspects of care:  Mr. Mueller is fully dependent for all ADLs/iADLs, aphasic and currently on AF via G tube. He is SNF level care. He is unfortunately unable to voice his concerns, but currently appears comfortable, resting without any perceived pain, dyspnea, agitation or other distressful symptom. At baseline, his wife Avril, states he has excessive daytime sleepiness, but often stays awake at night.    Psychological aspects of care:  As per above. It has been heavy burden for wife Avril to carry, since she has supported him as caregiver from previous strokes, though he was living at home and did manage some of his ADLs, walked with walker, ate independently, etc. Difficult to assess just how much Mr. Mueller understands as he doesn't exhibit any behaviors that would indicate he has capacity.    Social aspects of care:  Wife Avril, lives in [place]. Prior PC consult indicates she is a retired nurse.   Patient was a retired .  [place], lived in [place] until 1990s when they moved to [place] to be closer to family and warmer weather.  2 daughters, both whom live in [place] but are close with both parents.    Spiritual aspects of care:  [employer], has strong pepito. Disagreed with how his family supported his sister as she neared death from stroke. He voiced his opinion to his wife at that time that the family should have \"done more/everything\" when they were discussing organ donation.    Goals of care:  Unable to elicit any specifics with regards to my question regarding \"acceptable quality of life\" for Mr. Mueller. His wife is very concerned with following his prior statements of wanting \"everything\". We explored what \"everything\" means and how QoL might be overlooked at times for the sake of longevity.    Advance care planning:  As above, no ACP documents have been created by patient/family. Wife, Avril, is surrogate " decision-maker by law. No changes to code status.     The patient and/or legal decision maker has provided voluntary consent to discuss advance care planning. We discussed code status, ACP docs, POLST. No documents were completed. Total time spent in ACP discussion 30 minutes, which is separate from the time spent completing the evaluation and management visit.       Past Medical History:   Diagnosis Date    Chronic obstructive pulmonary disease (HCC)     Hypertension     MI, old     Peptic ulcer     Prostate disorder     Stroke (HCC)        Past Surgical History:   Procedure Laterality Date    NE UPPER GI ENDOSCOPY,DIAGNOSIS N/A 6/14/2024    Procedure: GASTROSCOPY, WITH PEG TUBE PLACEMENT;  Surgeon: Jose Cobos M.D.;  Location: SURGERY SAME DAY [facility];  Service: Gastroenterology    AAA WITH STENT GRAFT         Allergies:   Lisinopril, Namenda [memantine], and Rosuvastatin    Current Medications:    Current Facility-Administered Medications:     enoxaparin (Lovenox) inj 40 mg, 40 mg, Subcutaneous, DAILY AT 1800, Yue Contreras M.D.    senna-docusate (Pericolace Or Senokot S) 8.6-50 MG per tablet 2 Tablet, 2 Tablet, Enteral Tube, Q EVENING, 2 Tablet at 06/30/24 1800 **AND** polyethylene glycol/lytes (Miralax) Packet 1 Packet, 1 Packet, Enteral Tube, QDAY PRN, Yue Contreras M.D.    furosemide (Lasix) injection 20 mg, 20 mg, Intravenous, BID DIURETIC, Yue Contreras M.D., 20 mg at 07/01/24 0428    doxazosin (Cardura) tablet 1 mg, 1 mg, Enteral Tube, QHS, Yue Contreras M.D., 1 mg at 06/30/24 2037    hydrOXYzine HCl (Atarax) tablet 25 mg, 25 mg, Enteral Tube, Q4HRS PRN, Yue Contreras M.D.    [Held by provider] metoprolol tartrate (Lopressor) tablet 12.5 mg, 12.5 mg, Enteral Tube, BID, Yue Contreras M.D., 12.5 mg at 07/01/24 0428    oxyCODONE immediate-release (Roxicodone) tablet 5 mg, 5 mg, Enteral Tube, Q8HRS PRN, Yue Contreras M.D., 5 mg at 06/30/24 2037    pravastatin (Pravachol) tablet 40 mg, 40 mg, Enteral Tube,  Q EVENING, Yue Contreras M.D., 40 mg at 06/30/24 1819    valproic acid (Depakene) IR oral solution 500 mg, 500 mg, Enteral Tube, QHS, Yue Contreras M.D., 500 mg at 06/30/24 2037    Respiratory Therapy Consult, , Nebulization, Continuous RT, Yue Contreras M.D.    lansoprazole (Prevacid) solutab 30 mg, 30 mg, Enteral Tube, BID, Yue Contreras M.D., 30 mg at 07/01/24 0428    albuterol (Proventil) 2.5mg/0.5ml nebulizer solution 2.5 mg, 2.5 mg, Nebulization, 4X/DAY (RT), Yue Contreras M.D., 2.5 mg at 07/01/24 1050    albuterol (Proventil) 2.5mg/0.5ml nebulizer solution 2.5 mg, 2.5 mg, Nebulization, Q2HRS PRN (RT), Yue Contreras M.D.    mometasone-formoterol (Dulera) 200-5 MCG/ACT inhaler 2 Puff, 2 Puff, Inhalation, BID, Yue Contreras M.D.    Social History     Socioeconomic History    Marital status:    Tobacco Use    Smoking status: Unknown   Vaping Use    Vaping status: Never Used   Substance and Sexual Activity    Alcohol use: Not Currently    Drug use: Not Currently     Social Determinants of Health     Food Insecurity: Patient Unable To Answer (6/30/2024)    Hunger Vital Sign     Worried About Running Out of Food in the Last Year: Patient unable to answer     Ran Out of Food in the Last Year: Patient unable to answer   Transportation Needs: Patient Unable To Answer (6/30/2024)    PRAPARE - Transportation     Lack of Transportation (Medical): Patient unable to answer     Lack of Transportation (Non-Medical): Patient unable to answer   Intimate Partner Violence: Patient Unable To Answer (6/30/2024)    Humiliation, Afraid, Rape, and Kick questionnaire     Fear of Current or Ex-Partner: Patient unable to answer     Emotionally Abused: Patient unable to answer     Physically Abused: Patient unable to answer     Sexually Abused: Patient unable to answer   Housing Stability: Patient Unable To Answer (6/30/2024)    Housing Stability Vital Sign     Unable to Pay for Housing in the Last Year: Patient unable to answer      Unstable Housing in the Last Year: Patient unable to answer       No family history on file.    Spiritual Concerns:     Pertinent Physical Exam Findings:  Vitals:    07/01/24 0440 07/01/24 0705 07/01/24 0910 07/01/24 1050   BP:   120/53    Pulse:  79 77 75   Resp:  18 18 19   Temp:       TempSrc:   Temporal    SpO2:  99% 98% 98%   Weight: 58.5 kg (128 lb 15.5 oz)      Height:           Constitutional: laying supine in bed, frail, elderly  HEENT: eyes closed, wakes to voice/touch  Pulm/Chest: coarse BS, wet cough, non-labored  CVS: warm peripherals  Neuro: Wakes to voice/simultaneous touch, aphasic-grunts/incoherent speech, does not follow commands, moves upper extremities (mostly left for me)  Skin: warm, dry, no rash  Psych: mood is irritable when awake, lacks insight or ability to participate in any medical-decision making    Labs:    Recent Labs     06/30/24  0655 07/01/24  0247   WBC 22.4* 16.7*   RBC 3.71* 3.53*   HEMOGLOBIN 10.2* 9.8*   HEMATOCRIT 33.0* 31.6*   MCV 88.9 89.5   MCH 27.5 27.8   MCHC 30.9* 31.0*   RDW 50.4* 51.5*   PLATELETCT 298 288   MPV 11.0 11.2       Recent Labs     06/30/24  0655 07/01/24  0247   SODIUM 137 139   POTASSIUM 4.2 3.6   CHLORIDE 97 101   CO2 29 27   GLUCOSE 163* 105*   BUN 27* 21   CREATININE 0.75 0.71   CALCIUM 9.3 8.9       Thank you for allowing me the opportunity to participate in the care of Jl Mueller.    Interval diagnostic studies and medical documentation entries pertinent to this case were reviewed independently by me. This patient has at least one acute or chronic illness or injury that poses a threat to life or bodily function. This patient suffers from a high risk of morbidity from additional invasive diagnostic testing or intensive treatment. Discussion of recommendations and coordination of care undertaken with primary provider/treatment team.        Neena Crane MD  Fellow, Hospice and Palliative Medicine

## 2024-07-01 NOTE — PROGRESS NOTES
Assumed care of patient and received report from day shift RN. A&O x0, on 2L O2. VSS. Tele monitor in place. Plan of care discussed with patient's wife at bedside and verbalized understanding. Bed locked and in lowest position. Pt's wife educated to fall risk and fall precautions in place. Call light within reach. All questions answered and no other needs indicated at this time.

## 2024-07-01 NOTE — CARE PLAN
The patient is Watcher - Medium risk of patient condition declining or worsening    Shift Goals  Clinical Goals: abd US, diuresis, safety  Patient Goals: pain management  Family Goals: pain management, safety, updates    Progress made toward(s) clinical / shift goals:    Problem: Pain - Standard  Goal: Alleviation of pain or a reduction in pain to the patient’s comfort goal  Outcome: Progressing  Note: Nonverbal descriptors used as pain scale. Pt medicated per MAR with PRN medication. Non-pharmacological pain management techniques implemented.      Problem: Fall Risk  Goal: Patient will remain free from falls  Outcome: Progressing  Note: Fall risk assessment complete. Fall precautions implemented including bed alarm, non slip socks, call light and belongings within reach and hourly rounding.        Patient is not progressing towards the following goals:

## 2024-07-01 NOTE — DIETARY
"Nutrition Support Assessment:  Day 1 of admit.  Jl Mueller is a 78 y.o. male with recurrent strokes, dysphagia (with G tube), COPD with chronic hypoxia, HTN, CAD, Chronic systolic CHF (EF 40%) and PUD. [facility] from [facility] with increasing oxygen requirements.     Current problem list:  CHF with possible exacerbation  Elevated troponin  Leukocytosis  Dysphagia    Assessment:  Estimated Nutritional Needs based on:   Height: 167.6 cm (5' 5.98\")  Weight: 58.5 kg (128 lb 15.5 oz)  Weight to Use in Calculations: 61 kg (134 lb 7.7 oz)(initial bed scale taken on day of admit)  Ideal Body Weight: 64.4 kg (142 lb)  Percent Ideal Body Weight: 95  Body mass index is 20.83 kg/m²., BMI classification: Normal    Calculation/Equation: QJA=7652 kcal/d  Total Calories / day: 1475 - 1655  (Calories / k - 27)  Total Grams Protein / day: 67-85  (Grams Protein / k.1 - 1.4)  Estimated fluid needs = 1525 ml/d (25 ml/kg)    Evaluation:   Existing PEG tube dependent for nutrition prior to admission  Pertinent Medications include Lasix, Senna-docusate  Metabolic labs within normal range today except glucose slightly elevated at 105  Weight down from last admit, but may be fluid related.  He is on Lasix.  He was receiving enteral feeds at facility prior to admission    Malnutrition Risk: Criteria not met at this time     Recommendations/Plan:    Start bolus feeds of Isosource 1.5.  Goal is 4 per day.  This will provide  1500 kcals, 68 gm protein and 764 ml free water  Follow weigh trends and labs  Fluids per MD given CHF and Lasix  RD following and will adjust tube feeding provision as appropriate        "

## 2024-07-01 NOTE — PROGRESS NOTES
4 Eyes Skin Assessment Completed by SALBADOR Glover and SALBADOR Mcgowan.    Head WDL  Ears Discoloration  Nose WDL  Mouth WDL  Neck Scab  Breast/Chest Scab  Shoulder Blades WDL  Spine WDL  (R) Arm/Elbow/Hand Bruising, Scab, and Discoloration  (L) Arm/Elbow/Hand Bruising, Scab, and Discoloration  Abdomen Scar and PEG tube  Groin Redness and Excoriation  Scrotum/Coccyx/Buttocks Blanching and Discoloration  (R) Leg Scab and Bruising  (L) Leg Scab and Bruising  (R) Heel/Foot/Toe Flaky  (L) Heel/Foot/Toe Flaky          Devices In Places Tele Box, Pulse Ox, Nasal Cannula, and G Tube      Interventions In Place Gray Ear Foams, TAP System, Pillows, Heels Loaded W/Pillows, and Pressure Redistribution Mattress    Possible Skin Injury Yes    Pictures Uploaded Into Epic Yes  Wound Consult Placed Yes  RN Wound Prevention Protocol Ordered Yes

## 2024-07-02 ENCOUNTER — PATIENT OUTREACH (OUTPATIENT)
Dept: SCHEDULING | Facility: IMAGING CENTER | Age: 79
End: 2024-07-02
Payer: MEDICARE

## 2024-07-02 VITALS
SYSTOLIC BLOOD PRESSURE: 120 MMHG | RESPIRATION RATE: 19 BRPM | WEIGHT: 126.76 LBS | BODY MASS INDEX: 20.37 KG/M2 | HEIGHT: 66 IN | OXYGEN SATURATION: 99 % | TEMPERATURE: 98.6 F | DIASTOLIC BLOOD PRESSURE: 59 MMHG | HEART RATE: 113 BPM

## 2024-07-02 LAB
BACTERIA UR CULT: NORMAL
BASOPHILS # BLD AUTO: 0.5 % (ref 0–1.8)
BASOPHILS # BLD: 0.07 K/UL (ref 0–0.12)
CRP SERPL HS-MCNC: 1.87 MG/DL (ref 0–0.75)
EOSINOPHIL # BLD AUTO: 0.2 K/UL (ref 0–0.51)
EOSINOPHIL NFR BLD: 1.5 % (ref 0–6.9)
ERYTHROCYTE [DISTWIDTH] IN BLOOD BY AUTOMATED COUNT: 51.1 FL (ref 35.9–50)
HCT VFR BLD AUTO: 31.4 % (ref 42–52)
HGB BLD-MCNC: 9.9 G/DL (ref 14–18)
IMM GRANULOCYTES # BLD AUTO: 0.14 K/UL (ref 0–0.11)
IMM GRANULOCYTES NFR BLD AUTO: 1 % (ref 0–0.9)
LYMPHOCYTES # BLD AUTO: 1.33 K/UL (ref 1–4.8)
LYMPHOCYTES NFR BLD: 9.9 % (ref 22–41)
MCH RBC QN AUTO: 28.7 PG (ref 27–33)
MCHC RBC AUTO-ENTMCNC: 31.5 G/DL (ref 32.3–36.5)
MCV RBC AUTO: 91 FL (ref 81.4–97.8)
MONOCYTES # BLD AUTO: 1.07 K/UL (ref 0–0.85)
MONOCYTES NFR BLD AUTO: 7.9 % (ref 0–13.4)
NEUTROPHILS # BLD AUTO: 10.69 K/UL (ref 1.82–7.42)
NEUTROPHILS NFR BLD: 79.2 % (ref 44–72)
NRBC # BLD AUTO: 0 K/UL
NRBC BLD-RTO: 0 /100 WBC (ref 0–0.2)
PLATELET # BLD AUTO: 313 K/UL (ref 164–446)
PMV BLD AUTO: 11.5 FL (ref 9–12.9)
PREALB SERPL-MCNC: 19.8 MG/DL (ref 18–38)
RBC # BLD AUTO: 3.45 M/UL (ref 4.7–6.1)
SIGNIFICANT IND 70042: NORMAL
SITE SITE: NORMAL
SOURCE SOURCE: NORMAL
WBC # BLD AUTO: 13.5 K/UL (ref 4.8–10.8)

## 2024-07-02 PROCEDURE — 700102 HCHG RX REV CODE 250 W/ 637 OVERRIDE(OP): Performed by: STUDENT IN AN ORGANIZED HEALTH CARE EDUCATION/TRAINING PROGRAM

## 2024-07-02 PROCEDURE — 84134 ASSAY OF PREALBUMIN: CPT

## 2024-07-02 PROCEDURE — 700101 HCHG RX REV CODE 250: Performed by: HOSPITALIST

## 2024-07-02 PROCEDURE — 700102 HCHG RX REV CODE 250 W/ 637 OVERRIDE(OP): Performed by: HOSPITALIST

## 2024-07-02 PROCEDURE — A9270 NON-COVERED ITEM OR SERVICE: HCPCS | Performed by: STUDENT IN AN ORGANIZED HEALTH CARE EDUCATION/TRAINING PROGRAM

## 2024-07-02 PROCEDURE — A9270 NON-COVERED ITEM OR SERVICE: HCPCS | Performed by: HOSPITALIST

## 2024-07-02 PROCEDURE — 94640 AIRWAY INHALATION TREATMENT: CPT

## 2024-07-02 PROCEDURE — 99239 HOSP IP/OBS DSCHRG MGMT >30: CPT | Performed by: INTERNAL MEDICINE

## 2024-07-02 PROCEDURE — 36415 COLL VENOUS BLD VENIPUNCTURE: CPT

## 2024-07-02 PROCEDURE — 86140 C-REACTIVE PROTEIN: CPT

## 2024-07-02 PROCEDURE — 85025 COMPLETE CBC W/AUTO DIFF WBC: CPT

## 2024-07-02 RX ORDER — OXYCODONE HYDROCHLORIDE 5 MG/1
5 TABLET ORAL EVERY 8 HOURS PRN
Qty: 15 TABLET | Refills: 0 | Status: SHIPPED | OUTPATIENT
Start: 2024-07-02 | End: 2024-07-07

## 2024-07-02 RX ORDER — METOPROLOL SUCCINATE 25 MG/1
25 TABLET, EXTENDED RELEASE ORAL DAILY
Status: SHIPPED
Start: 2024-07-02

## 2024-07-02 RX ORDER — LOSARTAN POTASSIUM 25 MG/1
12.5 TABLET ORAL DAILY
Status: SHIPPED
Start: 2024-07-03

## 2024-07-02 RX ADMIN — OXYCODONE 5 MG: 5 TABLET ORAL at 13:01

## 2024-07-02 RX ADMIN — ALBUTEROL SULFATE 2.5 MG: 2.5 SOLUTION RESPIRATORY (INHALATION) at 06:51

## 2024-07-02 RX ADMIN — LANSOPRAZOLE 30 MG: 30 TABLET, ORALLY DISINTEGRATING ORAL at 06:34

## 2024-07-02 RX ADMIN — LOSARTAN POTASSIUM 12.5 MG: 25 TABLET, FILM COATED ORAL at 06:34

## 2024-07-02 RX ADMIN — ALBUTEROL SULFATE 2.5 MG: 2.5 SOLUTION RESPIRATORY (INHALATION) at 11:30

## 2024-07-02 ASSESSMENT — FIBROSIS 4 INDEX: FIB4 SCORE: 1.02

## 2024-07-02 NOTE — PROGRESS NOTES
Pt discharged, gave the heart failure packet EMS with cobra packet.  Oxy script was not in packet, but will be hand delivered by this  nurse to [place] once off shift.   Pt was given one dose of oxy 5mg via peg tube and transported to [place] via [facility]. Report given to Luisa MOCK EMT-B. Tele box off.

## 2024-07-02 NOTE — PROGRESS NOTES
"Hospital Medicine Daily Progress Note    Date of Service  7/1/2024    Chief Complaint  Jl Mueller is a 78 y.o. male admitted 6/30/2024 with abdominal pain.    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 The patient is here with his wife and she stated that he had increasing work of breathing, decreased saturations of oxygen and had increased his's oxygenation from 2 L nasal cannula to 4 L nasal cannula. The patient's wife states that she is a nurse and lives with the patient 70 increased work of breathing. In addition, she believes the patient is having abdominal pain as he is pointing to his abdomen and when she pushed on it he grimaced slightly. The patient is unable to communicate with words.\"    Interval Problem Update  7/1  Afebrile, normal HR today although notified he had a couple long pauses the night prior up to 2.9 seconds, will hold metoprolol for now and see what his HR does. RR 15-19 on his baseline 2L NC saturating 93-99%, -130's.   Discussed with his wife at bedside who is a retired RN. She states the rehab called her for increased O2 demands overnight 2->4L and that he had a mildly increased RR. She watched him for about an hour and consented for him to come but states he is back to his baseline.  Received one dose IV lasix in the AM. When I examined him he was not volume overloaded, comfortably resting in bed, baseline O2 requirements, baseline non verbal, no apparent distress, I held afternoon lasix dose.  Patient's wife requested I discontinue DVT PPX given recent hemorrhagic stroke, we discussed the differences between dvt ppx and full AC, would like it discontinued. Also would like doxyzosin discontinued.  Depending on course suspect discharge back to rehab in 24-48 hours     I have discussed this patient's plan of care and discharge plan at IDT rounds today with Case Management, " Nursing, Nursing leadership, and other members of the IDT team.    Consultants/Specialty  None    Code Status  Full Code    Disposition  <MEDICALLYCLEARED>  I have placed the appropriate orders for post-discharge needs.    Review of Systems  ROS   Review of Systems   Unable to perform ROS: Medical condition   Constitutional: Negative.  Negative for fever.   HENT: Negative.     Eyes: Negative.    Respiratory:  Positive for cough.    Cardiovascular: Negative.    Gastrointestinal: Negative.    Genitourinary: Negative.    Musculoskeletal: Negative.    Skin: Negative.  Negative for rash.   Neurological:  Positive for weakness (chronic).   Endo/Heme/Allergies: Negative.  Does not bruise/bleed easily.   Psychiatric/Behavioral: Negative.     All other systems reviewed and are negative.    Physical Exam  Temp:  [36.5 °C (97.7 °F)-37.1 °C (98.8 °F)] 37 °C (98.6 °F)  Pulse:  [] 100  Resp:  [15-19] 15  BP: (114-136)/(53-78) 114/58  SpO2:  [93 %-99 %] 95 %    Physical Exam  Vitals and nursing note reviewed. Exam conducted with a chaperone present.   Constitutional:       General: He is not in acute distress.     Appearance: He is not toxic-appearing.      Comments: Lying in bed, wife at bedside, no distress,    HENT:      Head: Normocephalic and atraumatic.      Nose: Nose normal. No rhinorrhea.      Mouth/Throat:      Pharynx: No posterior oropharyngeal erythema.   Eyes:      General: No scleral icterus.     Extraocular Movements: Extraocular movements intact.   Cardiovascular:      Rate and Rhythm: Normal rate and regular rhythm.   Pulmonary:      Effort: No respiratory distress.      Breath sounds: No wheezing, rhonchi or rales.   Abdominal:      General: There is no distension.      Palpations: Abdomen is soft.      Tenderness: There is no abdominal tenderness. There is no guarding or rebound.      Comments: G tube present    Musculoskeletal:         General: No tenderness or signs of injury.      Cervical back: Neck  supple. No rigidity.      Comments: No significant peripheral edema   Skin:     General: Skin is warm and dry.      Capillary Refill: Capillary refill takes less than 2 seconds.   Neurological:      Mental Status: He is disoriented.      Motor: Weakness present.      Comments: Non verbal and doesn't follow at baseline, slightly weaker left side, does not participate with exam, at baseline per wife         Fluids    Intake/Output Summary (Last 24 hours) at 7/2/2024 0417  Last data filed at 7/2/2024 0149  Gross per 24 hour   Intake 990 ml   Output 0 ml   Net 990 ml       Laboratory  Recent Labs     06/30/24  0655 07/01/24  0247 07/02/24  0014   WBC 22.4* 16.7* 13.5*   RBC 3.71* 3.53* 3.45*   HEMOGLOBIN 10.2* 9.8* 9.9*   HEMATOCRIT 33.0* 31.6* 31.4*   MCV 88.9 89.5 91.0   MCH 27.5 27.8 28.7   MCHC 30.9* 31.0* 31.5*   RDW 50.4* 51.5* 51.1*   PLATELETCT 298 288 313   MPV 11.0 11.2 11.5     Recent Labs     06/30/24  0655 07/01/24  0247   SODIUM 137 139   POTASSIUM 4.2 3.6   CHLORIDE 97 101   CO2 29 27   GLUCOSE 163* 105*   BUN 27* 21   CREATININE 0.75 0.71   CALCIUM 9.3 8.9                   Imaging  US-AORTA/ILIACS DUPLEX COMPLETE   Final Result      CT-CHEST,ABDOMEN,PELVIS WITH   Final Result      1.  No acute abnormality identified within the chest, abdomen, or pelvis      2.  Stented 6.6 x 6.3 cm abdominal aortic aneurysm. There is hyperdensity within the thrombosed lumen and this could be pre-existing clot versus endoleak and these cannot be differentiated on a contrast only study      3.  Descending thoracic aorta aneurysm measuring 3.9 x 3.8 cm      4.  Aortic and branch vessel atherosclerotic plaque      5.  Hiatal hernia with possible wall thickening the esophagus proximal to the hernia      6.  Incidental renal cyst      7.  Left colon diverticulosis      8.  Enlargement of the prostate gland      DX-CHEST-PORTABLE (1 VIEW)   Final Result         1.  Hazy left pulmonary infiltrates, similar to prior study.   2.   Small left pleural effusion, increased since prior study   3.  Cardiomegaly   4.  Atherosclerosis           Assessment/Plan  * CHF (congestive heart failure), NYHA class III, acute on chronic, combined (HCC)- (present on admission)  Assessment & Plan  Clinically suspect acute on chronic chf exacerbation  Rales on exam, pulm edema on imagaing and increased bnp  Admit to tele  Will start iv lasix  Following  Serial I/0  Tele monitoring  Serial trops  Repeat echo    7/1 Patient just had TTE 1-2 months ago, received 1 dose lasix this morning and does not appear to be volume overloaded, on baseline 2L NC. Had some sinus pauses the night prior, up to 2.9 seconds x2, will hold beta blocker this afternoon and watch HR trend.    Elevated troponin  Assessment & Plan  Minimally elevated but above previous baseline  See ekg changes below  No evidence of chest pain  Likely related to chf exacerbation  Following  Tele  Serial trops    Advanced care planning/counseling discussion  Assessment & Plan  25 minute conversation with pt's wife regarding prognosis, goals of care and code status, she would like to continue with full code   Palliative care consult    Leukocytosis  Assessment & Plan  No obvious bacterial infection  Pro vianca negative  Will check viral respiratory panel  Suspect stress reaction  Following  B/c pending  Afebrile  Will hold off on abx at this time but will re consider if clinical changes  Cbc in am     7/1 no e/o bacterial process, ?viral syndrome with recent hospitalizations and now at rehab, trend wbc, follow cx    Dysphagia  Assessment & Plan  Chronic  Aspiration precautions  Continue tube feeds as tolerated    Acute on chronic respiratory failure with hypoxia (HCC)- (present on admission)  Assessment & Plan  Has chronic copd, no evidence of acute copd exacerbation   Acute respiratory failure likely due to chf exacerbation  Will check viral respiratory panel  Following closely  RT following  Iv lasix    7/1  back to baseline 2 L NC    Abdominal aortic aneurysm (AAA) without rupture (HCC)- (present on admission)  Assessment & Plan  Non specific finding in lumen on CT, no associated abdominal pain, per [place] records does have history of endoleak  Following bp  Will check u/s of AAA  Following    7/1 discussed with wife, prior imaging noted type II endoleak back in February and she discussed with his physician, they will monitor outpatient for now.    COPD (chronic obstructive pulmonary disease) (HCC)- (present on admission)  Assessment & Plan  No evidence of acute exacerbation  RT following         VTE prophylaxis:   SCDs/TEDs      I have performed a physical exam and reviewed and updated ROS and Plan today (7/1/2024). In review of yesterday's note (6/30/2024), there are no changes except as documented above.  Total time spent 54 minutes. I spent greater than 50% of the time for patient care, counseling, and coordination on this date, including unit/floor time, and face-to-face time with the patient as per interval events, my own review of patient's imaging and lab analysis and developing my assessment and plan above.

## 2024-07-02 NOTE — DISCHARGE SUMMARY
"Discharge Summary    CHIEF COMPLAINT ON ADMISSION  Chief Complaint   Patient presents with    Abdominal Pain     Pt BIB [facility] for [facility]. Pt is nonverbal and wife wanted EMS called because patient was pointing to abdomen and seemed like he was in pain. Pt was given a pain pill PTA but wife was worried because his vital signs were changing and wanted him sent to the hospital.        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 The patient is here with his wife and she stated that he had increasing work of breathing, decreased saturations of oxygen and had increased his's oxygenation from 2 L nasal cannula to 4 L nasal cannula. The patient's wife states that she is a nurse and lives with the patient 70 increased work of breathing. In addition, she believes the patient is having abdominal pain as he is pointing to his abdomen and when she pushed on it he grimaced slightly. The patient is unable to communicate with words and he does not follow commands.   During this hospital stay he did have 2.9 seconds pause. Metoprolol was held, but then HR starting going up. Wife requested pt to be back on metoprolol. I did discuss with wife, that I am concern that pt is not improving and mentally he is not responding properly and is almost on vegetation state. Wife still would like to continue treatment. She wants to continue rehab. I did express my concern that because pt is not responsive and he does not follow commands he is not likely appropriate for pacemaker or other devices. Wife has to make so some hard decisions, if she needs to cross the bridge   Also in regards to anticoagulation it was discussed with Dr. Griffin and wife does not want anticoagulation. She does not want to continue prazosin and that was discontinued.  "     Therefore, he is discharged in guarded and stable condition to skilled nursing facility.    The patient met 2-midnight criteria for an inpatient stay at the time of discharge.    Discharge Date  07/02/2024    FOLLOW UP ITEMS POST DISCHARGE  none    DISCHARGE DIAGNOSES  Principal Problem:    CHF (congestive heart failure), NYHA class III, acute on chronic, combined (HCC) (POA: Yes)  Active Problems:    COPD (chronic obstructive pulmonary disease) (HCC) (POA: Yes)    Abdominal aortic aneurysm (AAA) without rupture (HCC) (POA: Yes)    Acute on chronic respiratory failure with hypoxia (HCC) (POA: Yes)    Dysphagia (POA: Unknown)    Leukocytosis (POA: Unknown)    Advanced care planning/counseling discussion (POA: Unknown)    Elevated troponin (POA: Unknown)  Resolved Problems:    * No resolved hospital problems. *      FOLLOW UP  No future appointments.  [facility]  [address]  [place] [address]  [phone]        Ximena Callahan, N.PAye  [address]  [facility] [address]  [facility] [id number] [phone]    Follow up  Please call your primary care provider to schedule a hospital follow up.   Thank you    Jordan Stovall D.O.  [address]  [address]  [facility] [id number] [phone]    Go on 8/7/2024  Please go to your  follow up appointment with Jordan Stovall D.O. on Wednesday August 7, 2024 at 3:15pm.      MEDICATIONS ON DISCHARGE     Medication List        START taking these medications        Instructions   losartan 25 MG Tabs  Start taking on: July 3, 2024  Commonly known as: Cozaar   0.5 Tablets by Enteral Tube route every day.  Dose: 12.5 mg            CHANGE how you take these medications        Instructions   metoprolol SR 25 MG Tb24  What changed: additional instructions  Commonly known as: Toprol XL   Take 1 Tablet by mouth every day.  Dose: 25 mg     valproic acid 250 MG/5ML Soln  What changed: additional instructions  Commonly known as: Depakene   10 mL by Enteral Tube route at bedtime.  Dose: 500  "mg            CONTINUE taking these medications        Instructions   albuterol 108 (90 Base) MCG/ACT Aers inhalation aerosol   Inhale 2 Puffs every 6 hours as needed for Shortness of Breath.  Dose: 2 Puff     Breo Ellipta 200-25 MCG/ACT Aepb  Generic drug: fluticasone furoate-vilanterol   Inhale 1 Puff every day.  Dose: 1 Puff     lansoprazole (Prevacid) 3 mg/mL oral suspension   30 mg by Enteral Tube route 2 times a day. Shake well  Dose: 30 mg     oxyCODONE immediate-release 5 MG Tabs  Commonly known as: Roxicodone   1 Tablet by Enteral Tube route every 8 hours as needed for Severe Pain for up to 5 days.  Dose: 5 mg     pravastatin 40 MG tablet  Commonly known as: Pravachol   1 Tablet by Enteral Tube route every evening.  Dose: 40 mg            STOP taking these medications      doxazosin 1 MG Tabs  Commonly known as: Cardura     hydrOXYzine HCl 25 MG Tabs  Commonly known as: Atarax              Allergies  Allergies   Allergen Reactions    Lisinopril      cough    Namenda [Memantine]      \"weakness\"    Rosuvastatin        DIET  Orders Placed This Encounter   Procedures    Diet: Diet Tube Feed; Formula: Other - Specify formula comments in the field to the right (Isosource 1.5); Select Bolus (If Indicated): Other (Specify in Comments) (Isosource 1.5 cartons); Bolus Frequency: QID; Number of Cartons Per Day: Four     Standing Status:   Standing     Number of Occurrences:   1     Order Specific Question:   Diet     Answer:   Diet Tube Feed [35]     Order Specific Question:   Formula:     Answer:   Other - Specify formula comments in the field to the right     Comments:   Isosource 1.5     Order Specific Question:   Route     Answer:   Gtube/PEG     Order Specific Question:   Select Bolus (If Indicated):     Answer:   Other (Specify in Comments)     Comments:   Isosource 1.5 cartons     Order Specific Question:   Bolus Frequency     Answer:   QID     Order Specific Question:   Number of Cartons Per Day:     Answer:   " Four       ACTIVITY  As tolerated and directed by skilled nursing.  Weight bearing as tolerated    CONSULTATIONS  none    PROCEDURES  none    LABORATORY  Lab Results   Component Value Date    SODIUM 139 07/01/2024    POTASSIUM 3.6 07/01/2024    CHLORIDE 101 07/01/2024    CO2 27 07/01/2024    GLUCOSE 105 (H) 07/01/2024    BUN 21 07/01/2024    CREATININE 0.71 07/01/2024    GLOMRATE 78 10/19/2023        Lab Results   Component Value Date    WBC 13.5 (H) 07/02/2024    HEMOGLOBIN 9.9 (L) 07/02/2024    HEMATOCRIT 31.4 (L) 07/02/2024    PLATELETCT 313 07/02/2024        Total time of the discharge process exceeds 35 minutes.

## 2024-07-02 NOTE — DISCHARGE PLANNING
-0910  DPA informed by Esther at [facility] that facility can accept pt today at 1300. RN CM notified to initiate 1300 transport time.

## 2024-07-02 NOTE — WOUND TEAM
[facility]  Inpatient Services  Wound and Skin Care Brief Evaluation    Admission Date: 6/30/2024     Last order of IP CONSULT TO WOUND CARE was found on 6/30/2024 from Hospital Encounter on 6/30/2024     HPI, PMH, SH: Reviewed    Chief Complaint   Patient presents with    Abdominal Pain     Pt BIB [facility] for [facility]. Pt is nonverbal and wife wanted EMS called because patient was pointing to abdomen and seemed like he was in pain. Pt was given a pain pill PTA but wife was worried because his vital signs were changing and wanted him sent to the hospital.      Diagnosis: CHF (congestive heart failure), NYHA class III, acute on chronic, combined (HCC) [I50.43]    Unit where seen by Wound Team: T801/00     Wound consult placed regarding sacrum, groin, bilateral ears. Chart and images reviewed. This discussed with bedside RNMarcelina. This clinician in to assess patient. Patient pleasant and agreeable. Patient's wife at bedside. Non-selectively debrided with No rinse foam soap and Moist warm washcloth.     No pressure injuries or advanced wound care needs identified. Sacrum, bilateral heels and ears are normal skin coloration for skin tone, intact and blanchable.  Bilateral shoulders has dry skin, Aquaphor ordered for nursing to apply.  Wound consult completed. No further follow up unless indicated and consulted.                            PREVENTATIVE INTERVENTIONS:    Q shift Barrera - performed per nursing policy  Q shift pressure point assessments - performed per nursing policy    Surface/Positioning  Standard/trauma mattress - Currently in Place  Reposition q 2 hours - Currently in Place  TAPs Turning system - Currently in Place    Offloading/Redistribution  Float Heels off Bed with Pillows - Applied this Visit           Respiratory  Silicone O2 tubing - Currently in Place  Gray Foam Ear protectors - Currently in Place    Containment/Moisture Prevention    Dri-zuri pad - Currently in  Place  Barrier paste - Currently in Place    Mobilization      Unable to assess

## 2024-07-02 NOTE — DISCHARGE PLANNING
Case Management Discharge Planning    Admission Date: 6/30/2024  GMLOS: 3.9  ALOS: 2    6-Clicks ADL Score: 12  6-Clicks Mobility Score: 12  PT and/or OT Eval ordered: Yes  Post-acute Referrals Ordered: Yes  Post-acute Choice Obtained: Yes  Has referral(s) been sent to post-acute provider:  Yes      Anticipated Discharge Dispo: Discharge Disposition: D/T to SNF with Medicare cert in anticipation of skilled care (03)    DME Needed: No    Action(s) Taken: Transport Arranged     Patient discussed in rounds, Medically cleared for D/C to [facility] today. DPA called [facility] for bed availability and per Esther patient can return today at 1300.    RN CM requested transport at 1300 via [facility] to [facility]. Received confirmation from ride line that [facility] will  patient today at 1300.     RN CM met with patient's spouse at bedside to discuss. Informed of discharge plan and time of transportation. She is agreeable with patient discharging back to [facility]. Obtained signature for [facility] packet as pt is non verbal.     [facility] packet prepared and given to bedside nurse.    Escalations Completed: None    Medically Clear: Yes    Next Steps: Pt discharged to [facility]    Barriers to Discharge: None    Is the patient up for discharge tomorrow: No

## 2024-07-02 NOTE — DISCHARGE INSTRUCTIONS
HF Patient Discharge Instructions  Monitor your weight daily, and maintain a weight chart, to track your weight changes.   Activity as tolerated, unless your Doctor has ordered otherwise. Other activity order: as tolerated.  Follow a low fat, low cholesterol, low salt diet unless instructed otherwise by your Doctor. Read the labels on the back of food products and track your intake of fat, cholesterol and salt.   Fluid Restriction No. If a Fluid Restriction has been ordered by your Doctor, measure fluids with a measuring cup to ensure that you are not exceeding the restriction.   No smoking.  Oxygen Yes. If your Doctor has ordered that you wear Oxygen at home, it is important to wear it as ordered.  Did you receive an explanation from staff on the importance of taking each of your medications and why it is necessary to keep taking them unless your doctor says to stop? Yes  Were all of your questions answered about how to manage your heart failure and what to do if you have increased signs and symptoms after you go home? Yes  Do you feel like your heart failure care team involved you in the care treatment plan and allowed you to make decisions regarding your care while in the hospital and addressed any discharge needs you might have? Yes    See the educational handout provided at discharge for more information on monitoring your daily weight, activity and diet. This also explains more about Heart Failure, symptoms of a flare-up and some of the tests that you have undergone.     Warning Signs of a Flare-Up include:  Swelling in the ankles or lower legs.  Shortness of breath, while at rest, or while doing normal activities.   Shortness of breath at night when in bed, or coughing in bed.   Requiring more pillows to sleep at night, or needing to sit up at night to sleep.  Feeling weak, dizzy or fatigued.     When to call your Doctor:  Call your Primary Care Physician or Cardiologist if:   You experience any pain  radiating to your jaw or neck.  You have any difficulty breathing.  You experience weight gain of 3 lbs in a day or 5 lbs in a week.   You feel any palpitations or irregular heartbeats.  You become dizzy or lose consciousness.   If you have had an angiogram or had a pacemaker or AICD placed, and experience:  Bleeding, drainage or swelling at the surgical / puncture site.  Fever greater than 100.0 F  Shock from internal defibrillator.  Cool and / or numb extremities.     Please access the AHA My HF Guide/Heart Failure Interactive Workbook:   [email or web address]

## 2024-07-02 NOTE — THERAPY
Occupational Therapy Contact Note    Patient Name: Jl Mueller  Age:  78 y.o., Sex:  male  Medical Record #: [id number]  Today's Date: 7/2/2024 07/02/24 1123   Interdisciplinary Plan of Care Collaboration   Collaboration Comments OT referral received. Per notes pt is to transport back to SNF at 1300. Will defer OT eval. If pt ends up remaining in-house, will follow-up on OT eval.

## 2024-07-02 NOTE — HOSPITAL COURSE
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 The patient is here with his wife and she stated that he had increasing work of breathing, decreased saturations of oxygen and had increased his's oxygenation from 2 L nasal cannula to 4 L nasal cannula. The patient's wife states that she is a nurse and lives with the patient 70 increased work of breathing. In addition, she believes the patient is having abdominal pain as he is pointing to his abdomen and when she pushed on it he grimaced slightly. The patient is unable to communicate with words.\"  "

## 2024-07-02 NOTE — DISCHARGE PLANNING
DC Transport Scheduled    Transport Company Scheduled:  [facility]  Spoke with Cierra at [facility] to schedule transport.    Scheduled Date: 7/2/2024  Scheduled Time: 1300    Destination: [facility]   Destination address: [address] , [place] [id number]    Notified care team of scheduled transport via [employer].     If there are any changes needed to the DC transportation scheduled, please contact [facility] at ext. [phone] between the hours of 3007-2861 Mon-Fri. If outside those hours, contact the ED Case Manager at ext. [phone].

## 2024-07-02 NOTE — DISCHARGE PLANNING
Community Health Worker Intake    Contact information provided to Jl Mueller   Inpatient assessment completed.    CHW introduced community care management to the patient's spouse Avril bedside. Pt is non verbal.     Avril needed assistance resetting MyChart on her phone / logging in.     CHW assisted the pt to access MyChart.     Pt is anticipated to discharge to SNF.     Plan: CHW will no longer follow.

## 2024-07-02 NOTE — PROGRESS NOTES
Monitor Summary  Rhythm: Sinus Tachycardia - SR   Rate:   Ectopy: Couplets  .16 / .09 / .34

## 2024-07-03 ENCOUNTER — PATIENT OUTREACH (OUTPATIENT)
Dept: HEALTH INFORMATION MANAGEMENT | Facility: OTHER | Age: 79
End: 2024-07-03
Payer: MEDICARE

## 2024-07-05 LAB
BACTERIA BLD CULT: NORMAL
BACTERIA BLD CULT: NORMAL
SIGNIFICANT IND 70042: NORMAL
SIGNIFICANT IND 70042: NORMAL
SITE SITE: NORMAL
SITE SITE: NORMAL
SOURCE SOURCE: NORMAL
SOURCE SOURCE: NORMAL

## 2024-07-06 ENCOUNTER — APPOINTMENT (OUTPATIENT)
Dept: RADIOLOGY | Facility: MEDICAL CENTER | Age: 79
End: 2024-07-06
Attending: EMERGENCY MEDICINE
Payer: MEDICARE

## 2024-07-06 ENCOUNTER — HOSPITAL ENCOUNTER (EMERGENCY)
Facility: MEDICAL CENTER | Age: 79
End: 2024-07-06
Attending: EMERGENCY MEDICINE
Payer: MEDICARE

## 2024-07-06 VITALS
SYSTOLIC BLOOD PRESSURE: 119 MMHG | HEART RATE: 99 BPM | DIASTOLIC BLOOD PRESSURE: 66 MMHG | TEMPERATURE: 97.8 F | RESPIRATION RATE: 20 BRPM | WEIGHT: 126 LBS | OXYGEN SATURATION: 94 % | HEIGHT: 65 IN | BODY MASS INDEX: 20.99 KG/M2

## 2024-07-06 DIAGNOSIS — R11.2 NAUSEA AND VOMITING, UNSPECIFIED VOMITING TYPE: ICD-10-CM

## 2024-07-06 DIAGNOSIS — K59.00 CONSTIPATION, UNSPECIFIED CONSTIPATION TYPE: ICD-10-CM

## 2024-07-06 LAB
ALBUMIN SERPL BCP-MCNC: 3.9 G/DL (ref 3.2–4.9)
ALBUMIN/GLOB SERPL: 1.2 G/DL
ALP SERPL-CCNC: 56 U/L (ref 30–99)
ALT SERPL-CCNC: 17 U/L (ref 2–50)
ANION GAP SERPL CALC-SCNC: 11 MMOL/L (ref 7–16)
APPEARANCE UR: CLEAR
AST SERPL-CCNC: 16 U/L (ref 12–45)
BACTERIA #/AREA URNS HPF: NEGATIVE /HPF
BASOPHILS # BLD AUTO: 0.3 % (ref 0–1.8)
BASOPHILS # BLD: 0.05 K/UL (ref 0–0.12)
BILIRUB SERPL-MCNC: 0.4 MG/DL (ref 0.1–1.5)
BILIRUB UR QL STRIP.AUTO: NEGATIVE
BUN SERPL-MCNC: 20 MG/DL (ref 8–22)
CALCIUM ALBUM COR SERPL-MCNC: 9.1 MG/DL (ref 8.5–10.5)
CALCIUM SERPL-MCNC: 9 MG/DL (ref 8.5–10.5)
CHLORIDE SERPL-SCNC: 96 MMOL/L (ref 96–112)
CO2 SERPL-SCNC: 28 MMOL/L (ref 20–33)
COLOR UR: YELLOW
CREAT SERPL-MCNC: 0.65 MG/DL (ref 0.5–1.4)
EOSINOPHIL # BLD AUTO: 0.01 K/UL (ref 0–0.51)
EOSINOPHIL NFR BLD: 0.1 % (ref 0–6.9)
EPI CELLS #/AREA URNS HPF: ABNORMAL /HPF
ERYTHROCYTE [DISTWIDTH] IN BLOOD BY AUTOMATED COUNT: 50.1 FL (ref 35.9–50)
GFR SERPLBLD CREATININE-BSD FMLA CKD-EPI: 96 ML/MIN/1.73 M 2
GLOBULIN SER CALC-MCNC: 3.3 G/DL (ref 1.9–3.5)
GLUCOSE SERPL-MCNC: 160 MG/DL (ref 65–99)
GLUCOSE UR STRIP.AUTO-MCNC: NEGATIVE MG/DL
HCT VFR BLD AUTO: 34.3 % (ref 42–52)
HGB BLD-MCNC: 11.1 G/DL (ref 14–18)
HYALINE CASTS #/AREA URNS LPF: ABNORMAL /LPF
IMM GRANULOCYTES # BLD AUTO: 0.1 K/UL (ref 0–0.11)
IMM GRANULOCYTES NFR BLD AUTO: 0.5 % (ref 0–0.9)
KETONES UR STRIP.AUTO-MCNC: ABNORMAL MG/DL
LEUKOCYTE ESTERASE UR QL STRIP.AUTO: NEGATIVE
LIPASE SERPL-CCNC: 17 U/L (ref 11–82)
LYMPHOCYTES # BLD AUTO: 0.52 K/UL (ref 1–4.8)
LYMPHOCYTES NFR BLD: 2.8 % (ref 22–41)
MCH RBC QN AUTO: 28.4 PG (ref 27–33)
MCHC RBC AUTO-ENTMCNC: 32.4 G/DL (ref 32.3–36.5)
MCV RBC AUTO: 87.7 FL (ref 81.4–97.8)
MICRO URNS: ABNORMAL
MONOCYTES # BLD AUTO: 1.2 K/UL (ref 0–0.85)
MONOCYTES NFR BLD AUTO: 6.5 % (ref 0–13.4)
NEUTROPHILS # BLD AUTO: 16.69 K/UL (ref 1.82–7.42)
NEUTROPHILS NFR BLD: 89.8 % (ref 44–72)
NITRITE UR QL STRIP.AUTO: NEGATIVE
NRBC # BLD AUTO: 0 K/UL
NRBC BLD-RTO: 0 /100 WBC (ref 0–0.2)
PH UR STRIP.AUTO: 7.5 [PH] (ref 5–8)
PLATELET # BLD AUTO: 339 K/UL (ref 164–446)
PMV BLD AUTO: 11.3 FL (ref 9–12.9)
POTASSIUM SERPL-SCNC: 4.3 MMOL/L (ref 3.6–5.5)
PROT SERPL-MCNC: 7.2 G/DL (ref 6–8.2)
PROT UR QL STRIP: 30 MG/DL
RBC # BLD AUTO: 3.91 M/UL (ref 4.7–6.1)
RBC # URNS HPF: ABNORMAL /HPF
RBC UR QL AUTO: NEGATIVE
SODIUM SERPL-SCNC: 135 MMOL/L (ref 135–145)
SP GR UR STRIP.AUTO: >=1.045
UROBILINOGEN UR STRIP.AUTO-MCNC: 1 MG/DL
WBC # BLD AUTO: 18.6 K/UL (ref 4.8–10.8)
WBC #/AREA URNS HPF: ABNORMAL /HPF

## 2024-07-06 PROCEDURE — 85025 COMPLETE CBC W/AUTO DIFF WBC: CPT

## 2024-07-06 PROCEDURE — 51701 INSERT BLADDER CATHETER: CPT

## 2024-07-06 PROCEDURE — 74177 CT ABD & PELVIS W/CONTRAST: CPT

## 2024-07-06 PROCEDURE — 700117 HCHG RX CONTRAST REV CODE 255: Performed by: EMERGENCY MEDICINE

## 2024-07-06 PROCEDURE — 81001 URINALYSIS AUTO W/SCOPE: CPT

## 2024-07-06 PROCEDURE — 36415 COLL VENOUS BLD VENIPUNCTURE: CPT

## 2024-07-06 PROCEDURE — 83690 ASSAY OF LIPASE: CPT

## 2024-07-06 PROCEDURE — 80053 COMPREHEN METABOLIC PANEL: CPT

## 2024-07-06 PROCEDURE — 700101 HCHG RX REV CODE 250: Performed by: EMERGENCY MEDICINE

## 2024-07-06 PROCEDURE — 94640 AIRWAY INHALATION TREATMENT: CPT

## 2024-07-06 PROCEDURE — 99285 EMERGENCY DEPT VISIT HI MDM: CPT

## 2024-07-06 RX ADMIN — ALBUTEROL SULFATE 2.5 MG: 2.5 SOLUTION RESPIRATORY (INHALATION) at 12:12

## 2024-07-06 RX ADMIN — IOHEXOL 100 ML: 350 INJECTION, SOLUTION INTRAVENOUS at 09:35

## 2024-07-06 ASSESSMENT — FIBROSIS 4 INDEX: FIB4 SCORE: 1.02

## 2024-07-06 ASSESSMENT — LIFESTYLE VARIABLES: DO YOU DRINK ALCOHOL: NO

## 2024-07-11 ENCOUNTER — APPOINTMENT (OUTPATIENT)
Dept: RADIOLOGY | Facility: MEDICAL CENTER | Age: 79
DRG: 871 | End: 2024-07-11
Attending: EMERGENCY MEDICINE
Payer: MEDICARE

## 2024-07-11 ENCOUNTER — HOSPITAL ENCOUNTER (INPATIENT)
Facility: MEDICAL CENTER | Age: 79
LOS: 8 days | DRG: 871 | End: 2024-07-19
Attending: EMERGENCY MEDICINE | Admitting: INTERNAL MEDICINE
Payer: MEDICARE

## 2024-07-11 DIAGNOSIS — N13.8 BPH WITH URINARY OBSTRUCTION: ICD-10-CM

## 2024-07-11 DIAGNOSIS — M19.90 OSTEOARTHRITIS, UNSPECIFIED OSTEOARTHRITIS TYPE, UNSPECIFIED SITE: ICD-10-CM

## 2024-07-11 DIAGNOSIS — N40.1 BPH WITH URINARY OBSTRUCTION: ICD-10-CM

## 2024-07-11 DIAGNOSIS — J69.0 ASPIRATION PNEUMONIA OF BOTH LOWER LOBES DUE TO GASTRIC SECRETIONS (HCC): ICD-10-CM

## 2024-07-11 DIAGNOSIS — J18.9 PNEUMONIA OF BOTH LUNGS DUE TO INFECTIOUS ORGANISM, UNSPECIFIED PART OF LUNG: ICD-10-CM

## 2024-07-11 DIAGNOSIS — J44.9 CHRONIC OBSTRUCTIVE PULMONARY DISEASE, UNSPECIFIED COPD TYPE (HCC): ICD-10-CM

## 2024-07-11 DIAGNOSIS — K92.2 ACUTE UPPER GI BLEED: ICD-10-CM

## 2024-07-11 DIAGNOSIS — J96.01 ACUTE RESPIRATORY FAILURE WITH HYPOXIA (HCC): ICD-10-CM

## 2024-07-11 PROBLEM — R65.20 SEVERE SEPSIS (HCC): Status: ACTIVE | Noted: 2024-07-11

## 2024-07-11 PROBLEM — A41.9 SEVERE SEPSIS (HCC): Status: ACTIVE | Noted: 2024-07-11

## 2024-07-11 LAB
ALBUMIN SERPL BCP-MCNC: 3.2 G/DL (ref 3.2–4.9)
ALBUMIN/GLOB SERPL: 1.2 G/DL
ALP SERPL-CCNC: 49 U/L (ref 30–99)
ALT SERPL-CCNC: 13 U/L (ref 2–50)
ANION GAP SERPL CALC-SCNC: 11 MMOL/L (ref 7–16)
ANISOCYTOSIS BLD QL SMEAR: ABNORMAL
APPEARANCE UR: CLEAR
AST SERPL-CCNC: 20 U/L (ref 12–45)
B-OH-BUTYR SERPL-MCNC: 0.16 MMOL/L (ref 0.02–0.27)
BACTERIA #/AREA URNS HPF: ABNORMAL /HPF
BASOPHILS # BLD AUTO: 0.5 % (ref 0–1.8)
BASOPHILS # BLD: 0.08 K/UL (ref 0–0.12)
BILIRUB SERPL-MCNC: 0.2 MG/DL (ref 0.1–1.5)
BILIRUB UR QL STRIP.AUTO: NEGATIVE
BUN SERPL-MCNC: 32 MG/DL (ref 8–22)
CA-I SERPL-SCNC: 1 MMOL/L (ref 1.1–1.3)
CALCIUM ALBUM COR SERPL-MCNC: 8.4 MG/DL (ref 8.5–10.5)
CALCIUM SERPL-MCNC: 7.8 MG/DL (ref 8.5–10.5)
CFT BLD TEG: 5.1 MIN (ref 4.6–9.1)
CFT P HPASE BLD TEG: 4.2 MIN (ref 4.3–8.3)
CHLORIDE SERPL-SCNC: 98 MMOL/L (ref 96–112)
CK SERPL-CCNC: 44 U/L (ref 0–154)
CLOT ANGLE BLD TEG: 77.4 DEGREES (ref 63–78)
CLOT LYSIS 30M P MA LENFR BLD TEG: 0.1 % (ref 0–2.6)
CO2 SERPL-SCNC: 30 MMOL/L (ref 20–33)
COLOR UR: YELLOW
COMMENT 1642: NORMAL
CORTIS SERPL-MCNC: 40.1 UG/DL (ref 0–23)
CREAT SERPL-MCNC: 0.88 MG/DL (ref 0.5–1.4)
CT.EXTRINSIC BLD ROTEM: 0.8 MIN (ref 0.8–2.1)
EKG IMPRESSION: NORMAL
EOSINOPHIL # BLD AUTO: 0.04 K/UL (ref 0–0.51)
EOSINOPHIL NFR BLD: 0.2 % (ref 0–6.9)
EPI CELLS #/AREA URNS HPF: NEGATIVE /HPF
ERYTHROCYTE [DISTWIDTH] IN BLOOD BY AUTOMATED COUNT: 52.5 FL (ref 35.9–50)
ERYTHROCYTE [DISTWIDTH] IN BLOOD BY AUTOMATED COUNT: 52.9 FL (ref 35.9–50)
ERYTHROCYTE [DISTWIDTH] IN BLOOD BY AUTOMATED COUNT: 53.2 FL (ref 35.9–50)
ERYTHROCYTE [DISTWIDTH] IN BLOOD BY AUTOMATED COUNT: 54.6 FL (ref 35.9–50)
EST. AVERAGE GLUCOSE BLD GHB EST-MCNC: 105 MG/DL
FIBRINOGEN PPP-MCNC: 312 MG/DL (ref 215–460)
GFR SERPLBLD CREATININE-BSD FMLA CKD-EPI: 88 ML/MIN/1.73 M 2
GLOBULIN SER CALC-MCNC: 2.6 G/DL (ref 1.9–3.5)
GLUCOSE BLD STRIP.AUTO-MCNC: 128 MG/DL (ref 65–99)
GLUCOSE BLD STRIP.AUTO-MCNC: 155 MG/DL (ref 65–99)
GLUCOSE BLD STRIP.AUTO-MCNC: 180 MG/DL (ref 65–99)
GLUCOSE BLD STRIP.AUTO-MCNC: 316 MG/DL (ref 65–99)
GLUCOSE SERPL-MCNC: 396 MG/DL (ref 65–99)
GLUCOSE UR STRIP.AUTO-MCNC: 100 MG/DL
GRAM STN SPEC: NORMAL
HBA1C MFR BLD: 5.3 % (ref 4–5.6)
HCT VFR BLD AUTO: 25 % (ref 42–52)
HCT VFR BLD AUTO: 27.7 % (ref 42–52)
HCT VFR BLD AUTO: 27.9 % (ref 42–52)
HCT VFR BLD AUTO: 32.4 % (ref 42–52)
HGB BLD-MCNC: 7.9 G/DL (ref 14–18)
HGB BLD-MCNC: 8.3 G/DL (ref 14–18)
HGB BLD-MCNC: 8.4 G/DL (ref 14–18)
HGB BLD-MCNC: 9.6 G/DL (ref 14–18)
HYALINE CASTS #/AREA URNS LPF: ABNORMAL /LPF
IMM GRANULOCYTES # BLD AUTO: 0.15 K/UL (ref 0–0.11)
IMM GRANULOCYTES NFR BLD AUTO: 0.9 % (ref 0–0.9)
INR PPP: 1.13 (ref 0.87–1.13)
KETONES UR STRIP.AUTO-MCNC: NEGATIVE MG/DL
LACTATE SERPL-SCNC: 3.1 MMOL/L (ref 0.5–2)
LACTATE SERPL-SCNC: 3.4 MMOL/L (ref 0.5–2)
LACTATE SERPL-SCNC: 3.5 MMOL/L (ref 0.5–2)
LACTATE SERPL-SCNC: 3.6 MMOL/L (ref 0.5–2)
LACTATE SERPL-SCNC: 4 MMOL/L (ref 0.5–2)
LEUKOCYTE ESTERASE UR QL STRIP.AUTO: ABNORMAL
LYMPHOCYTES # BLD AUTO: 2.42 K/UL (ref 1–4.8)
LYMPHOCYTES NFR BLD: 13.8 % (ref 22–41)
MAGNESIUM SERPL-MCNC: 2.2 MG/DL (ref 1.5–2.5)
MCF BLD TEG: 68.1 MM (ref 52–69)
MCF.PLATELET INHIB BLD ROTEM: 29.1 MM (ref 15–32)
MCH RBC QN AUTO: 27.1 PG (ref 27–33)
MCH RBC QN AUTO: 27.7 PG (ref 27–33)
MCH RBC QN AUTO: 27.8 PG (ref 27–33)
MCH RBC QN AUTO: 28.6 PG (ref 27–33)
MCHC RBC AUTO-ENTMCNC: 29.6 G/DL (ref 32.3–36.5)
MCHC RBC AUTO-ENTMCNC: 29.7 G/DL (ref 32.3–36.5)
MCHC RBC AUTO-ENTMCNC: 30.3 G/DL (ref 32.3–36.5)
MCHC RBC AUTO-ENTMCNC: 31.6 G/DL (ref 32.3–36.5)
MCV RBC AUTO: 90.6 FL (ref 81.4–97.8)
MCV RBC AUTO: 91.2 FL (ref 81.4–97.8)
MCV RBC AUTO: 91.7 FL (ref 81.4–97.8)
MCV RBC AUTO: 93.4 FL (ref 81.4–97.8)
MICRO URNS: ABNORMAL
MONOCYTES # BLD AUTO: 0.93 K/UL (ref 0–0.85)
MONOCYTES NFR BLD AUTO: 5.3 % (ref 0–13.4)
MORPHOLOGY BLD-IMP: NORMAL
NEUTROPHILS # BLD AUTO: 13.9 K/UL (ref 1.82–7.42)
NEUTROPHILS NFR BLD: 79.3 % (ref 44–72)
NITRITE UR QL STRIP.AUTO: POSITIVE
NRBC # BLD AUTO: 0.02 K/UL
NRBC BLD-RTO: 0.1 /100 WBC (ref 0–0.2)
OVALOCYTES BLD QL SMEAR: NORMAL
PA AA BLD-ACNC: 16.5 % (ref 0–11)
PA ADP BLD-ACNC: 92 % (ref 0–17)
PH UR STRIP.AUTO: 7 [PH] (ref 5–8)
PLATELET # BLD AUTO: 260 K/UL (ref 164–446)
PLATELET # BLD AUTO: 275 K/UL (ref 164–446)
PLATELET # BLD AUTO: 283 K/UL (ref 164–446)
PLATELET # BLD AUTO: 327 K/UL (ref 164–446)
PLATELET BLD QL SMEAR: NORMAL
PMV BLD AUTO: 10.9 FL (ref 9–12.9)
PMV BLD AUTO: 11.2 FL (ref 9–12.9)
PMV BLD AUTO: 11.2 FL (ref 9–12.9)
PMV BLD AUTO: 11.4 FL (ref 9–12.9)
POIKILOCYTOSIS BLD QL SMEAR: NORMAL
POTASSIUM SERPL-SCNC: 5.1 MMOL/L (ref 3.6–5.5)
PROCALCITONIN SERPL-MCNC: 0.21 NG/ML
PROT SERPL-MCNC: 5.8 G/DL (ref 6–8.2)
PROT UR QL STRIP: 30 MG/DL
PROTHROMBIN TIME: 14.6 SEC (ref 12–14.6)
RBC # BLD AUTO: 2.76 M/UL (ref 4.7–6.1)
RBC # BLD AUTO: 3.02 M/UL (ref 4.7–6.1)
RBC # BLD AUTO: 3.06 M/UL (ref 4.7–6.1)
RBC # BLD AUTO: 3.47 M/UL (ref 4.7–6.1)
RBC # URNS HPF: ABNORMAL /HPF
RBC BLD AUTO: PRESENT
RBC UR QL AUTO: NEGATIVE
SCCMEC + MECA PNL NOSE NAA+PROBE: NEGATIVE
SIGNIFICANT IND 70042: NORMAL
SITE SITE: NORMAL
SODIUM SERPL-SCNC: 139 MMOL/L (ref 135–145)
SOURCE SOURCE: NORMAL
SP GR UR STRIP.AUTO: 1.04
TEG ALGORITHM TGALG: ABNORMAL
TRIGL SERPL-MCNC: 93 MG/DL (ref 0–149)
TRIGL SERPL-MCNC: 94 MG/DL (ref 0–149)
TROPONIN T SERPL-MCNC: 103 NG/L (ref 6–19)
TROPONIN T SERPL-MCNC: 71 NG/L (ref 6–19)
UROBILINOGEN UR STRIP.AUTO-MCNC: 1 MG/DL
WBC # BLD AUTO: 17.2 K/UL (ref 4.8–10.8)
WBC # BLD AUTO: 17.5 K/UL (ref 4.8–10.8)
WBC # BLD AUTO: 18.6 K/UL (ref 4.8–10.8)
WBC # BLD AUTO: 19.1 K/UL (ref 4.8–10.8)
WBC #/AREA URNS HPF: ABNORMAL /HPF

## 2024-07-11 PROCEDURE — 82962 GLUCOSE BLOOD TEST: CPT

## 2024-07-11 PROCEDURE — 02HV33Z INSERTION OF INFUSION DEVICE INTO SUPERIOR VENA CAVA, PERCUTANEOUS APPROACH: ICD-10-PCS | Performed by: EMERGENCY MEDICINE

## 2024-07-11 PROCEDURE — 84484 ASSAY OF TROPONIN QUANT: CPT

## 2024-07-11 PROCEDURE — 36556 INSERT NON-TUNNEL CV CATH: CPT

## 2024-07-11 PROCEDURE — 96368 THER/DIAG CONCURRENT INF: CPT

## 2024-07-11 PROCEDURE — 87641 MR-STAPH DNA AMP PROBE: CPT

## 2024-07-11 PROCEDURE — 82803 BLOOD GASES ANY COMBINATION: CPT

## 2024-07-11 PROCEDURE — 85384 FIBRINOGEN ACTIVITY: CPT

## 2024-07-11 PROCEDURE — 85610 PROTHROMBIN TIME: CPT

## 2024-07-11 PROCEDURE — 700101 HCHG RX REV CODE 250: Performed by: INTERNAL MEDICINE

## 2024-07-11 PROCEDURE — 74177 CT ABD & PELVIS W/CONTRAST: CPT

## 2024-07-11 PROCEDURE — 93005 ELECTROCARDIOGRAM TRACING: CPT | Performed by: EMERGENCY MEDICINE

## 2024-07-11 PROCEDURE — 700101 HCHG RX REV CODE 250: Performed by: EMERGENCY MEDICINE

## 2024-07-11 PROCEDURE — 83605 ASSAY OF LACTIC ACID: CPT

## 2024-07-11 PROCEDURE — 82330 ASSAY OF CALCIUM: CPT

## 2024-07-11 PROCEDURE — 770022 HCHG ROOM/CARE - ICU (200)

## 2024-07-11 PROCEDURE — 85025 COMPLETE CBC W/AUTO DIFF WBC: CPT

## 2024-07-11 PROCEDURE — 70450 CT HEAD/BRAIN W/O DYE: CPT

## 2024-07-11 PROCEDURE — 5A1945Z RESPIRATORY VENTILATION, 24-96 CONSECUTIVE HOURS: ICD-10-PCS | Performed by: EMERGENCY MEDICINE

## 2024-07-11 PROCEDURE — 0B9J8ZX DRAINAGE OF LEFT LOWER LUNG LOBE, VIA NATURAL OR ARTIFICIAL OPENING ENDOSCOPIC, DIAGNOSTIC: ICD-10-PCS | Performed by: INTERNAL MEDICINE

## 2024-07-11 PROCEDURE — C9113 INJ PANTOPRAZOLE SODIUM, VIA: HCPCS | Performed by: INTERNAL MEDICINE

## 2024-07-11 PROCEDURE — 94003 VENT MGMT INPAT SUBQ DAY: CPT

## 2024-07-11 PROCEDURE — 96365 THER/PROPH/DIAG IV INF INIT: CPT

## 2024-07-11 PROCEDURE — 31624 DX BRONCHOSCOPE/LAVAGE: CPT | Performed by: INTERNAL MEDICINE

## 2024-07-11 PROCEDURE — 87147 CULTURE TYPE IMMUNOLOGIC: CPT

## 2024-07-11 PROCEDURE — 99291 CRITICAL CARE FIRST HOUR: CPT

## 2024-07-11 PROCEDURE — 84478 ASSAY OF TRIGLYCERIDES: CPT

## 2024-07-11 PROCEDURE — 80053 COMPREHEN METABOLIC PANEL: CPT

## 2024-07-11 PROCEDURE — C9113 INJ PANTOPRAZOLE SODIUM, VIA: HCPCS | Performed by: EMERGENCY MEDICINE

## 2024-07-11 PROCEDURE — 96375 TX/PRO/DX INJ NEW DRUG ADDON: CPT

## 2024-07-11 PROCEDURE — 85347 COAGULATION TIME ACTIVATED: CPT

## 2024-07-11 PROCEDURE — 81001 URINALYSIS AUTO W/SCOPE: CPT

## 2024-07-11 PROCEDURE — 700111 HCHG RX REV CODE 636 W/ 250 OVERRIDE (IP)

## 2024-07-11 PROCEDURE — 96366 THER/PROPH/DIAG IV INF ADDON: CPT

## 2024-07-11 PROCEDURE — 31645 BRNCHSC W/THER ASPIR 1ST: CPT | Performed by: INTERNAL MEDICINE

## 2024-07-11 PROCEDURE — 93005 ELECTROCARDIOGRAM TRACING: CPT | Performed by: INTERNAL MEDICINE

## 2024-07-11 PROCEDURE — 87086 URINE CULTURE/COLONY COUNT: CPT

## 2024-07-11 PROCEDURE — 302977 HCHG BRONCHOSCOPY PROC-THERAPEUTIC

## 2024-07-11 PROCEDURE — 87186 SC STD MICRODIL/AGAR DIL: CPT | Mod: 91

## 2024-07-11 PROCEDURE — 82550 ASSAY OF CK (CPK): CPT

## 2024-07-11 PROCEDURE — 94799 UNLISTED PULMONARY SVC/PX: CPT

## 2024-07-11 PROCEDURE — 99291 CRITICAL CARE FIRST HOUR: CPT | Mod: 25 | Performed by: INTERNAL MEDICINE

## 2024-07-11 PROCEDURE — 99223 1ST HOSP IP/OBS HIGH 75: CPT | Performed by: NURSE PRACTITIONER

## 2024-07-11 PROCEDURE — 71045 X-RAY EXAM CHEST 1 VIEW: CPT

## 2024-07-11 PROCEDURE — 87205 SMEAR GRAM STAIN: CPT

## 2024-07-11 PROCEDURE — 304538 HCHG NG TUBE

## 2024-07-11 PROCEDURE — 82010 KETONE BODYS QUAN: CPT

## 2024-07-11 PROCEDURE — 31500 INSERT EMERGENCY AIRWAY: CPT

## 2024-07-11 PROCEDURE — 700102 HCHG RX REV CODE 250 W/ 637 OVERRIDE(OP): Performed by: INTERNAL MEDICINE

## 2024-07-11 PROCEDURE — 82533 TOTAL CORTISOL: CPT

## 2024-07-11 PROCEDURE — 700105 HCHG RX REV CODE 258: Performed by: EMERGENCY MEDICINE

## 2024-07-11 PROCEDURE — 71275 CT ANGIOGRAPHY CHEST: CPT

## 2024-07-11 PROCEDURE — 94002 VENT MGMT INPAT INIT DAY: CPT

## 2024-07-11 PROCEDURE — C1751 CATH, INF, PER/CENT/MIDLINE: HCPCS

## 2024-07-11 PROCEDURE — 87077 CULTURE AEROBIC IDENTIFY: CPT | Mod: 91

## 2024-07-11 PROCEDURE — 85576 BLOOD PLATELET AGGREGATION: CPT | Mod: 91

## 2024-07-11 PROCEDURE — 83735 ASSAY OF MAGNESIUM: CPT

## 2024-07-11 PROCEDURE — 700117 HCHG RX CONTRAST REV CODE 255: Performed by: EMERGENCY MEDICINE

## 2024-07-11 PROCEDURE — 99292 CRITICAL CARE ADDL 30 MIN: CPT | Mod: 25 | Performed by: INTERNAL MEDICINE

## 2024-07-11 PROCEDURE — 85384 FIBRINOGEN ACTIVITY: CPT | Mod: 91

## 2024-07-11 PROCEDURE — 96367 TX/PROPH/DG ADDL SEQ IV INF: CPT

## 2024-07-11 PROCEDURE — A9270 NON-COVERED ITEM OR SERVICE: HCPCS | Performed by: INTERNAL MEDICINE

## 2024-07-11 PROCEDURE — 99152 MOD SED SAME PHYS/QHP 5/>YRS: CPT

## 2024-07-11 PROCEDURE — 87040 BLOOD CULTURE FOR BACTERIA: CPT | Mod: 91

## 2024-07-11 PROCEDURE — 36415 COLL VENOUS BLD VENIPUNCTURE: CPT

## 2024-07-11 PROCEDURE — 700105 HCHG RX REV CODE 258: Performed by: INTERNAL MEDICINE

## 2024-07-11 PROCEDURE — 84145 PROCALCITONIN (PCT): CPT

## 2024-07-11 PROCEDURE — 700111 HCHG RX REV CODE 636 W/ 250 OVERRIDE (IP): Performed by: EMERGENCY MEDICINE

## 2024-07-11 PROCEDURE — 83036 HEMOGLOBIN GLYCOSYLATED A1C: CPT

## 2024-07-11 PROCEDURE — 85027 COMPLETE CBC AUTOMATED: CPT

## 2024-07-11 PROCEDURE — 87070 CULTURE OTHR SPECIMN AEROBIC: CPT

## 2024-07-11 PROCEDURE — 700111 HCHG RX REV CODE 636 W/ 250 OVERRIDE (IP): Performed by: INTERNAL MEDICINE

## 2024-07-11 PROCEDURE — 36600 WITHDRAWAL OF ARTERIAL BLOOD: CPT

## 2024-07-11 PROCEDURE — 0BH17EZ INSERTION OF ENDOTRACHEAL AIRWAY INTO TRACHEA, VIA NATURAL OR ARTIFICIAL OPENING: ICD-10-PCS | Performed by: EMERGENCY MEDICINE

## 2024-07-11 RX ORDER — ACETAMINOPHEN 650 MG/1
650 SUPPOSITORY RECTAL EVERY 4 HOURS PRN
Status: DISCONTINUED | OUTPATIENT
Start: 2024-07-11 | End: 2024-07-19 | Stop reason: HOSPADM

## 2024-07-11 RX ORDER — ETOMIDATE 2 MG/ML
20 INJECTION INTRAVENOUS ONCE
Status: COMPLETED | OUTPATIENT
Start: 2024-07-11 | End: 2024-07-11

## 2024-07-11 RX ORDER — MIDAZOLAM HYDROCHLORIDE 1 MG/ML
1-5 INJECTION INTRAMUSCULAR; INTRAVENOUS ONCE
Status: COMPLETED | OUTPATIENT
Start: 2024-07-11 | End: 2024-07-11

## 2024-07-11 RX ORDER — PHENYLEPHRINE HCL IN 0.9% NACL 1 MG/10 ML
100-200 SYRINGE (ML) INTRAVENOUS
Status: ACTIVE | OUTPATIENT
Start: 2024-07-11 | End: 2024-07-11

## 2024-07-11 RX ORDER — SODIUM CHLORIDE, SODIUM LACTATE, POTASSIUM CHLORIDE, AND CALCIUM CHLORIDE .6; .31; .03; .02 G/100ML; G/100ML; G/100ML; G/100ML
500 INJECTION, SOLUTION INTRAVENOUS
Status: DISCONTINUED | OUTPATIENT
Start: 2024-07-11 | End: 2024-07-19 | Stop reason: HOSPADM

## 2024-07-11 RX ORDER — NOREPINEPHRINE BITARTRATE 0.03 MG/ML
0-1 INJECTION, SOLUTION INTRAVENOUS CONTINUOUS
Status: DISCONTINUED | OUTPATIENT
Start: 2024-07-11 | End: 2024-07-13

## 2024-07-11 RX ORDER — PANTOPRAZOLE SODIUM 40 MG/10ML
80 INJECTION, POWDER, LYOPHILIZED, FOR SOLUTION INTRAVENOUS ONCE
Status: COMPLETED | OUTPATIENT
Start: 2024-07-11 | End: 2024-07-11

## 2024-07-11 RX ORDER — SODIUM CHLORIDE, SODIUM LACTATE, POTASSIUM CHLORIDE, AND CALCIUM CHLORIDE .6; .31; .03; .02 G/100ML; G/100ML; G/100ML; G/100ML
30 INJECTION, SOLUTION INTRAVENOUS ONCE
Status: COMPLETED | OUTPATIENT
Start: 2024-07-11 | End: 2024-07-11

## 2024-07-11 RX ORDER — ROCURONIUM BROMIDE 10 MG/ML
100 INJECTION, SOLUTION INTRAVENOUS ONCE
Status: COMPLETED | OUTPATIENT
Start: 2024-07-11 | End: 2024-07-11

## 2024-07-11 RX ORDER — ONDANSETRON 2 MG/ML
4 INJECTION INTRAMUSCULAR; INTRAVENOUS EVERY 4 HOURS PRN
Status: DISCONTINUED | OUTPATIENT
Start: 2024-07-11 | End: 2024-07-19 | Stop reason: HOSPADM

## 2024-07-11 RX ORDER — DEXTROSE MONOHYDRATE 25 G/50ML
25 INJECTION, SOLUTION INTRAVENOUS
Status: DISCONTINUED | OUTPATIENT
Start: 2024-07-11 | End: 2024-07-19 | Stop reason: HOSPADM

## 2024-07-11 RX ORDER — MIDAZOLAM HYDROCHLORIDE 1 MG/ML
INJECTION INTRAMUSCULAR; INTRAVENOUS
Status: COMPLETED
Start: 2024-07-11 | End: 2024-07-11

## 2024-07-11 RX ORDER — HYDROXYZINE HYDROCHLORIDE 25 MG/1
25 TABLET, FILM COATED ORAL EVERY 4 HOURS PRN
COMMUNITY

## 2024-07-11 RX ORDER — CALCIUM GLUCONATE 20 MG/ML
2 INJECTION, SOLUTION INTRAVENOUS ONCE
Status: COMPLETED | OUTPATIENT
Start: 2024-07-11 | End: 2024-07-11

## 2024-07-11 RX ORDER — BISACODYL 10 MG
10 SUPPOSITORY, RECTAL RECTAL
Status: DISCONTINUED | OUTPATIENT
Start: 2024-07-11 | End: 2024-07-19 | Stop reason: HOSPADM

## 2024-07-11 RX ORDER — POLYETHYLENE GLYCOL 3350 17 G/17G
1 POWDER, FOR SOLUTION ORAL
Status: DISCONTINUED | OUTPATIENT
Start: 2024-07-11 | End: 2024-07-19 | Stop reason: HOSPADM

## 2024-07-11 RX ORDER — SODIUM CHLORIDE 9 MG/ML
30 INJECTION, SOLUTION INTRAVENOUS ONCE
Status: COMPLETED | OUTPATIENT
Start: 2024-07-11 | End: 2024-07-11

## 2024-07-11 RX ORDER — HYDRALAZINE HYDROCHLORIDE 20 MG/ML
10 INJECTION INTRAMUSCULAR; INTRAVENOUS EVERY 4 HOURS PRN
Status: DISCONTINUED | OUTPATIENT
Start: 2024-07-11 | End: 2024-07-19 | Stop reason: HOSPADM

## 2024-07-11 RX ORDER — NALOXONE HYDROCHLORIDE 1 MG/ML
2 INJECTION INTRAMUSCULAR; INTRAVENOUS; SUBCUTANEOUS ONCE
Status: COMPLETED | OUTPATIENT
Start: 2024-07-11 | End: 2024-07-11

## 2024-07-11 RX ORDER — AMOXICILLIN 250 MG
2 CAPSULE ORAL 2 TIMES DAILY
Status: DISCONTINUED | OUTPATIENT
Start: 2024-07-11 | End: 2024-07-19 | Stop reason: HOSPADM

## 2024-07-11 RX ORDER — ENEMA 19; 7 G/133ML; G/133ML
1 ENEMA RECTAL
COMMUNITY

## 2024-07-11 RX ORDER — ACETAMINOPHEN 325 MG/1
650 TABLET ORAL ONCE
Status: DISCONTINUED | OUTPATIENT
Start: 2024-07-11 | End: 2024-07-11

## 2024-07-11 RX ORDER — NOREPINEPHRINE BITARTRATE 0.03 MG/ML
0-1 INJECTION, SOLUTION INTRAVENOUS CONTINUOUS
Status: DISCONTINUED | OUTPATIENT
Start: 2024-07-11 | End: 2024-07-11

## 2024-07-11 RX ORDER — ACETAMINOPHEN 10 MG/ML
1000 INJECTION, SOLUTION INTRAVENOUS ONCE
Status: COMPLETED | OUTPATIENT
Start: 2024-07-11 | End: 2024-07-11

## 2024-07-11 RX ORDER — HYDROMORPHONE HYDROCHLORIDE 1 MG/ML
.5-1 INJECTION, SOLUTION INTRAMUSCULAR; INTRAVENOUS; SUBCUTANEOUS
Status: DISCONTINUED | OUTPATIENT
Start: 2024-07-11 | End: 2024-07-19 | Stop reason: HOSPADM

## 2024-07-11 RX ORDER — BISACODYL 10 MG
10 SUPPOSITORY, RECTAL RECTAL
COMMUNITY

## 2024-07-11 RX ORDER — OXYCODONE HYDROCHLORIDE 5 MG/1
5 TABLET ORAL EVERY 8 HOURS PRN
Status: ON HOLD | COMMUNITY
End: 2024-07-19

## 2024-07-11 RX ORDER — ONDANSETRON 4 MG/1
4 TABLET, ORALLY DISINTEGRATING ORAL EVERY 4 HOURS PRN
Status: DISCONTINUED | OUTPATIENT
Start: 2024-07-11 | End: 2024-07-19 | Stop reason: HOSPADM

## 2024-07-11 RX ORDER — OXYCODONE HYDROCHLORIDE 5 MG/1
5 TABLET ORAL EVERY 8 HOURS PRN
Status: DISCONTINUED | OUTPATIENT
Start: 2024-07-11 | End: 2024-07-19 | Stop reason: HOSPADM

## 2024-07-11 RX ORDER — IPRATROPIUM BROMIDE AND ALBUTEROL SULFATE 2.5; .5 MG/3ML; MG/3ML
3 SOLUTION RESPIRATORY (INHALATION)
Status: DISCONTINUED | OUTPATIENT
Start: 2024-07-11 | End: 2024-07-13

## 2024-07-11 RX ORDER — ACETAMINOPHEN 325 MG/1
650 TABLET ORAL EVERY 4 HOURS PRN
Status: DISCONTINUED | OUTPATIENT
Start: 2024-07-11 | End: 2024-07-19 | Stop reason: HOSPADM

## 2024-07-11 RX ORDER — ONDANSETRON 4 MG/1
4 TABLET, ORALLY DISINTEGRATING ORAL EVERY 4 HOURS PRN
Status: DISCONTINUED | OUTPATIENT
Start: 2024-07-11 | End: 2024-07-11

## 2024-07-11 RX ADMIN — PIPERACILLIN AND TAZOBACTAM 4.5 G: 4; .5 INJECTION, POWDER, FOR SOLUTION INTRAVENOUS at 10:55

## 2024-07-11 RX ADMIN — INSULIN HUMAN 2 UNITS: 100 INJECTION, SOLUTION PARENTERAL at 17:18

## 2024-07-11 RX ADMIN — PIPERACILLIN AND TAZOBACTAM 4.5 G: 4; .5 INJECTION, POWDER, FOR SOLUTION INTRAVENOUS at 20:38

## 2024-07-11 RX ADMIN — NOREPINEPHRINE BITARTRATE 0.05 MCG/KG/MIN: 1 INJECTION, SOLUTION, CONCENTRATE INTRAVENOUS at 08:05

## 2024-07-11 RX ADMIN — PIPERACILLIN AND TAZOBACTAM 4.5 G: 4; .5 INJECTION, POWDER, FOR SOLUTION INTRAVENOUS at 07:46

## 2024-07-11 RX ADMIN — PANTOPRAZOLE SODIUM 8 MG/HR: 40 INJECTION, POWDER, FOR SOLUTION INTRAVENOUS at 10:25

## 2024-07-11 RX ADMIN — PANTOPRAZOLE SODIUM 8 MG/HR: 40 INJECTION, POWDER, FOR SOLUTION INTRAVENOUS at 18:18

## 2024-07-11 RX ADMIN — NOREPINEPHRINE BITARTRATE 0.1 MCG/KG/MIN: 1 INJECTION, SOLUTION, CONCENTRATE INTRAVENOUS at 21:26

## 2024-07-11 RX ADMIN — ROCURONIUM BROMIDE 100 MG: 50 INJECTION, SOLUTION INTRAVENOUS at 07:13

## 2024-07-11 RX ADMIN — SENNOSIDES AND DOCUSATE SODIUM 2 TABLET: 50; 8.6 TABLET ORAL at 17:08

## 2024-07-11 RX ADMIN — PANTOPRAZOLE SODIUM 80 MG: 40 INJECTION, POWDER, FOR SOLUTION INTRAVENOUS at 07:54

## 2024-07-11 RX ADMIN — IOHEXOL 100 ML: 350 INJECTION, SOLUTION INTRAVENOUS at 08:29

## 2024-07-11 RX ADMIN — PROPOFOL 10 MCG/KG/MIN: 10 INJECTION, EMULSION INTRAVENOUS at 10:10

## 2024-07-11 RX ADMIN — MIDAZOLAM HYDROCHLORIDE 2 MG: 1 INJECTION INTRAMUSCULAR; INTRAVENOUS at 12:15

## 2024-07-11 RX ADMIN — PROPOFOL 5 MCG/KG/MIN: 10 INJECTION, EMULSION INTRAVENOUS at 07:15

## 2024-07-11 RX ADMIN — SODIUM CHLORIDE 1716 ML: 9 INJECTION, SOLUTION INTRAVENOUS at 08:33

## 2024-07-11 RX ADMIN — VANCOMYCIN HYDROCHLORIDE 1500 MG: 5 INJECTION, POWDER, LYOPHILIZED, FOR SOLUTION INTRAVENOUS at 10:37

## 2024-07-11 RX ADMIN — INSULIN HUMAN 2 UNITS: 100 INJECTION, SOLUTION PARENTERAL at 11:26

## 2024-07-11 RX ADMIN — CALCIUM GLUCONATE 2 G: 20 INJECTION, SOLUTION INTRAVENOUS at 11:33

## 2024-07-11 RX ADMIN — ACETAMINOPHEN 1000 MG: 10 INJECTION INTRAVENOUS at 09:04

## 2024-07-11 RX ADMIN — NALOXONE HYDROCHLORIDE 2 MG: 1 INJECTION, SOLUTION INTRAMUSCULAR; INTRAVENOUS; SUBCUTANEOUS at 07:08

## 2024-07-11 RX ADMIN — MIDAZOLAM HYDROCHLORIDE 2 MG: 1 INJECTION, SOLUTION INTRAMUSCULAR; INTRAVENOUS at 12:15

## 2024-07-11 RX ADMIN — ETOMIDATE 20 MG: 2 INJECTION, SOLUTION INTRAVENOUS at 07:12

## 2024-07-11 RX ADMIN — SODIUM CHLORIDE, POTASSIUM CHLORIDE, SODIUM LACTATE AND CALCIUM CHLORIDE 1716 ML: 600; 310; 30; 20 INJECTION, SOLUTION INTRAVENOUS at 10:27

## 2024-07-11 RX ADMIN — NOREPINEPHRINE BITARTRATE 0.25 MCG/KG/MIN: 1 INJECTION, SOLUTION, CONCENTRATE INTRAVENOUS at 10:09

## 2024-07-11 RX ADMIN — PROPOFOL 10 MCG/KG/MIN: 10 INJECTION, EMULSION INTRAVENOUS at 10:28

## 2024-07-11 ASSESSMENT — LIFESTYLE VARIABLES
TOTAL SCORE: 0
ON A TYPICAL DAY WHEN YOU DRINK ALCOHOL HOW MANY DRINKS DO YOU HAVE: 0
HAVE PEOPLE ANNOYED YOU BY CRITICIZING YOUR DRINKING: NO
EVER HAD A DRINK FIRST THING IN THE MORNING TO STEADY YOUR NERVES TO GET RID OF A HANGOVER: NO
HOW MANY TIMES IN THE PAST YEAR HAVE YOU HAD 5 OR MORE DRINKS IN A DAY: 0
TOTAL SCORE: 0
TOTAL SCORE: 0
DOES PATIENT WANT TO STOP DRINKING: NO
EVER FELT BAD OR GUILTY ABOUT YOUR DRINKING: NO
ALCOHOL_USE: NO
CONSUMPTION TOTAL: NEGATIVE
HAVE YOU EVER FELT YOU SHOULD CUT DOWN ON YOUR DRINKING: NO
AVERAGE NUMBER OF DAYS PER WEEK YOU HAVE A DRINK CONTAINING ALCOHOL: 0

## 2024-07-11 ASSESSMENT — PAIN DESCRIPTION - PAIN TYPE
TYPE: ACUTE PAIN

## 2024-07-11 ASSESSMENT — COGNITIVE AND FUNCTIONAL STATUS - GENERAL
TURNING FROM BACK TO SIDE WHILE IN FLAT BAD: TOTAL
TOILETING: TOTAL
MOVING TO AND FROM BED TO CHAIR: TOTAL
SUGGESTED CMS G CODE MODIFIER MOBILITY: CN
DRESSING REGULAR UPPER BODY CLOTHING: TOTAL
WALKING IN HOSPITAL ROOM: TOTAL
DRESSING REGULAR LOWER BODY CLOTHING: TOTAL
MOVING FROM LYING ON BACK TO SITTING ON SIDE OF FLAT BED: TOTAL
DAILY ACTIVITIY SCORE: 6
SUGGESTED CMS G CODE MODIFIER DAILY ACTIVITY: CN
HELP NEEDED FOR BATHING: TOTAL
STANDING UP FROM CHAIR USING ARMS: TOTAL
PERSONAL GROOMING: TOTAL
MOBILITY SCORE: 6
EATING MEALS: TOTAL
CLIMB 3 TO 5 STEPS WITH RAILING: TOTAL

## 2024-07-11 ASSESSMENT — FIBROSIS 4 INDEX: FIB4 SCORE: 0.89

## 2024-07-11 ASSESSMENT — SOCIAL DETERMINANTS OF HEALTH (SDOH)
WITHIN THE LAST YEAR, HAVE YOU BEEN KICKED, HIT, SLAPPED, OR OTHERWISE PHYSICALLY HURT BY YOUR PARTNER OR EX-PARTNER?: PATIENT UNABLE TO ANSWER
WITHIN THE LAST YEAR, HAVE TO BEEN RAPED OR FORCED TO HAVE ANY KIND OF SEXUAL ACTIVITY BY YOUR PARTNER OR EX-PARTNER?: PATIENT UNABLE TO ANSWER
WITHIN THE PAST 12 MONTHS, THE FOOD YOU BOUGHT JUST DIDN'T LAST AND YOU DIDN'T HAVE MONEY TO GET MORE: PATIENT UNABLE TO ANSWER
WITHIN THE LAST YEAR, HAVE YOU BEEN HUMILIATED OR EMOTIONALLY ABUSED IN OTHER WAYS BY YOUR PARTNER OR EX-PARTNER?: PATIENT UNABLE TO ANSWER
WITHIN THE PAST 12 MONTHS, YOU WORRIED THAT YOUR FOOD WOULD RUN OUT BEFORE YOU GOT THE MONEY TO BUY MORE: PATIENT UNABLE TO ANSWER
WITHIN THE LAST YEAR, HAVE YOU BEEN AFRAID OF YOUR PARTNER OR EX-PARTNER?: PATIENT UNABLE TO ANSWER
IN THE PAST 12 MONTHS, HAS THE ELECTRIC, GAS, OIL, OR WATER COMPANY THREATENED TO SHUT OFF SERVICE IN YOUR HOME?: PATIENT UNABLE TO ANSWER

## 2024-07-12 ENCOUNTER — APPOINTMENT (OUTPATIENT)
Dept: RADIOLOGY | Facility: MEDICAL CENTER | Age: 79
DRG: 871 | End: 2024-07-12
Attending: INTERNAL MEDICINE
Payer: MEDICARE

## 2024-07-12 LAB
ABO GROUP BLD: NORMAL
ALBUMIN SERPL BCP-MCNC: 2.7 G/DL (ref 3.2–4.9)
ALBUMIN/GLOB SERPL: 1.2 G/DL
ALP SERPL-CCNC: 39 U/L (ref 30–99)
ALT SERPL-CCNC: 11 U/L (ref 2–50)
ANION GAP SERPL CALC-SCNC: 8 MMOL/L (ref 7–16)
ARTERIAL PATENCY WRIST A: ABNORMAL
AST SERPL-CCNC: 17 U/L (ref 12–45)
BARCODED ABORH UBTYP: 6200
BARCODED PRD CODE UBPRD: NORMAL
BARCODED UNIT NUM UBUNT: NORMAL
BASE EXCESS BLDA CALC-SCNC: 7 MMOL/L (ref -4–3)
BASOPHILS # BLD AUTO: 0.1 % (ref 0–1.8)
BASOPHILS # BLD: 0.01 K/UL (ref 0–0.12)
BILIRUB SERPL-MCNC: 0.3 MG/DL (ref 0.1–1.5)
BLD GP AB SCN SERPL QL: NORMAL
BODY TEMPERATURE: ABNORMAL DEGREES
BREATHS SETTING VENT: 18
BUN SERPL-MCNC: 21 MG/DL (ref 8–22)
CA-I SERPL-SCNC: 1.1 MMOL/L (ref 1.1–1.3)
CALCIUM ALBUM COR SERPL-MCNC: 9.5 MG/DL (ref 8.5–10.5)
CALCIUM SERPL-MCNC: 8.5 MG/DL (ref 8.5–10.5)
CHLORIDE SERPL-SCNC: 106 MMOL/L (ref 96–112)
CO2 BLDA-SCNC: 31 MMOL/L (ref 20–33)
CO2 SERPL-SCNC: 28 MMOL/L (ref 20–33)
COMPONENT R 8504R: NORMAL
CREAT SERPL-MCNC: 0.69 MG/DL (ref 0.5–1.4)
DELSYS IDSYS: ABNORMAL
EKG IMPRESSION: NORMAL
EKG IMPRESSION: NORMAL
END TIDAL CARBON DIOXIDE IECO2: 31 MMHG
EOSINOPHIL # BLD AUTO: 0 K/UL (ref 0–0.51)
EOSINOPHIL NFR BLD: 0 % (ref 0–6.9)
ERYTHROCYTE [DISTWIDTH] IN BLOOD BY AUTOMATED COUNT: 53.1 FL (ref 35.9–50)
ERYTHROCYTE [DISTWIDTH] IN BLOOD BY AUTOMATED COUNT: 53.8 FL (ref 35.9–50)
GFR SERPLBLD CREATININE-BSD FMLA CKD-EPI: 94 ML/MIN/1.73 M 2
GLOBULIN SER CALC-MCNC: 2.3 G/DL (ref 1.9–3.5)
GLUCOSE BLD STRIP.AUTO-MCNC: 116 MG/DL (ref 65–99)
GLUCOSE BLD STRIP.AUTO-MCNC: 85 MG/DL (ref 65–99)
GLUCOSE BLD STRIP.AUTO-MCNC: 86 MG/DL (ref 65–99)
GLUCOSE BLD STRIP.AUTO-MCNC: 92 MG/DL (ref 65–99)
GLUCOSE SERPL-MCNC: 112 MG/DL (ref 65–99)
HCO3 BLDA-SCNC: 29.7 MMOL/L (ref 17–25)
HCT VFR BLD AUTO: 21.8 % (ref 42–52)
HCT VFR BLD AUTO: 23.7 % (ref 42–52)
HCT VFR BLD AUTO: 23.8 % (ref 42–52)
HCT VFR BLD AUTO: 26.2 % (ref 42–52)
HGB BLD-MCNC: 6.8 G/DL (ref 14–18)
HGB BLD-MCNC: 7.4 G/DL (ref 14–18)
HGB BLD-MCNC: 7.5 G/DL (ref 14–18)
HGB BLD-MCNC: 8.3 G/DL (ref 14–18)
HOROWITZ INDEX BLDA+IHG-RTO: 197 MM[HG]
IMM GRANULOCYTES # BLD AUTO: 0.1 K/UL (ref 0–0.11)
IMM GRANULOCYTES NFR BLD AUTO: 0.7 % (ref 0–0.9)
LACTATE SERPL-SCNC: 1.6 MMOL/L (ref 0.5–2)
LACTATE SERPL-SCNC: 2.5 MMOL/L (ref 0.5–2)
LYMPHOCYTES # BLD AUTO: 0.74 K/UL (ref 1–4.8)
LYMPHOCYTES NFR BLD: 4.9 % (ref 22–41)
MAGNESIUM SERPL-MCNC: 2 MG/DL (ref 1.5–2.5)
MCH RBC QN AUTO: 28.1 PG (ref 27–33)
MCH RBC QN AUTO: 28.2 PG (ref 27–33)
MCH RBC QN AUTO: 28.5 PG (ref 27–33)
MCH RBC QN AUTO: 28.8 PG (ref 27–33)
MCHC RBC AUTO-ENTMCNC: 31.1 G/DL (ref 32.3–36.5)
MCHC RBC AUTO-ENTMCNC: 31.2 G/DL (ref 32.3–36.5)
MCHC RBC AUTO-ENTMCNC: 31.6 G/DL (ref 32.3–36.5)
MCHC RBC AUTO-ENTMCNC: 31.7 G/DL (ref 32.3–36.5)
MCV RBC AUTO: 89.1 FL (ref 81.4–97.8)
MCV RBC AUTO: 90.5 FL (ref 81.4–97.8)
MCV RBC AUTO: 91.2 FL (ref 81.4–97.8)
MCV RBC AUTO: 91.2 FL (ref 81.4–97.8)
MODE IMODE: ABNORMAL
MONOCYTES # BLD AUTO: 1.2 K/UL (ref 0–0.85)
MONOCYTES NFR BLD AUTO: 8 % (ref 0–13.4)
NEUTROPHILS # BLD AUTO: 13.04 K/UL (ref 1.82–7.42)
NEUTROPHILS NFR BLD: 86.3 % (ref 44–72)
NRBC # BLD AUTO: 0 K/UL
NRBC BLD-RTO: 0 /100 WBC (ref 0–0.2)
O2/TOTAL GAS SETTING VFR VENT: 30 %
PCO2 BLDA: 35.6 MMHG (ref 26–37)
PCO2 TEMP ADJ BLDA: 35.6 MMHG (ref 26–37)
PEEP END EXPIRATORY PRESSURE IPEEP: 8 CMH20
PH BLDA: 7.53 [PH] (ref 7.4–7.5)
PH TEMP ADJ BLDA: 7.53 [PH] (ref 7.4–7.5)
PHOSPHATE SERPL-MCNC: 3.4 MG/DL (ref 2.5–4.5)
PLATELET # BLD AUTO: 213 K/UL (ref 164–446)
PLATELET # BLD AUTO: 216 K/UL (ref 164–446)
PLATELET # BLD AUTO: 232 K/UL (ref 164–446)
PLATELET # BLD AUTO: 233 K/UL (ref 164–446)
PMV BLD AUTO: 10.9 FL (ref 9–12.9)
PMV BLD AUTO: 11.1 FL (ref 9–12.9)
PMV BLD AUTO: 11.4 FL (ref 9–12.9)
PMV BLD AUTO: 11.5 FL (ref 9–12.9)
PO2 BLDA: 59 MMHG (ref 64–87)
PO2 TEMP ADJ BLDA: 59 MMHG (ref 64–87)
POTASSIUM SERPL-SCNC: 4.3 MMOL/L (ref 3.6–5.5)
PRODUCT TYPE UPROD: NORMAL
PROT SERPL-MCNC: 5 G/DL (ref 6–8.2)
RBC # BLD AUTO: 2.39 M/UL (ref 4.7–6.1)
RBC # BLD AUTO: 2.6 M/UL (ref 4.7–6.1)
RBC # BLD AUTO: 2.63 M/UL (ref 4.7–6.1)
RBC # BLD AUTO: 2.94 M/UL (ref 4.7–6.1)
RH BLD: NORMAL
SAO2 % BLDA: 93 % (ref 93–99)
SODIUM SERPL-SCNC: 142 MMOL/L (ref 135–145)
SPECIMEN DRAWN FROM PATIENT: ABNORMAL
TIDAL VOLUME IVT: 370 ML
TROPONIN T SERPL-MCNC: 61 NG/L (ref 6–19)
UNIT STATUS USTAT: NORMAL
WBC # BLD AUTO: 12 K/UL (ref 4.8–10.8)
WBC # BLD AUTO: 13.3 K/UL (ref 4.8–10.8)
WBC # BLD AUTO: 15.1 K/UL (ref 4.8–10.8)
WBC # BLD AUTO: 9.7 K/UL (ref 4.8–10.8)

## 2024-07-12 PROCEDURE — 160048 HCHG OR STATISTICAL LEVEL 1-5: Performed by: INTERNAL MEDICINE

## 2024-07-12 PROCEDURE — A9270 NON-COVERED ITEM OR SERVICE: HCPCS | Performed by: INTERNAL MEDICINE

## 2024-07-12 PROCEDURE — 83735 ASSAY OF MAGNESIUM: CPT

## 2024-07-12 PROCEDURE — 86900 BLOOD TYPING SEROLOGIC ABO: CPT

## 2024-07-12 PROCEDURE — 30243N1 TRANSFUSION OF NONAUTOLOGOUS RED BLOOD CELLS INTO CENTRAL VEIN, PERCUTANEOUS APPROACH: ICD-10-PCS | Performed by: INTERNAL MEDICINE

## 2024-07-12 PROCEDURE — 80053 COMPREHEN METABOLIC PANEL: CPT

## 2024-07-12 PROCEDURE — 700105 HCHG RX REV CODE 258: Performed by: INTERNAL MEDICINE

## 2024-07-12 PROCEDURE — 83605 ASSAY OF LACTIC ACID: CPT

## 2024-07-12 PROCEDURE — 94799 UNLISTED PULMONARY SVC/PX: CPT

## 2024-07-12 PROCEDURE — 84100 ASSAY OF PHOSPHORUS: CPT

## 2024-07-12 PROCEDURE — 82330 ASSAY OF CALCIUM: CPT

## 2024-07-12 PROCEDURE — 93010 ELECTROCARDIOGRAM REPORT: CPT | Performed by: INTERNAL MEDICINE

## 2024-07-12 PROCEDURE — 85027 COMPLETE CBC AUTOMATED: CPT | Mod: 91

## 2024-07-12 PROCEDURE — 0DJ08ZZ INSPECTION OF UPPER INTESTINAL TRACT, VIA NATURAL OR ARTIFICIAL OPENING ENDOSCOPIC: ICD-10-PCS | Performed by: INTERNAL MEDICINE

## 2024-07-12 PROCEDURE — 36600 WITHDRAWAL OF ARTERIAL BLOOD: CPT

## 2024-07-12 PROCEDURE — 93005 ELECTROCARDIOGRAM TRACING: CPT | Performed by: INTERNAL MEDICINE

## 2024-07-12 PROCEDURE — 71045 X-RAY EXAM CHEST 1 VIEW: CPT

## 2024-07-12 PROCEDURE — 86850 RBC ANTIBODY SCREEN: CPT

## 2024-07-12 PROCEDURE — 160202 HCHG ENDO MINUTES - 1ST 30 MINS LEVEL 3: Performed by: INTERNAL MEDICINE

## 2024-07-12 PROCEDURE — 770022 HCHG ROOM/CARE - ICU (200)

## 2024-07-12 PROCEDURE — 82803 BLOOD GASES ANY COMBINATION: CPT

## 2024-07-12 PROCEDURE — 86923 COMPATIBILITY TEST ELECTRIC: CPT

## 2024-07-12 PROCEDURE — 93010 ELECTROCARDIOGRAM REPORT: CPT | Mod: 77 | Performed by: INTERNAL MEDICINE

## 2024-07-12 PROCEDURE — 700102 HCHG RX REV CODE 250 W/ 637 OVERRIDE(OP): Performed by: INTERNAL MEDICINE

## 2024-07-12 PROCEDURE — 85025 COMPLETE CBC W/AUTO DIFF WBC: CPT

## 2024-07-12 PROCEDURE — 43235 EGD DIAGNOSTIC BRUSH WASH: CPT | Performed by: INTERNAL MEDICINE

## 2024-07-12 PROCEDURE — 700111 HCHG RX REV CODE 636 W/ 250 OVERRIDE (IP): Performed by: INTERNAL MEDICINE

## 2024-07-12 PROCEDURE — 94003 VENT MGMT INPAT SUBQ DAY: CPT

## 2024-07-12 PROCEDURE — 99291 CRITICAL CARE FIRST HOUR: CPT | Performed by: INTERNAL MEDICINE

## 2024-07-12 PROCEDURE — 84484 ASSAY OF TROPONIN QUANT: CPT

## 2024-07-12 PROCEDURE — 86901 BLOOD TYPING SEROLOGIC RH(D): CPT

## 2024-07-12 PROCEDURE — 36430 TRANSFUSION BLD/BLD COMPNT: CPT

## 2024-07-12 PROCEDURE — C9113 INJ PANTOPRAZOLE SODIUM, VIA: HCPCS | Performed by: INTERNAL MEDICINE

## 2024-07-12 PROCEDURE — 82962 GLUCOSE BLOOD TEST: CPT

## 2024-07-12 PROCEDURE — P9016 RBC LEUKOCYTES REDUCED: HCPCS

## 2024-07-12 RX ORDER — SUCRALFATE ORAL 1 G/10ML
1 SUSPENSION ORAL EVERY 6 HOURS
Status: DISCONTINUED | OUTPATIENT
Start: 2024-07-12 | End: 2024-07-14

## 2024-07-12 RX ORDER — LANSOPRAZOLE 30 MG/1
30 TABLET, ORALLY DISINTEGRATING, DELAYED RELEASE ORAL 2 TIMES DAILY
Status: DISCONTINUED | OUTPATIENT
Start: 2024-07-13 | End: 2024-07-19 | Stop reason: HOSPADM

## 2024-07-12 RX ORDER — PROPOFOL 10 MG/ML
150 INJECTION, EMULSION INTRAVENOUS ONCE
Status: COMPLETED | OUTPATIENT
Start: 2024-07-12 | End: 2024-07-12

## 2024-07-12 RX ORDER — NYSTATIN 100000 [USP'U]/G
POWDER TOPICAL 2 TIMES DAILY
Status: DISCONTINUED | OUTPATIENT
Start: 2024-07-12 | End: 2024-07-19 | Stop reason: HOSPADM

## 2024-07-12 RX ADMIN — SUCRALFATE 1 G: 1 SUSPENSION ORAL at 23:59

## 2024-07-12 RX ADMIN — PANTOPRAZOLE SODIUM 8 MG/HR: 40 INJECTION, POWDER, FOR SOLUTION INTRAVENOUS at 13:04

## 2024-07-12 RX ADMIN — AMPICILLIN AND SULBACTAM 3 G: 1; 2 INJECTION, POWDER, FOR SOLUTION INTRAMUSCULAR; INTRAVENOUS at 23:58

## 2024-07-12 RX ADMIN — PROPOFOL 5 MCG/KG/MIN: 10 INJECTION, EMULSION INTRAVENOUS at 05:30

## 2024-07-12 RX ADMIN — PROPOFOL 150 MG: 10 INJECTION, EMULSION INTRAVENOUS at 10:26

## 2024-07-12 RX ADMIN — AMPICILLIN AND SULBACTAM 3 G: 1; 2 INJECTION, POWDER, FOR SOLUTION INTRAMUSCULAR; INTRAVENOUS at 15:34

## 2024-07-12 RX ADMIN — VANCOMYCIN HYDROCHLORIDE 1000 MG: 5 INJECTION, POWDER, LYOPHILIZED, FOR SOLUTION INTRAVENOUS at 11:45

## 2024-07-12 RX ADMIN — PIPERACILLIN AND TAZOBACTAM 4.5 G: 4; .5 INJECTION, POWDER, FOR SOLUTION INTRAVENOUS at 04:30

## 2024-07-12 RX ADMIN — PANTOPRAZOLE SODIUM 8 MG/HR: 40 INJECTION, POWDER, FOR SOLUTION INTRAVENOUS at 02:53

## 2024-07-12 RX ADMIN — PROPOFOL 10 MCG/KG/MIN: 10 INJECTION, EMULSION INTRAVENOUS at 23:58

## 2024-07-12 RX ADMIN — SUCRALFATE 1 G: 1 SUSPENSION ORAL at 18:59

## 2024-07-12 RX ADMIN — AMPICILLIN AND SULBACTAM 3 G: 1; 2 INJECTION, POWDER, FOR SOLUTION INTRAMUSCULAR; INTRAVENOUS at 18:56

## 2024-07-12 RX ADMIN — NYSTATIN: 100000 POWDER TOPICAL at 20:27

## 2024-07-12 ASSESSMENT — PAIN DESCRIPTION - PAIN TYPE
TYPE: ACUTE PAIN

## 2024-07-13 ENCOUNTER — APPOINTMENT (OUTPATIENT)
Dept: RADIOLOGY | Facility: MEDICAL CENTER | Age: 79
DRG: 871 | End: 2024-07-13
Attending: INTERNAL MEDICINE
Payer: MEDICARE

## 2024-07-13 ENCOUNTER — APPOINTMENT (OUTPATIENT)
Dept: RADIOLOGY | Facility: MEDICAL CENTER | Age: 79
DRG: 871 | End: 2024-07-13
Attending: NURSE PRACTITIONER
Payer: MEDICARE

## 2024-07-13 LAB
ALBUMIN SERPL BCP-MCNC: 2.5 G/DL (ref 3.2–4.9)
ALBUMIN/GLOB SERPL: 1.1 G/DL
ALP SERPL-CCNC: 36 U/L (ref 30–99)
ALT SERPL-CCNC: 13 U/L (ref 2–50)
ANION GAP SERPL CALC-SCNC: 7 MMOL/L (ref 7–16)
ARTERIAL PATENCY WRIST A: ABNORMAL
ARTERIAL PATENCY WRIST A: ABNORMAL
AST SERPL-CCNC: 19 U/L (ref 12–45)
BACTERIA BRONCH AEROBE CULT: ABNORMAL
BACTERIA BRONCH AEROBE CULT: ABNORMAL
BACTERIA UR CULT: ABNORMAL
BACTERIA UR CULT: ABNORMAL
BASE EXCESS BLDA CALC-SCNC: 2 MMOL/L (ref -4–3)
BASE EXCESS BLDA CALC-SCNC: 2 MMOL/L (ref -4–3)
BASOPHILS # BLD AUTO: 0.1 % (ref 0–1.8)
BASOPHILS # BLD: 0.01 K/UL (ref 0–0.12)
BILIRUB SERPL-MCNC: 0.3 MG/DL (ref 0.1–1.5)
BODY TEMPERATURE: ABNORMAL DEGREES
BODY TEMPERATURE: ABNORMAL DEGREES
BREATHS SETTING VENT: 18
BUN SERPL-MCNC: 17 MG/DL (ref 8–22)
CA-I SERPL-SCNC: 1.2 MMOL/L (ref 1.1–1.3)
CALCIUM ALBUM COR SERPL-MCNC: 9.2 MG/DL (ref 8.5–10.5)
CALCIUM SERPL-MCNC: 8 MG/DL (ref 8.5–10.5)
CHLORIDE SERPL-SCNC: 108 MMOL/L (ref 96–112)
CO2 BLDA-SCNC: 27 MMOL/L (ref 20–33)
CO2 BLDA-SCNC: 27 MMOL/L (ref 20–33)
CO2 SERPL-SCNC: 26 MMOL/L (ref 20–33)
CREAT SERPL-MCNC: 0.61 MG/DL (ref 0.5–1.4)
DELSYS IDSYS: ABNORMAL
DELSYS IDSYS: ABNORMAL
END TIDAL CARBON DIOXIDE IECO2: 29 MMHG
EOSINOPHIL # BLD AUTO: 0.04 K/UL (ref 0–0.51)
EOSINOPHIL NFR BLD: 0.5 % (ref 0–6.9)
ERYTHROCYTE [DISTWIDTH] IN BLOOD BY AUTOMATED COUNT: 52.9 FL (ref 35.9–50)
ERYTHROCYTE [DISTWIDTH] IN BLOOD BY AUTOMATED COUNT: 54.1 FL (ref 35.9–50)
ERYTHROCYTE [DISTWIDTH] IN BLOOD BY AUTOMATED COUNT: 54.4 FL (ref 35.9–50)
ERYTHROCYTE [DISTWIDTH] IN BLOOD BY AUTOMATED COUNT: 54.7 FL (ref 35.9–50)
GFR SERPLBLD CREATININE-BSD FMLA CKD-EPI: 98 ML/MIN/1.73 M 2
GLOBULIN SER CALC-MCNC: 2.3 G/DL (ref 1.9–3.5)
GLUCOSE BLD STRIP.AUTO-MCNC: 115 MG/DL (ref 65–99)
GLUCOSE BLD STRIP.AUTO-MCNC: 130 MG/DL (ref 65–99)
GLUCOSE BLD STRIP.AUTO-MCNC: 161 MG/DL (ref 65–99)
GLUCOSE BLD STRIP.AUTO-MCNC: 70 MG/DL (ref 65–99)
GLUCOSE SERPL-MCNC: 75 MG/DL (ref 65–99)
GRAM STN SPEC: ABNORMAL
HCO3 BLDA-SCNC: 25.6 MMOL/L (ref 17–25)
HCO3 BLDA-SCNC: 25.9 MMOL/L (ref 17–25)
HCT VFR BLD AUTO: 25.7 % (ref 42–52)
HCT VFR BLD AUTO: 27.5 % (ref 42–52)
HCT VFR BLD AUTO: 27.8 % (ref 42–52)
HCT VFR BLD AUTO: 28.1 % (ref 42–52)
HGB BLD-MCNC: 8 G/DL (ref 14–18)
HGB BLD-MCNC: 8.5 G/DL (ref 14–18)
HGB BLD-MCNC: 8.8 G/DL (ref 14–18)
HGB BLD-MCNC: 9 G/DL (ref 14–18)
HOROWITZ INDEX BLDA+IHG-RTO: 175 MM[HG]
HOROWITZ INDEX BLDA+IHG-RTO: 230 MM[HG]
IMM GRANULOCYTES # BLD AUTO: 0.03 K/UL (ref 0–0.11)
IMM GRANULOCYTES NFR BLD AUTO: 0.4 % (ref 0–0.9)
LPM ILPM: 30 LPM
LYMPHOCYTES # BLD AUTO: 0.5 K/UL (ref 1–4.8)
LYMPHOCYTES NFR BLD: 6.8 % (ref 22–41)
MAGNESIUM SERPL-MCNC: 1.9 MG/DL (ref 1.5–2.5)
MCH RBC QN AUTO: 27.4 PG (ref 27–33)
MCH RBC QN AUTO: 27.5 PG (ref 27–33)
MCH RBC QN AUTO: 27.7 PG (ref 27–33)
MCH RBC QN AUTO: 28.2 PG (ref 27–33)
MCHC RBC AUTO-ENTMCNC: 30.9 G/DL (ref 32.3–36.5)
MCHC RBC AUTO-ENTMCNC: 31.1 G/DL (ref 32.3–36.5)
MCHC RBC AUTO-ENTMCNC: 31.7 G/DL (ref 32.3–36.5)
MCHC RBC AUTO-ENTMCNC: 32 G/DL (ref 32.3–36.5)
MCV RBC AUTO: 87.4 FL (ref 81.4–97.8)
MCV RBC AUTO: 88.1 FL (ref 81.4–97.8)
MCV RBC AUTO: 88.3 FL (ref 81.4–97.8)
MCV RBC AUTO: 88.7 FL (ref 81.4–97.8)
MODE IMODE: ABNORMAL
MONOCYTES # BLD AUTO: 0.71 K/UL (ref 0–0.85)
MONOCYTES NFR BLD AUTO: 9.7 % (ref 0–13.4)
NEUTROPHILS # BLD AUTO: 6.03 K/UL (ref 1.82–7.42)
NEUTROPHILS NFR BLD: 82.5 % (ref 44–72)
NRBC # BLD AUTO: 0 K/UL
NRBC BLD-RTO: 0 /100 WBC (ref 0–0.2)
O2/TOTAL GAS SETTING VFR VENT: 30 %
O2/TOTAL GAS SETTING VFR VENT: 40 %
PCO2 BLDA: 35.8 MMHG (ref 26–37)
PCO2 BLDA: 36 MMHG (ref 26–37)
PCO2 TEMP ADJ BLDA: 35.6 MMHG (ref 26–37)
PCO2 TEMP ADJ BLDA: 37.2 MMHG (ref 26–37)
PEEP END EXPIRATORY PRESSURE IPEEP: 8 CMH20
PH BLDA: 7.46 [PH] (ref 7.4–7.5)
PH BLDA: 7.47 [PH] (ref 7.4–7.5)
PH TEMP ADJ BLDA: 7.45 [PH] (ref 7.4–7.5)
PH TEMP ADJ BLDA: 7.47 [PH] (ref 7.4–7.5)
PHOSPHATE SERPL-MCNC: 2.7 MG/DL (ref 2.5–4.5)
PLATELET # BLD AUTO: 188 K/UL (ref 164–446)
PLATELET # BLD AUTO: 216 K/UL (ref 164–446)
PLATELET # BLD AUTO: 218 K/UL (ref 164–446)
PLATELET # BLD AUTO: 225 K/UL (ref 164–446)
PMV BLD AUTO: 10.6 FL (ref 9–12.9)
PMV BLD AUTO: 10.8 FL (ref 9–12.9)
PMV BLD AUTO: 10.8 FL (ref 9–12.9)
PMV BLD AUTO: 10.9 FL (ref 9–12.9)
PO2 BLDA: 69 MMHG (ref 64–87)
PO2 BLDA: 70 MMHG (ref 64–87)
PO2 TEMP ADJ BLDA: 69 MMHG (ref 64–87)
PO2 TEMP ADJ BLDA: 74 MMHG (ref 64–87)
POTASSIUM SERPL-SCNC: 3.8 MMOL/L (ref 3.6–5.5)
PROT SERPL-MCNC: 4.8 G/DL (ref 6–8.2)
RBC # BLD AUTO: 2.91 M/UL (ref 4.7–6.1)
RBC # BLD AUTO: 3.1 M/UL (ref 4.7–6.1)
RBC # BLD AUTO: 3.18 M/UL (ref 4.7–6.1)
RBC # BLD AUTO: 3.19 M/UL (ref 4.7–6.1)
SAO2 % BLDA: 95 % (ref 93–99)
SAO2 % BLDA: 95 % (ref 93–99)
SIGNIFICANT IND 70042: ABNORMAL
SIGNIFICANT IND 70042: ABNORMAL
SITE SITE: ABNORMAL
SITE SITE: ABNORMAL
SODIUM SERPL-SCNC: 141 MMOL/L (ref 135–145)
SOURCE SOURCE: ABNORMAL
SOURCE SOURCE: ABNORMAL
SPECIMEN DRAWN FROM PATIENT: ABNORMAL
SPECIMEN DRAWN FROM PATIENT: ABNORMAL
TIDAL VOLUME IVT: 370 ML
TRIGL SERPL-MCNC: 138 MG/DL (ref 0–149)
WBC # BLD AUTO: 6.2 K/UL (ref 4.8–10.8)
WBC # BLD AUTO: 7.3 K/UL (ref 4.8–10.8)
WBC # BLD AUTO: 7.4 K/UL (ref 4.8–10.8)
WBC # BLD AUTO: 9.4 K/UL (ref 4.8–10.8)

## 2024-07-13 PROCEDURE — 85025 COMPLETE CBC W/AUTO DIFF WBC: CPT

## 2024-07-13 PROCEDURE — 71045 X-RAY EXAM CHEST 1 VIEW: CPT

## 2024-07-13 PROCEDURE — 94640 AIRWAY INHALATION TREATMENT: CPT

## 2024-07-13 PROCEDURE — 31720 CLEARANCE OF AIRWAYS: CPT

## 2024-07-13 PROCEDURE — A9270 NON-COVERED ITEM OR SERVICE: HCPCS | Performed by: INTERNAL MEDICINE

## 2024-07-13 PROCEDURE — 82803 BLOOD GASES ANY COMBINATION: CPT | Mod: 91

## 2024-07-13 PROCEDURE — 80053 COMPREHEN METABOLIC PANEL: CPT

## 2024-07-13 PROCEDURE — 700111 HCHG RX REV CODE 636 W/ 250 OVERRIDE (IP): Performed by: INTERNAL MEDICINE

## 2024-07-13 PROCEDURE — 700105 HCHG RX REV CODE 258: Performed by: INTERNAL MEDICINE

## 2024-07-13 PROCEDURE — 84478 ASSAY OF TRIGLYCERIDES: CPT

## 2024-07-13 PROCEDURE — 770022 HCHG ROOM/CARE - ICU (200)

## 2024-07-13 PROCEDURE — 700101 HCHG RX REV CODE 250: Performed by: INTERNAL MEDICINE

## 2024-07-13 PROCEDURE — 36600 WITHDRAWAL OF ARTERIAL BLOOD: CPT

## 2024-07-13 PROCEDURE — 94003 VENT MGMT INPAT SUBQ DAY: CPT

## 2024-07-13 PROCEDURE — 82962 GLUCOSE BLOOD TEST: CPT | Mod: 91

## 2024-07-13 PROCEDURE — 700102 HCHG RX REV CODE 250 W/ 637 OVERRIDE(OP): Performed by: INTERNAL MEDICINE

## 2024-07-13 PROCEDURE — 99291 CRITICAL CARE FIRST HOUR: CPT | Performed by: INTERNAL MEDICINE

## 2024-07-13 PROCEDURE — 84100 ASSAY OF PHOSPHORUS: CPT

## 2024-07-13 PROCEDURE — 94799 UNLISTED PULMONARY SVC/PX: CPT

## 2024-07-13 PROCEDURE — 85027 COMPLETE CBC AUTOMATED: CPT | Mod: 91

## 2024-07-13 PROCEDURE — 82330 ASSAY OF CALCIUM: CPT

## 2024-07-13 PROCEDURE — 83735 ASSAY OF MAGNESIUM: CPT

## 2024-07-13 RX ORDER — IPRATROPIUM BROMIDE AND ALBUTEROL SULFATE 2.5; .5 MG/3ML; MG/3ML
3 SOLUTION RESPIRATORY (INHALATION)
Status: DISCONTINUED | OUTPATIENT
Start: 2024-07-13 | End: 2024-07-17

## 2024-07-13 RX ORDER — SODIUM CHLORIDE FOR INHALATION 7 %
4 VIAL, NEBULIZER (ML) INHALATION
Status: DISCONTINUED | OUTPATIENT
Start: 2024-07-13 | End: 2024-07-19 | Stop reason: HOSPADM

## 2024-07-13 RX ORDER — LOSARTAN POTASSIUM 50 MG/1
12.5 TABLET ORAL DAILY
Status: DISCONTINUED | OUTPATIENT
Start: 2024-07-13 | End: 2024-07-19 | Stop reason: HOSPADM

## 2024-07-13 RX ORDER — SODIUM CHLORIDE FOR INHALATION 7 %
4 VIAL, NEBULIZER (ML) INHALATION PRN
Status: DISCONTINUED | OUTPATIENT
Start: 2024-07-13 | End: 2024-07-19 | Stop reason: HOSPADM

## 2024-07-13 RX ORDER — PRAVASTATIN SODIUM 20 MG
40 TABLET ORAL EVERY EVENING
Status: DISCONTINUED | OUTPATIENT
Start: 2024-07-13 | End: 2024-07-19 | Stop reason: HOSPADM

## 2024-07-13 RX ORDER — IPRATROPIUM BROMIDE AND ALBUTEROL SULFATE 2.5; .5 MG/3ML; MG/3ML
3 SOLUTION RESPIRATORY (INHALATION)
Status: DISCONTINUED | OUTPATIENT
Start: 2024-07-13 | End: 2024-07-19 | Stop reason: HOSPADM

## 2024-07-13 RX ORDER — MAGNESIUM SULFATE HEPTAHYDRATE 40 MG/ML
2 INJECTION, SOLUTION INTRAVENOUS ONCE
Status: COMPLETED | OUTPATIENT
Start: 2024-07-13 | End: 2024-07-13

## 2024-07-13 RX ORDER — NOREPINEPHRINE BITARTRATE 0.03 MG/ML
0-1 INJECTION, SOLUTION INTRAVENOUS CONTINUOUS
Status: DISCONTINUED | OUTPATIENT
Start: 2024-07-13 | End: 2024-07-15

## 2024-07-13 RX ORDER — VALPROIC ACID 250 MG/5ML
500 SOLUTION ORAL
Status: DISCONTINUED | OUTPATIENT
Start: 2024-07-13 | End: 2024-07-19 | Stop reason: HOSPADM

## 2024-07-13 RX ORDER — HYDROXYZINE HYDROCHLORIDE 25 MG/1
25 TABLET, FILM COATED ORAL EVERY 4 HOURS PRN
Status: DISCONTINUED | OUTPATIENT
Start: 2024-07-13 | End: 2024-07-19 | Stop reason: HOSPADM

## 2024-07-13 RX ADMIN — IPRATROPIUM BROMIDE AND ALBUTEROL SULFATE 3 ML: 2.5; .5 SOLUTION RESPIRATORY (INHALATION) at 18:48

## 2024-07-13 RX ADMIN — SENNOSIDES AND DOCUSATE SODIUM 2 TABLET: 50; 8.6 TABLET ORAL at 17:17

## 2024-07-13 RX ADMIN — POLYETHYLENE GLYCOL 3350 1 PACKET: 17 POWDER, FOR SOLUTION ORAL at 17:18

## 2024-07-13 RX ADMIN — SUCRALFATE 1 G: 1 SUSPENSION ORAL at 13:37

## 2024-07-13 RX ADMIN — SUCRALFATE 1 G: 1 SUSPENSION ORAL at 05:09

## 2024-07-13 RX ADMIN — DEXTROSE MONOHYDRATE 25 G: 25 INJECTION, SOLUTION INTRAVENOUS at 05:09

## 2024-07-13 RX ADMIN — NYSTATIN: 100000 POWDER TOPICAL at 05:09

## 2024-07-13 RX ADMIN — LOSARTAN POTASSIUM 12.5 MG: 50 TABLET, FILM COATED ORAL at 17:17

## 2024-07-13 RX ADMIN — POTASSIUM BICARBONATE 25 MEQ: 978 TABLET, EFFERVESCENT ORAL at 07:31

## 2024-07-13 RX ADMIN — IPRATROPIUM BROMIDE AND ALBUTEROL SULFATE 3 ML: 2.5; .5 SOLUTION RESPIRATORY (INHALATION) at 22:32

## 2024-07-13 RX ADMIN — SENNOSIDES AND DOCUSATE SODIUM 2 TABLET: 50; 8.6 TABLET ORAL at 05:09

## 2024-07-13 RX ADMIN — METOPROLOL TARTRATE 12.5 MG: 25 TABLET, FILM COATED ORAL at 17:16

## 2024-07-13 RX ADMIN — CEFAZOLIN 2 G: 2 INJECTION, POWDER, FOR SOLUTION INTRAMUSCULAR; INTRAVENOUS at 21:37

## 2024-07-13 RX ADMIN — PRAVASTATIN SODIUM 40 MG: 20 TABLET ORAL at 17:10

## 2024-07-13 RX ADMIN — LANSOPRAZOLE 30 MG: 30 TABLET, ORALLY DISINTEGRATING ORAL at 05:09

## 2024-07-13 RX ADMIN — IPRATROPIUM BROMIDE AND ALBUTEROL SULFATE 3 ML: 2.5; .5 SOLUTION RESPIRATORY (INHALATION) at 08:57

## 2024-07-13 RX ADMIN — LANSOPRAZOLE 30 MG: 30 TABLET, ORALLY DISINTEGRATING ORAL at 17:09

## 2024-07-13 RX ADMIN — SUCRALFATE 1 G: 1 SUSPENSION ORAL at 17:08

## 2024-07-13 RX ADMIN — MAGNESIUM HYDROXIDE 30 ML: 1200 LIQUID ORAL at 17:18

## 2024-07-13 RX ADMIN — IPRATROPIUM BROMIDE AND ALBUTEROL SULFATE 3 ML: 2.5; .5 SOLUTION RESPIRATORY (INHALATION) at 10:48

## 2024-07-13 RX ADMIN — MAGNESIUM SULFATE HEPTAHYDRATE 2 G: 2 INJECTION, SOLUTION INTRAVENOUS at 07:34

## 2024-07-13 RX ADMIN — AMPICILLIN AND SULBACTAM 3 G: 1; 2 INJECTION, POWDER, FOR SOLUTION INTRAMUSCULAR; INTRAVENOUS at 05:15

## 2024-07-13 RX ADMIN — OXYCODONE 5 MG: 5 TABLET ORAL at 17:15

## 2024-07-13 RX ADMIN — IPRATROPIUM BROMIDE AND ALBUTEROL SULFATE 3 ML: 2.5; .5 SOLUTION RESPIRATORY (INHALATION) at 14:46

## 2024-07-13 RX ADMIN — Medication 4 ML: at 18:48

## 2024-07-13 RX ADMIN — CEFAZOLIN 2 G: 2 INJECTION, POWDER, FOR SOLUTION INTRAMUSCULAR; INTRAVENOUS at 13:37

## 2024-07-13 RX ADMIN — Medication 4 ML: at 14:47

## 2024-07-13 RX ADMIN — NYSTATIN: 100000 POWDER TOPICAL at 17:18

## 2024-07-13 RX ADMIN — VALPROIC ACID 500 MG: 500 SOLUTION ORAL at 21:35

## 2024-07-13 ASSESSMENT — FIBROSIS 4 INDEX
FIB4 SCORE: 1.85
FIB4 SCORE: 2.19

## 2024-07-13 ASSESSMENT — COGNITIVE AND FUNCTIONAL STATUS - GENERAL
MOBILITY SCORE: 6
SUGGESTED CMS G CODE MODIFIER MOBILITY: CN
MOVING TO AND FROM BED TO CHAIR: TOTAL
WALKING IN HOSPITAL ROOM: TOTAL
DRESSING REGULAR UPPER BODY CLOTHING: TOTAL
SUGGESTED CMS G CODE MODIFIER DAILY ACTIVITY: CN
PERSONAL GROOMING: TOTAL
CLIMB 3 TO 5 STEPS WITH RAILING: TOTAL
TURNING FROM BACK TO SIDE WHILE IN FLAT BAD: TOTAL
HELP NEEDED FOR BATHING: TOTAL
STANDING UP FROM CHAIR USING ARMS: TOTAL
DRESSING REGULAR LOWER BODY CLOTHING: TOTAL
MOVING FROM LYING ON BACK TO SITTING ON SIDE OF FLAT BED: TOTAL
DAILY ACTIVITIY SCORE: 6
TOILETING: TOTAL
EATING MEALS: TOTAL

## 2024-07-13 ASSESSMENT — PAIN DESCRIPTION - PAIN TYPE
TYPE: ACUTE PAIN

## 2024-07-14 ENCOUNTER — APPOINTMENT (OUTPATIENT)
Dept: RADIOLOGY | Facility: MEDICAL CENTER | Age: 79
DRG: 871 | End: 2024-07-14
Attending: INTERNAL MEDICINE
Payer: MEDICARE

## 2024-07-14 LAB
ALBUMIN SERPL BCP-MCNC: 3 G/DL (ref 3.2–4.9)
ALBUMIN/GLOB SERPL: 1.3 G/DL
ALP SERPL-CCNC: 46 U/L (ref 30–99)
ALT SERPL-CCNC: 12 U/L (ref 2–50)
ANION GAP SERPL CALC-SCNC: 10 MMOL/L (ref 7–16)
ARTERIAL PATENCY WRIST A: ABNORMAL
AST SERPL-CCNC: 19 U/L (ref 12–45)
BASE EXCESS BLDA CALC-SCNC: 2 MMOL/L (ref -4–3)
BASOPHILS # BLD AUTO: 0.2 % (ref 0–1.8)
BASOPHILS # BLD: 0.01 K/UL (ref 0–0.12)
BILIRUB SERPL-MCNC: 0.2 MG/DL (ref 0.1–1.5)
BODY TEMPERATURE: ABNORMAL DEGREES
BUN SERPL-MCNC: 9 MG/DL (ref 8–22)
CALCIUM ALBUM COR SERPL-MCNC: 9 MG/DL (ref 8.5–10.5)
CALCIUM SERPL-MCNC: 8.2 MG/DL (ref 8.5–10.5)
CHLORIDE SERPL-SCNC: 102 MMOL/L (ref 96–112)
CO2 BLDA-SCNC: 27 MMOL/L (ref 20–33)
CO2 SERPL-SCNC: 25 MMOL/L (ref 20–33)
CREAT SERPL-MCNC: 0.53 MG/DL (ref 0.5–1.4)
CRP SERPL HS-MCNC: 3.51 MG/DL (ref 0–0.75)
DELSYS IDSYS: ABNORMAL
EKG IMPRESSION: NORMAL
EOSINOPHIL # BLD AUTO: 0.18 K/UL (ref 0–0.51)
EOSINOPHIL NFR BLD: 2.7 % (ref 0–6.9)
ERYTHROCYTE [DISTWIDTH] IN BLOOD BY AUTOMATED COUNT: 52.7 FL (ref 35.9–50)
FERRITIN SERPL-MCNC: 98.4 NG/ML (ref 22–322)
GFR SERPLBLD CREATININE-BSD FMLA CKD-EPI: 102 ML/MIN/1.73 M 2
GLOBULIN SER CALC-MCNC: 2.3 G/DL (ref 1.9–3.5)
GLUCOSE BLD STRIP.AUTO-MCNC: 118 MG/DL (ref 65–99)
GLUCOSE BLD STRIP.AUTO-MCNC: 128 MG/DL (ref 65–99)
GLUCOSE BLD STRIP.AUTO-MCNC: 134 MG/DL (ref 65–99)
GLUCOSE BLD STRIP.AUTO-MCNC: 136 MG/DL (ref 65–99)
GLUCOSE SERPL-MCNC: 128 MG/DL (ref 65–99)
HCO3 BLDA-SCNC: 26 MMOL/L (ref 17–25)
HCT VFR BLD AUTO: 28.1 % (ref 42–52)
HGB BLD-MCNC: 9 G/DL (ref 14–18)
HGB RETIC QN AUTO: 26.3 PG/CELL (ref 29–35)
HOROWITZ INDEX BLDA+IHG-RTO: 170 MM[HG]
IMM GRANULOCYTES # BLD AUTO: 0.03 K/UL (ref 0–0.11)
IMM GRANULOCYTES NFR BLD AUTO: 0.5 % (ref 0–0.9)
IMM RETICS NFR: 25 % (ref 2.6–16.1)
IRON SATN MFR SERPL: 8 % (ref 15–55)
IRON SERPL-MCNC: 21 UG/DL (ref 50–180)
LPM ILPM: 30 LPM
LYMPHOCYTES # BLD AUTO: 0.46 K/UL (ref 1–4.8)
LYMPHOCYTES NFR BLD: 7 % (ref 22–41)
MAGNESIUM SERPL-MCNC: 2.2 MG/DL (ref 1.5–2.5)
MCH RBC QN AUTO: 28 PG (ref 27–33)
MCHC RBC AUTO-ENTMCNC: 32 G/DL (ref 32.3–36.5)
MCV RBC AUTO: 87.3 FL (ref 81.4–97.8)
MONOCYTES # BLD AUTO: 0.47 K/UL (ref 0–0.85)
MONOCYTES NFR BLD AUTO: 7.1 % (ref 0–13.4)
NEUTROPHILS # BLD AUTO: 5.46 K/UL (ref 1.82–7.42)
NEUTROPHILS NFR BLD: 82.5 % (ref 44–72)
NRBC # BLD AUTO: 0 K/UL
NRBC BLD-RTO: 0 /100 WBC (ref 0–0.2)
O2/TOTAL GAS SETTING VFR VENT: 30 %
PCO2 BLDA: 38 MMHG (ref 26–37)
PCO2 TEMP ADJ BLDA: 39 MMHG (ref 26–37)
PH BLDA: 7.44 [PH] (ref 7.4–7.5)
PH TEMP ADJ BLDA: 7.43 [PH] (ref 7.4–7.5)
PHOSPHATE SERPL-MCNC: 2.5 MG/DL (ref 2.5–4.5)
PLATELET # BLD AUTO: 207 K/UL (ref 164–446)
PMV BLD AUTO: 10.8 FL (ref 9–12.9)
PO2 BLDA: 51 MMHG (ref 64–87)
PO2 TEMP ADJ BLDA: 53 MMHG (ref 64–87)
POTASSIUM SERPL-SCNC: 3.6 MMOL/L (ref 3.6–5.5)
PREALB SERPL-MCNC: 13.7 MG/DL (ref 18–38)
PROT SERPL-MCNC: 5.3 G/DL (ref 6–8.2)
RBC # BLD AUTO: 3.22 M/UL (ref 4.7–6.1)
RETICS # AUTO: 0.11 M/UL (ref 0.04–0.12)
RETICS/RBC NFR: 3.1 % (ref 0.8–2.6)
SAO2 % BLDA: 87 % (ref 93–99)
SODIUM SERPL-SCNC: 137 MMOL/L (ref 135–145)
SPECIMEN DRAWN FROM PATIENT: ABNORMAL
TIBC SERPL-MCNC: 254 UG/DL (ref 250–450)
UIBC SERPL-MCNC: 233 UG/DL (ref 110–370)
WBC # BLD AUTO: 6.6 K/UL (ref 4.8–10.8)

## 2024-07-14 PROCEDURE — 770022 HCHG ROOM/CARE - ICU (200)

## 2024-07-14 PROCEDURE — 700111 HCHG RX REV CODE 636 W/ 250 OVERRIDE (IP): Mod: JZ | Performed by: INTERNAL MEDICINE

## 2024-07-14 PROCEDURE — 84134 ASSAY OF PREALBUMIN: CPT

## 2024-07-14 PROCEDURE — 700102 HCHG RX REV CODE 250 W/ 637 OVERRIDE(OP): Performed by: INTERNAL MEDICINE

## 2024-07-14 PROCEDURE — 700105 HCHG RX REV CODE 258: Performed by: INTERNAL MEDICINE

## 2024-07-14 PROCEDURE — 36600 WITHDRAWAL OF ARTERIAL BLOOD: CPT

## 2024-07-14 PROCEDURE — 83540 ASSAY OF IRON: CPT

## 2024-07-14 PROCEDURE — 94640 AIRWAY INHALATION TREATMENT: CPT

## 2024-07-14 PROCEDURE — 700101 HCHG RX REV CODE 250: Performed by: INTERNAL MEDICINE

## 2024-07-14 PROCEDURE — 99291 CRITICAL CARE FIRST HOUR: CPT | Performed by: INTERNAL MEDICINE

## 2024-07-14 PROCEDURE — 85025 COMPLETE CBC W/AUTO DIFF WBC: CPT

## 2024-07-14 PROCEDURE — 93005 ELECTROCARDIOGRAM TRACING: CPT | Performed by: INTERNAL MEDICINE

## 2024-07-14 PROCEDURE — 86140 C-REACTIVE PROTEIN: CPT

## 2024-07-14 PROCEDURE — 82728 ASSAY OF FERRITIN: CPT

## 2024-07-14 PROCEDURE — 80053 COMPREHEN METABOLIC PANEL: CPT

## 2024-07-14 PROCEDURE — 82962 GLUCOSE BLOOD TEST: CPT | Mod: 91

## 2024-07-14 PROCEDURE — 84100 ASSAY OF PHOSPHORUS: CPT

## 2024-07-14 PROCEDURE — A9270 NON-COVERED ITEM OR SERVICE: HCPCS | Performed by: INTERNAL MEDICINE

## 2024-07-14 PROCEDURE — 93010 ELECTROCARDIOGRAM REPORT: CPT | Performed by: INTERNAL MEDICINE

## 2024-07-14 PROCEDURE — 83735 ASSAY OF MAGNESIUM: CPT

## 2024-07-14 PROCEDURE — 82803 BLOOD GASES ANY COMBINATION: CPT

## 2024-07-14 PROCEDURE — 71045 X-RAY EXAM CHEST 1 VIEW: CPT

## 2024-07-14 PROCEDURE — 83550 IRON BINDING TEST: CPT

## 2024-07-14 PROCEDURE — 85046 RETICYTE/HGB CONCENTRATE: CPT

## 2024-07-14 RX ORDER — POTASSIUM CHLORIDE 14.9 MG/ML
20 INJECTION INTRAVENOUS ONCE
Status: COMPLETED | OUTPATIENT
Start: 2024-07-14 | End: 2024-07-14

## 2024-07-14 RX ORDER — OXYBUTYNIN CHLORIDE 5 MG/1
5 TABLET ORAL 3 TIMES DAILY
Status: DISCONTINUED | OUTPATIENT
Start: 2024-07-14 | End: 2024-07-19 | Stop reason: HOSPADM

## 2024-07-14 RX ORDER — FUROSEMIDE 10 MG/ML
40 INJECTION INTRAMUSCULAR; INTRAVENOUS ONCE
Status: COMPLETED | OUTPATIENT
Start: 2024-07-14 | End: 2024-07-14

## 2024-07-14 RX ORDER — ENOXAPARIN SODIUM 100 MG/ML
40 INJECTION SUBCUTANEOUS DAILY
Status: DISCONTINUED | OUTPATIENT
Start: 2024-07-14 | End: 2024-07-19 | Stop reason: HOSPADM

## 2024-07-14 RX ADMIN — METOPROLOL TARTRATE 12.5 MG: 25 TABLET, FILM COATED ORAL at 17:34

## 2024-07-14 RX ADMIN — IPRATROPIUM BROMIDE AND ALBUTEROL SULFATE 3 ML: 2.5; .5 SOLUTION RESPIRATORY (INHALATION) at 19:00

## 2024-07-14 RX ADMIN — SUCRALFATE 1 G: 1 SUSPENSION ORAL at 05:10

## 2024-07-14 RX ADMIN — ACETAMINOPHEN 650 MG: 325 TABLET, FILM COATED ORAL at 10:37

## 2024-07-14 RX ADMIN — NYSTATIN: 100000 POWDER TOPICAL at 05:07

## 2024-07-14 RX ADMIN — CEFAZOLIN 2 G: 2 INJECTION, POWDER, FOR SOLUTION INTRAMUSCULAR; INTRAVENOUS at 21:28

## 2024-07-14 RX ADMIN — IPRATROPIUM BROMIDE AND ALBUTEROL SULFATE 3 ML: 2.5; .5 SOLUTION RESPIRATORY (INHALATION) at 02:48

## 2024-07-14 RX ADMIN — IPRATROPIUM BROMIDE AND ALBUTEROL SULFATE 3 ML: 2.5; .5 SOLUTION RESPIRATORY (INHALATION) at 12:21

## 2024-07-14 RX ADMIN — LOSARTAN POTASSIUM 12.5 MG: 50 TABLET, FILM COATED ORAL at 05:45

## 2024-07-14 RX ADMIN — IPRATROPIUM BROMIDE AND ALBUTEROL SULFATE 3 ML: 2.5; .5 SOLUTION RESPIRATORY (INHALATION) at 07:03

## 2024-07-14 RX ADMIN — POTASSIUM CHLORIDE 20 MEQ: 14.9 INJECTION, SOLUTION INTRAVENOUS at 10:08

## 2024-07-14 RX ADMIN — OXYBUTYNIN CHLORIDE 5 MG: 5 TABLET ORAL at 17:35

## 2024-07-14 RX ADMIN — OXYBUTYNIN CHLORIDE 5 MG: 5 TABLET ORAL at 09:56

## 2024-07-14 RX ADMIN — Medication 4 ML: at 06:55

## 2024-07-14 RX ADMIN — LANSOPRAZOLE 30 MG: 30 TABLET, ORALLY DISINTEGRATING ORAL at 17:33

## 2024-07-14 RX ADMIN — PRAVASTATIN SODIUM 40 MG: 20 TABLET ORAL at 17:34

## 2024-07-14 RX ADMIN — IPRATROPIUM BROMIDE AND ALBUTEROL SULFATE 3 ML: 2.5; .5 SOLUTION RESPIRATORY (INHALATION) at 22:36

## 2024-07-14 RX ADMIN — NYSTATIN: 100000 POWDER TOPICAL at 17:38

## 2024-07-14 RX ADMIN — Medication 4 ML: at 18:59

## 2024-07-14 RX ADMIN — ENOXAPARIN SODIUM 40 MG: 100 INJECTION SUBCUTANEOUS at 17:38

## 2024-07-14 RX ADMIN — SUCRALFATE 1 G: 1 SUSPENSION ORAL at 00:05

## 2024-07-14 RX ADMIN — FUROSEMIDE 40 MG: 10 INJECTION INTRAMUSCULAR; INTRAVENOUS at 04:58

## 2024-07-14 RX ADMIN — LANSOPRAZOLE 30 MG: 30 TABLET, ORALLY DISINTEGRATING ORAL at 05:10

## 2024-07-14 RX ADMIN — CEFAZOLIN 2 G: 2 INJECTION, POWDER, FOR SOLUTION INTRAMUSCULAR; INTRAVENOUS at 13:34

## 2024-07-14 RX ADMIN — IPRATROPIUM BROMIDE AND ALBUTEROL SULFATE 3 ML: 2.5; .5 SOLUTION RESPIRATORY (INHALATION) at 14:32

## 2024-07-14 RX ADMIN — CEFAZOLIN 2 G: 2 INJECTION, POWDER, FOR SOLUTION INTRAMUSCULAR; INTRAVENOUS at 05:07

## 2024-07-14 RX ADMIN — METOPROLOL TARTRATE 12.5 MG: 25 TABLET, FILM COATED ORAL at 05:46

## 2024-07-14 RX ADMIN — SENNOSIDES AND DOCUSATE SODIUM 2 TABLET: 50; 8.6 TABLET ORAL at 17:34

## 2024-07-14 RX ADMIN — VALPROIC ACID 500 MG: 500 SOLUTION ORAL at 21:27

## 2024-07-14 RX ADMIN — OXYBUTYNIN CHLORIDE 5 MG: 5 TABLET ORAL at 11:41

## 2024-07-14 ASSESSMENT — PAIN DESCRIPTION - PAIN TYPE
TYPE: ACUTE PAIN

## 2024-07-14 ASSESSMENT — FIBROSIS 4 INDEX
FIB4 SCORE: 2.07
FIB4 SCORE: 1.89

## 2024-07-15 ENCOUNTER — APPOINTMENT (OUTPATIENT)
Dept: RADIOLOGY | Facility: MEDICAL CENTER | Age: 79
DRG: 871 | End: 2024-07-15
Attending: HOSPITALIST
Payer: MEDICARE

## 2024-07-15 PROBLEM — J69.0 ASPIRATION PNEUMONIA OF BOTH LOWER LOBES DUE TO GASTRIC SECRETIONS (HCC): Status: ACTIVE | Noted: 2024-07-15

## 2024-07-15 LAB
ALBUMIN SERPL BCP-MCNC: 3 G/DL (ref 3.2–4.9)
ALBUMIN/GLOB SERPL: 1.2 G/DL
ALP SERPL-CCNC: 47 U/L (ref 30–99)
ALT SERPL-CCNC: 8 U/L (ref 2–50)
ANION GAP SERPL CALC-SCNC: 8 MMOL/L (ref 7–16)
AST SERPL-CCNC: 17 U/L (ref 12–45)
BASOPHILS # BLD AUTO: 0.1 % (ref 0–1.8)
BASOPHILS # BLD: 0.01 K/UL (ref 0–0.12)
BILIRUB SERPL-MCNC: 0.2 MG/DL (ref 0.1–1.5)
BUN SERPL-MCNC: 9 MG/DL (ref 8–22)
CALCIUM ALBUM COR SERPL-MCNC: 9 MG/DL (ref 8.5–10.5)
CALCIUM SERPL-MCNC: 8.2 MG/DL (ref 8.5–10.5)
CHLORIDE SERPL-SCNC: 100 MMOL/L (ref 96–112)
CO2 SERPL-SCNC: 26 MMOL/L (ref 20–33)
CREAT SERPL-MCNC: 0.56 MG/DL (ref 0.5–1.4)
CRP SERPL HS-MCNC: 4.11 MG/DL (ref 0–0.75)
EOSINOPHIL # BLD AUTO: 0.36 K/UL (ref 0–0.51)
EOSINOPHIL NFR BLD: 5.3 % (ref 0–6.9)
ERYTHROCYTE [DISTWIDTH] IN BLOOD BY AUTOMATED COUNT: 51.2 FL (ref 35.9–50)
GFR SERPLBLD CREATININE-BSD FMLA CKD-EPI: 100 ML/MIN/1.73 M 2
GLOBULIN SER CALC-MCNC: 2.6 G/DL (ref 1.9–3.5)
GLUCOSE SERPL-MCNC: 129 MG/DL (ref 65–99)
HCT VFR BLD AUTO: 29.2 % (ref 42–52)
HGB BLD-MCNC: 9.3 G/DL (ref 14–18)
IMM GRANULOCYTES # BLD AUTO: 0.08 K/UL (ref 0–0.11)
IMM GRANULOCYTES NFR BLD AUTO: 1.2 % (ref 0–0.9)
LYMPHOCYTES # BLD AUTO: 0.45 K/UL (ref 1–4.8)
LYMPHOCYTES NFR BLD: 6.6 % (ref 22–41)
MCH RBC QN AUTO: 27.5 PG (ref 27–33)
MCHC RBC AUTO-ENTMCNC: 31.8 G/DL (ref 32.3–36.5)
MCV RBC AUTO: 86.4 FL (ref 81.4–97.8)
MONOCYTES # BLD AUTO: 0.43 K/UL (ref 0–0.85)
MONOCYTES NFR BLD AUTO: 6.3 % (ref 0–13.4)
NEUTROPHILS # BLD AUTO: 5.51 K/UL (ref 1.82–7.42)
NEUTROPHILS NFR BLD: 80.5 % (ref 44–72)
NRBC # BLD AUTO: 0 K/UL
NRBC BLD-RTO: 0 /100 WBC (ref 0–0.2)
PLATELET # BLD AUTO: 233 K/UL (ref 164–446)
PMV BLD AUTO: 11 FL (ref 9–12.9)
POTASSIUM SERPL-SCNC: 4 MMOL/L (ref 3.6–5.5)
PREALB SERPL-MCNC: 13 MG/DL (ref 18–38)
PROT SERPL-MCNC: 5.6 G/DL (ref 6–8.2)
RBC # BLD AUTO: 3.38 M/UL (ref 4.7–6.1)
SODIUM SERPL-SCNC: 134 MMOL/L (ref 135–145)
WBC # BLD AUTO: 6.8 K/UL (ref 4.8–10.8)

## 2024-07-15 PROCEDURE — 94640 AIRWAY INHALATION TREATMENT: CPT

## 2024-07-15 PROCEDURE — 85025 COMPLETE CBC W/AUTO DIFF WBC: CPT

## 2024-07-15 PROCEDURE — 80053 COMPREHEN METABOLIC PANEL: CPT

## 2024-07-15 PROCEDURE — 97163 PT EVAL HIGH COMPLEX 45 MIN: CPT

## 2024-07-15 PROCEDURE — 97166 OT EVAL MOD COMPLEX 45 MIN: CPT

## 2024-07-15 PROCEDURE — 99223 1ST HOSP IP/OBS HIGH 75: CPT | Mod: 25 | Performed by: HOSPITALIST

## 2024-07-15 PROCEDURE — 700117 HCHG RX CONTRAST REV CODE 255: Performed by: HOSPITALIST

## 2024-07-15 PROCEDURE — 86140 C-REACTIVE PROTEIN: CPT

## 2024-07-15 PROCEDURE — 770000 HCHG ROOM/CARE - INTERMEDIATE ICU *

## 2024-07-15 PROCEDURE — 700102 HCHG RX REV CODE 250 W/ 637 OVERRIDE(OP): Performed by: INTERNAL MEDICINE

## 2024-07-15 PROCEDURE — 99291 CRITICAL CARE FIRST HOUR: CPT | Performed by: INTERNAL MEDICINE

## 2024-07-15 PROCEDURE — 84134 ASSAY OF PREALBUMIN: CPT

## 2024-07-15 PROCEDURE — A9270 NON-COVERED ITEM OR SERVICE: HCPCS | Performed by: INTERNAL MEDICINE

## 2024-07-15 PROCEDURE — 700111 HCHG RX REV CODE 636 W/ 250 OVERRIDE (IP): Performed by: INTERNAL MEDICINE

## 2024-07-15 PROCEDURE — 700105 HCHG RX REV CODE 258: Performed by: INTERNAL MEDICINE

## 2024-07-15 PROCEDURE — 71045 X-RAY EXAM CHEST 1 VIEW: CPT

## 2024-07-15 PROCEDURE — 49465 FLUORO EXAM OF G/COLON TUBE: CPT

## 2024-07-15 PROCEDURE — 99497 ADVNCD CARE PLAN 30 MIN: CPT | Performed by: HOSPITALIST

## 2024-07-15 PROCEDURE — 700101 HCHG RX REV CODE 250: Performed by: INTERNAL MEDICINE

## 2024-07-15 RX ADMIN — NYSTATIN: 100000 POWDER TOPICAL at 05:09

## 2024-07-15 RX ADMIN — IOHEXOL 20 ML: 300 INJECTION, SOLUTION INTRAVENOUS at 15:15

## 2024-07-15 RX ADMIN — Medication 4 ML: at 19:05

## 2024-07-15 RX ADMIN — ENOXAPARIN SODIUM 40 MG: 100 INJECTION SUBCUTANEOUS at 18:19

## 2024-07-15 RX ADMIN — LANSOPRAZOLE 30 MG: 30 TABLET, ORALLY DISINTEGRATING ORAL at 18:19

## 2024-07-15 RX ADMIN — IPRATROPIUM BROMIDE AND ALBUTEROL SULFATE 3 ML: 2.5; .5 SOLUTION RESPIRATORY (INHALATION) at 22:21

## 2024-07-15 RX ADMIN — LOSARTAN POTASSIUM 12.5 MG: 50 TABLET, FILM COATED ORAL at 05:09

## 2024-07-15 RX ADMIN — CEFAZOLIN 2 G: 2 INJECTION, POWDER, FOR SOLUTION INTRAMUSCULAR; INTRAVENOUS at 22:03

## 2024-07-15 RX ADMIN — METOPROLOL TARTRATE 12.5 MG: 25 TABLET, FILM COATED ORAL at 18:19

## 2024-07-15 RX ADMIN — IPRATROPIUM BROMIDE AND ALBUTEROL SULFATE 3 ML: 2.5; .5 SOLUTION RESPIRATORY (INHALATION) at 14:16

## 2024-07-15 RX ADMIN — VALPROIC ACID 500 MG: 500 SOLUTION ORAL at 21:55

## 2024-07-15 RX ADMIN — OXYBUTYNIN CHLORIDE 5 MG: 5 TABLET ORAL at 13:08

## 2024-07-15 RX ADMIN — IPRATROPIUM BROMIDE AND ALBUTEROL SULFATE 3 ML: 2.5; .5 SOLUTION RESPIRATORY (INHALATION) at 01:29

## 2024-07-15 RX ADMIN — IPRATROPIUM BROMIDE AND ALBUTEROL SULFATE 3 ML: 2.5; .5 SOLUTION RESPIRATORY (INHALATION) at 07:02

## 2024-07-15 RX ADMIN — OXYBUTYNIN CHLORIDE 5 MG: 5 TABLET ORAL at 18:19

## 2024-07-15 RX ADMIN — CEFAZOLIN 2 G: 2 INJECTION, POWDER, FOR SOLUTION INTRAMUSCULAR; INTRAVENOUS at 13:14

## 2024-07-15 RX ADMIN — LANSOPRAZOLE 30 MG: 30 TABLET, ORALLY DISINTEGRATING ORAL at 05:09

## 2024-07-15 RX ADMIN — METOPROLOL TARTRATE 12.5 MG: 25 TABLET, FILM COATED ORAL at 05:09

## 2024-07-15 RX ADMIN — CEFAZOLIN 2 G: 2 INJECTION, POWDER, FOR SOLUTION INTRAMUSCULAR; INTRAVENOUS at 05:11

## 2024-07-15 RX ADMIN — OXYBUTYNIN CHLORIDE 5 MG: 5 TABLET ORAL at 05:09

## 2024-07-15 RX ADMIN — PRAVASTATIN SODIUM 40 MG: 20 TABLET ORAL at 18:19

## 2024-07-15 RX ADMIN — NYSTATIN: 100000 POWDER TOPICAL at 18:20

## 2024-07-15 RX ADMIN — IPRATROPIUM BROMIDE AND ALBUTEROL SULFATE 3 ML: 2.5; .5 SOLUTION RESPIRATORY (INHALATION) at 09:48

## 2024-07-15 RX ADMIN — IPRATROPIUM BROMIDE AND ALBUTEROL SULFATE 3 ML: 2.5; .5 SOLUTION RESPIRATORY (INHALATION) at 19:03

## 2024-07-15 ASSESSMENT — PAIN DESCRIPTION - PAIN TYPE
TYPE: ACUTE PAIN
TYPE: ACUTE PAIN

## 2024-07-15 ASSESSMENT — COGNITIVE AND FUNCTIONAL STATUS - GENERAL
DRESSING REGULAR LOWER BODY CLOTHING: TOTAL
PERSONAL GROOMING: TOTAL
WALKING IN HOSPITAL ROOM: TOTAL
MOBILITY SCORE: 7
STANDING UP FROM CHAIR USING ARMS: A LOT
CLIMB 3 TO 5 STEPS WITH RAILING: TOTAL
SUGGESTED CMS G CODE MODIFIER MOBILITY: CM
SUGGESTED CMS G CODE MODIFIER DAILY ACTIVITY: CN
MOVING TO AND FROM BED TO CHAIR: TOTAL
TOILETING: TOTAL
TURNING FROM BACK TO SIDE WHILE IN FLAT BAD: TOTAL
DRESSING REGULAR UPPER BODY CLOTHING: TOTAL
HELP NEEDED FOR BATHING: TOTAL
MOVING FROM LYING ON BACK TO SITTING ON SIDE OF FLAT BED: TOTAL
DAILY ACTIVITIY SCORE: 6
EATING MEALS: TOTAL

## 2024-07-15 ASSESSMENT — ACTIVITIES OF DAILY LIVING (ADL): TOILETING: REQUIRES ASSIST

## 2024-07-15 ASSESSMENT — GAIT ASSESSMENTS: GAIT LEVEL OF ASSIST: UNABLE TO PARTICIPATE

## 2024-07-15 ASSESSMENT — FIBROSIS 4 INDEX: FIB4 SCORE: 2.01

## 2024-07-16 LAB
ALBUMIN SERPL BCP-MCNC: 2.9 G/DL (ref 3.2–4.9)
ALBUMIN/GLOB SERPL: 1 G/DL
ALP SERPL-CCNC: 43 U/L (ref 30–99)
ALT SERPL-CCNC: 5 U/L (ref 2–50)
ANION GAP SERPL CALC-SCNC: 6 MMOL/L (ref 7–16)
AST SERPL-CCNC: 11 U/L (ref 12–45)
BACTERIA BLD CULT: NORMAL
BACTERIA BLD CULT: NORMAL
BASOPHILS # BLD AUTO: 0.3 % (ref 0–1.8)
BASOPHILS # BLD: 0.02 K/UL (ref 0–0.12)
BILIRUB SERPL-MCNC: <0.2 MG/DL (ref 0.1–1.5)
BUN SERPL-MCNC: 17 MG/DL (ref 8–22)
CALCIUM ALBUM COR SERPL-MCNC: 9.2 MG/DL (ref 8.5–10.5)
CALCIUM SERPL-MCNC: 8.3 MG/DL (ref 8.5–10.5)
CHLORIDE SERPL-SCNC: 102 MMOL/L (ref 96–112)
CO2 SERPL-SCNC: 28 MMOL/L (ref 20–33)
CREAT SERPL-MCNC: 0.43 MG/DL (ref 0.5–1.4)
CRP SERPL HS-MCNC: 2.74 MG/DL (ref 0–0.75)
EOSINOPHIL # BLD AUTO: 0.5 K/UL (ref 0–0.51)
EOSINOPHIL NFR BLD: 6.5 % (ref 0–6.9)
ERYTHROCYTE [DISTWIDTH] IN BLOOD BY AUTOMATED COUNT: 51.9 FL (ref 35.9–50)
GFR SERPLBLD CREATININE-BSD FMLA CKD-EPI: 109 ML/MIN/1.73 M 2
GLOBULIN SER CALC-MCNC: 2.8 G/DL (ref 1.9–3.5)
GLUCOSE SERPL-MCNC: 120 MG/DL (ref 65–99)
HCT VFR BLD AUTO: 29.8 % (ref 42–52)
HGB BLD-MCNC: 9.3 G/DL (ref 14–18)
HGB RETIC QN AUTO: 23.5 PG/CELL (ref 29–35)
IMM GRANULOCYTES # BLD AUTO: 0.1 K/UL (ref 0–0.11)
IMM GRANULOCYTES NFR BLD AUTO: 1.3 % (ref 0–0.9)
IMM RETICS NFR: 18.1 % (ref 2.6–16.1)
IRON SATN MFR SERPL: 11 % (ref 15–55)
IRON SERPL-MCNC: 28 UG/DL (ref 50–180)
LYMPHOCYTES # BLD AUTO: 0.54 K/UL (ref 1–4.8)
LYMPHOCYTES NFR BLD: 7 % (ref 22–41)
MCH RBC QN AUTO: 27.9 PG (ref 27–33)
MCHC RBC AUTO-ENTMCNC: 31.2 G/DL (ref 32.3–36.5)
MCV RBC AUTO: 89.5 FL (ref 81.4–97.8)
MONOCYTES # BLD AUTO: 0.41 K/UL (ref 0–0.85)
MONOCYTES NFR BLD AUTO: 5.3 % (ref 0–13.4)
NEUTROPHILS # BLD AUTO: 6.14 K/UL (ref 1.82–7.42)
NEUTROPHILS NFR BLD: 79.6 % (ref 44–72)
NRBC # BLD AUTO: 0 K/UL
NRBC BLD-RTO: 0 /100 WBC (ref 0–0.2)
PLATELET # BLD AUTO: 260 K/UL (ref 164–446)
PMV BLD AUTO: 10.5 FL (ref 9–12.9)
POTASSIUM SERPL-SCNC: 4.3 MMOL/L (ref 3.6–5.5)
PROT SERPL-MCNC: 5.7 G/DL (ref 6–8.2)
RBC # BLD AUTO: 3.33 M/UL (ref 4.7–6.1)
RETICS # AUTO: 0.09 M/UL (ref 0.04–0.12)
RETICS/RBC NFR: 2.7 % (ref 0.8–2.6)
SIGNIFICANT IND 70042: NORMAL
SIGNIFICANT IND 70042: NORMAL
SITE SITE: NORMAL
SITE SITE: NORMAL
SODIUM SERPL-SCNC: 136 MMOL/L (ref 135–145)
SOURCE SOURCE: NORMAL
SOURCE SOURCE: NORMAL
TIBC SERPL-MCNC: 246 UG/DL (ref 250–450)
UIBC SERPL-MCNC: 218 UG/DL (ref 110–370)
WBC # BLD AUTO: 7.7 K/UL (ref 4.8–10.8)

## 2024-07-16 PROCEDURE — 86140 C-REACTIVE PROTEIN: CPT

## 2024-07-16 PROCEDURE — 700111 HCHG RX REV CODE 636 W/ 250 OVERRIDE (IP): Mod: JZ | Performed by: INTERNAL MEDICINE

## 2024-07-16 PROCEDURE — 700102 HCHG RX REV CODE 250 W/ 637 OVERRIDE(OP): Performed by: INTERNAL MEDICINE

## 2024-07-16 PROCEDURE — 700105 HCHG RX REV CODE 258: Performed by: INTERNAL MEDICINE

## 2024-07-16 PROCEDURE — 80053 COMPREHEN METABOLIC PANEL: CPT

## 2024-07-16 PROCEDURE — 770001 HCHG ROOM/CARE - MED/SURG/GYN PRIV*

## 2024-07-16 PROCEDURE — 83540 ASSAY OF IRON: CPT

## 2024-07-16 PROCEDURE — 94640 AIRWAY INHALATION TREATMENT: CPT

## 2024-07-16 PROCEDURE — 85046 RETICYTE/HGB CONCENTRATE: CPT

## 2024-07-16 PROCEDURE — 85025 COMPLETE CBC W/AUTO DIFF WBC: CPT

## 2024-07-16 PROCEDURE — 99233 SBSQ HOSP IP/OBS HIGH 50: CPT | Performed by: HOSPITALIST

## 2024-07-16 PROCEDURE — A9270 NON-COVERED ITEM OR SERVICE: HCPCS | Performed by: INTERNAL MEDICINE

## 2024-07-16 PROCEDURE — 700101 HCHG RX REV CODE 250: Performed by: INTERNAL MEDICINE

## 2024-07-16 PROCEDURE — 83550 IRON BINDING TEST: CPT

## 2024-07-16 PROCEDURE — 92610 EVALUATE SWALLOWING FUNCTION: CPT

## 2024-07-16 RX ADMIN — IPRATROPIUM BROMIDE AND ALBUTEROL SULFATE 3 ML: 2.5; .5 SOLUTION RESPIRATORY (INHALATION) at 07:32

## 2024-07-16 RX ADMIN — VALPROIC ACID 500 MG: 500 SOLUTION ORAL at 22:03

## 2024-07-16 RX ADMIN — METOPROLOL TARTRATE 12.5 MG: 25 TABLET, FILM COATED ORAL at 05:17

## 2024-07-16 RX ADMIN — PRAVASTATIN SODIUM 40 MG: 20 TABLET ORAL at 17:36

## 2024-07-16 RX ADMIN — METOPROLOL TARTRATE 12.5 MG: 25 TABLET, FILM COATED ORAL at 17:36

## 2024-07-16 RX ADMIN — Medication 4 ML: at 18:23

## 2024-07-16 RX ADMIN — IPRATROPIUM BROMIDE AND ALBUTEROL SULFATE 3 ML: 2.5; .5 SOLUTION RESPIRATORY (INHALATION) at 12:04

## 2024-07-16 RX ADMIN — CEFAZOLIN 2 G: 2 INJECTION, POWDER, FOR SOLUTION INTRAMUSCULAR; INTRAVENOUS at 22:08

## 2024-07-16 RX ADMIN — CEFAZOLIN 2 G: 2 INJECTION, POWDER, FOR SOLUTION INTRAMUSCULAR; INTRAVENOUS at 13:34

## 2024-07-16 RX ADMIN — ENOXAPARIN SODIUM 40 MG: 100 INJECTION SUBCUTANEOUS at 17:37

## 2024-07-16 RX ADMIN — OXYBUTYNIN CHLORIDE 5 MG: 5 TABLET ORAL at 05:17

## 2024-07-16 RX ADMIN — Medication 4 ML: at 07:32

## 2024-07-16 RX ADMIN — NYSTATIN: 100000 POWDER TOPICAL at 05:18

## 2024-07-16 RX ADMIN — IPRATROPIUM BROMIDE AND ALBUTEROL SULFATE 3 ML: 2.5; .5 SOLUTION RESPIRATORY (INHALATION) at 23:02

## 2024-07-16 RX ADMIN — LOSARTAN POTASSIUM 12.5 MG: 50 TABLET, FILM COATED ORAL at 05:17

## 2024-07-16 RX ADMIN — IPRATROPIUM BROMIDE AND ALBUTEROL SULFATE 3 ML: 2.5; .5 SOLUTION RESPIRATORY (INHALATION) at 02:32

## 2024-07-16 RX ADMIN — OXYBUTYNIN CHLORIDE 5 MG: 5 TABLET ORAL at 17:37

## 2024-07-16 RX ADMIN — IPRATROPIUM BROMIDE AND ALBUTEROL SULFATE 3 ML: 2.5; .5 SOLUTION RESPIRATORY (INHALATION) at 15:21

## 2024-07-16 RX ADMIN — IPRATROPIUM BROMIDE AND ALBUTEROL SULFATE 3 ML: 2.5; .5 SOLUTION RESPIRATORY (INHALATION) at 18:23

## 2024-07-16 RX ADMIN — LANSOPRAZOLE 30 MG: 30 TABLET, ORALLY DISINTEGRATING ORAL at 05:17

## 2024-07-16 RX ADMIN — CEFAZOLIN 2 G: 2 INJECTION, POWDER, FOR SOLUTION INTRAMUSCULAR; INTRAVENOUS at 05:20

## 2024-07-16 RX ADMIN — OXYBUTYNIN CHLORIDE 5 MG: 5 TABLET ORAL at 11:16

## 2024-07-16 RX ADMIN — LANSOPRAZOLE 30 MG: 30 TABLET, ORALLY DISINTEGRATING ORAL at 17:37

## 2024-07-16 RX ADMIN — NYSTATIN: 100000 POWDER TOPICAL at 17:37

## 2024-07-16 ASSESSMENT — PAIN SCALES - WONG BAKER: WONGBAKER_NUMERICALRESPONSE: DOESN'T HURT AT ALL

## 2024-07-16 ASSESSMENT — PAIN DESCRIPTION - PAIN TYPE
TYPE: ACUTE PAIN

## 2024-07-16 ASSESSMENT — FIBROSIS 4 INDEX: FIB4 SCORE: 2.01

## 2024-07-17 PROBLEM — E87.8 ELECTROLYTE ABNORMALITY: Status: ACTIVE | Noted: 2024-07-17

## 2024-07-17 LAB
ANION GAP SERPL CALC-SCNC: 8 MMOL/L (ref 7–16)
BUN SERPL-MCNC: 16 MG/DL (ref 8–22)
CALCIUM SERPL-MCNC: 8.4 MG/DL (ref 8.5–10.5)
CHLORIDE SERPL-SCNC: 103 MMOL/L (ref 96–112)
CO2 SERPL-SCNC: 26 MMOL/L (ref 20–33)
CREAT SERPL-MCNC: 0.54 MG/DL (ref 0.5–1.4)
ERYTHROCYTE [DISTWIDTH] IN BLOOD BY AUTOMATED COUNT: 51.8 FL (ref 35.9–50)
GFR SERPLBLD CREATININE-BSD FMLA CKD-EPI: 102 ML/MIN/1.73 M 2
GLUCOSE SERPL-MCNC: 114 MG/DL (ref 65–99)
HCT VFR BLD AUTO: 30 % (ref 42–52)
HGB BLD-MCNC: 9.2 G/DL (ref 14–18)
MAGNESIUM SERPL-MCNC: 2.2 MG/DL (ref 1.5–2.5)
MCH RBC QN AUTO: 27.2 PG (ref 27–33)
MCHC RBC AUTO-ENTMCNC: 30.7 G/DL (ref 32.3–36.5)
MCV RBC AUTO: 88.8 FL (ref 81.4–97.8)
PHOSPHATE SERPL-MCNC: 2 MG/DL (ref 2.5–4.5)
PLATELET # BLD AUTO: 275 K/UL (ref 164–446)
PMV BLD AUTO: 10.9 FL (ref 9–12.9)
POTASSIUM SERPL-SCNC: 4 MMOL/L (ref 3.6–5.5)
RBC # BLD AUTO: 3.38 M/UL (ref 4.7–6.1)
SODIUM SERPL-SCNC: 137 MMOL/L (ref 135–145)
WBC # BLD AUTO: 8.6 K/UL (ref 4.8–10.8)

## 2024-07-17 PROCEDURE — 85027 COMPLETE CBC AUTOMATED: CPT

## 2024-07-17 PROCEDURE — 83735 ASSAY OF MAGNESIUM: CPT

## 2024-07-17 PROCEDURE — 99233 SBSQ HOSP IP/OBS HIGH 50: CPT | Performed by: HOSPITALIST

## 2024-07-17 PROCEDURE — 700105 HCHG RX REV CODE 258: Performed by: HOSPITALIST

## 2024-07-17 PROCEDURE — 92526 ORAL FUNCTION THERAPY: CPT

## 2024-07-17 PROCEDURE — 700101 HCHG RX REV CODE 250: Performed by: HOSPITALIST

## 2024-07-17 PROCEDURE — 94640 AIRWAY INHALATION TREATMENT: CPT

## 2024-07-17 PROCEDURE — 80048 BASIC METABOLIC PNL TOTAL CA: CPT

## 2024-07-17 PROCEDURE — 36415 COLL VENOUS BLD VENIPUNCTURE: CPT

## 2024-07-17 PROCEDURE — 700101 HCHG RX REV CODE 250: Performed by: INTERNAL MEDICINE

## 2024-07-17 PROCEDURE — 700111 HCHG RX REV CODE 636 W/ 250 OVERRIDE (IP): Performed by: INTERNAL MEDICINE

## 2024-07-17 PROCEDURE — 700105 HCHG RX REV CODE 258: Performed by: INTERNAL MEDICINE

## 2024-07-17 PROCEDURE — 97530 THERAPEUTIC ACTIVITIES: CPT

## 2024-07-17 PROCEDURE — 700102 HCHG RX REV CODE 250 W/ 637 OVERRIDE(OP): Performed by: INTERNAL MEDICINE

## 2024-07-17 PROCEDURE — 84100 ASSAY OF PHOSPHORUS: CPT

## 2024-07-17 PROCEDURE — 97112 NEUROMUSCULAR REEDUCATION: CPT

## 2024-07-17 PROCEDURE — A9270 NON-COVERED ITEM OR SERVICE: HCPCS | Performed by: INTERNAL MEDICINE

## 2024-07-17 PROCEDURE — 770020 HCHG ROOM/CARE - TELE (206)

## 2024-07-17 RX ORDER — IPRATROPIUM BROMIDE AND ALBUTEROL SULFATE 2.5; .5 MG/3ML; MG/3ML
3 SOLUTION RESPIRATORY (INHALATION)
Status: DISCONTINUED | OUTPATIENT
Start: 2024-07-17 | End: 2024-07-19 | Stop reason: HOSPADM

## 2024-07-17 RX ADMIN — IPRATROPIUM BROMIDE AND ALBUTEROL SULFATE 3 ML: 2.5; .5 SOLUTION RESPIRATORY (INHALATION) at 14:30

## 2024-07-17 RX ADMIN — SODIUM PHOSPHATE, MONOBASIC, MONOHYDRATE AND SODIUM PHOSPHATE, DIBASIC, ANHYDROUS 30 MMOL: 142; 276 INJECTION, SOLUTION INTRAVENOUS at 08:35

## 2024-07-17 RX ADMIN — OXYBUTYNIN CHLORIDE 5 MG: 5 TABLET ORAL at 18:00

## 2024-07-17 RX ADMIN — SENNOSIDES AND DOCUSATE SODIUM 2 TABLET: 50; 8.6 TABLET ORAL at 04:19

## 2024-07-17 RX ADMIN — IPRATROPIUM BROMIDE AND ALBUTEROL SULFATE 3 ML: 2.5; .5 SOLUTION RESPIRATORY (INHALATION) at 10:18

## 2024-07-17 RX ADMIN — IPRATROPIUM BROMIDE AND ALBUTEROL SULFATE 3 ML: 2.5; .5 SOLUTION RESPIRATORY (INHALATION) at 18:55

## 2024-07-17 RX ADMIN — OXYCODONE 5 MG: 5 TABLET ORAL at 18:00

## 2024-07-17 RX ADMIN — Medication 4 ML: at 20:28

## 2024-07-17 RX ADMIN — OXYBUTYNIN CHLORIDE 5 MG: 5 TABLET ORAL at 13:36

## 2024-07-17 RX ADMIN — LOSARTAN POTASSIUM 12.5 MG: 50 TABLET, FILM COATED ORAL at 04:20

## 2024-07-17 RX ADMIN — PRAVASTATIN SODIUM 40 MG: 20 TABLET ORAL at 18:00

## 2024-07-17 RX ADMIN — LANSOPRAZOLE 30 MG: 30 TABLET, ORALLY DISINTEGRATING ORAL at 04:21

## 2024-07-17 RX ADMIN — Medication 4 ML: at 07:06

## 2024-07-17 RX ADMIN — METOPROLOL TARTRATE 12.5 MG: 25 TABLET, FILM COATED ORAL at 04:18

## 2024-07-17 RX ADMIN — VALPROIC ACID 500 MG: 500 SOLUTION ORAL at 21:27

## 2024-07-17 RX ADMIN — IPRATROPIUM BROMIDE AND ALBUTEROL SULFATE 3 ML: 2.5; .5 SOLUTION RESPIRATORY (INHALATION) at 02:16

## 2024-07-17 RX ADMIN — CEFAZOLIN 2 G: 2 INJECTION, POWDER, FOR SOLUTION INTRAMUSCULAR; INTRAVENOUS at 21:47

## 2024-07-17 RX ADMIN — CEFAZOLIN 2 G: 2 INJECTION, POWDER, FOR SOLUTION INTRAMUSCULAR; INTRAVENOUS at 04:24

## 2024-07-17 RX ADMIN — NYSTATIN: 100000 POWDER TOPICAL at 18:03

## 2024-07-17 RX ADMIN — LANSOPRAZOLE 30 MG: 30 TABLET, ORALLY DISINTEGRATING ORAL at 18:00

## 2024-07-17 RX ADMIN — IPRATROPIUM BROMIDE AND ALBUTEROL SULFATE 3 ML: 2.5; .5 SOLUTION RESPIRATORY (INHALATION) at 07:06

## 2024-07-17 RX ADMIN — ENOXAPARIN SODIUM 40 MG: 100 INJECTION SUBCUTANEOUS at 18:17

## 2024-07-17 RX ADMIN — METOPROLOL TARTRATE 12.5 MG: 25 TABLET, FILM COATED ORAL at 18:00

## 2024-07-17 RX ADMIN — OXYBUTYNIN CHLORIDE 5 MG: 5 TABLET ORAL at 06:00

## 2024-07-17 RX ADMIN — CEFAZOLIN 2 G: 2 INJECTION, POWDER, FOR SOLUTION INTRAMUSCULAR; INTRAVENOUS at 13:36

## 2024-07-17 ASSESSMENT — COGNITIVE AND FUNCTIONAL STATUS - GENERAL
SUGGESTED CMS G CODE MODIFIER MOBILITY: CM
CLIMB 3 TO 5 STEPS WITH RAILING: TOTAL
WALKING IN HOSPITAL ROOM: TOTAL
CLIMB 3 TO 5 STEPS WITH RAILING: TOTAL
SUGGESTED CMS G CODE MODIFIER DAILY ACTIVITY: CN
EATING MEALS: TOTAL
STANDING UP FROM CHAIR USING ARMS: TOTAL
HELP NEEDED FOR BATHING: TOTAL
DRESSING REGULAR UPPER BODY CLOTHING: TOTAL
TURNING FROM BACK TO SIDE WHILE IN FLAT BAD: TOTAL
MOVING FROM LYING ON BACK TO SITTING ON SIDE OF FLAT BED: A LOT
STANDING UP FROM CHAIR USING ARMS: TOTAL
WALKING IN HOSPITAL ROOM: TOTAL
MOBILITY SCORE: 6
SUGGESTED CMS G CODE MODIFIER MOBILITY: CN
PERSONAL GROOMING: TOTAL
TOILETING: TOTAL
TURNING FROM BACK TO SIDE WHILE IN FLAT BAD: A LOT
MOVING FROM LYING ON BACK TO SITTING ON SIDE OF FLAT BED: TOTAL
MOBILITY SCORE: 8
DRESSING REGULAR LOWER BODY CLOTHING: TOTAL
MOVING TO AND FROM BED TO CHAIR: TOTAL
MOVING TO AND FROM BED TO CHAIR: TOTAL
DAILY ACTIVITIY SCORE: 6

## 2024-07-17 ASSESSMENT — PAIN DESCRIPTION - PAIN TYPE
TYPE: ACUTE PAIN
TYPE: ACUTE PAIN

## 2024-07-17 ASSESSMENT — PAIN SCALES - WONG BAKER
WONGBAKER_NUMERICALRESPONSE: DOESN'T HURT AT ALL
WONGBAKER_NUMERICALRESPONSE: DOESN'T HURT AT ALL

## 2024-07-17 ASSESSMENT — GAIT ASSESSMENTS: GAIT LEVEL OF ASSIST: UNABLE TO PARTICIPATE

## 2024-07-17 ASSESSMENT — FIBROSIS 4 INDEX: FIB4 SCORE: 1.48

## 2024-07-18 ENCOUNTER — APPOINTMENT (OUTPATIENT)
Dept: RADIOLOGY | Facility: MEDICAL CENTER | Age: 79
DRG: 871 | End: 2024-07-18
Attending: HOSPITALIST
Payer: MEDICARE

## 2024-07-18 LAB
ANION GAP SERPL CALC-SCNC: 9 MMOL/L (ref 7–16)
BUN SERPL-MCNC: 17 MG/DL (ref 8–22)
CALCIUM SERPL-MCNC: 8.5 MG/DL (ref 8.5–10.5)
CHLORIDE SERPL-SCNC: 102 MMOL/L (ref 96–112)
CO2 SERPL-SCNC: 25 MMOL/L (ref 20–33)
CREAT SERPL-MCNC: 0.48 MG/DL (ref 0.5–1.4)
ERYTHROCYTE [DISTWIDTH] IN BLOOD BY AUTOMATED COUNT: 51 FL (ref 35.9–50)
GFR SERPLBLD CREATININE-BSD FMLA CKD-EPI: 105 ML/MIN/1.73 M 2
GLUCOSE SERPL-MCNC: 87 MG/DL (ref 65–99)
HCT VFR BLD AUTO: 30.5 % (ref 42–52)
HGB BLD-MCNC: 9.4 G/DL (ref 14–18)
MAGNESIUM SERPL-MCNC: 2.2 MG/DL (ref 1.5–2.5)
MCH RBC QN AUTO: 27.2 PG (ref 27–33)
MCHC RBC AUTO-ENTMCNC: 30.8 G/DL (ref 32.3–36.5)
MCV RBC AUTO: 88.2 FL (ref 81.4–97.8)
PHOSPHATE SERPL-MCNC: 2.4 MG/DL (ref 2.5–4.5)
PLATELET # BLD AUTO: 290 K/UL (ref 164–446)
PMV BLD AUTO: 10.5 FL (ref 9–12.9)
POTASSIUM SERPL-SCNC: 4.3 MMOL/L (ref 3.6–5.5)
RBC # BLD AUTO: 3.46 M/UL (ref 4.7–6.1)
SODIUM SERPL-SCNC: 136 MMOL/L (ref 135–145)
WBC # BLD AUTO: 8.4 K/UL (ref 4.8–10.8)

## 2024-07-18 PROCEDURE — 770020 HCHG ROOM/CARE - TELE (206)

## 2024-07-18 PROCEDURE — 71045 X-RAY EXAM CHEST 1 VIEW: CPT

## 2024-07-18 PROCEDURE — 94640 AIRWAY INHALATION TREATMENT: CPT

## 2024-07-18 PROCEDURE — 700101 HCHG RX REV CODE 250: Performed by: INTERNAL MEDICINE

## 2024-07-18 PROCEDURE — 700102 HCHG RX REV CODE 250 W/ 637 OVERRIDE(OP): Performed by: HOSPITALIST

## 2024-07-18 PROCEDURE — 84100 ASSAY OF PHOSPHORUS: CPT

## 2024-07-18 PROCEDURE — 99232 SBSQ HOSP IP/OBS MODERATE 35: CPT | Performed by: HOSPITALIST

## 2024-07-18 PROCEDURE — 700111 HCHG RX REV CODE 636 W/ 250 OVERRIDE (IP): Mod: JZ | Performed by: INTERNAL MEDICINE

## 2024-07-18 PROCEDURE — A9270 NON-COVERED ITEM OR SERVICE: HCPCS | Performed by: INTERNAL MEDICINE

## 2024-07-18 PROCEDURE — 83735 ASSAY OF MAGNESIUM: CPT

## 2024-07-18 PROCEDURE — 700102 HCHG RX REV CODE 250 W/ 637 OVERRIDE(OP): Performed by: INTERNAL MEDICINE

## 2024-07-18 PROCEDURE — 85027 COMPLETE CBC AUTOMATED: CPT

## 2024-07-18 PROCEDURE — 80048 BASIC METABOLIC PNL TOTAL CA: CPT

## 2024-07-18 PROCEDURE — 92612 ENDOSCOPY SWALLOW (FEES) VID: CPT

## 2024-07-18 PROCEDURE — 700101 HCHG RX REV CODE 250: Performed by: HOSPITALIST

## 2024-07-18 PROCEDURE — 36415 COLL VENOUS BLD VENIPUNCTURE: CPT

## 2024-07-18 PROCEDURE — A9270 NON-COVERED ITEM OR SERVICE: HCPCS | Performed by: HOSPITALIST

## 2024-07-18 RX ORDER — FLUTICASONE FUROATE AND VILANTEROL 200; 25 UG/1; UG/1
1 POWDER RESPIRATORY (INHALATION) DAILY
Status: DISCONTINUED | OUTPATIENT
Start: 2024-07-18 | End: 2024-07-18

## 2024-07-18 RX ADMIN — Medication 4 ML: at 19:13

## 2024-07-18 RX ADMIN — OXYCODONE 5 MG: 5 TABLET ORAL at 13:54

## 2024-07-18 RX ADMIN — Medication 4 ML: at 06:12

## 2024-07-18 RX ADMIN — SENNOSIDES AND DOCUSATE SODIUM 2 TABLET: 50; 8.6 TABLET ORAL at 04:11

## 2024-07-18 RX ADMIN — OXYBUTYNIN CHLORIDE 5 MG: 5 TABLET ORAL at 12:56

## 2024-07-18 RX ADMIN — IPRATROPIUM BROMIDE AND ALBUTEROL SULFATE 3 ML: 2.5; .5 SOLUTION RESPIRATORY (INHALATION) at 14:45

## 2024-07-18 RX ADMIN — METOPROLOL TARTRATE 12.5 MG: 25 TABLET, FILM COATED ORAL at 18:36

## 2024-07-18 RX ADMIN — OXYBUTYNIN CHLORIDE 5 MG: 5 TABLET ORAL at 04:11

## 2024-07-18 RX ADMIN — LOSARTAN POTASSIUM 12.5 MG: 50 TABLET, FILM COATED ORAL at 04:11

## 2024-07-18 RX ADMIN — VALPROIC ACID 500 MG: 500 SOLUTION ORAL at 20:56

## 2024-07-18 RX ADMIN — ENOXAPARIN SODIUM 40 MG: 100 INJECTION SUBCUTANEOUS at 18:35

## 2024-07-18 RX ADMIN — NYSTATIN: 100000 POWDER TOPICAL at 18:36

## 2024-07-18 RX ADMIN — LANSOPRAZOLE 30 MG: 30 TABLET, ORALLY DISINTEGRATING ORAL at 04:11

## 2024-07-18 RX ADMIN — LANSOPRAZOLE 30 MG: 30 TABLET, ORALLY DISINTEGRATING ORAL at 18:35

## 2024-07-18 RX ADMIN — IPRATROPIUM BROMIDE AND ALBUTEROL SULFATE 3 ML: 2.5; .5 SOLUTION RESPIRATORY (INHALATION) at 19:09

## 2024-07-18 RX ADMIN — NYSTATIN: 100000 POWDER TOPICAL at 06:00

## 2024-07-18 RX ADMIN — SENNOSIDES AND DOCUSATE SODIUM 2 TABLET: 50; 8.6 TABLET ORAL at 18:35

## 2024-07-18 RX ADMIN — DIBASIC SODIUM PHOSPHATE, MONOBASIC POTASSIUM PHOSPHATE AND MONOBASIC SODIUM PHOSPHATE 500 MG: 852; 155; 130 TABLET ORAL at 18:35

## 2024-07-18 RX ADMIN — MOMETASONE FUROATE AND FORMOTEROL FUMARATE DIHYDRATE 2 PUFF: 200; 5 AEROSOL RESPIRATORY (INHALATION) at 19:08

## 2024-07-18 RX ADMIN — OXYCODONE 5 MG: 5 TABLET ORAL at 04:11

## 2024-07-18 RX ADMIN — IPRATROPIUM BROMIDE AND ALBUTEROL SULFATE 3 ML: 2.5; .5 SOLUTION RESPIRATORY (INHALATION) at 06:12

## 2024-07-18 RX ADMIN — OXYBUTYNIN CHLORIDE 5 MG: 5 TABLET ORAL at 18:35

## 2024-07-18 RX ADMIN — IPRATROPIUM BROMIDE AND ALBUTEROL SULFATE 3 ML: 2.5; .5 SOLUTION RESPIRATORY (INHALATION) at 09:35

## 2024-07-18 RX ADMIN — PRAVASTATIN SODIUM 40 MG: 20 TABLET ORAL at 18:36

## 2024-07-18 RX ADMIN — DIBASIC SODIUM PHOSPHATE, MONOBASIC POTASSIUM PHOSPHATE AND MONOBASIC SODIUM PHOSPHATE 500 MG: 852; 155; 130 TABLET ORAL at 08:19

## 2024-07-18 RX ADMIN — METOPROLOL TARTRATE 12.5 MG: 25 TABLET, FILM COATED ORAL at 04:12

## 2024-07-18 ASSESSMENT — PATIENT HEALTH QUESTIONNAIRE - PHQ9
2. FEELING DOWN, DEPRESSED, IRRITABLE, OR HOPELESS: NOT AT ALL
1. LITTLE INTEREST OR PLEASURE IN DOING THINGS: NOT AT ALL
SUM OF ALL RESPONSES TO PHQ9 QUESTIONS 1 AND 2: 0

## 2024-07-18 ASSESSMENT — PAIN DESCRIPTION - PAIN TYPE
TYPE: ACUTE PAIN
TYPE: ACUTE PAIN

## 2024-07-18 ASSESSMENT — PAIN SCALES - WONG BAKER
WONGBAKER_NUMERICALRESPONSE: DOESN'T HURT AT ALL
WONGBAKER_NUMERICALRESPONSE: DOESN'T HURT AT ALL

## 2024-07-18 ASSESSMENT — FIBROSIS 4 INDEX: FIB4 SCORE: 1.32

## 2024-07-19 VITALS
TEMPERATURE: 98.4 F | SYSTOLIC BLOOD PRESSURE: 118 MMHG | DIASTOLIC BLOOD PRESSURE: 71 MMHG | HEART RATE: 85 BPM | WEIGHT: 139.33 LBS | RESPIRATION RATE: 18 BRPM | HEIGHT: 65 IN | BODY MASS INDEX: 23.21 KG/M2 | OXYGEN SATURATION: 98 %

## 2024-07-19 PROBLEM — R65.20 SEVERE SEPSIS (HCC): Status: RESOLVED | Noted: 2024-07-11 | Resolved: 2024-07-19

## 2024-07-19 PROBLEM — A41.9 SEVERE SEPSIS (HCC): Status: RESOLVED | Noted: 2024-07-11 | Resolved: 2024-07-19

## 2024-07-19 LAB
ANION GAP SERPL CALC-SCNC: 8 MMOL/L (ref 7–16)
BUN SERPL-MCNC: 19 MG/DL (ref 8–22)
CALCIUM SERPL-MCNC: 8.8 MG/DL (ref 8.5–10.5)
CHLORIDE SERPL-SCNC: 102 MMOL/L (ref 96–112)
CO2 SERPL-SCNC: 27 MMOL/L (ref 20–33)
CREAT SERPL-MCNC: 0.44 MG/DL (ref 0.5–1.4)
ERYTHROCYTE [DISTWIDTH] IN BLOOD BY AUTOMATED COUNT: 51.1 FL (ref 35.9–50)
GFR SERPLBLD CREATININE-BSD FMLA CKD-EPI: 108 ML/MIN/1.73 M 2
GLUCOSE SERPL-MCNC: 130 MG/DL (ref 65–99)
HCT VFR BLD AUTO: 30.2 % (ref 42–52)
HGB BLD-MCNC: 9.4 G/DL (ref 14–18)
MAGNESIUM SERPL-MCNC: 2.2 MG/DL (ref 1.5–2.5)
MCH RBC QN AUTO: 27.4 PG (ref 27–33)
MCHC RBC AUTO-ENTMCNC: 31.1 G/DL (ref 32.3–36.5)
MCV RBC AUTO: 88 FL (ref 81.4–97.8)
PHOSPHATE SERPL-MCNC: 2.4 MG/DL (ref 2.5–4.5)
PLATELET # BLD AUTO: 286 K/UL (ref 164–446)
PMV BLD AUTO: 10.6 FL (ref 9–12.9)
POTASSIUM SERPL-SCNC: 4.4 MMOL/L (ref 3.6–5.5)
RBC # BLD AUTO: 3.43 M/UL (ref 4.7–6.1)
SODIUM SERPL-SCNC: 137 MMOL/L (ref 135–145)
WBC # BLD AUTO: 7.5 K/UL (ref 4.8–10.8)

## 2024-07-19 PROCEDURE — 85027 COMPLETE CBC AUTOMATED: CPT

## 2024-07-19 PROCEDURE — A9270 NON-COVERED ITEM OR SERVICE: HCPCS | Performed by: INTERNAL MEDICINE

## 2024-07-19 PROCEDURE — 99239 HOSP IP/OBS DSCHRG MGMT >30: CPT | Performed by: HOSPITALIST

## 2024-07-19 PROCEDURE — 83735 ASSAY OF MAGNESIUM: CPT

## 2024-07-19 PROCEDURE — 80048 BASIC METABOLIC PNL TOTAL CA: CPT

## 2024-07-19 PROCEDURE — 700102 HCHG RX REV CODE 250 W/ 637 OVERRIDE(OP): Performed by: INTERNAL MEDICINE

## 2024-07-19 PROCEDURE — 700111 HCHG RX REV CODE 636 W/ 250 OVERRIDE (IP): Performed by: HOSPITALIST

## 2024-07-19 PROCEDURE — 84100 ASSAY OF PHOSPHORUS: CPT

## 2024-07-19 PROCEDURE — 36415 COLL VENOUS BLD VENIPUNCTURE: CPT

## 2024-07-19 PROCEDURE — 700101 HCHG RX REV CODE 250: Performed by: INTERNAL MEDICINE

## 2024-07-19 PROCEDURE — 700102 HCHG RX REV CODE 250 W/ 637 OVERRIDE(OP): Performed by: HOSPITALIST

## 2024-07-19 PROCEDURE — A9270 NON-COVERED ITEM OR SERVICE: HCPCS | Performed by: HOSPITALIST

## 2024-07-19 PROCEDURE — 94640 AIRWAY INHALATION TREATMENT: CPT

## 2024-07-19 PROCEDURE — 700101 HCHG RX REV CODE 250: Performed by: HOSPITALIST

## 2024-07-19 RX ORDER — ONDANSETRON 4 MG/1
4 TABLET, ORALLY DISINTEGRATING ORAL EVERY 4 HOURS PRN
Status: SHIPPED
Start: 2024-07-19

## 2024-07-19 RX ORDER — ENOXAPARIN SODIUM 100 MG/ML
40 INJECTION SUBCUTANEOUS DAILY
Status: ACTIVE | DISCHARGE
Start: 2024-07-19

## 2024-07-19 RX ORDER — OXYCODONE HYDROCHLORIDE 5 MG/1
5 TABLET ORAL EVERY 8 HOURS PRN
Qty: 9 TABLET | Refills: 0 | Status: SHIPPED | OUTPATIENT
Start: 2024-07-19 | End: 2024-07-22

## 2024-07-19 RX ORDER — ACETAMINOPHEN 650 MG/1
650 SUPPOSITORY RECTAL EVERY 4 HOURS PRN
Status: SHIPPED
Start: 2024-07-19

## 2024-07-19 RX ORDER — BUDESONIDE 0.5 MG/2ML
500 INHALANT ORAL 2 TIMES DAILY
Status: SHIPPED
Start: 2024-07-19

## 2024-07-19 RX ORDER — NYSTATIN 100000 [USP'U]/G
POWDER TOPICAL
Status: SHIPPED
Start: 2024-07-19

## 2024-07-19 RX ORDER — BUDESONIDE 0.5 MG/2ML
0.5 INHALANT ORAL
Status: DISCONTINUED | OUTPATIENT
Start: 2024-07-19 | End: 2024-07-19 | Stop reason: HOSPADM

## 2024-07-19 RX ORDER — LANSOPRAZOLE 30 MG/1
30 TABLET, ORALLY DISINTEGRATING, DELAYED RELEASE ORAL 2 TIMES DAILY
Status: SHIPPED
Start: 2024-07-19

## 2024-07-19 RX ORDER — IPRATROPIUM BROMIDE AND ALBUTEROL SULFATE 2.5; .5 MG/3ML; MG/3ML
3 SOLUTION RESPIRATORY (INHALATION) 4 TIMES DAILY
Status: SHIPPED
Start: 2024-07-19

## 2024-07-19 RX ADMIN — IPRATROPIUM BROMIDE AND ALBUTEROL SULFATE 3 ML: 2.5; .5 SOLUTION RESPIRATORY (INHALATION) at 06:24

## 2024-07-19 RX ADMIN — LOSARTAN POTASSIUM 12.5 MG: 50 TABLET, FILM COATED ORAL at 04:14

## 2024-07-19 RX ADMIN — NYSTATIN: 100000 POWDER TOPICAL at 04:16

## 2024-07-19 RX ADMIN — OXYCODONE 5 MG: 5 TABLET ORAL at 10:40

## 2024-07-19 RX ADMIN — LANSOPRAZOLE 30 MG: 30 TABLET, ORALLY DISINTEGRATING ORAL at 04:14

## 2024-07-19 RX ADMIN — METOPROLOL TARTRATE 12.5 MG: 25 TABLET, FILM COATED ORAL at 04:14

## 2024-07-19 RX ADMIN — Medication 4 ML: at 06:25

## 2024-07-19 RX ADMIN — DIBASIC SODIUM PHOSPHATE, MONOBASIC POTASSIUM PHOSPHATE AND MONOBASIC SODIUM PHOSPHATE 500 MG: 852; 155; 130 TABLET ORAL at 07:41

## 2024-07-19 RX ADMIN — BUDESONIDE 0.5 MG: 0.5 SUSPENSION RESPIRATORY (INHALATION) at 09:58

## 2024-07-19 RX ADMIN — SENNOSIDES AND DOCUSATE SODIUM 2 TABLET: 50; 8.6 TABLET ORAL at 06:00

## 2024-07-19 RX ADMIN — OXYBUTYNIN CHLORIDE 5 MG: 5 TABLET ORAL at 04:15

## 2024-07-19 RX ADMIN — IPRATROPIUM BROMIDE AND ALBUTEROL SULFATE 3 ML: 2.5; .5 SOLUTION RESPIRATORY (INHALATION) at 10:10

## 2024-07-19 ASSESSMENT — PATIENT HEALTH QUESTIONNAIRE - PHQ9
2. FEELING DOWN, DEPRESSED, IRRITABLE, OR HOPELESS: NOT AT ALL
SUM OF ALL RESPONSES TO PHQ9 QUESTIONS 1 AND 2: 0
1. LITTLE INTEREST OR PLEASURE IN DOING THINGS: NOT AT ALL

## 2024-07-19 ASSESSMENT — PAIN DESCRIPTION - PAIN TYPE: TYPE: ACUTE PAIN

## 2024-07-20 LAB
ARTERIAL PATENCY WRIST A: ABNORMAL
BASE EXCESS BLDA CALC-SCNC: 6 MMOL/L (ref -4–3)
BODY TEMPERATURE: ABNORMAL DEGREES
BREATHS SETTING VENT: 24
CO2 BLDA-SCNC: 35 MMOL/L (ref 20–33)
DELSYS IDSYS: ABNORMAL
HCO3 BLDA-SCNC: 33.4 MMOL/L (ref 17–25)
HOROWITZ INDEX BLDA+IHG-RTO: 126 MM[HG]
MODE IMODE: ABNORMAL
O2/TOTAL GAS SETTING VFR VENT: 50 %
PCO2 BLDA: 63.7 MMHG (ref 26–37)
PCO2 TEMP ADJ BLDA: 69.8 MMHG (ref 26–37)
PEEP END EXPIRATORY PRESSURE IPEEP: 8 CMH20
PH BLDA: 7.33 [PH] (ref 7.4–7.5)
PH TEMP ADJ BLDA: 7.3 [PH] (ref 7.4–7.5)
PO2 BLDA: 63 MMHG (ref 64–87)
PO2 TEMP ADJ BLDA: 73 MMHG (ref 64–87)
SAO2 % BLDA: 89 % (ref 93–99)
SPECIMEN DRAWN FROM PATIENT: ABNORMAL
TIDAL VOLUME IVT: 370 ML

## 2024-07-23 ENCOUNTER — TELEPHONE (OUTPATIENT)
Dept: HEALTH INFORMATION MANAGEMENT | Facility: OTHER | Age: 79
End: 2024-07-23
Payer: MEDICARE

## 2024-07-29 ENCOUNTER — TELEPHONE (OUTPATIENT)
Dept: HEALTH INFORMATION MANAGEMENT | Facility: OTHER | Age: 79
End: 2024-07-29
Payer: MEDICARE

## 2024-08-02 ENCOUNTER — TELEPHONE (OUTPATIENT)
Dept: HEALTH INFORMATION MANAGEMENT | Facility: OTHER | Age: 79
End: 2024-08-02
Payer: MEDICARE

## 2024-08-20 ENCOUNTER — HOSPITAL ENCOUNTER (OUTPATIENT)
Facility: MEDICAL CENTER | Age: 79
End: 2024-08-20
Attending: INTERNAL MEDICINE
Payer: COMMERCIAL

## 2024-08-22 LAB
BACTERIA UR CULT: ABNORMAL
BACTERIA UR CULT: ABNORMAL
SIGNIFICANT IND 70042: ABNORMAL
SITE SITE: ABNORMAL
SOURCE SOURCE: ABNORMAL

## 2024-10-04 ENCOUNTER — APPOINTMENT (OUTPATIENT)
Dept: RADIOLOGY | Facility: MEDICAL CENTER | Age: 79
End: 2024-10-04
Attending: STUDENT IN AN ORGANIZED HEALTH CARE EDUCATION/TRAINING PROGRAM
Payer: MEDICARE

## 2024-10-04 ENCOUNTER — HOSPITAL ENCOUNTER (EMERGENCY)
Facility: MEDICAL CENTER | Age: 79
End: 2024-10-04
Attending: STUDENT IN AN ORGANIZED HEALTH CARE EDUCATION/TRAINING PROGRAM | Admitting: STUDENT IN AN ORGANIZED HEALTH CARE EDUCATION/TRAINING PROGRAM
Payer: MEDICARE

## 2024-10-04 VITALS
WEIGHT: 139 LBS | BODY MASS INDEX: 23.16 KG/M2 | OXYGEN SATURATION: 95 % | HEART RATE: 86 BPM | SYSTOLIC BLOOD PRESSURE: 138 MMHG | DIASTOLIC BLOOD PRESSURE: 63 MMHG | TEMPERATURE: 98.2 F | RESPIRATION RATE: 38 BRPM | HEIGHT: 65 IN

## 2024-10-04 DIAGNOSIS — J69.0 ASPIRATION PNEUMONIA OF BOTH LOWER LOBES DUE TO GASTRIC SECRETIONS (HCC): ICD-10-CM

## 2024-10-04 DIAGNOSIS — J44.9 CHRONIC OBSTRUCTIVE PULMONARY DISEASE, UNSPECIFIED COPD TYPE (HCC): ICD-10-CM

## 2024-10-04 DIAGNOSIS — J18.9 COMMUNITY ACQUIRED PNEUMONIA OF LEFT LOWER LOBE OF LUNG: ICD-10-CM

## 2024-10-04 DIAGNOSIS — J96.21 ACUTE ON CHRONIC RESPIRATORY FAILURE WITH HYPOXIA (HCC): Primary | ICD-10-CM

## 2024-10-04 LAB
ALBUMIN SERPL BCP-MCNC: 3.7 G/DL (ref 3.2–4.9)
ALBUMIN/GLOB SERPL: 0.9 G/DL
ALP SERPL-CCNC: 47 U/L (ref 30–99)
ALT SERPL-CCNC: 7 U/L (ref 2–50)
ANION GAP SERPL CALC-SCNC: 10 MMOL/L (ref 7–16)
AST SERPL-CCNC: 11 U/L (ref 12–45)
BASE EXCESS BLDV CALC-SCNC: 6 MMOL/L
BASOPHILS # BLD AUTO: 0.3 % (ref 0–1.8)
BASOPHILS # BLD: 0.03 K/UL (ref 0–0.12)
BILIRUB SERPL-MCNC: 0.2 MG/DL (ref 0.1–1.5)
BODY TEMPERATURE: 36.8 CENTIGRADE
BUN SERPL-MCNC: 24 MG/DL (ref 8–22)
CALCIUM ALBUM COR SERPL-MCNC: 9.6 MG/DL (ref 8.5–10.5)
CALCIUM SERPL-MCNC: 9.4 MG/DL (ref 8.5–10.5)
CHLORIDE SERPL-SCNC: 99 MMOL/L (ref 96–112)
CO2 SERPL-SCNC: 28 MMOL/L (ref 20–33)
CREAT SERPL-MCNC: 0.58 MG/DL (ref 0.5–1.4)
EKG IMPRESSION: NORMAL
EOSINOPHIL # BLD AUTO: 0.21 K/UL (ref 0–0.51)
EOSINOPHIL NFR BLD: 2.3 % (ref 0–6.9)
ERYTHROCYTE [DISTWIDTH] IN BLOOD BY AUTOMATED COUNT: 48.5 FL (ref 35.9–50)
FLUAV RNA SPEC QL NAA+PROBE: NEGATIVE
FLUBV RNA SPEC QL NAA+PROBE: NEGATIVE
GFR SERPLBLD CREATININE-BSD FMLA CKD-EPI: 99 ML/MIN/1.73 M 2
GLOBULIN SER CALC-MCNC: 4 G/DL (ref 1.9–3.5)
GLUCOSE SERPL-MCNC: 129 MG/DL (ref 65–99)
GRAM STN SPEC: NORMAL
HCO3 BLDV-SCNC: 33 MMOL/L (ref 24–28)
HCT VFR BLD AUTO: 37.5 % (ref 42–52)
HGB BLD-MCNC: 12.3 G/DL (ref 14–18)
IMM GRANULOCYTES # BLD AUTO: 0.03 K/UL (ref 0–0.11)
IMM GRANULOCYTES NFR BLD AUTO: 0.3 % (ref 0–0.9)
INHALED O2 FLOW RATE: ABNORMAL L/MIN
LACTATE SERPL-SCNC: 1 MMOL/L (ref 0.5–2)
LIPASE SERPL-CCNC: 10 U/L (ref 11–82)
LYMPHOCYTES # BLD AUTO: 0.94 K/UL (ref 1–4.8)
LYMPHOCYTES NFR BLD: 10.2 % (ref 22–41)
MCH RBC QN AUTO: 27.8 PG (ref 27–33)
MCHC RBC AUTO-ENTMCNC: 32.8 G/DL (ref 32.3–36.5)
MCV RBC AUTO: 84.7 FL (ref 81.4–97.8)
MONOCYTES # BLD AUTO: 1.06 K/UL (ref 0–0.85)
MONOCYTES NFR BLD AUTO: 11.5 % (ref 0–13.4)
NEUTROPHILS # BLD AUTO: 6.95 K/UL (ref 1.82–7.42)
NEUTROPHILS NFR BLD: 75.4 % (ref 44–72)
NRBC # BLD AUTO: 0 K/UL
NRBC BLD-RTO: 0 /100 WBC (ref 0–0.2)
NT-PROBNP SERPL IA-MCNC: 878 PG/ML (ref 0–125)
PCO2 BLDV: 58.5 MMHG (ref 41–51)
PCO2 TEMP ADJ BLDV: 58 MMHG (ref 41–51)
PH BLDV: 7.37 [PH] (ref 7.31–7.45)
PH TEMP ADJ BLDV: 7.37 [PH] (ref 7.31–7.45)
PLATELET # BLD AUTO: 227 K/UL (ref 164–446)
PMV BLD AUTO: 11.4 FL (ref 9–12.9)
PO2 BLDV: 34.3 MMHG (ref 25–40)
PO2 TEMP ADJ BLDV: 33.8 MMHG (ref 25–40)
POTASSIUM SERPL-SCNC: 4.5 MMOL/L (ref 3.6–5.5)
PROCALCITONIN SERPL-MCNC: 0.07 NG/ML
PROT SERPL-MCNC: 7.7 G/DL (ref 6–8.2)
RBC # BLD AUTO: 4.43 M/UL (ref 4.7–6.1)
RSV RNA SPEC QL NAA+PROBE: NEGATIVE
SAO2 % BLDV: 62.9 %
SARS-COV-2 RNA RESP QL NAA+PROBE: NOTDETECTED
SIGNIFICANT IND 70042: NORMAL
SITE SITE: NORMAL
SODIUM SERPL-SCNC: 137 MMOL/L (ref 135–145)
SOURCE SOURCE: NORMAL
TROPONIN T SERPL-MCNC: 34 NG/L (ref 6–19)
WBC # BLD AUTO: 9.2 K/UL (ref 4.8–10.8)

## 2024-10-04 PROCEDURE — 85025 COMPLETE CBC W/AUTO DIFF WBC: CPT

## 2024-10-04 PROCEDURE — 84145 PROCALCITONIN (PCT): CPT

## 2024-10-04 PROCEDURE — 700111 HCHG RX REV CODE 636 W/ 250 OVERRIDE (IP): Mod: JZ | Performed by: STUDENT IN AN ORGANIZED HEALTH CARE EDUCATION/TRAINING PROGRAM

## 2024-10-04 PROCEDURE — 99497 ADVNCD CARE PLAN 30 MIN: CPT | Performed by: STUDENT IN AN ORGANIZED HEALTH CARE EDUCATION/TRAINING PROGRAM

## 2024-10-04 PROCEDURE — 31720 CLEARANCE OF AIRWAYS: CPT

## 2024-10-04 PROCEDURE — 83880 ASSAY OF NATRIURETIC PEPTIDE: CPT

## 2024-10-04 PROCEDURE — 700101 HCHG RX REV CODE 250: Performed by: STUDENT IN AN ORGANIZED HEALTH CARE EDUCATION/TRAINING PROGRAM

## 2024-10-04 PROCEDURE — 96368 THER/DIAG CONCURRENT INF: CPT | Mod: XU

## 2024-10-04 PROCEDURE — 83690 ASSAY OF LIPASE: CPT

## 2024-10-04 PROCEDURE — 82803 BLOOD GASES ANY COMBINATION: CPT

## 2024-10-04 PROCEDURE — 87205 SMEAR GRAM STAIN: CPT

## 2024-10-04 PROCEDURE — 94640 AIRWAY INHALATION TREATMENT: CPT

## 2024-10-04 PROCEDURE — 87186 SC STD MICRODIL/AGAR DIL: CPT | Mod: 91

## 2024-10-04 PROCEDURE — 71275 CT ANGIOGRAPHY CHEST: CPT

## 2024-10-04 PROCEDURE — 99284 EMERGENCY DEPT VISIT MOD MDM: CPT | Mod: 25 | Performed by: STUDENT IN AN ORGANIZED HEALTH CARE EDUCATION/TRAINING PROGRAM

## 2024-10-04 PROCEDURE — 99285 EMERGENCY DEPT VISIT HI MDM: CPT

## 2024-10-04 PROCEDURE — 83605 ASSAY OF LACTIC ACID: CPT

## 2024-10-04 PROCEDURE — 700117 HCHG RX CONTRAST REV CODE 255: Performed by: STUDENT IN AN ORGANIZED HEALTH CARE EDUCATION/TRAINING PROGRAM

## 2024-10-04 PROCEDURE — 0241U HCHG SARS-COV-2 COVID-19 NFCT DS RESP RNA 4 TRGT ED POC: CPT

## 2024-10-04 PROCEDURE — 36415 COLL VENOUS BLD VENIPUNCTURE: CPT

## 2024-10-04 PROCEDURE — 96365 THER/PROPH/DIAG IV INF INIT: CPT | Mod: XU

## 2024-10-04 PROCEDURE — 71045 X-RAY EXAM CHEST 1 VIEW: CPT

## 2024-10-04 PROCEDURE — 93005 ELECTROCARDIOGRAM TRACING: CPT | Mod: XE | Performed by: STUDENT IN AN ORGANIZED HEALTH CARE EDUCATION/TRAINING PROGRAM

## 2024-10-04 PROCEDURE — 84484 ASSAY OF TROPONIN QUANT: CPT

## 2024-10-04 PROCEDURE — 80053 COMPREHEN METABOLIC PANEL: CPT

## 2024-10-04 PROCEDURE — 700105 HCHG RX REV CODE 258: Performed by: STUDENT IN AN ORGANIZED HEALTH CARE EDUCATION/TRAINING PROGRAM

## 2024-10-04 PROCEDURE — 87070 CULTURE OTHR SPECIMN AEROBIC: CPT

## 2024-10-04 RX ORDER — METOPROLOL TARTRATE 25 MG/1
12.5 TABLET, FILM COATED ORAL 2 TIMES DAILY
Status: ON HOLD | COMMUNITY
End: 2024-10-09

## 2024-10-04 RX ORDER — SCOLOPAMINE TRANSDERMAL SYSTEM 1 MG/1
1 PATCH, EXTENDED RELEASE TRANSDERMAL
Status: ON HOLD
Start: 2024-10-04 | End: 2024-10-09

## 2024-10-04 RX ORDER — AZITHROMYCIN 500 MG/5ML
500 INJECTION, POWDER, LYOPHILIZED, FOR SOLUTION INTRAVENOUS EVERY 24 HOURS
Status: DISCONTINUED | OUTPATIENT
Start: 2024-10-04 | End: 2024-10-04 | Stop reason: HOSPADM

## 2024-10-04 RX ORDER — AZITHROMYCIN 250 MG/1
500 TABLET, FILM COATED ORAL ONCE
Status: DISCONTINUED | OUTPATIENT
Start: 2024-10-04 | End: 2024-10-04

## 2024-10-04 RX ORDER — AZITHROMYCIN 200 MG/5ML
200 POWDER, FOR SUSPENSION ORAL DAILY
Status: SHIPPED | DISCHARGE
Start: 2024-10-04 | End: 2024-10-04

## 2024-10-04 RX ORDER — BUDESONIDE AND FORMOTEROL FUMARATE DIHYDRATE 80; 4.5 UG/1; UG/1
2 AEROSOL RESPIRATORY (INHALATION) 2 TIMES DAILY
Status: ON HOLD
Start: 2024-10-04 | End: 2024-10-08

## 2024-10-04 RX ORDER — ALBUTEROL SULFATE 5 MG/ML
2.5 SOLUTION RESPIRATORY (INHALATION) ONCE
Status: COMPLETED | OUTPATIENT
Start: 2024-10-04 | End: 2024-10-04

## 2024-10-04 RX ORDER — SODIUM CHLORIDE, SODIUM LACTATE, POTASSIUM CHLORIDE, AND CALCIUM CHLORIDE .6; .31; .03; .02 G/100ML; G/100ML; G/100ML; G/100ML
500 INJECTION, SOLUTION INTRAVENOUS ONCE
Status: COMPLETED | OUTPATIENT
Start: 2024-10-04 | End: 2024-10-04

## 2024-10-04 RX ORDER — AZITHROMYCIN 200 MG/5ML
250 POWDER, FOR SUSPENSION ORAL DAILY
Qty: 12.6 ML | Refills: 0 | Status: ACTIVE | OUTPATIENT
Start: 2024-10-04 | End: 2024-10-06

## 2024-10-04 RX ADMIN — IOHEXOL 55 ML: 350 INJECTION, SOLUTION INTRAVENOUS at 09:00

## 2024-10-04 RX ADMIN — AMPICILLIN AND SULBACTAM 3 G: 1; 2 INJECTION, POWDER, FOR SOLUTION INTRAMUSCULAR; INTRAVENOUS at 05:59

## 2024-10-04 RX ADMIN — AZITHROMYCIN 500 MG: 500 INJECTION, POWDER, LYOPHILIZED, FOR SOLUTION INTRAVENOUS at 05:58

## 2024-10-04 RX ADMIN — ALBUTEROL SULFATE 2.5 MG: 2.5 SOLUTION RESPIRATORY (INHALATION) at 06:38

## 2024-10-04 RX ADMIN — SODIUM CHLORIDE, POTASSIUM CHLORIDE, SODIUM LACTATE AND CALCIUM CHLORIDE 500 ML: 600; 310; 30; 20 INJECTION, SOLUTION INTRAVENOUS at 05:57

## 2024-10-04 ASSESSMENT — FIBROSIS 4 INDEX: FIB4 SCORE: 1.341640786499873818

## 2024-10-06 ENCOUNTER — APPOINTMENT (OUTPATIENT)
Dept: RADIOLOGY | Facility: MEDICAL CENTER | Age: 79
End: 2024-10-06
Attending: EMERGENCY MEDICINE
Payer: MEDICARE

## 2024-10-06 ENCOUNTER — HOSPITAL ENCOUNTER (OUTPATIENT)
Facility: MEDICAL CENTER | Age: 79
End: 2024-10-09
Attending: EMERGENCY MEDICINE | Admitting: STUDENT IN AN ORGANIZED HEALTH CARE EDUCATION/TRAINING PROGRAM
Payer: MEDICARE

## 2024-10-06 DIAGNOSIS — J44.9 CHRONIC OBSTRUCTIVE PULMONARY DISEASE, UNSPECIFIED COPD TYPE (HCC): ICD-10-CM

## 2024-10-06 DIAGNOSIS — T85.598A FEEDING TUBE DYSFUNCTION, INITIAL ENCOUNTER: ICD-10-CM

## 2024-10-06 DIAGNOSIS — J69.0 ASPIRATION PNEUMONIA OF BOTH LOWER LOBES DUE TO GASTRIC SECRETIONS (HCC): ICD-10-CM

## 2024-10-06 DIAGNOSIS — Z86.73 HISTORY OF STROKE: ICD-10-CM

## 2024-10-06 DIAGNOSIS — N13.8 BPH WITH URINARY OBSTRUCTION: ICD-10-CM

## 2024-10-06 DIAGNOSIS — N40.1 BPH WITH URINARY OBSTRUCTION: ICD-10-CM

## 2024-10-06 DIAGNOSIS — K94.20 COMPLICATION OF FEEDING TUBE (HCC): ICD-10-CM

## 2024-10-06 PROBLEM — J96.10 CHRONIC RESPIRATORY FAILURE (HCC): Status: ACTIVE | Noted: 2023-10-24

## 2024-10-06 LAB
ALBUMIN SERPL BCP-MCNC: 3.8 G/DL (ref 3.2–4.9)
ALBUMIN/GLOB SERPL: 1.1 G/DL
ALP SERPL-CCNC: 53 U/L (ref 30–99)
ALT SERPL-CCNC: 9 U/L (ref 2–50)
ANION GAP SERPL CALC-SCNC: 9 MMOL/L (ref 7–16)
APPEARANCE UR: CLEAR
AST SERPL-CCNC: 17 U/L (ref 12–45)
BASOPHILS # BLD AUTO: 0.4 % (ref 0–1.8)
BASOPHILS # BLD: 0.03 K/UL (ref 0–0.12)
BILIRUB SERPL-MCNC: 0.3 MG/DL (ref 0.1–1.5)
BILIRUB UR QL STRIP.AUTO: NEGATIVE
BUN SERPL-MCNC: 18 MG/DL (ref 8–22)
CALCIUM ALBUM COR SERPL-MCNC: 9.6 MG/DL (ref 8.5–10.5)
CALCIUM SERPL-MCNC: 9.4 MG/DL (ref 8.5–10.5)
CHLORIDE SERPL-SCNC: 96 MMOL/L (ref 96–112)
CO2 SERPL-SCNC: 29 MMOL/L (ref 20–33)
COLOR UR: YELLOW
CREAT SERPL-MCNC: 0.7 MG/DL (ref 0.5–1.4)
EOSINOPHIL # BLD AUTO: 0.24 K/UL (ref 0–0.51)
EOSINOPHIL NFR BLD: 3 % (ref 0–6.9)
ERYTHROCYTE [DISTWIDTH] IN BLOOD BY AUTOMATED COUNT: 46.6 FL (ref 35.9–50)
GFR SERPLBLD CREATININE-BSD FMLA CKD-EPI: 94 ML/MIN/1.73 M 2
GLOBULIN SER CALC-MCNC: 3.6 G/DL (ref 1.9–3.5)
GLUCOSE SERPL-MCNC: 122 MG/DL (ref 65–99)
GLUCOSE UR STRIP.AUTO-MCNC: NEGATIVE MG/DL
HCT VFR BLD AUTO: 36.4 % (ref 42–52)
HGB BLD-MCNC: 11.5 G/DL (ref 14–18)
IMM GRANULOCYTES # BLD AUTO: 0.03 K/UL (ref 0–0.11)
IMM GRANULOCYTES NFR BLD AUTO: 0.4 % (ref 0–0.9)
KETONES UR STRIP.AUTO-MCNC: NEGATIVE MG/DL
LEUKOCYTE ESTERASE UR QL STRIP.AUTO: NEGATIVE
LIPASE SERPL-CCNC: 9 U/L (ref 11–82)
LYMPHOCYTES # BLD AUTO: 0.68 K/UL (ref 1–4.8)
LYMPHOCYTES NFR BLD: 8.4 % (ref 22–41)
MCH RBC QN AUTO: 26.6 PG (ref 27–33)
MCHC RBC AUTO-ENTMCNC: 31.6 G/DL (ref 32.3–36.5)
MCV RBC AUTO: 84.3 FL (ref 81.4–97.8)
MICRO URNS: ABNORMAL
MONOCYTES # BLD AUTO: 0.73 K/UL (ref 0–0.85)
MONOCYTES NFR BLD AUTO: 9 % (ref 0–13.4)
NEUTROPHILS # BLD AUTO: 6.39 K/UL (ref 1.82–7.42)
NEUTROPHILS NFR BLD: 78.8 % (ref 44–72)
NITRITE UR QL STRIP.AUTO: NEGATIVE
NRBC # BLD AUTO: 0 K/UL
NRBC BLD-RTO: 0 /100 WBC (ref 0–0.2)
PH UR STRIP.AUTO: 8 [PH] (ref 5–8)
PLATELET # BLD AUTO: 250 K/UL (ref 164–446)
PMV BLD AUTO: 11.8 FL (ref 9–12.9)
POTASSIUM SERPL-SCNC: 4.8 MMOL/L (ref 3.6–5.5)
PROT SERPL-MCNC: 7.4 G/DL (ref 6–8.2)
PROT UR QL STRIP: NEGATIVE MG/DL
RBC # BLD AUTO: 4.32 M/UL (ref 4.7–6.1)
RBC UR QL AUTO: NEGATIVE
SODIUM SERPL-SCNC: 134 MMOL/L (ref 135–145)
SP GR UR STRIP.AUTO: >=1.045
UROBILINOGEN UR STRIP.AUTO-MCNC: 1 MG/DL
WBC # BLD AUTO: 8.1 K/UL (ref 4.8–10.8)

## 2024-10-06 PROCEDURE — G0378 HOSPITAL OBSERVATION PER HR: HCPCS

## 2024-10-06 PROCEDURE — 49465 FLUORO EXAM OF G/COLON TUBE: CPT

## 2024-10-06 PROCEDURE — 81003 URINALYSIS AUTO W/O SCOPE: CPT

## 2024-10-06 PROCEDURE — 99285 EMERGENCY DEPT VISIT HI MDM: CPT

## 2024-10-06 PROCEDURE — A9270 NON-COVERED ITEM OR SERVICE: HCPCS | Performed by: STUDENT IN AN ORGANIZED HEALTH CARE EDUCATION/TRAINING PROGRAM

## 2024-10-06 PROCEDURE — 700111 HCHG RX REV CODE 636 W/ 250 OVERRIDE (IP): Mod: JZ

## 2024-10-06 PROCEDURE — 83690 ASSAY OF LIPASE: CPT

## 2024-10-06 PROCEDURE — 99222 1ST HOSP IP/OBS MODERATE 55: CPT | Performed by: STUDENT IN AN ORGANIZED HEALTH CARE EDUCATION/TRAINING PROGRAM

## 2024-10-06 PROCEDURE — 700111 HCHG RX REV CODE 636 W/ 250 OVERRIDE (IP): Performed by: STUDENT IN AN ORGANIZED HEALTH CARE EDUCATION/TRAINING PROGRAM

## 2024-10-06 PROCEDURE — 36415 COLL VENOUS BLD VENIPUNCTURE: CPT

## 2024-10-06 PROCEDURE — 700102 HCHG RX REV CODE 250 W/ 637 OVERRIDE(OP): Performed by: STUDENT IN AN ORGANIZED HEALTH CARE EDUCATION/TRAINING PROGRAM

## 2024-10-06 PROCEDURE — 700117 HCHG RX CONTRAST REV CODE 255: Performed by: EMERGENCY MEDICINE

## 2024-10-06 PROCEDURE — 80053 COMPREHEN METABOLIC PANEL: CPT

## 2024-10-06 PROCEDURE — 303105 HCHG CATHETER EXTRA

## 2024-10-06 PROCEDURE — 74177 CT ABD & PELVIS W/CONTRAST: CPT

## 2024-10-06 PROCEDURE — 43762 RPLC GTUBE NO REVJ TRC: CPT

## 2024-10-06 PROCEDURE — 85025 COMPLETE CBC W/AUTO DIFF WBC: CPT

## 2024-10-06 PROCEDURE — 31720 CLEARANCE OF AIRWAYS: CPT

## 2024-10-06 PROCEDURE — 96376 TX/PRO/DX INJ SAME DRUG ADON: CPT | Mod: XU

## 2024-10-06 PROCEDURE — 71045 X-RAY EXAM CHEST 1 VIEW: CPT

## 2024-10-06 PROCEDURE — 96375 TX/PRO/DX INJ NEW DRUG ADDON: CPT | Mod: XU

## 2024-10-06 RX ORDER — VALPROIC ACID 250 MG/5ML
500 SOLUTION ORAL
Status: DISCONTINUED | OUTPATIENT
Start: 2024-10-06 | End: 2024-10-06

## 2024-10-06 RX ORDER — HYDROXYZINE HYDROCHLORIDE 25 MG/1
25 TABLET, FILM COATED ORAL 3 TIMES DAILY
Status: ON HOLD | COMMUNITY
End: 2024-10-09

## 2024-10-06 RX ORDER — OXYCODONE HYDROCHLORIDE 5 MG/1
5 TABLET ORAL EVERY 8 HOURS PRN
Status: ON HOLD | COMMUNITY
End: 2024-10-09

## 2024-10-06 RX ORDER — IPRATROPIUM BROMIDE AND ALBUTEROL SULFATE 2.5; .5 MG/3ML; MG/3ML
3 SOLUTION RESPIRATORY (INHALATION) 4 TIMES DAILY
Status: ON HOLD | COMMUNITY
End: 2024-10-09

## 2024-10-06 RX ORDER — LOSARTAN POTASSIUM 25 MG/1
12.5 TABLET ORAL DAILY
Status: ON HOLD | COMMUNITY
End: 2024-10-09

## 2024-10-06 RX ORDER — ACETAMINOPHEN 650 MG/1
650 SUPPOSITORY RECTAL EVERY 4 HOURS PRN
Status: DISCONTINUED | OUTPATIENT
Start: 2024-10-06 | End: 2024-10-09 | Stop reason: HOSPADM

## 2024-10-06 RX ORDER — ACETAMINOPHEN 325 MG/1
650 TABLET ORAL EVERY 6 HOURS PRN
COMMUNITY

## 2024-10-06 RX ORDER — BUDESONIDE 0.5 MG/2ML
500 INHALANT ORAL DAILY
Status: ON HOLD | COMMUNITY
End: 2024-10-09

## 2024-10-06 RX ORDER — IPRATROPIUM BROMIDE AND ALBUTEROL SULFATE 2.5; .5 MG/3ML; MG/3ML
3 SOLUTION RESPIRATORY (INHALATION)
Status: DISCONTINUED | OUTPATIENT
Start: 2024-10-06 | End: 2024-10-08

## 2024-10-06 RX ORDER — MORPHINE SULFATE 4 MG/ML
1 INJECTION INTRAVENOUS EVERY 4 HOURS PRN
Status: DISCONTINUED | OUTPATIENT
Start: 2024-10-06 | End: 2024-10-09 | Stop reason: HOSPADM

## 2024-10-06 RX ORDER — ALBUTEROL SULFATE 0.83 MG/ML
2.5 SOLUTION RESPIRATORY (INHALATION) EVERY 6 HOURS PRN
Status: ON HOLD | COMMUNITY
End: 2024-10-09

## 2024-10-06 RX ORDER — LANSOPRAZOLE 30 MG/1
30 CAPSULE, DELAYED RELEASE ORAL 2 TIMES DAILY
Status: ON HOLD | COMMUNITY
End: 2024-10-09

## 2024-10-06 RX ORDER — DOXAZOSIN 1 MG/1
1 TABLET ORAL NIGHTLY
Status: ON HOLD | COMMUNITY
End: 2024-10-09

## 2024-10-06 RX ORDER — VALPROIC ACID 250 MG/5ML
500 SOLUTION ORAL
Status: ON HOLD | COMMUNITY
End: 2024-10-09

## 2024-10-06 RX ORDER — HALOPERIDOL 5 MG/ML
5 INJECTION INTRAMUSCULAR EVERY 6 HOURS PRN
Status: DISCONTINUED | OUTPATIENT
Start: 2024-10-06 | End: 2024-10-09 | Stop reason: HOSPADM

## 2024-10-06 RX ORDER — LORAZEPAM 2 MG/ML
0.5 INJECTION INTRAMUSCULAR EVERY 6 HOURS PRN
Status: DISCONTINUED | OUTPATIENT
Start: 2024-10-06 | End: 2024-10-06

## 2024-10-06 RX ORDER — PRAVASTATIN SODIUM 40 MG
40 TABLET ORAL NIGHTLY
Status: ON HOLD | COMMUNITY
End: 2024-10-09

## 2024-10-06 RX ORDER — FINASTERIDE 5 MG/1
5 TABLET, FILM COATED ORAL DAILY
Status: ON HOLD | COMMUNITY
End: 2024-10-09

## 2024-10-06 RX ORDER — ACETAMINOPHEN 500 MG
1000 TABLET ORAL EVERY 6 HOURS PRN
Status: DISCONTINUED | OUTPATIENT
Start: 2024-10-06 | End: 2024-10-06

## 2024-10-06 RX ADMIN — HALOPERIDOL LACTATE 5 MG: 5 INJECTION, SOLUTION INTRAMUSCULAR at 23:14

## 2024-10-06 RX ADMIN — ACETAMINOPHEN 1000 MG: 500 TABLET ORAL at 17:15

## 2024-10-06 RX ADMIN — IOHEXOL 100 ML: 350 INJECTION, SOLUTION INTRAVENOUS at 12:15

## 2024-10-06 RX ADMIN — MORPHINE SULFATE 1 MG: 4 INJECTION, SOLUTION INTRAMUSCULAR; INTRAVENOUS at 19:41

## 2024-10-06 RX ADMIN — MORPHINE SULFATE 1 MG: 4 INJECTION, SOLUTION INTRAMUSCULAR; INTRAVENOUS at 15:35

## 2024-10-06 RX ADMIN — IOHEXOL 30 ML: 350 INJECTION, SOLUTION INTRAVENOUS at 14:15

## 2024-10-06 SDOH — ECONOMIC STABILITY: TRANSPORTATION INSECURITY
IN THE PAST 12 MONTHS, HAS THE LACK OF TRANSPORTATION KEPT YOU FROM MEDICAL APPOINTMENTS OR FROM GETTING MEDICATIONS?: NO

## 2024-10-06 SDOH — ECONOMIC STABILITY: TRANSPORTATION INSECURITY
IN THE PAST 12 MONTHS, HAS LACK OF RELIABLE TRANSPORTATION KEPT YOU FROM MEDICAL APPOINTMENTS, MEETINGS, WORK OR FROM GETTING THINGS NEEDED FOR DAILY LIVING?: NO

## 2024-10-06 ASSESSMENT — PAIN DESCRIPTION - PAIN TYPE: TYPE: ACUTE PAIN

## 2024-10-06 ASSESSMENT — LIFESTYLE VARIABLES
HOW MANY TIMES IN THE PAST YEAR HAVE YOU HAD 5 OR MORE DRINKS IN A DAY: 0
TOTAL SCORE: 0
HAVE YOU EVER FELT YOU SHOULD CUT DOWN ON YOUR DRINKING: NO
HAVE PEOPLE ANNOYED YOU BY CRITICIZING YOUR DRINKING: NO
EVER HAD A DRINK FIRST THING IN THE MORNING TO STEADY YOUR NERVES TO GET RID OF A HANGOVER: NO
TOTAL SCORE: 0
DOES PATIENT WANT TO STOP DRINKING: NO
EVER FELT BAD OR GUILTY ABOUT YOUR DRINKING: NO
ALCOHOL_USE: NO
AVERAGE NUMBER OF DAYS PER WEEK YOU HAVE A DRINK CONTAINING ALCOHOL: 0
CONSUMPTION TOTAL: NEGATIVE
ON A TYPICAL DAY WHEN YOU DRINK ALCOHOL HOW MANY DRINKS DO YOU HAVE: 0
TOTAL SCORE: 0

## 2024-10-06 ASSESSMENT — PAIN SCALES - PAIN ASSESSMENT IN ADVANCED DEMENTIA (PAINAD)
BREATHING: NOISY LABORED BREATHING, LONG PERIODS OF HYPERVENTILATION, CHEYNE-STOKES RESPIRATIONS
BREATHING: OCCASIONAL LABORED BREATHING, SHORT PERIOD OF HYPERVENTILATION
CONSOLABILITY: DISTRACTED OR REASSURED BY VOICE/TOUCH
BODYLANGUAGE: TENSE, DISTRESSED PACING, FIDGETING
CONSOLABILITY: NO NEED TO CONSOLE
CONSOLABILITY: NO NEED TO CONSOLE
TOTALSCORE: 6
NEGVOCALIZATION: OCCASIONAL MOAN/GROAN, LOW SPEECH, NEGATIVE/DISAPPROVING QUALITY
FACIALEXPRESSION: SMILING OR INEXPRESSIVE
BODYLANGUAGE: TENSE, DISTRESSED PACING, FIDGETING
CONSOLABILITY: DISTRACTED OR REASSURED BY VOICE/TOUCH
TOTALSCORE: 7
NEGVOCALIZATION: OCCASIONAL MOAN/GROAN, LOW SPEECH, NEGATIVE/DISAPPROVING QUALITY
BREATHING: OCCASIONAL LABORED BREATHING, SHORT PERIOD OF HYPERVENTILATION
FACIALEXPRESSION: FACIAL GRIMACING
NEGVOCALIZATION: OCCASIONAL MOAN/GROAN, LOW SPEECH, NEGATIVE/DISAPPROVING QUALITY
CONSOLABILITY: NO NEED TO CONSOLE
FACIALEXPRESSION: SMILING OR INEXPRESSIVE
TOTALSCORE: 1
BREATHING: OCCASIONAL LABORED BREATHING, SHORT PERIOD OF HYPERVENTILATION
BREATHING: NORMAL
TOTALSCORE: 1
TOTALSCORE: 2
BODYLANGUAGE: RELAXED
FACIALEXPRESSION: FACIAL GRIMACING
BODYLANGUAGE: TENSE, DISTRESSED PACING, FIDGETING
BODYLANGUAGE: RELAXED
FACIALEXPRESSION: SMILING OR INEXPRESSIVE

## 2024-10-06 ASSESSMENT — SOCIAL DETERMINANTS OF HEALTH (SDOH)
WITHIN THE PAST 12 MONTHS, YOU WORRIED THAT YOUR FOOD WOULD RUN OUT BEFORE YOU GOT THE MONEY TO BUY MORE: NEVER TRUE
IN THE PAST 12 MONTHS, HAS THE ELECTRIC, GAS, OIL, OR WATER COMPANY THREATENED TO SHUT OFF SERVICE IN YOUR HOME?: NO
WITHIN THE LAST YEAR, HAVE TO BEEN RAPED OR FORCED TO HAVE ANY KIND OF SEXUAL ACTIVITY BY YOUR PARTNER OR EX-PARTNER?: PATIENT UNABLE TO ANSWER
WITHIN THE LAST YEAR, HAVE YOU BEEN HUMILIATED OR EMOTIONALLY ABUSED IN OTHER WAYS BY YOUR PARTNER OR EX-PARTNER?: PATIENT UNABLE TO ANSWER
WITHIN THE LAST YEAR, HAVE YOU BEEN AFRAID OF YOUR PARTNER OR EX-PARTNER?: PATIENT UNABLE TO ANSWER
WITHIN THE PAST 12 MONTHS, THE FOOD YOU BOUGHT JUST DIDN'T LAST AND YOU DIDN'T HAVE MONEY TO GET MORE: NEVER TRUE
WITHIN THE LAST YEAR, HAVE YOU BEEN KICKED, HIT, SLAPPED, OR OTHERWISE PHYSICALLY HURT BY YOUR PARTNER OR EX-PARTNER?: PATIENT UNABLE TO ANSWER

## 2024-10-06 ASSESSMENT — PATIENT HEALTH QUESTIONNAIRE - PHQ9
1. LITTLE INTEREST OR PLEASURE IN DOING THINGS: NOT AT ALL
SUM OF ALL RESPONSES TO PHQ9 QUESTIONS 1 AND 2: 0
2. FEELING DOWN, DEPRESSED, IRRITABLE, OR HOPELESS: NOT AT ALL

## 2024-10-06 ASSESSMENT — FIBROSIS 4 INDEX
FIB4 SCORE: 1.768
FIB4 SCORE: 1.43

## 2024-10-06 ASSESSMENT — ENCOUNTER SYMPTOMS: ABDOMINAL PAIN: 1

## 2024-10-07 ENCOUNTER — APPOINTMENT (OUTPATIENT)
Dept: RADIOLOGY | Facility: MEDICAL CENTER | Age: 79
End: 2024-10-07
Attending: STUDENT IN AN ORGANIZED HEALTH CARE EDUCATION/TRAINING PROGRAM
Payer: MEDICARE

## 2024-10-07 ENCOUNTER — HOSPITAL ENCOUNTER (OUTPATIENT)
Dept: RADIOLOGY | Facility: MEDICAL CENTER | Age: 79
End: 2024-10-07
Attending: NURSE PRACTITIONER
Payer: MEDICARE

## 2024-10-07 LAB
ANION GAP SERPL CALC-SCNC: 9 MMOL/L (ref 7–16)
BACTERIA SPEC RESP CULT: ABNORMAL
BASOPHILS # BLD AUTO: 0.7 % (ref 0–1.8)
BASOPHILS # BLD: 0.04 K/UL (ref 0–0.12)
BUN SERPL-MCNC: 17 MG/DL (ref 8–22)
CALCIUM SERPL-MCNC: 9.1 MG/DL (ref 8.5–10.5)
CHLORIDE SERPL-SCNC: 101 MMOL/L (ref 96–112)
CO2 SERPL-SCNC: 28 MMOL/L (ref 20–33)
CREAT SERPL-MCNC: 0.88 MG/DL (ref 0.5–1.4)
EOSINOPHIL # BLD AUTO: 0.37 K/UL (ref 0–0.51)
EOSINOPHIL NFR BLD: 6.3 % (ref 0–6.9)
ERYTHROCYTE [DISTWIDTH] IN BLOOD BY AUTOMATED COUNT: 46.3 FL (ref 35.9–50)
GFR SERPLBLD CREATININE-BSD FMLA CKD-EPI: 87 ML/MIN/1.73 M 2
GLUCOSE BLD STRIP.AUTO-MCNC: 91 MG/DL (ref 65–99)
GLUCOSE SERPL-MCNC: 110 MG/DL (ref 65–99)
GRAM STN SPEC: ABNORMAL
HCT VFR BLD AUTO: 34.3 % (ref 42–52)
HGB BLD-MCNC: 10.8 G/DL (ref 14–18)
IMM GRANULOCYTES # BLD AUTO: 0.01 K/UL (ref 0–0.11)
IMM GRANULOCYTES NFR BLD AUTO: 0.2 % (ref 0–0.9)
LYMPHOCYTES # BLD AUTO: 0.77 K/UL (ref 1–4.8)
LYMPHOCYTES NFR BLD: 13.2 % (ref 22–41)
MCH RBC QN AUTO: 26.5 PG (ref 27–33)
MCHC RBC AUTO-ENTMCNC: 31.5 G/DL (ref 32.3–36.5)
MCV RBC AUTO: 84.3 FL (ref 81.4–97.8)
MONOCYTES # BLD AUTO: 0.78 K/UL (ref 0–0.85)
MONOCYTES NFR BLD AUTO: 13.4 % (ref 0–13.4)
NEUTROPHILS # BLD AUTO: 3.87 K/UL (ref 1.82–7.42)
NEUTROPHILS NFR BLD: 66.2 % (ref 44–72)
NRBC # BLD AUTO: 0 K/UL
NRBC BLD-RTO: 0 /100 WBC (ref 0–0.2)
PLATELET # BLD AUTO: 231 K/UL (ref 164–446)
PMV BLD AUTO: 10.8 FL (ref 9–12.9)
POTASSIUM SERPL-SCNC: 4 MMOL/L (ref 3.6–5.5)
PROCALCITONIN SERPL-MCNC: 0.05 NG/ML
RBC # BLD AUTO: 4.07 M/UL (ref 4.7–6.1)
SCCMEC + MECA PNL NOSE NAA+PROBE: NEGATIVE
SIGNIFICANT IND 70042: ABNORMAL
SITE SITE: ABNORMAL
SODIUM SERPL-SCNC: 138 MMOL/L (ref 135–145)
SOURCE SOURCE: ABNORMAL
WBC # BLD AUTO: 5.8 K/UL (ref 4.8–10.8)

## 2024-10-07 PROCEDURE — 700111 HCHG RX REV CODE 636 W/ 250 OVERRIDE (IP)

## 2024-10-07 PROCEDURE — 36415 COLL VENOUS BLD VENIPUNCTURE: CPT

## 2024-10-07 PROCEDURE — 84145 PROCALCITONIN (PCT): CPT

## 2024-10-07 PROCEDURE — 700105 HCHG RX REV CODE 258

## 2024-10-07 PROCEDURE — 96365 THER/PROPH/DIAG IV INF INIT: CPT | Mod: XU

## 2024-10-07 PROCEDURE — B4087 GASTRO/JEJUNO TUBE, STD: HCPCS

## 2024-10-07 PROCEDURE — 82962 GLUCOSE BLOOD TEST: CPT

## 2024-10-07 PROCEDURE — 85025 COMPLETE CBC W/AUTO DIFF WBC: CPT

## 2024-10-07 PROCEDURE — 700101 HCHG RX REV CODE 250: Performed by: NURSE PRACTITIONER

## 2024-10-07 PROCEDURE — 96366 THER/PROPH/DIAG IV INF ADDON: CPT | Mod: XU

## 2024-10-07 PROCEDURE — G0378 HOSPITAL OBSERVATION PER HR: HCPCS

## 2024-10-07 PROCEDURE — 99232 SBSQ HOSP IP/OBS MODERATE 35: CPT | Performed by: HOSPITALIST

## 2024-10-07 PROCEDURE — 71045 X-RAY EXAM CHEST 1 VIEW: CPT

## 2024-10-07 PROCEDURE — 96375 TX/PRO/DX INJ NEW DRUG ADDON: CPT | Mod: XU

## 2024-10-07 PROCEDURE — 700117 HCHG RX CONTRAST REV CODE 255: Performed by: RADIOLOGY

## 2024-10-07 PROCEDURE — 87641 MR-STAPH DNA AMP PROBE: CPT

## 2024-10-07 PROCEDURE — 80048 BASIC METABOLIC PNL TOTAL CA: CPT

## 2024-10-07 PROCEDURE — 700111 HCHG RX REV CODE 636 W/ 250 OVERRIDE (IP): Performed by: NURSE PRACTITIONER

## 2024-10-07 RX ADMIN — IOHEXOL 20 ML: 300 INJECTION, SOLUTION INTRAVENOUS at 10:00

## 2024-10-07 RX ADMIN — VALPROATE SODIUM 125 MG: 100 INJECTION, SOLUTION INTRAVENOUS at 10:23

## 2024-10-07 RX ADMIN — VALPROATE SODIUM 125 MG: 100 INJECTION, SOLUTION INTRAVENOUS at 00:16

## 2024-10-07 RX ADMIN — CEFTRIAXONE SODIUM 2000 MG: 10 INJECTION, POWDER, FOR SOLUTION INTRAVENOUS at 00:00

## 2024-10-07 RX ADMIN — VALPROATE SODIUM 125 MG: 100 INJECTION, SOLUTION INTRAVENOUS at 05:28

## 2024-10-07 RX ADMIN — VALPROATE SODIUM 125 MG: 100 INJECTION, SOLUTION INTRAVENOUS at 18:10

## 2024-10-07 RX ADMIN — VALPROATE SODIUM 125 MG: 100 INJECTION, SOLUTION INTRAVENOUS at 23:45

## 2024-10-07 ASSESSMENT — PAIN SCALES - PAIN ASSESSMENT IN ADVANCED DEMENTIA (PAINAD)
BREATHING: NORMAL
TOTALSCORE: 0
TOTALSCORE: 0
FACIALEXPRESSION: SMILING OR INEXPRESSIVE
CONSOLABILITY: NO NEED TO CONSOLE
BREATHING: NORMAL
BREATHING: NORMAL
BODYLANGUAGE: RELAXED
BODYLANGUAGE: RELAXED
FACIALEXPRESSION: SMILING OR INEXPRESSIVE
FACIALEXPRESSION: SMILING OR INEXPRESSIVE
TOTALSCORE: 0
BODYLANGUAGE: RELAXED

## 2024-10-07 ASSESSMENT — COGNITIVE AND FUNCTIONAL STATUS - GENERAL
PERSONAL GROOMING: TOTAL
SUGGESTED CMS G CODE MODIFIER MOBILITY: CL
TOILETING: TOTAL
DAILY ACTIVITIY SCORE: 6
EATING MEALS: TOTAL
DRESSING REGULAR LOWER BODY CLOTHING: TOTAL
SUGGESTED CMS G CODE MODIFIER DAILY ACTIVITY: CN
TURNING FROM BACK TO SIDE WHILE IN FLAT BAD: A LOT
MOVING FROM LYING ON BACK TO SITTING ON SIDE OF FLAT BED: A LOT
MOBILITY SCORE: 10
WALKING IN HOSPITAL ROOM: TOTAL
STANDING UP FROM CHAIR USING ARMS: A LOT
CLIMB 3 TO 5 STEPS WITH RAILING: TOTAL
MOVING TO AND FROM BED TO CHAIR: A LOT
HELP NEEDED FOR BATHING: TOTAL
DRESSING REGULAR UPPER BODY CLOTHING: TOTAL

## 2024-10-07 ASSESSMENT — PAIN DESCRIPTION - PAIN TYPE
TYPE: ACUTE PAIN
TYPE: ACUTE PAIN

## 2024-10-08 ENCOUNTER — APPOINTMENT (OUTPATIENT)
Dept: RADIOLOGY | Facility: MEDICAL CENTER | Age: 79
End: 2024-10-08
Attending: STUDENT IN AN ORGANIZED HEALTH CARE EDUCATION/TRAINING PROGRAM
Payer: MEDICARE

## 2024-10-08 LAB
ANION GAP SERPL CALC-SCNC: 14 MMOL/L (ref 7–16)
BUN SERPL-MCNC: 17 MG/DL (ref 8–22)
CALCIUM SERPL-MCNC: 9.9 MG/DL (ref 8.5–10.5)
CHLORIDE SERPL-SCNC: 102 MMOL/L (ref 96–112)
CO2 SERPL-SCNC: 25 MMOL/L (ref 20–33)
CREAT SERPL-MCNC: 0.63 MG/DL (ref 0.5–1.4)
ERYTHROCYTE [DISTWIDTH] IN BLOOD BY AUTOMATED COUNT: 46.1 FL (ref 35.9–50)
GFR SERPLBLD CREATININE-BSD FMLA CKD-EPI: 97 ML/MIN/1.73 M 2
GLUCOSE SERPL-MCNC: 90 MG/DL (ref 65–99)
HCT VFR BLD AUTO: 36.8 % (ref 42–52)
HGB BLD-MCNC: 11.9 G/DL (ref 14–18)
MCH RBC QN AUTO: 27.1 PG (ref 27–33)
MCHC RBC AUTO-ENTMCNC: 32.3 G/DL (ref 32.3–36.5)
MCV RBC AUTO: 83.8 FL (ref 81.4–97.8)
PLATELET # BLD AUTO: 263 K/UL (ref 164–446)
PMV BLD AUTO: 10.6 FL (ref 9–12.9)
POTASSIUM SERPL-SCNC: 4.1 MMOL/L (ref 3.6–5.5)
RBC # BLD AUTO: 4.39 M/UL (ref 4.7–6.1)
SODIUM SERPL-SCNC: 141 MMOL/L (ref 135–145)
WBC # BLD AUTO: 7.1 K/UL (ref 4.8–10.8)

## 2024-10-08 PROCEDURE — 74230 X-RAY XM SWLNG FUNCJ C+: CPT

## 2024-10-08 PROCEDURE — 96376 TX/PRO/DX INJ SAME DRUG ADON: CPT | Mod: XU

## 2024-10-08 PROCEDURE — 85027 COMPLETE CBC AUTOMATED: CPT

## 2024-10-08 PROCEDURE — 700102 HCHG RX REV CODE 250 W/ 637 OVERRIDE(OP): Mod: UD | Performed by: STUDENT IN AN ORGANIZED HEALTH CARE EDUCATION/TRAINING PROGRAM

## 2024-10-08 PROCEDURE — 700101 HCHG RX REV CODE 250: Mod: UD | Performed by: NURSE PRACTITIONER

## 2024-10-08 PROCEDURE — 94640 AIRWAY INHALATION TREATMENT: CPT

## 2024-10-08 PROCEDURE — G0378 HOSPITAL OBSERVATION PER HR: HCPCS

## 2024-10-08 PROCEDURE — 36415 COLL VENOUS BLD VENIPUNCTURE: CPT

## 2024-10-08 PROCEDURE — 700111 HCHG RX REV CODE 636 W/ 250 OVERRIDE (IP): Mod: UD | Performed by: NURSE PRACTITIONER

## 2024-10-08 PROCEDURE — 92611 MOTION FLUOROSCOPY/SWALLOW: CPT

## 2024-10-08 PROCEDURE — 700101 HCHG RX REV CODE 250: Mod: UD | Performed by: STUDENT IN AN ORGANIZED HEALTH CARE EDUCATION/TRAINING PROGRAM

## 2024-10-08 PROCEDURE — A9270 NON-COVERED ITEM OR SERVICE: HCPCS | Mod: UD | Performed by: STUDENT IN AN ORGANIZED HEALTH CARE EDUCATION/TRAINING PROGRAM

## 2024-10-08 PROCEDURE — 96366 THER/PROPH/DIAG IV INF ADDON: CPT | Mod: XU

## 2024-10-08 PROCEDURE — 31720 CLEARANCE OF AIRWAYS: CPT

## 2024-10-08 PROCEDURE — 92610 EVALUATE SWALLOWING FUNCTION: CPT | Mod: XU

## 2024-10-08 PROCEDURE — 80048 BASIC METABOLIC PNL TOTAL CA: CPT

## 2024-10-08 PROCEDURE — 99239 HOSP IP/OBS DSCHRG MGMT >30: CPT | Performed by: STUDENT IN AN ORGANIZED HEALTH CARE EDUCATION/TRAINING PROGRAM

## 2024-10-08 PROCEDURE — 700105 HCHG RX REV CODE 258: Mod: UD

## 2024-10-08 PROCEDURE — 700111 HCHG RX REV CODE 636 W/ 250 OVERRIDE (IP): Mod: UD

## 2024-10-08 RX ORDER — VALPROIC ACID 250 MG/5ML
500 SOLUTION ORAL
Status: DISCONTINUED | OUTPATIENT
Start: 2024-10-08 | End: 2024-10-09 | Stop reason: HOSPADM

## 2024-10-08 RX ORDER — PREDNISONE 20 MG/1
40 TABLET ORAL
Qty: 5 TABLET | Refills: 0
Start: 2024-10-08 | End: 2024-10-09

## 2024-10-08 RX ORDER — AMOXICILLIN 250 MG
2 CAPSULE ORAL EVERY EVENING
Status: DISCONTINUED | OUTPATIENT
Start: 2024-10-08 | End: 2024-10-09 | Stop reason: HOSPADM

## 2024-10-08 RX ORDER — HYDROXYZINE HYDROCHLORIDE 50 MG/1
25 TABLET, FILM COATED ORAL 3 TIMES DAILY
Status: DISCONTINUED | OUTPATIENT
Start: 2024-10-08 | End: 2024-10-09 | Stop reason: HOSPADM

## 2024-10-08 RX ORDER — DOXYCYCLINE 100 MG/1
100 CAPSULE ORAL 2 TIMES DAILY PRN
Qty: 10 CAPSULE | Refills: 0 | Status: ACTIVE
Start: 2024-10-08 | End: 2024-10-09

## 2024-10-08 RX ORDER — IPRATROPIUM BROMIDE AND ALBUTEROL SULFATE 2.5; .5 MG/3ML; MG/3ML
3 SOLUTION RESPIRATORY (INHALATION) 4 TIMES DAILY
Status: DISCONTINUED | OUTPATIENT
Start: 2024-10-08 | End: 2024-10-09 | Stop reason: HOSPADM

## 2024-10-08 RX ORDER — BUDESONIDE 0.5 MG/2ML
0.5 INHALANT ORAL DAILY
Status: DISCONTINUED | OUTPATIENT
Start: 2024-10-08 | End: 2024-10-09 | Stop reason: HOSPADM

## 2024-10-08 RX ORDER — OXYCODONE HYDROCHLORIDE 5 MG/1
5 TABLET ORAL EVERY 8 HOURS PRN
Status: DISCONTINUED | OUTPATIENT
Start: 2024-10-08 | End: 2024-10-09 | Stop reason: HOSPADM

## 2024-10-08 RX ORDER — FINASTERIDE 5 MG/1
5 TABLET, FILM COATED ORAL DAILY
Status: DISCONTINUED | OUTPATIENT
Start: 2024-10-08 | End: 2024-10-09

## 2024-10-08 RX ORDER — POLYETHYLENE GLYCOL 3350 17 G/17G
1 POWDER, FOR SOLUTION ORAL
Status: DISCONTINUED | OUTPATIENT
Start: 2024-10-08 | End: 2024-10-09 | Stop reason: HOSPADM

## 2024-10-08 RX ORDER — METOPROLOL TARTRATE 25 MG/1
12.5 TABLET, FILM COATED ORAL 2 TIMES DAILY
Status: DISCONTINUED | OUTPATIENT
Start: 2024-10-08 | End: 2024-10-09 | Stop reason: HOSPADM

## 2024-10-08 RX ORDER — LANSOPRAZOLE 30 MG/1
30 TABLET, ORALLY DISINTEGRATING, DELAYED RELEASE ORAL DAILY
Status: DISCONTINUED | OUTPATIENT
Start: 2024-10-08 | End: 2024-10-09 | Stop reason: HOSPADM

## 2024-10-08 RX ORDER — PRAVASTATIN SODIUM 20 MG
40 TABLET ORAL NIGHTLY
Status: DISCONTINUED | OUTPATIENT
Start: 2024-10-08 | End: 2024-10-09 | Stop reason: HOSPADM

## 2024-10-08 RX ORDER — LOSARTAN POTASSIUM 25 MG/1
12.5 TABLET ORAL DAILY
Status: DISCONTINUED | OUTPATIENT
Start: 2024-10-08 | End: 2024-10-09 | Stop reason: HOSPADM

## 2024-10-08 RX ADMIN — LOSARTAN POTASSIUM 12.5 MG: 25 TABLET, FILM COATED ORAL at 16:31

## 2024-10-08 RX ADMIN — VALPROATE SODIUM 125 MG: 100 INJECTION, SOLUTION INTRAVENOUS at 13:14

## 2024-10-08 RX ADMIN — PRAVASTATIN SODIUM 40 MG: 20 TABLET ORAL at 21:20

## 2024-10-08 RX ADMIN — CEFTRIAXONE SODIUM 2000 MG: 10 INJECTION, POWDER, FOR SOLUTION INTRAVENOUS at 05:03

## 2024-10-08 RX ADMIN — VALPROIC ACID 500 MG: 500 SOLUTION ORAL at 21:20

## 2024-10-08 RX ADMIN — SENNOSIDES AND DOCUSATE SODIUM 2 TABLET: 50; 8.6 TABLET ORAL at 18:05

## 2024-10-08 RX ADMIN — IPRATROPIUM BROMIDE AND ALBUTEROL SULFATE 3 ML: 2.5; .5 SOLUTION RESPIRATORY (INHALATION) at 10:38

## 2024-10-08 RX ADMIN — IPRATROPIUM BROMIDE AND ALBUTEROL SULFATE 3 ML: 2.5; .5 SOLUTION RESPIRATORY (INHALATION) at 14:38

## 2024-10-08 RX ADMIN — IPRATROPIUM BROMIDE AND ALBUTEROL SULFATE 3 ML: 2.5; .5 SOLUTION RESPIRATORY (INHALATION) at 19:21

## 2024-10-08 RX ADMIN — HYDROXYZINE HYDROCHLORIDE 25 MG: 50 TABLET, FILM COATED ORAL at 21:20

## 2024-10-08 RX ADMIN — HYDROXYZINE HYDROCHLORIDE 25 MG: 50 TABLET, FILM COATED ORAL at 16:32

## 2024-10-08 RX ADMIN — METOPROLOL TARTRATE 12.5 MG: 25 TABLET, FILM COATED ORAL at 16:30

## 2024-10-08 RX ADMIN — LANSOPRAZOLE 30 MG: 30 TABLET, ORALLY DISINTEGRATING, DELAYED RELEASE ORAL at 16:30

## 2024-10-08 RX ADMIN — VALPROATE SODIUM 125 MG: 100 INJECTION, SOLUTION INTRAVENOUS at 06:49

## 2024-10-08 RX ADMIN — FINASTERIDE 5 MG: 5 TABLET, FILM COATED ORAL at 16:31

## 2024-10-08 ASSESSMENT — PAIN DESCRIPTION - PAIN TYPE
TYPE: ACUTE PAIN
TYPE: ACUTE PAIN

## 2024-10-08 ASSESSMENT — PAIN SCALES - PAIN ASSESSMENT IN ADVANCED DEMENTIA (PAINAD)
BODYLANGUAGE: RELAXED
CONSOLABILITY: NO NEED TO CONSOLE
FACIALEXPRESSION: SMILING OR INEXPRESSIVE
TOTALSCORE: 0
BREATHING: NORMAL

## 2024-10-09 VITALS
DIASTOLIC BLOOD PRESSURE: 89 MMHG | HEIGHT: 65 IN | TEMPERATURE: 97.4 F | OXYGEN SATURATION: 97 % | SYSTOLIC BLOOD PRESSURE: 128 MMHG | HEART RATE: 84 BPM | RESPIRATION RATE: 16 BRPM | BODY MASS INDEX: 24.43 KG/M2 | WEIGHT: 146.61 LBS

## 2024-10-09 LAB
CRP SERPL HS-MCNC: 1.59 MG/DL (ref 0–0.75)
PREALB SERPL-MCNC: 15.3 MG/DL (ref 18–38)

## 2024-10-09 PROCEDURE — 99239 HOSP IP/OBS DSCHRG MGMT >30: CPT | Mod: DUPCHRG | Performed by: STUDENT IN AN ORGANIZED HEALTH CARE EDUCATION/TRAINING PROGRAM

## 2024-10-09 PROCEDURE — A9270 NON-COVERED ITEM OR SERVICE: HCPCS | Mod: UD | Performed by: STUDENT IN AN ORGANIZED HEALTH CARE EDUCATION/TRAINING PROGRAM

## 2024-10-09 PROCEDURE — 36415 COLL VENOUS BLD VENIPUNCTURE: CPT

## 2024-10-09 PROCEDURE — 94640 AIRWAY INHALATION TREATMENT: CPT

## 2024-10-09 PROCEDURE — 700111 HCHG RX REV CODE 636 W/ 250 OVERRIDE (IP): Mod: UD | Performed by: STUDENT IN AN ORGANIZED HEALTH CARE EDUCATION/TRAINING PROGRAM

## 2024-10-09 PROCEDURE — 84134 ASSAY OF PREALBUMIN: CPT

## 2024-10-09 PROCEDURE — G0378 HOSPITAL OBSERVATION PER HR: HCPCS

## 2024-10-09 PROCEDURE — 700101 HCHG RX REV CODE 250: Mod: UD | Performed by: STUDENT IN AN ORGANIZED HEALTH CARE EDUCATION/TRAINING PROGRAM

## 2024-10-09 PROCEDURE — 86140 C-REACTIVE PROTEIN: CPT

## 2024-10-09 PROCEDURE — 700102 HCHG RX REV CODE 250 W/ 637 OVERRIDE(OP): Mod: UD | Performed by: STUDENT IN AN ORGANIZED HEALTH CARE EDUCATION/TRAINING PROGRAM

## 2024-10-09 RX ORDER — HYDROXYZINE HYDROCHLORIDE 25 MG/1
25 TABLET, FILM COATED ORAL 3 TIMES DAILY
Qty: 30 TABLET | Refills: 0 | Status: SHIPPED | OUTPATIENT
Start: 2024-10-09

## 2024-10-09 RX ORDER — SCOLOPAMINE TRANSDERMAL SYSTEM 1 MG/1
1 PATCH, EXTENDED RELEASE TRANSDERMAL
Qty: 4 PATCH | Refills: 3 | Status: SHIPPED | OUTPATIENT
Start: 2024-10-09

## 2024-10-09 RX ORDER — PRAVASTATIN SODIUM 40 MG
40 TABLET ORAL NIGHTLY
Qty: 30 TABLET | Refills: 11 | Status: SHIPPED | OUTPATIENT
Start: 2024-10-09

## 2024-10-09 RX ORDER — LOSARTAN POTASSIUM 25 MG/1
12.5 TABLET ORAL DAILY
Qty: 30 TABLET | Refills: 3 | Status: SHIPPED | OUTPATIENT
Start: 2024-10-09

## 2024-10-09 RX ORDER — FINASTERIDE 5 MG/1
5 TABLET, FILM COATED ORAL DAILY
Qty: 30 TABLET | Refills: 3 | Status: SHIPPED | OUTPATIENT
Start: 2024-10-09

## 2024-10-09 RX ORDER — DOXYCYCLINE 100 MG/1
100 CAPSULE ORAL 2 TIMES DAILY PRN
Qty: 10 CAPSULE | Refills: 0 | Status: ACTIVE | OUTPATIENT
Start: 2024-10-09 | End: 2024-10-14

## 2024-10-09 RX ORDER — ALBUTEROL SULFATE 0.83 MG/ML
2.5 SOLUTION RESPIRATORY (INHALATION) EVERY 6 HOURS PRN
Qty: 3 ML | Refills: 3 | Status: SHIPPED | OUTPATIENT
Start: 2024-10-09

## 2024-10-09 RX ORDER — IPRATROPIUM BROMIDE AND ALBUTEROL SULFATE 2.5; .5 MG/3ML; MG/3ML
3 SOLUTION RESPIRATORY (INHALATION) 4 TIMES DAILY
Qty: 3 ML | Refills: 3 | Status: SHIPPED | OUTPATIENT
Start: 2024-10-09

## 2024-10-09 RX ORDER — BUDESONIDE 0.5 MG/2ML
500 INHALANT ORAL DAILY
Qty: 2 ML | Refills: 3 | Status: SHIPPED | OUTPATIENT
Start: 2024-10-09

## 2024-10-09 RX ORDER — DOXAZOSIN 1 MG/1
1 TABLET ORAL NIGHTLY
Qty: 30 TABLET | Refills: 3 | Status: SHIPPED | OUTPATIENT
Start: 2024-10-09

## 2024-10-09 RX ORDER — PREDNISONE 20 MG/1
40 TABLET ORAL
Qty: 5 TABLET | Refills: 0 | Status: SHIPPED | OUTPATIENT
Start: 2024-10-09 | End: 2024-10-14

## 2024-10-09 RX ORDER — VALPROIC ACID 250 MG/5ML
500 SOLUTION ORAL
Qty: 300 ML | Refills: 1 | Status: SHIPPED | OUTPATIENT
Start: 2024-10-09

## 2024-10-09 RX ORDER — METOPROLOL TARTRATE 25 MG/1
12.5 TABLET, FILM COATED ORAL 2 TIMES DAILY
Qty: 60 TABLET | Refills: 3 | Status: SHIPPED | OUTPATIENT
Start: 2024-10-09

## 2024-10-09 RX ADMIN — IPRATROPIUM BROMIDE AND ALBUTEROL SULFATE 3 ML: 2.5; .5 SOLUTION RESPIRATORY (INHALATION) at 11:08

## 2024-10-09 RX ADMIN — METOPROLOL TARTRATE 12.5 MG: 25 TABLET, FILM COATED ORAL at 02:31

## 2024-10-09 RX ADMIN — LANSOPRAZOLE 30 MG: 30 TABLET, ORALLY DISINTEGRATING, DELAYED RELEASE ORAL at 05:01

## 2024-10-09 RX ADMIN — IPRATROPIUM BROMIDE AND ALBUTEROL SULFATE 3 ML: 2.5; .5 SOLUTION RESPIRATORY (INHALATION) at 06:43

## 2024-10-09 RX ADMIN — BUDESONIDE 0.5 MG: 0.5 SUSPENSION RESPIRATORY (INHALATION) at 06:42

## 2024-10-09 RX ADMIN — HYDROXYZINE HYDROCHLORIDE 25 MG: 50 TABLET, FILM COATED ORAL at 13:01

## 2024-10-09 RX ADMIN — FINASTERIDE 5 MG: 5 TABLET, FILM COATED ORAL at 10:41

## 2024-10-09 RX ADMIN — LOSARTAN POTASSIUM 12.5 MG: 25 TABLET, FILM COATED ORAL at 05:01

## 2024-10-09 ASSESSMENT — ENCOUNTER SYMPTOMS
VOMITING: 0
FEVER: 0
ABDOMINAL PAIN: 0
NAUSEA: 0
SHORTNESS OF BREATH: 0

## (undated) DEVICE — KIT CUSTOM PROCEDURE SINGLE FOR ENDO  (15/CA)

## (undated) DEVICE — BLOCK BITE MAXI DENTAL RETENTION RIM (100EA/BX)

## (undated) DEVICE — ELECTRODE 850 FOAM ADHESIVE - HYDROGEL RADIOTRNSPRNT (50/PK)

## (undated) DEVICE — TUBE CONNECTING SUCTION - CLEAR PLASTIC STERILE 72 IN (50EA/CA)

## (undated) DEVICE — WATER IRRIGATION STERILE 1000ML (12EA/CA)

## (undated) DEVICE — SET LEADWIRE 5 LEAD BEDSIDE DISPOSABLE ECG (1SET OF 5/EA)

## (undated) DEVICE — PORT AUXILLARY WATER (50EA/BX)

## (undated) DEVICE — FILM CASSETTE ENDO

## (undated) DEVICE — NEPTUNE 4 PORT MANIFOLD - (20/PK)

## (undated) DEVICE — SET EXTENSION WITH 2 PORTS (48EA/CA) ***PART #2C8610 IS A SUBSTITUTE*****

## (undated) DEVICE — BUTTON ENDOSCOPY DISPOSABLE

## (undated) DEVICE — CANISTER SUCTION RIGID RED 1500CC (40EA/CA)

## (undated) DEVICE — CANNULA O2 COMFORT SOFT EAR ADULT 7 FT TUBING (50/CA)

## (undated) DEVICE — SENSOR OXIMETER ADULT SPO2 RD SET (20EA/BX)

## (undated) DEVICE — LACTATED RINGERS INJ 1000 ML - (14EA/CA 60CA/PF)

## (undated) DEVICE — BINDER ABDOMINAL 30X45X12IN - FITS 30-45IN WAIST 12IN HIGH

## (undated) DEVICE — CATHETER IV 20 GA X 1-1/4 ---SURG.& SDS ONLY--- (50EA/BX)